# Patient Record
Sex: FEMALE | Race: WHITE | NOT HISPANIC OR LATINO | URBAN - METROPOLITAN AREA
[De-identification: names, ages, dates, MRNs, and addresses within clinical notes are randomized per-mention and may not be internally consistent; named-entity substitution may affect disease eponyms.]

---

## 2017-03-30 ENCOUNTER — OFFICE (AMBULATORY)
Dept: URBAN - METROPOLITAN AREA CLINIC 64 | Facility: CLINIC | Age: 67
End: 2017-03-30

## 2017-03-30 VITALS
WEIGHT: 172 LBS | HEART RATE: 85 BPM | HEIGHT: 67 IN | SYSTOLIC BLOOD PRESSURE: 132 MMHG | DIASTOLIC BLOOD PRESSURE: 90 MMHG

## 2017-03-30 DIAGNOSIS — R10.13 EPIGASTRIC PAIN: ICD-10-CM

## 2017-03-30 DIAGNOSIS — R11.0 NAUSEA: ICD-10-CM

## 2017-03-30 DIAGNOSIS — R12 HEARTBURN: ICD-10-CM

## 2017-03-30 DIAGNOSIS — Z86.010 PERSONAL HISTORY OF COLONIC POLYPS: ICD-10-CM

## 2017-03-30 DIAGNOSIS — K22.70 BARRETT'S ESOPHAGUS WITHOUT DYSPLASIA: ICD-10-CM

## 2017-03-30 PROCEDURE — 99214 OFFICE O/P EST MOD 30 MIN: CPT | Performed by: NURSE PRACTITIONER

## 2017-03-30 RX ORDER — DICYCLOMINE HYDROCHLORIDE 20 MG/1
40 TABLET ORAL
Qty: 60 | Refills: 11 | Status: COMPLETED
Start: 2017-03-30 | End: 2017-05-12

## 2017-04-19 ENCOUNTER — HOSPITAL ENCOUNTER (OUTPATIENT)
Dept: OTHER | Facility: HOSPITAL | Age: 67
Setting detail: SPECIMEN
Discharge: HOME | End: 2017-04-19
Attending: INTERNAL MEDICINE | Admitting: INTERNAL MEDICINE

## 2017-04-19 ENCOUNTER — ON CAMPUS - OUTPATIENT (AMBULATORY)
Dept: URBAN - METROPOLITAN AREA HOSPITAL 2 | Facility: HOSPITAL | Age: 67
End: 2017-04-19
Payer: MEDICARE

## 2017-04-19 ENCOUNTER — OFFICE (AMBULATORY)
Dept: URBAN - METROPOLITAN AREA CLINIC 64 | Facility: CLINIC | Age: 67
End: 2017-04-19
Payer: MEDICARE

## 2017-04-19 VITALS
SYSTOLIC BLOOD PRESSURE: 133 MMHG | SYSTOLIC BLOOD PRESSURE: 134 MMHG | HEART RATE: 76 BPM | RESPIRATION RATE: 18 BRPM | TEMPERATURE: 96.6 F | OXYGEN SATURATION: 98 % | DIASTOLIC BLOOD PRESSURE: 77 MMHG | OXYGEN SATURATION: 100 % | OXYGEN SATURATION: 95 % | DIASTOLIC BLOOD PRESSURE: 79 MMHG | HEART RATE: 70 BPM | SYSTOLIC BLOOD PRESSURE: 160 MMHG | DIASTOLIC BLOOD PRESSURE: 83 MMHG | DIASTOLIC BLOOD PRESSURE: 68 MMHG | SYSTOLIC BLOOD PRESSURE: 130 MMHG | OXYGEN SATURATION: 93 % | SYSTOLIC BLOOD PRESSURE: 103 MMHG | DIASTOLIC BLOOD PRESSURE: 94 MMHG | SYSTOLIC BLOOD PRESSURE: 116 MMHG | OXYGEN SATURATION: 97 % | HEART RATE: 77 BPM | OXYGEN SATURATION: 96 % | HEART RATE: 88 BPM | HEART RATE: 82 BPM | DIASTOLIC BLOOD PRESSURE: 104 MMHG | RESPIRATION RATE: 16 BRPM | DIASTOLIC BLOOD PRESSURE: 55 MMHG | DIASTOLIC BLOOD PRESSURE: 80 MMHG | HEART RATE: 87 BPM | SYSTOLIC BLOOD PRESSURE: 136 MMHG | SYSTOLIC BLOOD PRESSURE: 140 MMHG | WEIGHT: 167.4 LBS | RESPIRATION RATE: 19 BRPM | HEART RATE: 83 BPM | SYSTOLIC BLOOD PRESSURE: 178 MMHG | HEIGHT: 67 IN | HEART RATE: 86 BPM

## 2017-04-19 DIAGNOSIS — K62.89 OTHER SPECIFIED DISEASES OF ANUS AND RECTUM: ICD-10-CM

## 2017-04-19 DIAGNOSIS — K21.0 GASTRO-ESOPHAGEAL REFLUX DISEASE WITH ESOPHAGITIS: ICD-10-CM

## 2017-04-19 DIAGNOSIS — K62.1 RECTAL POLYP: ICD-10-CM

## 2017-04-19 DIAGNOSIS — D12.2 BENIGN NEOPLASM OF ASCENDING COLON: ICD-10-CM

## 2017-04-19 DIAGNOSIS — Z86.010 PERSONAL HISTORY OF COLONIC POLYPS: ICD-10-CM

## 2017-04-19 DIAGNOSIS — K22.70 BARRETT'S ESOPHAGUS WITHOUT DYSPLASIA: ICD-10-CM

## 2017-04-19 DIAGNOSIS — K44.9 DIAPHRAGMATIC HERNIA WITHOUT OBSTRUCTION OR GANGRENE: ICD-10-CM

## 2017-04-19 DIAGNOSIS — K64.8 OTHER HEMORRHOIDS: ICD-10-CM

## 2017-04-19 DIAGNOSIS — K57.30 DIVERTICULOSIS OF LARGE INTESTINE WITHOUT PERFORATION OR ABS: ICD-10-CM

## 2017-04-19 LAB
GI HISTOLOGY: A. UNSPECIFIED: (no result)
GI HISTOLOGY: B. UNSPECIFIED: (no result)
GI HISTOLOGY: C. UNSPECIFIED: (no result)
GI HISTOLOGY: PDF REPORT: (no result)

## 2017-04-19 PROCEDURE — 43239 EGD BIOPSY SINGLE/MULTIPLE: CPT | Performed by: INTERNAL MEDICINE

## 2017-04-19 PROCEDURE — 88305 TISSUE EXAM BY PATHOLOGIST: CPT | Mod: 26 | Performed by: INTERNAL MEDICINE

## 2017-04-19 PROCEDURE — 43450 DILATE ESOPHAGUS 1/MULT PASS: CPT | Performed by: INTERNAL MEDICINE

## 2017-04-19 PROCEDURE — 45380 COLONOSCOPY AND BIOPSY: CPT | Performed by: INTERNAL MEDICINE

## 2017-04-19 RX ORDER — OMEPRAZOLE 40 MG/1
CAPSULE, DELAYED RELEASE ORAL
Qty: 90 | Refills: 5 | Status: COMPLETED
End: 2019-04-22

## 2017-04-19 RX ORDER — RANITIDINE HYDROCHLORIDE 150 MG/1
CAPSULE ORAL
Qty: 90 | Refills: 5 | Status: COMPLETED
End: 2019-03-25

## 2017-04-19 RX ADMIN — PROPOFOL: 10 INJECTION, EMULSION INTRAVENOUS at 07:57

## 2017-05-12 ENCOUNTER — OFFICE (AMBULATORY)
Dept: URBAN - METROPOLITAN AREA CLINIC 64 | Facility: CLINIC | Age: 67
End: 2017-05-12

## 2017-05-12 VITALS
SYSTOLIC BLOOD PRESSURE: 99 MMHG | DIASTOLIC BLOOD PRESSURE: 67 MMHG | HEIGHT: 67 IN | HEART RATE: 89 BPM | WEIGHT: 173 LBS

## 2017-05-12 DIAGNOSIS — K44.9 DIAPHRAGMATIC HERNIA WITHOUT OBSTRUCTION OR GANGRENE: ICD-10-CM

## 2017-05-12 DIAGNOSIS — I95.9 HYPOTENSION, UNSPECIFIED: ICD-10-CM

## 2017-05-12 DIAGNOSIS — K22.70 BARRETT'S ESOPHAGUS WITHOUT DYSPLASIA: ICD-10-CM

## 2017-05-12 DIAGNOSIS — K31.84 GASTROPARESIS: ICD-10-CM

## 2017-05-12 DIAGNOSIS — R55 SYNCOPE AND COLLAPSE: ICD-10-CM

## 2017-05-12 DIAGNOSIS — K57.30 DIVERTICULOSIS OF LARGE INTESTINE WITHOUT PERFORATION OR ABS: ICD-10-CM

## 2017-05-12 DIAGNOSIS — Z86.010 PERSONAL HISTORY OF COLONIC POLYPS: ICD-10-CM

## 2017-05-12 PROCEDURE — 99213 OFFICE O/P EST LOW 20 MIN: CPT | Performed by: INTERNAL MEDICINE

## 2017-05-12 RX ORDER — LISINOPRIL 5 MG/1
TABLET ORAL
Qty: 90 | Refills: 5 | Status: COMPLETED
End: 2021-04-02

## 2017-05-15 ENCOUNTER — HOSPITAL ENCOUNTER (OUTPATIENT)
Dept: GENERAL RADIOLOGY | Facility: HOSPITAL | Age: 67
Discharge: HOME | End: 2017-05-15
Attending: INTERNAL MEDICINE | Admitting: INTERNAL MEDICINE

## 2017-06-14 ENCOUNTER — OFFICE (AMBULATORY)
Dept: URBAN - METROPOLITAN AREA CLINIC 64 | Facility: CLINIC | Age: 67
End: 2017-06-14

## 2017-06-14 VITALS
DIASTOLIC BLOOD PRESSURE: 64 MMHG | WEIGHT: 173 LBS | HEIGHT: 67 IN | SYSTOLIC BLOOD PRESSURE: 103 MMHG | HEART RATE: 86 BPM

## 2017-06-14 DIAGNOSIS — K31.84 GASTROPARESIS: ICD-10-CM

## 2017-06-14 DIAGNOSIS — K22.70 BARRETT'S ESOPHAGUS WITHOUT DYSPLASIA: ICD-10-CM

## 2017-06-14 DIAGNOSIS — K44.9 DIAPHRAGMATIC HERNIA WITHOUT OBSTRUCTION OR GANGRENE: ICD-10-CM

## 2017-06-14 DIAGNOSIS — R10.13 EPIGASTRIC PAIN: ICD-10-CM

## 2017-06-14 DIAGNOSIS — Z86.010 PERSONAL HISTORY OF COLONIC POLYPS: ICD-10-CM

## 2017-06-14 PROCEDURE — 99213 OFFICE O/P EST LOW 20 MIN: CPT | Performed by: INTERNAL MEDICINE

## 2017-06-14 RX ORDER — RANITIDINE HYDROCHLORIDE 150 MG/1
CAPSULE ORAL
Qty: 90 | Refills: 5 | Status: COMPLETED
End: 2019-03-25

## 2017-06-14 RX ORDER — OMEPRAZOLE 40 MG/1
CAPSULE, DELAYED RELEASE ORAL
Qty: 90 | Refills: 5 | Status: COMPLETED
End: 2019-04-22

## 2017-09-08 ENCOUNTER — HOSPITAL ENCOUNTER (OUTPATIENT)
Dept: MRI IMAGING | Facility: HOSPITAL | Age: 67
Discharge: HOME | End: 2017-09-08
Attending: ORTHOPAEDIC SURGERY | Admitting: ORTHOPAEDIC SURGERY

## 2018-08-28 ENCOUNTER — OFFICE (AMBULATORY)
Dept: URBAN - METROPOLITAN AREA CLINIC 64 | Facility: CLINIC | Age: 68
End: 2018-08-28

## 2018-08-28 VITALS
SYSTOLIC BLOOD PRESSURE: 138 MMHG | HEIGHT: 67 IN | HEART RATE: 94 BPM | DIASTOLIC BLOOD PRESSURE: 83 MMHG | WEIGHT: 153 LBS

## 2018-08-28 DIAGNOSIS — K22.70 BARRETT'S ESOPHAGUS WITHOUT DYSPLASIA: ICD-10-CM

## 2018-08-28 DIAGNOSIS — R13.19 OTHER DYSPHAGIA: ICD-10-CM

## 2018-08-28 DIAGNOSIS — I95.9 HYPOTENSION, UNSPECIFIED: ICD-10-CM

## 2018-08-28 DIAGNOSIS — Z86.010 PERSONAL HISTORY OF COLONIC POLYPS: ICD-10-CM

## 2018-08-28 DIAGNOSIS — K44.9 DIAPHRAGMATIC HERNIA WITHOUT OBSTRUCTION OR GANGRENE: ICD-10-CM

## 2018-08-28 PROCEDURE — 99213 OFFICE O/P EST LOW 20 MIN: CPT | Performed by: INTERNAL MEDICINE

## 2018-08-28 RX ORDER — DOXYCYCLINE 100 MG/1
200 TABLET, FILM COATED ORAL
Qty: 20 | Refills: 1 | Status: COMPLETED
Start: 2018-08-28 | End: 2019-03-25

## 2018-08-28 RX ORDER — OMEPRAZOLE 40 MG/1
CAPSULE, DELAYED RELEASE ORAL
Qty: 90 | Refills: 5 | Status: COMPLETED
End: 2019-04-22

## 2018-10-02 ENCOUNTER — ON CAMPUS - OUTPATIENT (AMBULATORY)
Dept: URBAN - METROPOLITAN AREA HOSPITAL 77 | Facility: HOSPITAL | Age: 68
End: 2018-10-02
Payer: MEDICARE

## 2018-10-02 DIAGNOSIS — R10.32 LEFT LOWER QUADRANT PAIN: ICD-10-CM

## 2018-10-02 DIAGNOSIS — K59.00 CONSTIPATION, UNSPECIFIED: ICD-10-CM

## 2018-10-02 DIAGNOSIS — K57.30 DIVERTICULOSIS OF LARGE INTESTINE WITHOUT PERFORATION OR ABS: ICD-10-CM

## 2018-10-02 DIAGNOSIS — R13.10 DYSPHAGIA, UNSPECIFIED: ICD-10-CM

## 2018-10-02 DIAGNOSIS — Z86.010 PERSONAL HISTORY OF COLONIC POLYPS: ICD-10-CM

## 2018-10-02 DIAGNOSIS — Z87.19 PERSONAL HISTORY OF OTHER DISEASES OF THE DIGESTIVE SYSTEM: ICD-10-CM

## 2018-10-02 DIAGNOSIS — K44.9 DIAPHRAGMATIC HERNIA WITHOUT OBSTRUCTION OR GANGRENE: ICD-10-CM

## 2018-10-02 PROCEDURE — 43235 EGD DIAGNOSTIC BRUSH WASH: CPT | Performed by: INTERNAL MEDICINE

## 2018-10-02 PROCEDURE — 43450 DILATE ESOPHAGUS 1/MULT PASS: CPT | Performed by: INTERNAL MEDICINE

## 2018-10-02 PROCEDURE — 45330 DIAGNOSTIC SIGMOIDOSCOPY: CPT | Performed by: INTERNAL MEDICINE

## 2019-03-25 ENCOUNTER — OFFICE (AMBULATORY)
Dept: URBAN - METROPOLITAN AREA CLINIC 64 | Facility: CLINIC | Age: 69
End: 2019-03-25

## 2019-03-25 VITALS
SYSTOLIC BLOOD PRESSURE: 98 MMHG | DIASTOLIC BLOOD PRESSURE: 69 MMHG | HEART RATE: 93 BPM | HEIGHT: 67 IN | WEIGHT: 173 LBS

## 2019-03-25 DIAGNOSIS — R07.9 CHEST PAIN, UNSPECIFIED: ICD-10-CM

## 2019-03-25 DIAGNOSIS — R10.13 EPIGASTRIC PAIN: ICD-10-CM

## 2019-03-25 PROCEDURE — 99214 OFFICE O/P EST MOD 30 MIN: CPT | Performed by: NURSE PRACTITIONER

## 2019-03-25 RX ORDER — OMEPRAZOLE 40 MG/1
CAPSULE, DELAYED RELEASE ORAL
Qty: 90 | Refills: 5 | Status: ACTIVE
Start: 2019-03-25

## 2019-03-25 RX ORDER — SUCRALFATE 1 G/1
4 TABLET ORAL
Qty: 120 | Refills: 1 | Status: COMPLETED
Start: 2019-03-25 | End: 2021-04-02

## 2019-04-23 ENCOUNTER — ON CAMPUS - OUTPATIENT (AMBULATORY)
Dept: URBAN - METROPOLITAN AREA HOSPITAL 2 | Facility: HOSPITAL | Age: 69
End: 2019-04-23
Payer: MEDICARE

## 2019-04-23 VITALS
OXYGEN SATURATION: 95 % | HEART RATE: 76 BPM | SYSTOLIC BLOOD PRESSURE: 145 MMHG | DIASTOLIC BLOOD PRESSURE: 84 MMHG | SYSTOLIC BLOOD PRESSURE: 125 MMHG | HEART RATE: 70 BPM | DIASTOLIC BLOOD PRESSURE: 90 MMHG | RESPIRATION RATE: 18 BRPM | HEART RATE: 67 BPM | DIASTOLIC BLOOD PRESSURE: 73 MMHG | HEIGHT: 67 IN | OXYGEN SATURATION: 97 % | WEIGHT: 171 LBS | RESPIRATION RATE: 20 BRPM | DIASTOLIC BLOOD PRESSURE: 78 MMHG | SYSTOLIC BLOOD PRESSURE: 122 MMHG | RESPIRATION RATE: 21 BRPM | OXYGEN SATURATION: 96 % | HEART RATE: 72 BPM | OXYGEN SATURATION: 100 % | TEMPERATURE: 97.1 F | HEART RATE: 73 BPM | DIASTOLIC BLOOD PRESSURE: 72 MMHG | SYSTOLIC BLOOD PRESSURE: 142 MMHG | OXYGEN SATURATION: 94 %

## 2019-04-23 DIAGNOSIS — K44.9 DIAPHRAGMATIC HERNIA WITHOUT OBSTRUCTION OR GANGRENE: ICD-10-CM

## 2019-04-23 DIAGNOSIS — R07.9 CHEST PAIN, UNSPECIFIED: ICD-10-CM

## 2019-04-23 DIAGNOSIS — K22.2 ESOPHAGEAL OBSTRUCTION: ICD-10-CM

## 2019-04-23 DIAGNOSIS — K22.70 BARRETT'S ESOPHAGUS WITHOUT DYSPLASIA: ICD-10-CM

## 2019-04-23 PROCEDURE — 43450 DILATE ESOPHAGUS 1/MULT PASS: CPT | Performed by: INTERNAL MEDICINE

## 2019-04-23 PROCEDURE — 43235 EGD DIAGNOSTIC BRUSH WASH: CPT | Performed by: INTERNAL MEDICINE

## 2020-03-05 PROBLEM — M48.061 SPINAL STENOSIS OF LUMBAR REGION: Status: ACTIVE | Noted: 2017-09-13

## 2020-03-05 PROBLEM — M48.07 STENOSIS OF LUMBOSACRAL SPINE: Status: ACTIVE | Noted: 2017-09-13

## 2020-03-05 PROBLEM — Z83.3 FAMILY HISTORY OF DIABETES MELLITUS: Status: ACTIVE | Noted: 2020-03-05

## 2020-03-05 RX ORDER — OMEPRAZOLE 40 MG/1
CAPSULE, DELAYED RELEASE ORAL
COMMUNITY
Start: 2019-12-16 | End: 2020-03-05

## 2020-03-05 RX ORDER — IBUPROFEN 800 MG/1
TABLET ORAL
COMMUNITY
Start: 2016-05-17

## 2020-03-05 RX ORDER — RANITIDINE HCL 75 MG
TABLET ORAL
COMMUNITY
Start: 2017-08-30 | End: 2022-05-10

## 2020-03-05 RX ORDER — LISINOPRIL 5 MG/1
TABLET ORAL
COMMUNITY
Start: 2019-12-16 | End: 2022-05-31

## 2020-03-05 RX ORDER — LANSOPRAZOLE 30 MG/1
CAPSULE, DELAYED RELEASE ORAL
COMMUNITY
Start: 2016-05-17 | End: 2020-03-05

## 2020-03-05 RX ORDER — HYDROCODONE BITARTRATE AND ACETAMINOPHEN 10; 325 MG/1; MG/1
TABLET ORAL
COMMUNITY
Start: 2017-08-30 | End: 2022-05-31

## 2020-03-05 RX ORDER — LISINOPRIL AND HYDROCHLOROTHIAZIDE 12.5; 1 MG/1; MG/1
TABLET ORAL
COMMUNITY
Start: 2016-05-17 | End: 2020-03-05

## 2020-03-05 NOTE — PROGRESS NOTES
Subjective: dizziness    Patient ID: Nola Tariq is a 70 y.o. female.    CHIEF COMPLAINT:dizziness    History of Present Illness      Patient notes she has had several episodes of dizziness and passing out since 2012.   Had a few episodes of feeling light headed.  First episode passed out when driving car to urgent care, was found to have pneumonia,   Had brief warning, put on brake and pulled car over, then had LOC brief LOC     Has not passed out completely since 2012 event driving    Has multiple episodes of warning , queezy feeling in stomach, light headed feeling, sit down or lay down then it passes with out LOC    Most usually standing , occ while sitting , not when laying down.    No noted associate with headache.     Once was too hot, not noted with being sleepy or hungry       She notes she has been evaluated by several doctors, cardiology, neurology and spinal surgeon. Saw dr anders, tried gabapentin, did not help.   she has all normal labs, cardiac work up, mri and EEG, had stress test , echo.     .She notes the only thing they can find is a Chiari malformation.      Patient had dexa scan on 8/12/19 showing osteoporosis.     Neuropathy in both feet, has a lot of back problems.    Low back pain since 2008.   Fell and fractured three vertebrae ,    She does get injections in her back for arthritis. Facet injections and epidurals and going to PT    Patient was dx with sleep apnea, she doesn't sleep with CPAP machine, she does have one.      Pt had migraine for years on right side of head , nausea vomiting if not treated right away, now Headaches not migraine , she has 15 - 20 headaches a month, she take OTC and they go away after 3 min.    Doesn't know what triggers them.  Family hx of headaches, father, sister and brother.      The following portions of the patient's history were reviewed and updated as appropriate: allergies, current medications, past family history, past medical history, past social  history, past surgical history and problem list.      Family History   Problem Relation Age of Onset   • Migraines Father    • Migraines Sister    • Migraines Brother        Past Medical History:   Diagnosis Date   • Arthritis    • Tijerina esophagus    • DJD (degenerative joint disease)    • GERD (gastroesophageal reflux disease)    • Hypertension    • Migraine        Social History     Socioeconomic History   • Marital status:      Spouse name: Not on file   • Number of children: Not on file   • Years of education: Not on file   • Highest education level: Not on file   Tobacco Use   • Smoking status: Current Every Day Smoker     Types: Cigarettes   Substance and Sexual Activity   • Alcohol use: Never     Frequency: Never   • Drug use: Never   • Sexual activity: Defer         Current Outpatient Medications:   •  Acetaminophen-Caffeine (EXCEDRIN TENSION HEADACHE) 500-65 MG tablet, EXCEDRIN TENSION HEADACHE 500-65 MG TABS, Disp: , Rfl:   •  Cetirizine HCl (ZYRTEC ALLERGY) 10 MG capsule, ZYRTEC ALLERGY 10 MG CAPS, Disp: , Rfl:   •  fluconazole (DIFLUCAN) 150 MG tablet, , Disp: , Rfl:   •  ibuprofen (ADVIL,MOTRIN) 800 MG tablet, IBUPROFEN 800 MG TABS, Disp: , Rfl:   •  lisinopril (PRINIVIL,ZESTRIL) 5 MG tablet, , Disp: , Rfl:   •  meloxicam (MOBIC) 15 MG tablet, , Disp: , Rfl:   •  Multiple Vitamins-Minerals (MULTIVITAMIN WITH MINERALS) tablet tablet, Take 1 tablet by mouth Daily., Disp: , Rfl:   •  raNITIdine (ZANTAC) 75 MG tablet, RANITIDINE HCL 75 MG TABS, Disp: , Rfl:   •  vitamin D (ERGOCALCIFEROL) 1.25 MG (48286 UT) capsule capsule, Take 50,000 Units by mouth 1 (One) Time Per Week., Disp: , Rfl:   •  HYDROcodone-acetaminophen (NORCO)  MG per tablet, NORCO  MG TABS, Disp: , Rfl:   •  nystatin (MYCOSTATIN) [id number] UNIT/ML suspension, , Disp: , Rfl:     Review of Systems   Constitutional: Negative for fatigue and fever.   HENT: Positive for hearing loss. Negative for ear discharge and ear pain.     Eyes: Negative for discharge and itching.   Cardiovascular: Negative for leg swelling.   Gastrointestinal: Negative for abdominal pain and nausea.   Endocrine: Negative for cold intolerance and heat intolerance.   Genitourinary: Negative for urgency.   Musculoskeletal: Positive for back pain. Negative for neck pain and neck stiffness.   Neurological: Positive for light-headedness and headaches. Negative for dizziness.   Psychiatric/Behavioral: Negative for confusion and sleep disturbance.        I have reviewed ROS completed by medical assistant.     Objective:    Neurologic Exam     Mental Status   Oriented to person, place, and time.   Level of consciousness: alert    Cranial Nerves   Cranial nerves II through XII intact.     Motor Exam   Muscle bulk: normal    Strength   Strength 5/5 throughout.     Sensory Exam   Light touch normal.   Proprioception normal.     Gait, Coordination, and Reflexes     Gait  Gait: normal    Coordination   Romberg: negative    Reflexes   Right biceps: 2+  Left biceps: 2+  Right patellar: 2+  Left patellar: 2+      Physical Exam   Constitutional: She is oriented to person, place, and time.   Neurological: She is oriented to person, place, and time. She has normal strength. She has a normal Romberg Test. Gait normal.   Reflex Scores:       Bicep reflexes are 2+ on the right side and 2+ on the left side.       Patellar reflexes are 2+ on the right side and 2+ on the left side.      Assessment/Plan:    Nola was seen today for dizziness.    Diagnoses and all orders for this visit:    Vasovagal near syncope    Migraine without aura and without status migrainosus, not intractable    Spinal stenosis of lumbar region with neurogenic claudication    pt has had frequent near syncopal spells but apparently is only had one episode with loss of consciousness.  She has been evaluated thoroughly and has seen multiple doctors including neurologist cardiologist and a spine surgeon.  Additional  testing does not appear warranted for the symptoms.  It is recommended that when she feels dizzy or lightheaded that she sit down or lie down to relieve the symptoms as she has been doing.  It is recommended that she continue the conservative management of her lumbar spine issues.  If her symptoms of claudication worsen reevaluation and consideration of spine surgery may be indicated in the future.      This document has been electronically signed by Joseph Seipel, MD on March 6, 2020 1:44 PM

## 2020-03-06 ENCOUNTER — OFFICE VISIT (OUTPATIENT)
Dept: NEUROLOGY | Facility: CLINIC | Age: 70
End: 2020-03-06

## 2020-03-06 VITALS — WEIGHT: 170 LBS | HEIGHT: 64 IN | BODY MASS INDEX: 29.02 KG/M2

## 2020-03-06 DIAGNOSIS — G43.009 MIGRAINE WITHOUT AURA AND WITHOUT STATUS MIGRAINOSUS, NOT INTRACTABLE: ICD-10-CM

## 2020-03-06 DIAGNOSIS — M48.062 SPINAL STENOSIS OF LUMBAR REGION WITH NEUROGENIC CLAUDICATION: ICD-10-CM

## 2020-03-06 DIAGNOSIS — R55 VASOVAGAL NEAR SYNCOPE: Primary | ICD-10-CM

## 2020-03-06 PROBLEM — G43.909 MIGRAINE: Status: ACTIVE | Noted: 2020-03-06

## 2020-03-06 PROCEDURE — 99203 OFFICE O/P NEW LOW 30 MIN: CPT | Performed by: PSYCHIATRY & NEUROLOGY

## 2020-03-06 RX ORDER — FLUCONAZOLE 150 MG/1
TABLET ORAL
COMMUNITY
Start: 2020-02-25 | End: 2022-05-31

## 2020-03-06 RX ORDER — MULTIPLE VITAMINS W/ MINERALS TAB 9MG-400MCG
1 TAB ORAL DAILY
COMMUNITY

## 2020-03-06 RX ORDER — MELOXICAM 15 MG/1
TABLET ORAL
COMMUNITY
Start: 2020-02-25 | End: 2022-05-31

## 2020-03-06 RX ORDER — ERGOCALCIFEROL 1.25 MG/1
50000 CAPSULE ORAL WEEKLY
COMMUNITY

## 2021-04-02 ENCOUNTER — OFFICE (AMBULATORY)
Dept: URBAN - METROPOLITAN AREA CLINIC 64 | Facility: CLINIC | Age: 71
End: 2021-04-02

## 2021-04-02 VITALS
SYSTOLIC BLOOD PRESSURE: 132 MMHG | DIASTOLIC BLOOD PRESSURE: 105 MMHG | HEART RATE: 103 BPM | WEIGHT: 173 LBS | HEIGHT: 67 IN

## 2021-04-02 DIAGNOSIS — K14.6 GLOSSODYNIA: ICD-10-CM

## 2021-04-02 DIAGNOSIS — Z86.010 PERSONAL HISTORY OF COLONIC POLYPS: ICD-10-CM

## 2021-04-02 DIAGNOSIS — K30 FUNCTIONAL DYSPEPSIA: ICD-10-CM

## 2021-04-02 PROCEDURE — 99214 OFFICE O/P EST MOD 30 MIN: CPT | Performed by: NURSE PRACTITIONER

## 2021-04-02 RX ORDER — SUCRALFATE 1 G/1
4 TABLET ORAL
Qty: 120 | Refills: 5 | Status: COMPLETED
Start: 2021-04-02 | End: 2021-10-25

## 2021-05-18 ENCOUNTER — OFFICE (AMBULATORY)
Dept: URBAN - METROPOLITAN AREA PATHOLOGY 4 | Facility: PATHOLOGY | Age: 71
End: 2021-05-18
Payer: MEDICARE

## 2021-05-18 ENCOUNTER — ON CAMPUS - OUTPATIENT (AMBULATORY)
Dept: URBAN - METROPOLITAN AREA HOSPITAL 2 | Facility: HOSPITAL | Age: 71
End: 2021-05-18
Payer: MEDICARE

## 2021-05-18 ENCOUNTER — OFFICE (AMBULATORY)
Dept: URBAN - METROPOLITAN AREA PATHOLOGY 4 | Facility: PATHOLOGY | Age: 71
End: 2021-05-18
Payer: COMMERCIAL

## 2021-05-18 VITALS
DIASTOLIC BLOOD PRESSURE: 79 MMHG | OXYGEN SATURATION: 97 % | OXYGEN SATURATION: 99 % | SYSTOLIC BLOOD PRESSURE: 131 MMHG | OXYGEN SATURATION: 94 % | HEART RATE: 98 BPM | OXYGEN SATURATION: 98 % | DIASTOLIC BLOOD PRESSURE: 71 MMHG | RESPIRATION RATE: 16 BRPM | HEIGHT: 67 IN | HEART RATE: 100 BPM | DIASTOLIC BLOOD PRESSURE: 74 MMHG | DIASTOLIC BLOOD PRESSURE: 97 MMHG | HEART RATE: 84 BPM | HEART RATE: 104 BPM | SYSTOLIC BLOOD PRESSURE: 129 MMHG | RESPIRATION RATE: 18 BRPM | DIASTOLIC BLOOD PRESSURE: 81 MMHG | SYSTOLIC BLOOD PRESSURE: 122 MMHG | DIASTOLIC BLOOD PRESSURE: 52 MMHG | SYSTOLIC BLOOD PRESSURE: 128 MMHG | OXYGEN SATURATION: 96 % | DIASTOLIC BLOOD PRESSURE: 106 MMHG | HEART RATE: 86 BPM | SYSTOLIC BLOOD PRESSURE: 125 MMHG | SYSTOLIC BLOOD PRESSURE: 136 MMHG | TEMPERATURE: 98 F | SYSTOLIC BLOOD PRESSURE: 161 MMHG | HEART RATE: 110 BPM | SYSTOLIC BLOOD PRESSURE: 111 MMHG | WEIGHT: 170 LBS

## 2021-05-18 DIAGNOSIS — K62.1 RECTAL POLYP: ICD-10-CM

## 2021-05-18 DIAGNOSIS — K64.8 OTHER HEMORRHOIDS: ICD-10-CM

## 2021-05-18 DIAGNOSIS — K44.9 DIAPHRAGMATIC HERNIA WITHOUT OBSTRUCTION OR GANGRENE: ICD-10-CM

## 2021-05-18 DIAGNOSIS — R13.19 OTHER DYSPHAGIA: ICD-10-CM

## 2021-05-18 DIAGNOSIS — K63.5 POLYP OF COLON: ICD-10-CM

## 2021-05-18 DIAGNOSIS — K22.2 ESOPHAGEAL OBSTRUCTION: ICD-10-CM

## 2021-05-18 DIAGNOSIS — K57.30 DIVERTICULOSIS OF LARGE INTESTINE WITHOUT PERFORATION OR ABS: ICD-10-CM

## 2021-05-18 DIAGNOSIS — Z86.010 PERSONAL HISTORY OF COLONIC POLYPS: ICD-10-CM

## 2021-05-18 LAB
GI HISTOLOGY: A. UNSPECIFIED: (no result)
GI HISTOLOGY: B. UNSPECIFIED: (no result)
GI HISTOLOGY: PDF REPORT: (no result)

## 2021-05-18 PROCEDURE — 88305 TISSUE EXAM BY PATHOLOGIST: CPT | Mod: 26 | Performed by: INTERNAL MEDICINE

## 2021-05-18 PROCEDURE — 45385 COLONOSCOPY W/LESION REMOVAL: CPT | Mod: PT | Performed by: INTERNAL MEDICINE

## 2021-05-18 PROCEDURE — 43235 EGD DIAGNOSTIC BRUSH WASH: CPT | Performed by: INTERNAL MEDICINE

## 2021-05-18 PROCEDURE — 43450 DILATE ESOPHAGUS 1/MULT PASS: CPT | Performed by: INTERNAL MEDICINE

## 2021-05-18 RX ORDER — MENTHOL AND BENZOCAINE 10; 15 MG/1; MG/1
LOZENGE ORAL
Qty: 20 | Refills: 5 | Status: COMPLETED
Start: 2021-05-18 | End: 2021-10-25

## 2021-10-25 ENCOUNTER — OFFICE (AMBULATORY)
Dept: URBAN - METROPOLITAN AREA CLINIC 64 | Facility: CLINIC | Age: 71
End: 2021-10-25

## 2021-10-25 VITALS
HEIGHT: 67 IN | DIASTOLIC BLOOD PRESSURE: 87 MMHG | HEART RATE: 86 BPM | WEIGHT: 167 LBS | SYSTOLIC BLOOD PRESSURE: 144 MMHG

## 2021-10-25 DIAGNOSIS — R12 HEARTBURN: ICD-10-CM

## 2021-10-25 DIAGNOSIS — K21.9 GASTRO-ESOPHAGEAL REFLUX DISEASE WITHOUT ESOPHAGITIS: ICD-10-CM

## 2021-10-25 PROBLEM — K22.2 ESOPHAGEAL OBSTRUCTION: Status: ACTIVE | Noted: 2019-04-23

## 2021-10-25 PROCEDURE — 99213 OFFICE O/P EST LOW 20 MIN: CPT | Performed by: INTERNAL MEDICINE

## 2021-10-25 RX ORDER — METOCLOPRAMIDE HYDROCHLORIDE 10 MG/1
TABLET ORAL
Qty: 90 | Refills: 10 | Status: COMPLETED
Start: 2021-10-25 | End: 2022-11-16

## 2021-10-25 RX ORDER — OMEPRAZOLE 40 MG/1
CAPSULE, DELAYED RELEASE ORAL
Qty: 90 | Refills: 5 | Status: ACTIVE
Start: 2019-03-25

## 2021-10-25 RX ORDER — METOCLOPRAMIDE HYDROCHLORIDE 10 MG/1
TABLET ORAL
Qty: 90 | Refills: 10 | Status: ACTIVE
Start: 2021-10-25

## 2021-10-25 RX ORDER — SUCRALFATE 1 G/1
4 TABLET ORAL
Qty: 120 | Refills: 11 | Status: COMPLETED
Start: 2021-10-25 | End: 2024-01-29

## 2021-11-10 ENCOUNTER — OFFICE (AMBULATORY)
Dept: URBAN - METROPOLITAN AREA PATHOLOGY 4 | Facility: PATHOLOGY | Age: 71
End: 2021-11-10
Payer: MEDICARE

## 2021-11-10 ENCOUNTER — ON CAMPUS - OUTPATIENT (AMBULATORY)
Dept: URBAN - METROPOLITAN AREA HOSPITAL 2 | Facility: HOSPITAL | Age: 71
End: 2021-11-10
Payer: MEDICARE

## 2021-11-10 ENCOUNTER — OFFICE (AMBULATORY)
Dept: URBAN - METROPOLITAN AREA PATHOLOGY 4 | Facility: PATHOLOGY | Age: 71
End: 2021-11-10

## 2021-11-10 VITALS
DIASTOLIC BLOOD PRESSURE: 90 MMHG | OXYGEN SATURATION: 96 % | RESPIRATION RATE: 16 BRPM | RESPIRATION RATE: 18 BRPM | HEART RATE: 90 BPM | HEIGHT: 67 IN | OXYGEN SATURATION: 95 % | RESPIRATION RATE: 19 BRPM | SYSTOLIC BLOOD PRESSURE: 139 MMHG | OXYGEN SATURATION: 99 % | SYSTOLIC BLOOD PRESSURE: 150 MMHG | HEART RATE: 76 BPM | TEMPERATURE: 96.7 F | SYSTOLIC BLOOD PRESSURE: 147 MMHG | OXYGEN SATURATION: 98 % | HEART RATE: 77 BPM | DIASTOLIC BLOOD PRESSURE: 109 MMHG | SYSTOLIC BLOOD PRESSURE: 154 MMHG | DIASTOLIC BLOOD PRESSURE: 93 MMHG | WEIGHT: 170 LBS | DIASTOLIC BLOOD PRESSURE: 106 MMHG | DIASTOLIC BLOOD PRESSURE: 99 MMHG

## 2021-11-10 DIAGNOSIS — K22.70 BARRETT'S ESOPHAGUS WITHOUT DYSPLASIA: ICD-10-CM

## 2021-11-10 DIAGNOSIS — K22.2 ESOPHAGEAL OBSTRUCTION: ICD-10-CM

## 2021-11-10 DIAGNOSIS — K44.9 DIAPHRAGMATIC HERNIA WITHOUT OBSTRUCTION OR GANGRENE: ICD-10-CM

## 2021-11-10 LAB
GI HISTOLOGY: A. UNSPECIFIED: (no result)
GI HISTOLOGY: PDF REPORT: (no result)

## 2021-11-10 PROCEDURE — 43450 DILATE ESOPHAGUS 1/MULT PASS: CPT | Performed by: INTERNAL MEDICINE

## 2021-11-10 PROCEDURE — 88305 TISSUE EXAM BY PATHOLOGIST: CPT | Mod: 26 | Performed by: INTERNAL MEDICINE

## 2021-11-10 PROCEDURE — 43239 EGD BIOPSY SINGLE/MULTIPLE: CPT | Performed by: INTERNAL MEDICINE

## 2021-11-10 RX ORDER — MENTHOL AND BENZOCAINE 10; 15 MG/1; MG/1
LOZENGE ORAL
Qty: 20 | Refills: 5 | Status: COMPLETED
Start: 2021-11-10 | End: 2022-11-16

## 2022-05-09 NOTE — PROGRESS NOTES
CHIEF COMPLAINT  Lower back pain.       Subjective   Nola Tariq is a 72 y.o. female who was referred by JEM Ashton to our pain management clinic for consultation, evaluation and treatment of lower back pain.  Pain started around 2008 without any significant injuries and has been getting worse since then.  Her main complaints today are mid back and lower back pain.  Patient had previously seen Dr. Voss and had epidurals, facet joint injections, ablations.  She states that nothing helped at the time. Last seen 1 year ago. Her lower back pain is worse than mid back pain. She is retired RN.      Lower back  pain is 4/10 on VAS, at maximum is 5/10. Pain is aching and dull in nature. Pain is not referred but she has some numbness and tingling in b/l foot. The pain is constant. The pain is improved by pain meds (tramadol). The pain is worse with walking, standing. She takes 2 tramadols at bed time which helps.     PHQ-9- 4  SOAPP-2     PMH:   Tijerina's esophagus, hypertension, migraine, thoracic vertebra fx s/p kypho T7, T10, migraines, smoker, Back surgery- decompression L4-5?-2009 Dr. Rdz, Carpal tunnel surgery 1983 bilateral wrist; R knee steroid injection with orthopedics (Dr. Barton at [facility]).       Current Medications:   Ibuprofen 800 mg  Tramadol 50 mg BID PRN (4/4/22- 30 days)    Past Medications:  Norco 5-325 mg - short prescription   Meloxicam 15 mg  Celebrex-leg swelling    Past Modalities:  TENS:       no          Physical Therapy Within The Last 6 Months     Yes (1.5 years ago with some help).   Psychotherapy     no  Massage Therapy      no    Patient Complains Of:  Uro-Fecal Incontinence no  Weight Gain/Loss  Yes (weight gain 25 lbs - 1 year after smoking).   Fever/Chills   no  Weakness   no      PEG Assessment   What number best describes your pain on average in the past week?4  What number best describes how, during the past week, pain has interfered with your enjoyment of life?4  What number  best describes how, during the past week, pain has interfered with your general activity?  4        Current Outpatient Medications:   •  Acetaminophen-Caffeine 500-65 MG tablet, EXCEDRIN TENSION HEADACHE 500-65 MG TABS, Disp: , Rfl:   •  Cetirizine HCl 10 MG capsule, ZYRTEC ALLERGY 10 MG CAPS, Disp: , Rfl:   •  fluconazole (DIFLUCAN) 150 MG tablet, , Disp: , Rfl:   •  HYDROcodone-acetaminophen (NORCO)  MG per tablet, NORCO  MG TABS, Disp: , Rfl:   •  ibuprofen (ADVIL,MOTRIN) 800 MG tablet, IBUPROFEN 800 MG TABS, Disp: , Rfl:   •  lisinopril (PRINIVIL,ZESTRIL) 5 MG tablet, , Disp: , Rfl:   •  meloxicam (MOBIC) 15 MG tablet, , Disp: , Rfl:   •  Multiple Vitamins-Minerals (MULTIVITAMIN WITH MINERALS) tablet tablet, Take 1 tablet by mouth Daily., Disp: , Rfl:   •  nystatin (MYCOSTATIN) [id number] UNIT/ML suspension, , Disp: , Rfl:   •  omeprazole (priLOSEC) 40 MG capsule, , Disp: , Rfl:   •  sucralfate (CARAFATE) 1 g tablet, , Disp: , Rfl:   •  vitamin D (ERGOCALCIFEROL) 1.25 MG (74854 UT) capsule capsule, Take 50,000 Units by mouth 1 (One) Time Per Week., Disp: , Rfl:   •  traMADol ER (ULTRAM-ER) 100 MG 24 hr tablet, Take 1 tablet by mouth Daily As Needed for Moderate Pain  for up to 60 days., Disp: 30 tablet, Rfl: 0    The following portions of the patient's history were reviewed and updated as appropriate: allergies, current medications, past family history, past medical history, past social history, past surgical history, and problem list.      REVIEW OF PERTINENT MEDICAL DATA    Past Medical History:   Diagnosis Date   • Arthritis    • Tijerina esophagus    • DJD (degenerative joint disease)    • GERD (gastroesophageal reflux disease)    • Hypertension    • Low back pain    • Migraine    • Plantar fasciitis      Past Surgical History:   Procedure Laterality Date   • CARPAL TUNNEL RELEASE      colin.   • EYE SURGERY      catarac   • KNEE ARTHROSCOPY     • TUBAL ABDOMINAL LIGATION     • WRIST SURGERY      tendon  "release     Family History   Problem Relation Age of Onset   • Migraines Father    • Migraines Sister    • Migraines Brother      Social History     Socioeconomic History   • Marital status:    Tobacco Use   • Smoking status: Former Smoker     Types: Cigarettes     Quit date: 11/3/2021     Years since quittin.5   • Smokeless tobacco: Never Used   Vaping Use   • Vaping Use: Never used   Substance and Sexual Activity   • Alcohol use: Never   • Drug use: Never   • Sexual activity: Defer         Review of Systems   Musculoskeletal: Positive for arthralgias and back pain.         Vitals:    05/10/22 1015   BP: 146/82   Pulse: 83   Resp: 16   SpO2: 95%   Weight: 77.1 kg (170 lb)   Height: 162.6 cm (64\")   PainSc:   4         Objective   Physical Exam  Musculoskeletal:         General: Tenderness present.        Back:         Legs:    Neurological:      Comments: Motor strength 5/5 b/l LE  Sensory intact b/l LE             Imaging Reviewed:  MRI lumbar spine-2017  L2-3-facet arthropathy.  Mild neuroforaminal narrowing on the right.  L3-4-right paracentral disc protrusion.  Moderate canal stenosis with canal measuring 8 mm in AP.  Significant effacement of right lateral recess.  Mild facet hypertrophy.  L4-5-facet hypertrophy with disc bulge.  Mild neuroforaminal narrowing bilateral  L5-S1-disc bulge with bilateral facet hypertrophy.  Mild to moderate neuroforaminal narrowing on the right with moderate to severe neuroforaminal narrowing on the left.    Assessment:    1. Lumbar spondylosis    2. Failed back surgical syndrome    3. Chronic bilateral thoracic back pain         Plan:   1. New patient visit, urine drug screen per office policy. UDS today to monitor for compliance with medication use. The UDS is medically necessary to monitor for compliance due to the potential side effects and complications of misuse of opioids. UDS done today will review on next visit.   Narcotic contract signed-5/10/2022.  2. We " discussed trying a course of formal physical therapy.  Physical therapy can help strengthen and stretch the muscles around the joints. Continue to be as active as possible. Start physical therapy as it will help generalized pain and follow up with HEP.  PT prescription sent to [facility] in [place] as requested by patient.  3.  Patient currently takes 2 tramadol at nighttime which helps with pain but does not last for long enough.  We will start her on tramadol 100 mg ER daily PRN (30 days prescription - 5/10/22). Side effects discussed including but not limited to nausea, vomiting, constipation, lightheadedness, dizziness, drowsiness, or headache. This medication may increase serotonin and rarely cause a very serious condition called serotonin syndrome/ toxicity.   4.  Patient's last MRI was in 2017.  Patient had multiple epidurals, facet injections, and RFA without significant pain relief.  She has a history of lumbar decompression.  We will obtain new lumbar MRI without contrast to evaluate her back further.  5.  History of multiple compression fractures after a fall in her thoracic spine.  She continues to have mid back pain.  Will obtain thoracic MRI without contrast to evaluate her mid back further.  6.  Patient has history of failed back syndrome and has failed multiple interventional procedures along with medications including NSAIDs.  We will consider spinal cord stimulator in future after reviewing her MRI.    RTC on 5/31/22.     Kirk Nascimento DO  Pain Management   [facility]         INSPECT REPORT    As part of the patient's treatment plan, I may be prescribing controlled substances. The patient has been made aware of appropriate use of such medications, including potential risk of somnolence, limited ability to drive and/or work safely, and the potential for dependence or overdose. It has also been made clear that these medications are for use by this patient only, without concomitant use of  alcohol or other substances unless prescribed.     Patient has completed prescribing agreement detailing terms of continued prescribing of controlled substances, including monitoring INSPECT reports, urine drug screening, and pill counts if necessary. The patient is aware that inappropriate use will results in cessation of prescribing such medications.    INSPECT report has been reviewed and scanned into the patient's chart.      EMR Dragon/Transcription Disclaimer:   Much of this encounter note is an electronic transcription/translation of spoken language to printed text. The electronic translation of spoken language may permit erroneous, or at times, nonsensical words or phrases to be inadvertently transcribed; Although I have reviewed the note for such errors, some may still exist.

## 2022-05-10 ENCOUNTER — OFFICE VISIT (OUTPATIENT)
Dept: PAIN MEDICINE | Facility: CLINIC | Age: 72
End: 2022-05-10

## 2022-05-10 VITALS
HEIGHT: 64 IN | RESPIRATION RATE: 16 BRPM | DIASTOLIC BLOOD PRESSURE: 82 MMHG | WEIGHT: 170 LBS | BODY MASS INDEX: 29.02 KG/M2 | SYSTOLIC BLOOD PRESSURE: 146 MMHG | OXYGEN SATURATION: 95 % | HEART RATE: 83 BPM

## 2022-05-10 DIAGNOSIS — G89.29 CHRONIC BILATERAL THORACIC BACK PAIN: ICD-10-CM

## 2022-05-10 DIAGNOSIS — Z79.899 HIGH RISK MEDICATION USE: Primary | ICD-10-CM

## 2022-05-10 DIAGNOSIS — M96.1 FAILED BACK SURGICAL SYNDROME: ICD-10-CM

## 2022-05-10 DIAGNOSIS — M47.816 LUMBAR SPONDYLOSIS: Primary | ICD-10-CM

## 2022-05-10 DIAGNOSIS — M54.6 CHRONIC BILATERAL THORACIC BACK PAIN: ICD-10-CM

## 2022-05-10 PROCEDURE — 99204 OFFICE O/P NEW MOD 45 MIN: CPT | Performed by: STUDENT IN AN ORGANIZED HEALTH CARE EDUCATION/TRAINING PROGRAM

## 2022-05-10 RX ORDER — SUCRALFATE 1 G/1
TABLET ORAL
COMMUNITY
Start: 2022-05-02

## 2022-05-10 RX ORDER — TRAMADOL HYDROCHLORIDE 50 MG/1
50 TABLET ORAL 2 TIMES DAILY PRN
COMMUNITY
Start: 2022-04-04 | End: 2022-05-10

## 2022-05-10 RX ORDER — TRAMADOL HYDROCHLORIDE 100 MG/1
100 TABLET, EXTENDED RELEASE ORAL DAILY PRN
Qty: 30 TABLET | Refills: 0 | Status: SHIPPED | OUTPATIENT
Start: 2022-05-10 | End: 2022-05-31

## 2022-05-10 RX ORDER — OMEPRAZOLE 40 MG/1
CAPSULE, DELAYED RELEASE ORAL
COMMUNITY
Start: 2022-05-02

## 2022-05-19 NOTE — PROGRESS NOTES
Subjective   Nola Tariq is a 72 y.o. female is here for follow up for lower back pain. Patient was last seen on 5/10/2022. This is televisit as she is currently has COVID. She is scheduled for MRI on 6/2/2021.     On last visit: Switch to long-acting tramadol- hasn't helped much yet.     Lower back pain is 4/10 on VAS, and maximum 5/10.  Pain is aching and dull in nature.  Not referred, but as she has some numbness and tingling in bilateral foot.  Pain is constant.  Improved by pain meds.  Worse with walking, standing.    Previous Injection:     Hx:  Referred by JEM Ashton for  lower back pain.  Pain started around 2008 without any significant injuries and has been getting worse since then.  Her main complaints today are mid back and lower back pain.  Patient had previously seen Dr. Voss and had epidurals, facet joint injections, ablations.  She states that nothing helped at the time. Last seen 1 year ago. Her lower back pain is worse than mid back pain. She is retired RN.          PHQ-9- 4  SOAPP-2      PMH:   Tijerina's esophagus, hypertension, migraine, thoracic vertebra fx s/p kypho T7, T10, migraines, smoker, Back surgery- decompression L4-5?-2009 Dr. Rdz, Carpal tunnel surgery 1983 bilateral wrist; R knee steroid injection with orthopedics (Dr. Barton at [place]).         Current Medications:   Ibuprofen 800 mg  Tramadol  mg daily      Past Medications:  Norco 5-325 mg - short prescription   Meloxicam 15 mg  Celebrex-leg swelling     Past Modalities:  TENS:                                                                          no                                                  Physical Therapy Within The Last 6 Months              Yes (1.5 years ago with some help).   Psychotherapy                                                            no  Massage Therapy                                                       no     Patient Complains Of:  Uro-Fecal Incontinence          no  Weight  Gain/Loss                   Yes (weight gain 25 lbs - 1 year after smoking).   Fever/Chills                             no  Weakness                               no            Current Outpatient Medications:   •  Acetaminophen-Caffeine 500-65 MG tablet, EXCEDRIN TENSION HEADACHE 500-65 MG TABS, Disp: , Rfl:   •  Cetirizine HCl 10 MG capsule, ZYRTEC ALLERGY 10 MG CAPS, Disp: , Rfl:   •  ibuprofen (ADVIL,MOTRIN) 800 MG tablet, IBUPROFEN 800 MG TABS, Disp: , Rfl:   •  Multiple Vitamins-Minerals (MULTIVITAMIN WITH MINERALS) tablet tablet, Take 1 tablet by mouth Daily., Disp: , Rfl:   •  omeprazole (priLOSEC) 40 MG capsule, , Disp: , Rfl:   •  sucralfate (CARAFATE) 1 g tablet, , Disp: , Rfl:   •  vitamin D (ERGOCALCIFEROL) 1.25 MG (57986 UT) capsule capsule, Take 50,000 Units by mouth 1 (One) Time Per Week., Disp: , Rfl:     The following portions of the patient's history were reviewed and updated as appropriate: allergies, current medications, past family history, past medical history, past social history, past surgical history, and problem list.      REVIEW OF PERTINENT MEDICAL DATA    Past Medical History:   Diagnosis Date   • Arthritis    • Tijerina esophagus    • COVID-19    • DJD (degenerative joint disease)    • GERD (gastroesophageal reflux disease)    • Hypertension    • Low back pain    • Migraine    • Plantar fasciitis      Past Surgical History:   Procedure Laterality Date   • CARPAL TUNNEL RELEASE      colin.   • EYE SURGERY      catarac   • KNEE ARTHROSCOPY     • TUBAL ABDOMINAL LIGATION     • WRIST SURGERY      tendon release     Family History   Problem Relation Age of Onset   • Migraines Father    • Migraines Sister    • Migraines Brother      Social History     Socioeconomic History   • Marital status:    Tobacco Use   • Smoking status: Former Smoker     Types: Cigarettes     Quit date: 11/3/2021     Years since quittin.5   • Smokeless tobacco: Never Used   Vaping Use   • Vaping Use: Never used  "  Substance and Sexual Activity   • Alcohol use: Never   • Drug use: Never   • Sexual activity: Defer         Review of Systems      Vitals:    05/31/22 1014   Weight: 77.1 kg (170 lb)   Height: 162.6 cm (64\")         Objective   Physical Exam      Imaging Reviewed:  MRI lumbar spine-9/8/2017  L2-3-facet arthropathy.  Mild neuroforaminal narrowing on the right.  L3-4-right paracentral disc protrusion.  Moderate canal stenosis with canal measuring 8 mm in AP.  Significant effacement of right lateral recess.  Mild facet hypertrophy.  L4-5-facet hypertrophy with disc bulge.  Mild neuroforaminal narrowing bilateral  L5-S1-disc bulge with bilateral facet hypertrophy.  Mild to moderate neuroforaminal narrowing on the right with moderate to severe neuroforaminal narrowing on the left.      Assessment:    1. Failed back surgical syndrome    2. High risk medication use    3. Lumbar spondylosis    4. Chronic bilateral thoracic back pain         Plan:   1.  UDS consistent with patient's interview on 5/10/2022.   Narcotic contract signed-5/10/2022.  2. We discussed trying a course of formal physical therapy.  Physical therapy can help strengthen and stretch the muscles around the joints. Continue to be as active as possible. Start physical therapy as it will help generalized pain and follow up with HEP.  PT prescription sent to [facility] in [place] as requested by patient.  3.  Increase tramadol to 200 mg ER daily PRN (30 days prescription - 5/31/22)- will bring back 100 mg tabs on next visit. Side effects discussed including but not limited to nausea, vomiting, constipation, lightheadedness, dizziness, drowsiness, or headache. This medication may increase serotonin and rarely cause a very serious condition called serotonin syndrome/ toxicity.   4.  Patient's last MRI was in 2017.  Patient had multiple epidurals, facet injections, and RFA without significant pain relief.  She has a history of lumbar decompression.  We will " obtain new lumbar MRI without contrast to evaluate her back further.  5.  History of multiple compression fractures after a fall in her thoracic spine.  She continues to have mid back pain.  Will obtain thoracic MRI without contrast to evaluate her mid back further.  6.  Patient has history of failed back syndrome and has failed multiple interventional procedures along with medications including NSAIDs.  We will consider spinal cord stimulator in future after reviewing her MRI.  7. Scheduled for root canal. Okay to for dentist to prescribe higher dose pain meds if needed. Advised not to take both pain medications at the same time.      RTC in 1 month.      Kirk Nascimento DO  Pain Management   [facility]     Time spent- 25 minutes.       INSPECT REPORT    As part of the patient's treatment plan, I may be prescribing controlled substances. The patient has been made aware of appropriate use of such medications, including potential risk of somnolence, limited ability to drive and/or work safely, and the potential for dependence or overdose. It has also been made clear that these medications are for use by this patient only, without concomitant use of alcohol or other substances unless prescribed.     Patient has completed prescribing agreement detailing terms of continued prescribing of controlled substances, including monitoring INSPECT reports, urine drug screening, and pill counts if necessary. The patient is aware that inappropriate use will results in cessation of prescribing such medications.    INSPECT report has been reviewed and scanned into the patient's chart.

## 2022-05-31 ENCOUNTER — OFFICE VISIT (OUTPATIENT)
Dept: PAIN MEDICINE | Facility: CLINIC | Age: 72
End: 2022-05-31

## 2022-05-31 VITALS — BODY MASS INDEX: 29.02 KG/M2 | HEIGHT: 64 IN | WEIGHT: 170 LBS

## 2022-05-31 DIAGNOSIS — M47.816 LUMBAR SPONDYLOSIS: ICD-10-CM

## 2022-05-31 DIAGNOSIS — M96.1 FAILED BACK SURGICAL SYNDROME: Primary | ICD-10-CM

## 2022-05-31 DIAGNOSIS — Z79.899 HIGH RISK MEDICATION USE: ICD-10-CM

## 2022-05-31 DIAGNOSIS — M54.6 CHRONIC BILATERAL THORACIC BACK PAIN: ICD-10-CM

## 2022-05-31 DIAGNOSIS — G89.29 CHRONIC BILATERAL THORACIC BACK PAIN: ICD-10-CM

## 2022-05-31 PROCEDURE — 99443 PR PHYS/QHP TELEPHONE EVALUATION 21-30 MIN: CPT | Performed by: STUDENT IN AN ORGANIZED HEALTH CARE EDUCATION/TRAINING PROGRAM

## 2022-05-31 RX ORDER — TRAMADOL HYDROCHLORIDE 200 MG/1
200 TABLET, EXTENDED RELEASE ORAL DAILY
Qty: 30 TABLET | Refills: 0 | Status: SHIPPED | OUTPATIENT
Start: 2022-05-31 | End: 2022-06-28

## 2022-06-27 NOTE — PROGRESS NOTES
Subjective   Nola Tariq is a 72 y.o. female  is here for follow-up for lower back pain.  Last seen on 5/31/2022.  MRI lumbar spine and thoracic spine obtained since last visit.    On last visit: Started tramadol ER- didn't help much.     Lower back pain is 4/10 on VAS, and maximum 5/10.  Pain is aching and dull in nature.  Not referred, but as she has some numbness and tingling in bilateral foot.  Pain is constant.  Improved by pain meds.  Worse with walking, standing.    Previous Injection:     Hx:  Referred by JEM Ashton for  lower back pain.  Pain started around 2008 without any significant injuries and has been getting worse since then.  Her main complaints today are mid back and lower back pain.  Patient had previously seen Dr. Voss and had epidurals, facet joint injections, ablations.  She states that nothing helped at the time. Last seen 1 year ago. Her lower back pain is worse than mid back pain. She is retired RN.          PHQ-9- 4  SOAPP-2      PMH:   Tijerina's esophagus, hypertension, migraine, thoracic vertebra fx s/p kypho T7, T10, migraines, smoker, Back surgery- decompression L4-5?-2009 Dr. Rdz, Carpal tunnel surgery 1983 bilateral wrist; R knee steroid injection with orthopedics (Dr. Barton at [place]).         Current Medications:   Norco 5-325 mg BID PRN   Lyrica 25 mg BID  Ibuprofen 800 mg       Past Medications:  Tramadol  mg daily   Meloxicam 15 mg  Celebrex-leg swelling  Gabapentin - didn't help      Past Modalities:  TENS:                                                                          no                                                  Physical Therapy Within The Last 6 Months              Yes (1.5 years ago with some help).   Psychotherapy                                                            no  Massage Therapy                                                       no     Patient Complains Of:  Uro-Fecal Incontinence          no  Weight Gain/Loss                    Yes (weight gain 25 lbs - 1 year after smoking).   Fever/Chills                             no  Weakness                               no                    Current Outpatient Medications:   •  Acetaminophen-Caffeine 500-65 MG tablet, EXCEDRIN TENSION HEADACHE 500-65 MG TABS, Disp: , Rfl:   •  Cetirizine HCl 10 MG capsule, ZYRTEC ALLERGY 10 MG CAPS, Disp: , Rfl:   •  ibuprofen (ADVIL,MOTRIN) 800 MG tablet, IBUPROFEN 800 MG TABS, Disp: , Rfl:   •  Multiple Vitamins-Minerals (MULTIVITAMIN WITH MINERALS) tablet tablet, Take 1 tablet by mouth Daily., Disp: , Rfl:   •  omeprazole (priLOSEC) 40 MG capsule, , Disp: , Rfl:   •  sucralfate (CARAFATE) 1 g tablet, , Disp: , Rfl:   •  vitamin D (ERGOCALCIFEROL) 1.25 MG (68749 UT) capsule capsule, Take 50,000 Units by mouth 1 (One) Time Per Week., Disp: , Rfl:   •  HYDROcodone-acetaminophen (NORCO) 5-325 MG per tablet, Take 1 tablet by mouth 2 (Two) Times a Day As Needed for Severe Pain  for up to 7 days., Disp: 14 tablet, Rfl: 0  •  [START ON 7/5/2022] HYDROcodone-acetaminophen (NORCO) 5-325 MG per tablet, Take 1 tablet by mouth 2 (Two) Times a Day As Needed for Severe Pain  for up to 30 days., Disp: 60 tablet, Rfl: 0  •  naloxone (NARCAN) 4 MG/0.1ML nasal spray, 1 spray into the nostril(s) as directed by provider As Needed (narcotic overdose, respiratory depression) for up to 30 days., Disp: 1 each, Rfl: 0  •  pregabalin (Lyrica) 25 MG capsule, Take 1 capsule by mouth 2 (Two) Times a Day for 30 days., Disp: 60 capsule, Rfl: 0    The following portions of the patient's history were reviewed and updated as appropriate: allergies, current medications, past family history, past medical history, past social history, past surgical history, and problem list.      REVIEW OF PERTINENT MEDICAL DATA    Past Medical History:   Diagnosis Date   • Arthritis    • Tijerina esophagus    • COVID-19    • DJD (degenerative joint disease)    • GERD (gastroesophageal reflux disease)    •  Hypertension    • Low back pain    • Migraine    • Plantar fasciitis      Past Surgical History:   Procedure Laterality Date   • CARPAL TUNNEL RELEASE      colin.   • EYE SURGERY      catarac   • KNEE ARTHROSCOPY     • TUBAL ABDOMINAL LIGATION     • WRIST SURGERY      tendon release     Family History   Problem Relation Age of Onset   • Migraines Father    • Migraines Sister    • Migraines Brother      Social History     Socioeconomic History   • Marital status:    Tobacco Use   • Smoking status: Former Smoker     Types: Cigarettes     Quit date: 11/3/2021     Years since quittin.6   • Smokeless tobacco: Never Used   Vaping Use   • Vaping Use: Never used   Substance and Sexual Activity   • Alcohol use: Never   • Drug use: Never   • Sexual activity: Defer         Review of Systems      Vitals:    22 1103   BP: 141/81   Pulse: 89   Resp: 16   SpO2: 96%   PainSc:   5         Objective   Physical Exam      Imaging Reviewed:  Imaging Reviewed:  Lumbar spine MRI without contrast-2022.  L2-3-moderate disc bulge.  Moderate posterior facet changes.  Mild to moderate central canal narrowing.  Moderate bilateral neural foraminal narrowing.  L3-4-- broad-based disc bulge.  Central posterior disc protrusion causing narrowing of central canal with approximate 5 mm in AP dimension.  Moderate facet changes.  Moderate to severe central canal narrowing.  Mild bilateral neural foraminal narrowing.  L4-5-large broad-based disc bulge.  Severe facet changes.  Severe central canal narrowing.  Moderate bilateral neural foraminal narrowing  L5-S1-large broad-based disc bulge.  Severe facet changes.  Moderate to severe central canal narrowing.  Severe bilateral neural foraminal narrowing.      MRI thoracic spine-2022  Kyphoplasty at T7 and T10.  - Residual mild to moderate compression fracture deformities  - Mild posterior disc bulges at multiple thoracic levels.  No large extrusions.      Assessment:    1. Lumbar  stenosis with neurogenic claudication    2. Lumbar spondylosis    3. Chronic bilateral thoracic back pain    4. Opioid use         Plan:   1.  UDS consistent with patient's interview on 5/10/2022.   Narcotic contract signed-5/10/2022.  Narcan sent-6/20/2022.  2. We discussed trying a course of formal physical therapy.  Physical therapy can help strengthen and stretch the muscles around the joints. Continue to be as active as possible. Start physical therapy as it will help generalized pain and follow up with HEP.  PT prescription sent to [facility] in [place] as requested by patient.  3.   Stop tramadol as it is not helping.  Due to severe pain, start Norco 5-325 mg BID PRN (7 days followed by 30 days). Discussed with the patient regarding long-term side effects of opioids including but not limited to opioid induced hormonal suppression, hyperalgesia, fatigue, weight gain, possible opioid induced altered immune system, addiction, tolerance, dependence, risk of hearing loss and elevated risk of myocardial infarction. Proper use and potential life threatening side effects of over use discussed with patient. Patient states understanding of their use and risks.  4.  Start Lyrica 25 mg increase it to BID. Side effects discussed with the patient including but not limited to dizziness, blurry vision, weight gain, sleepiness, trouble concentrating, swelling of your hands and feet, dry mouth, feeling high and suicidal thoughts. Patient understands and agrees with the plan.  5. Patient has pain in the lower back with referred pain in the leg, patient has failed conservative therapy including PT and pharmacological management for more than 6 weeks and pain interferes with activities of daily living. MRI shows severe central canal stenosis at L3-4, L4-5, L5-S1. Discussed lumbar CARLIE L5-S1. Discussed the possibility of infection, bleeding, nerve damage, post dural puncture headache, increased pain, paraplegia. Patient  understands and agrees.   6. Narcan ordered as patient is on chronic opioid therapy. Narcan Nasal Spray/ Naloxone is used to treat an opioid overdose in an emergency situation.  It blocks or reverses the effects of opioid medication, including extreme drowsiness, slowed breathing, or loss of consciousness.   Administer 1 spray intranasally into 1 nostril, if desired response is not achieved after 2 or 3 minutes, give a second dose intranasally into alternate nostril.  Because Narcan reverses opioid effects, this medicine may cause sudden withdrawal symptoms such as: nausea, vomiting, diarrhea, stomach pain,fever, sweating, body aches, weakness; tremors or shivering, fast heart rate, pounding heartbeats, increased blood pressure; feeling nervous, restless, or irritable; goosebumps, shivering; runny nose, yawning.      RTC for injections and then 2-week follow-up in [place].     Kirk Nascimento DO  Pain Management   [facility]         INSPECT REPORT    As part of the patient's treatment plan, I may be prescribing controlled substances. The patient has been made aware of appropriate use of such medications, including potential risk of somnolence, limited ability to drive and/or work safely, and the potential for dependence or overdose. It has also been made clear that these medications are for use by this patient only, without concomitant use of alcohol or other substances unless prescribed.     Patient has completed prescribing agreement detailing terms of continued prescribing of controlled substances, including monitoring INSPECT reports, urine drug screening, and pill counts if necessary. The patient is aware that inappropriate use will results in cessation of prescribing such medications.    INSPECT report has been reviewed and scanned into the patient's chart.

## 2022-06-28 ENCOUNTER — OFFICE VISIT (OUTPATIENT)
Dept: PAIN MEDICINE | Facility: CLINIC | Age: 72
End: 2022-06-28

## 2022-06-28 VITALS
OXYGEN SATURATION: 96 % | DIASTOLIC BLOOD PRESSURE: 81 MMHG | RESPIRATION RATE: 16 BRPM | HEART RATE: 89 BPM | SYSTOLIC BLOOD PRESSURE: 141 MMHG

## 2022-06-28 DIAGNOSIS — G89.29 CHRONIC BILATERAL THORACIC BACK PAIN: ICD-10-CM

## 2022-06-28 DIAGNOSIS — M48.062 LUMBAR STENOSIS WITH NEUROGENIC CLAUDICATION: Primary | ICD-10-CM

## 2022-06-28 DIAGNOSIS — F11.90 OPIOID USE: ICD-10-CM

## 2022-06-28 DIAGNOSIS — M54.6 CHRONIC BILATERAL THORACIC BACK PAIN: ICD-10-CM

## 2022-06-28 DIAGNOSIS — M47.816 LUMBAR SPONDYLOSIS: ICD-10-CM

## 2022-06-28 PROCEDURE — 99214 OFFICE O/P EST MOD 30 MIN: CPT | Performed by: STUDENT IN AN ORGANIZED HEALTH CARE EDUCATION/TRAINING PROGRAM

## 2022-06-28 RX ORDER — HYDROCODONE BITARTRATE AND ACETAMINOPHEN 5; 325 MG/1; MG/1
1 TABLET ORAL 2 TIMES DAILY PRN
Qty: 60 TABLET | Refills: 0 | Status: SHIPPED | OUTPATIENT
Start: 2022-07-05 | End: 2022-07-08

## 2022-06-28 RX ORDER — PREGABALIN 25 MG/1
25 CAPSULE ORAL 2 TIMES DAILY
Qty: 60 CAPSULE | Refills: 0 | Status: SHIPPED | OUTPATIENT
Start: 2022-06-28 | End: 2022-07-26

## 2022-06-28 RX ORDER — NALOXONE HYDROCHLORIDE 4 MG/.1ML
1 SPRAY NASAL AS NEEDED
Qty: 1 EACH | Refills: 0 | Status: SHIPPED | OUTPATIENT
Start: 2022-06-28 | End: 2022-07-28

## 2022-06-28 RX ORDER — HYDROCODONE BITARTRATE AND ACETAMINOPHEN 5; 325 MG/1; MG/1
1 TABLET ORAL 2 TIMES DAILY PRN
Qty: 14 TABLET | Refills: 0 | Status: SHIPPED | OUTPATIENT
Start: 2022-06-28 | End: 2022-07-05

## 2022-07-05 ENCOUNTER — TELEPHONE (OUTPATIENT)
Dept: PAIN MEDICINE | Facility: CLINIC | Age: 72
End: 2022-07-05

## 2022-07-05 DIAGNOSIS — M48.062 LUMBAR STENOSIS WITH NEUROGENIC CLAUDICATION: Primary | ICD-10-CM

## 2022-07-05 RX ORDER — TRAMADOL HYDROCHLORIDE 200 MG/1
200 TABLET, EXTENDED RELEASE ORAL DAILY PRN
Qty: 30 TABLET | Refills: 0 | Status: SHIPPED | OUTPATIENT
Start: 2022-07-05 | End: 2022-07-26

## 2022-07-05 NOTE — TELEPHONE ENCOUNTER
Caller: FRANCIE  Relationship to Patient: SELF    Phone Number: [phone]  Reason for Call: PT RETURNING PHONE CALL TO TI ABOUT MEDICATION. PLEASE ADVISE

## 2022-07-08 ENCOUNTER — HOSPITAL ENCOUNTER (OUTPATIENT)
Dept: PAIN MEDICINE | Facility: HOSPITAL | Age: 72
Discharge: HOME | End: 2022-07-08

## 2022-07-08 VITALS
DIASTOLIC BLOOD PRESSURE: 83 MMHG | WEIGHT: 178 LBS | HEIGHT: 64 IN | SYSTOLIC BLOOD PRESSURE: 120 MMHG | OXYGEN SATURATION: 96 % | TEMPERATURE: 97.3 F | HEART RATE: 79 BPM | BODY MASS INDEX: 30.39 KG/M2 | RESPIRATION RATE: 16 BRPM

## 2022-07-08 DIAGNOSIS — R52 PAIN: ICD-10-CM

## 2022-07-08 DIAGNOSIS — M48.07 STENOSIS OF LUMBOSACRAL SPINE: Primary | ICD-10-CM

## 2022-07-08 PROCEDURE — 25010000002 METHYLPREDNISOLONE PER 80 MG: Performed by: STUDENT IN AN ORGANIZED HEALTH CARE EDUCATION/TRAINING PROGRAM

## 2022-07-08 PROCEDURE — 62323 NJX INTERLAMINAR LMBR/SAC: CPT | Performed by: STUDENT IN AN ORGANIZED HEALTH CARE EDUCATION/TRAINING PROGRAM

## 2022-07-08 PROCEDURE — 77003 FLUOROGUIDE FOR SPINE INJECT: CPT

## 2022-07-08 PROCEDURE — 0 IOPAMIDOL 41 % SOLUTION: Performed by: STUDENT IN AN ORGANIZED HEALTH CARE EDUCATION/TRAINING PROGRAM

## 2022-07-08 RX ORDER — METHYLPREDNISOLONE ACETATE 80 MG/ML
80 INJECTION, SUSPENSION INTRA-ARTICULAR; INTRALESIONAL; INTRAMUSCULAR; SOFT TISSUE ONCE
Status: COMPLETED | OUTPATIENT
Start: 2022-07-08 | End: 2022-07-08

## 2022-07-08 RX ADMIN — IOPAMIDOL 1 ML: 408 INJECTION, SOLUTION INTRATHECAL at 15:41

## 2022-07-08 RX ADMIN — METHYLPREDNISOLONE ACETATE 80 MG: 80 INJECTION, SUSPENSION INTRA-ARTICULAR; INTRALESIONAL; INTRAMUSCULAR; SOFT TISSUE at 15:41

## 2022-07-08 NOTE — H&P
H and P reviewed from previous visit and no changes to patient's clinical presentation. Will proceed with procedure as planned.       Kirk Nascimento DO  Pain Management   [facility]

## 2022-07-08 NOTE — DISCHARGE INSTRUCTIONS

## 2022-07-08 NOTE — PROCEDURES
PREOPERATIVE DIAGNOSIS:    1. Lumbar DDD    POSTOPERATIVE DIAGNOSIS: Same    PROCEDURE:  Lumbar epidural steroid injection L5-S1    PROCEDURE NOTE:  After obtaining written informed consent patient was taken to the procedure room. Pre-procedure blood pressure and pulse were stable and recorded in patients clinic chart.     The patient was placed in the prone position. The lower back was prepped with antiseptic solution and draped in the usual sterile fashion.  The skin over the L5-S1 space was identified under fluoroscopic guidance and infiltrated with 1% lidocaine for local anesthesia via 25 gauge needle.  A 20-gauge tuohy needle was used to access the epidural space using loss of resistance to air technique. Following negative aspiration, 2 cc of the omnipaque dye was injected.  There was good spread of the dye from L3-L5 area. A mixture containing  3 ml of saline with 80 mg of depo-medrol was injected. There was no evidence of CSF, paresthesia or vascular spread. The needle was removed. Skin was cleaned and band aid was applied.    Following the procedure the patient's vital signs were stable. The patient was discharged home in good condition after being given discharge instructions.    COMPLICATIONS: None    Kirk Nascimento DO  Pain Management   [facility]

## 2022-07-25 NOTE — PROGRESS NOTES
Subjective   Nola Tariq is a 72 y.o. female  is here for follow-up for lower back pain.  Last seen on 7/8/2022 for LESI L5-S1 with no relief.     On last visit: Started tramadol 24-hour release-  Minimal help      ; started Lyrica - hasn't noticed much relief.     Lower back pain is 4/10 on VAS, and maximum 5/10.  Pain is aching and dull in nature.  Not referred.  She has some numbness and tingling in bilateral foot.  Pain is constant.  Improved by pain meds.  Worse with walking and standing.    Previous Injection:   7/8/22 - LESI L5-S1 - no relief.     Hx:  Referred by JEM Ashton for  lower back pain.  Pain started around 2008 without any significant injuries and has been getting worse since then.  Her main complaints today are mid back and lower back pain.  Patient had previously seen Dr. Voss and had epidurals, facet joint injections, ablations.  She states that nothing helped at the time. Last seen 1 year ago. Her lower back pain is worse than mid back pain. She is retired RN.          PHQ-9- 4  SOAPP-2      PMH:   Tijerina's esophagus, hypertension, migraine, thoracic vertebra fx s/p kypho T7, T10, migraines, smoker, Back surgery- decompression L4-5?-2009 Dr. Rdz, Carpal tunnel surgery 1983 bilateral wrist; R knee steroid injection with orthopedics (Dr. Barton at [place]).         Current Medications:   Tramadol 200 mg 24-hour release-  Lyrica 25 mg BID  Ibuprofen 800 mg       Past Medications:  Tramadol  mg daily   Meloxicam 15 mg  Celebrex-leg swelling  Gabapentin - didn't help   Norco 5-325 mg BID PRN      Past Modalities:  TENS:                                                                          no                                                  Physical Therapy Within The Last 6 Months              Yes (1.5 years ago with some help).   Psychotherapy                                                            no  Massage Therapy                                                        no     Patient Complains Of:  Uro-Fecal Incontinence          no  Weight Gain/Loss                   Yes (weight gain 25 lbs - 1 year after smoking).   Fever/Chills                             no  Weakness                               no            Current Outpatient Medications:   •  Acetaminophen-Caffeine 500-65 MG tablet, EXCEDRIN TENSION HEADACHE 500-65 MG TABS, Disp: , Rfl:   •  Cetirizine HCl 10 MG capsule, ZYRTEC ALLERGY 10 MG CAPS, Disp: , Rfl:   •  ibuprofen (ADVIL,MOTRIN) 800 MG tablet, IBUPROFEN 800 MG TABS, Disp: , Rfl:   •  Multiple Vitamins-Minerals (MULTIVITAMIN WITH MINERALS) tablet tablet, Take 1 tablet by mouth Daily., Disp: , Rfl:   •  naloxone (NARCAN) 4 MG/0.1ML nasal spray, 1 spray into the nostril(s) as directed by provider As Needed (narcotic overdose, respiratory depression) for up to 30 days., Disp: 1 each, Rfl: 0  •  omeprazole (priLOSEC) 40 MG capsule, , Disp: , Rfl:   •  sucralfate (CARAFATE) 1 g tablet, , Disp: , Rfl:   •  vitamin D (ERGOCALCIFEROL) 1.25 MG (49685 UT) capsule capsule, Take 50,000 Units by mouth 1 (One) Time Per Week., Disp: , Rfl:   •  pregabalin (LYRICA) 50 MG capsule, Take 1 capsule by mouth 2 (Two) Times a Day for 30 days., Disp: 60 capsule, Rfl: 0  •  traMADol (ULTRAM) 50 MG tablet, Take 1-2 tablets by mouth Every 6 (Six) Hours As Needed for Moderate Pain  for up to 28 days., Disp: 112 tablet, Rfl: 1    The following portions of the patient's history were reviewed and updated as appropriate: allergies, current medications, past family history, past medical history, past social history, past surgical history, and problem list.      REVIEW OF PERTINENT MEDICAL DATA    Past Medical History:   Diagnosis Date   • Arthritis    • Tijerina esophagus    • COVID-19    • DJD (degenerative joint disease)    • GERD (gastroesophageal reflux disease)    • Hypertension    • Low back pain    • Migraine    • Plantar fasciitis      Past Surgical History:   Procedure Laterality Date  "  • CARPAL TUNNEL RELEASE      colin.   • EYE SURGERY      catarac   • KNEE ARTHROSCOPY     • TUBAL ABDOMINAL LIGATION     • WRIST SURGERY      tendon release     Family History   Problem Relation Age of Onset   • Migraines Father    • Migraines Sister    • Migraines Brother      Social History     Socioeconomic History   • Marital status:    Tobacco Use   • Smoking status: Former Smoker     Types: Cigarettes     Quit date: 11/3/2021     Years since quittin.7   • Smokeless tobacco: Never Used   Vaping Use   • Vaping Use: Never used   Substance and Sexual Activity   • Alcohol use: Never   • Drug use: Never   • Sexual activity: Defer         Review of Systems   Musculoskeletal: Positive for arthralgias and back pain.         Vitals:    22 1140   BP: 139/81   Pulse: 100   Resp: 16   SpO2: 94%   Weight: 80.7 kg (178 lb)   Height: 162.6 cm (64\")   PainSc:   5         Objective   Physical Exam  Musculoskeletal:         General: Tenderness present.        Back:         Legs:    Neurological:      Comments: Motor strength 5/5 b/l LE  Sensory intact b/l LE             Imaging Reviewed:  Lumbar spine MRI without contrast-2022.  L2-3-moderate disc bulge.  Moderate posterior facet changes.  Mild to moderate central canal narrowing.  Moderate bilateral neural foraminal narrowing.  L3-4-- broad-based disc bulge.  Central posterior disc protrusion causing narrowing of central canal with approximate 5 mm in AP dimension.  Moderate facet changes.  Moderate to severe central canal narrowing.  Mild bilateral neural foraminal narrowing.  L4-5-large broad-based disc bulge.  Severe facet changes.  Severe central canal narrowing.  Moderate bilateral neural foraminal narrowing  L5-S1-large broad-based disc bulge.  Severe facet changes.  Moderate to severe central canal narrowing.  Severe bilateral neural foraminal narrowing.      MRI thoracic spine-2022  Kyphoplasty at T7 and T10.  - Residual mild to moderate " compression fracture deformities  - Mild posterior disc bulges at multiple thoracic levels.  No large extrusions.      Assessment:    1. Stenosis of lumbosacral spine    2. Lumbar stenosis with neurogenic claudication         Plan:   1.  UDS consistent with patient's interview on 5/10/2022.   Narcotic contract signed-5/10/2022.  Narcan sent-6/20/2022.  2. We discussed trying a course of formal physical therapy.  Physical therapy can help strengthen and stretch the muscles around the joints. Continue to be as active as possible. Start physical therapy as it will help generalized pain and follow up with HEP.  PT prescription sent to [facility] in [place] as requested by patient.  3.   Patient states that she had better relief when she was on 1 to 2 tablets of 50 mg tramadol every 6 hour.  Stop tramadol 200 mg ER and start tramadol  mg every 6 hours as needed- given 14-day supply with 1 refill. Side effects discussed including but not limited to nausea, vomiting, constipation, lightheadedness, dizziness, drowsiness, or headache. This medication may increase serotonin and rarely cause a very serious condition called serotonin syndrome/ toxicity.   4.  Start Lyrica 50 mg increase it to BID. Side effects discussed with the patient including but not limited to dizziness, blurry vision, weight gain, sleepiness, trouble concentrating, swelling of your hands and feet, dry mouth, feeling high and suicidal thoughts. Patient understands and agrees with the plan.  5.  Patient's axial back pain is the main complaint today.  Discussed possible MBB for her severe facet arthritis and lumbar spine.  Patient states that she had facet injections in the past without significant pain relief and she does not believe in this procedures as they have not been helping and she has already spent about thousand dollars.  Also discussed seeing spine surgeon for possible surgical evaluation.  Patient states that her friend recommended  [facility] spine [place] and she would like to go to [facility] spine.  Put in referral today for [facility].    RTC in 1 month.     Kirk Nascimento DO  Pain Management   [facility]           INSPECT REPORT    As part of the patient's treatment plan, I may be prescribing controlled substances. The patient has been made aware of appropriate use of such medications, including potential risk of somnolence, limited ability to drive and/or work safely, and the potential for dependence or overdose. It has also been made clear that these medications are for use by this patient only, without concomitant use of alcohol or other substances unless prescribed.     Patient has completed prescribing agreement detailing terms of continued prescribing of controlled substances, including monitoring INSPECT reports, urine drug screening, and pill counts if necessary. The patient is aware that inappropriate use will results in cessation of prescribing such medications.    INSPECT report has been reviewed and scanned into the patient's chart.

## 2022-07-26 ENCOUNTER — OFFICE VISIT (OUTPATIENT)
Dept: PAIN MEDICINE | Facility: CLINIC | Age: 72
End: 2022-07-26

## 2022-07-26 VITALS
HEART RATE: 100 BPM | WEIGHT: 178 LBS | OXYGEN SATURATION: 94 % | BODY MASS INDEX: 30.39 KG/M2 | SYSTOLIC BLOOD PRESSURE: 139 MMHG | RESPIRATION RATE: 16 BRPM | HEIGHT: 64 IN | DIASTOLIC BLOOD PRESSURE: 81 MMHG

## 2022-07-26 DIAGNOSIS — M48.07 STENOSIS OF LUMBOSACRAL SPINE: Primary | ICD-10-CM

## 2022-07-26 DIAGNOSIS — M48.062 LUMBAR STENOSIS WITH NEUROGENIC CLAUDICATION: ICD-10-CM

## 2022-07-26 PROCEDURE — 99214 OFFICE O/P EST MOD 30 MIN: CPT | Performed by: STUDENT IN AN ORGANIZED HEALTH CARE EDUCATION/TRAINING PROGRAM

## 2022-07-26 RX ORDER — TRAMADOL HYDROCHLORIDE 50 MG/1
50-100 TABLET ORAL EVERY 6 HOURS PRN
Qty: 112 TABLET | Refills: 1 | Status: SHIPPED | OUTPATIENT
Start: 2022-07-26 | End: 2022-08-23

## 2022-07-26 RX ORDER — PREGABALIN 50 MG/1
50 CAPSULE ORAL 2 TIMES DAILY
Qty: 60 CAPSULE | Refills: 0 | Status: SHIPPED | OUTPATIENT
Start: 2022-07-26 | End: 2022-08-25

## 2022-08-22 NOTE — PROGRESS NOTES
Subjective   Nola Tariq is a 72 y.o. female is here for follow-up for lower back pain.  Last seen on 7/26/2022.  She has since seen [facility] (seen by APRN) who recommended medication management.  Patient states that she is not interested in fusion surgery as there is 50% chance of lower back pain improvement.    On last visit: Restarted short acting tramadol- helping better         ; started Lyrica- stopped taking it as it made her drowsy    Lower back pain is 4/10 on VAS, and maximum 5/10.  Pain is aching and dull in nature.  Not referred.  She has some numbness and tingling in bilateral foot.  Pain is constant.  Improved by pain meds.  Worse with walking and standing.    Previous Injection:   7/8/22 - LESI L5-S1 - no relief.     Hx:  Referred by JEM Ashton for  lower back pain.  Pain started around 2008 without any significant injuries and has been getting worse since then.  Her main complaints today are mid back and lower back pain.  Patient had previously seen Dr. Voss and had epidurals, facet joint injections, ablations.  She states that nothing helped at the time. Last seen 1 year ago. Her lower back pain is worse than mid back pain. She is retired RN.          PHQ-9- 4  SOAPP-2      PMH:   Tijerina's esophagus, hypertension, migraine, thoracic vertebra fx s/p kypho T7, T10, migraines, smoker, Back surgery- decompression L4-5?-2009 Dr. Rdz, Carpal tunnel surgery 1983 bilateral wrist; R knee steroid injection with orthopedics (Dr. Barton at [facility]).         Current Medications:   Tramadol  mg q6H PRN  Lyrica 25 mg BID  Ibuprofen 800 mg       Past Medications:  Tramadol  mg daily   Meloxicam 15 mg  Celebrex-leg swelling  Gabapentin - didn't help   Norco 5-325 mg BID PRN      Past Modalities:  TENS:                                                                          no                                                  Physical Therapy Within The Last 6 Months               Yes (1.5 years ago with some help).   Psychotherapy                                                            no  Massage Therapy                                                       no     Patient Complains Of:  Uro-Fecal Incontinence          no  Weight Gain/Loss                   Yes (weight gain 25 lbs - 1 year after smoking).   Fever/Chills                             no  Weakness                               no            Current Outpatient Medications:   •  Acetaminophen-Caffeine 500-65 MG tablet, EXCEDRIN TENSION HEADACHE 500-65 MG TABS, Disp: , Rfl:   •  Cetirizine HCl 10 MG capsule, ZYRTEC ALLERGY 10 MG CAPS, Disp: , Rfl:   •  ibuprofen (ADVIL,MOTRIN) 800 MG tablet, IBUPROFEN 800 MG TABS, Disp: , Rfl:   •  Multiple Vitamins-Minerals (MULTIVITAMIN WITH MINERALS) tablet tablet, Take 1 tablet by mouth Daily., Disp: , Rfl:   •  omeprazole (priLOSEC) 40 MG capsule, , Disp: , Rfl:   •  pregabalin (LYRICA) 50 MG capsule, Take 1 capsule by mouth 2 (Two) Times a Day for 30 days., Disp: 60 capsule, Rfl: 0  •  sucralfate (CARAFATE) 1 g tablet, , Disp: , Rfl:   •  [START ON 8/30/2022] traMADol (ULTRAM) 50 MG tablet, Take 1-2 tablets by mouth Every 6 (Six) Hours As Needed for Moderate Pain  for up to 60 days., Disp: 120 tablet, Rfl: 1  •  vitamin D (ERGOCALCIFEROL) 1.25 MG (66051 UT) capsule capsule, Take 50,000 Units by mouth 1 (One) Time Per Week., Disp: , Rfl:     The following portions of the patient's history were reviewed and updated as appropriate: allergies, current medications, past family history, past medical history, past social history, past surgical history, and problem list.      REVIEW OF PERTINENT MEDICAL DATA    Past Medical History:   Diagnosis Date   • Arthritis    • Tijerina esophagus    • COVID-19    • DJD (degenerative joint disease)    • GERD (gastroesophageal reflux disease)    • Hypertension    • Low back pain    • Migraine    • Plantar fasciitis      Past Surgical History:   Procedure  "Laterality Date   • CARPAL TUNNEL RELEASE      colin.   • EYE SURGERY      catarac   • KNEE ARTHROSCOPY     • TUBAL ABDOMINAL LIGATION     • WRIST SURGERY      tendon release     Family History   Problem Relation Age of Onset   • Migraines Father    • Migraines Sister    • Migraines Brother      Social History     Socioeconomic History   • Marital status:    Tobacco Use   • Smoking status: Former Smoker     Types: Cigarettes     Quit date: 11/3/2021     Years since quittin.8   • Smokeless tobacco: Never Used   Vaping Use   • Vaping Use: Never used   Substance and Sexual Activity   • Alcohol use: Never   • Drug use: Never   • Sexual activity: Defer         Review of Systems   Musculoskeletal: Positive for arthralgias and back pain.         Vitals:    22 1140   BP: 143/82   Pulse: 88   Resp: 16   SpO2: 98%   Weight: 80.7 kg (178 lb)   Height: 162.6 cm (64\")   PainSc:   4         Objective   Physical Exam  Musculoskeletal:         General: Tenderness present.        Back:         Legs:    Neurological:      Comments: Motor strength 5/5 b/l LE  Sensory intact b/l LE             Imaging Reviewed:  Lumbar spine MRI without contrast-2022.  L2-3-moderate disc bulge.  Moderate posterior facet changes.  Mild to moderate central canal narrowing.  Moderate bilateral neural foraminal narrowing.  L3-4-- broad-based disc bulge.  Central posterior disc protrusion causing narrowing of central canal with approximate 5 mm in AP dimension.  Moderate facet changes.  Moderate to severe central canal narrowing.  Mild bilateral neural foraminal narrowing.  L4-5-large broad-based disc bulge.  Severe facet changes.  Severe central canal narrowing.  Moderate bilateral neural foraminal narrowing  L5-S1-large broad-based disc bulge.  Severe facet changes.  Moderate to severe central canal narrowing.  Severe bilateral neural foraminal narrowing.      MRI thoracic spine-2022  Kyphoplasty at T7 and T10.  - Residual mild to " moderate compression fracture deformities  - Mild posterior disc bulges at multiple thoracic levels.  No large extrusions.      Assessment:    1. Failed back surgical syndrome    2. Stenosis of lumbosacral spine    3. Lumbar stenosis with neurogenic claudication    4. Lumbar spondylosis    5. Chronic bilateral thoracic back pain         Plan:   1.  UDS consistent with patient's interview on 5/10/2022.   Narcotic contract signed-5/10/2022.  Narcan sent-6/20/2022.  2. We discussed trying a course of formal physical therapy.  Physical therapy can help strengthen and stretch the muscles around the joints. Continue to be as active as possible. Start physical therapy as it will help generalized pain and follow up with HEP.  PT prescription sent to [facility] in [place] as requested by patient.  3.   Patient states that she had better relief when she was on 1 to 2 tablets of 50 mg tramadol q6H PRN (good till 10/27/22) Side effects discussed including but not limited to nausea, vomiting, constipation, lightheadedness, dizziness, drowsiness, or headache. This medication may increase serotonin and rarely cause a very serious condition called serotonin syndrome/ toxicity.   4.  Patient has pain in the lower back with referred pain in the legs. He had multiple procedures and back surgery in the past with no improvement in pain. She is not a candidate for repeat surgery and has failed more than 6 months of conservative therapy and there are no contraindications for SCS trial. So he is good candidate for SCS trial.   Given information on 8/23/2022.    RTC in 2 months.     Kirk Nascimento DO  Pain Management   [facility]           INSPECT REPORT    As part of the patient's treatment plan, I may be prescribing controlled substances. The patient has been made aware of appropriate use of such medications, including potential risk of somnolence, limited ability to drive and/or work safely, and the potential for dependence or  overdose. It has also been made clear that these medications are for use by this patient only, without concomitant use of alcohol or other substances unless prescribed.     Patient has completed prescribing agreement detailing terms of continued prescribing of controlled substances, including monitoring INSPECT reports, urine drug screening, and pill counts if necessary. The patient is aware that inappropriate use will results in cessation of prescribing such medications.    INSPECT report has been reviewed and scanned into the patient's chart.

## 2022-08-23 ENCOUNTER — OFFICE VISIT (OUTPATIENT)
Dept: PAIN MEDICINE | Facility: CLINIC | Age: 72
End: 2022-08-23

## 2022-08-23 VITALS
RESPIRATION RATE: 16 BRPM | OXYGEN SATURATION: 98 % | SYSTOLIC BLOOD PRESSURE: 143 MMHG | BODY MASS INDEX: 30.39 KG/M2 | DIASTOLIC BLOOD PRESSURE: 82 MMHG | HEART RATE: 88 BPM | WEIGHT: 178 LBS | HEIGHT: 64 IN

## 2022-08-23 DIAGNOSIS — M48.062 LUMBAR STENOSIS WITH NEUROGENIC CLAUDICATION: ICD-10-CM

## 2022-08-23 DIAGNOSIS — M96.1 FAILED BACK SURGICAL SYNDROME: Primary | ICD-10-CM

## 2022-08-23 DIAGNOSIS — M48.07 STENOSIS OF LUMBOSACRAL SPINE: ICD-10-CM

## 2022-08-23 DIAGNOSIS — M47.816 LUMBAR SPONDYLOSIS: ICD-10-CM

## 2022-08-23 DIAGNOSIS — G89.29 CHRONIC BILATERAL THORACIC BACK PAIN: ICD-10-CM

## 2022-08-23 DIAGNOSIS — M54.6 CHRONIC BILATERAL THORACIC BACK PAIN: ICD-10-CM

## 2022-08-23 PROCEDURE — 99214 OFFICE O/P EST MOD 30 MIN: CPT | Performed by: STUDENT IN AN ORGANIZED HEALTH CARE EDUCATION/TRAINING PROGRAM

## 2022-08-23 RX ORDER — TRAMADOL HYDROCHLORIDE 50 MG/1
50-100 TABLET ORAL EVERY 6 HOURS PRN
Qty: 120 TABLET | Refills: 1 | Status: SHIPPED | OUTPATIENT
Start: 2022-08-30 | End: 2022-10-25 | Stop reason: SDUPTHER

## 2022-08-23 RX ORDER — TRAMADOL HYDROCHLORIDE 100 MG/1
TABLET, EXTENDED RELEASE ORAL
COMMUNITY
Start: 2022-05-11 | End: 2022-08-23

## 2022-10-24 NOTE — PROGRESS NOTES
Subjective   Nola Tariq is a 72 y.o. female is here for follow-up for lower back pain.  Last seen 8/23/2022.    On last visit:     Lower back pain is 4/10 on VAS, at maximum 5/10.  Pain is aching and dull in nature.  Not referred.  She has numbness and tingling in bilateral foot.  Pain is constant.  Improved by pain meds.  Worse with walking and standing.    R knee pain is 5/10 on VAS. Worse with weight bearing. She had R knee steroid injection with Rheumatology several months ago with excellent relief.     Previous Injection:   7/8/22 - LESI L5-S1 - no relief.   RFA at previous pain management - no relief.     Hx:  Referred by JEM Ashton for  lower back pain.  Pain started around 2008 without any significant injuries and has been getting worse since then.  Her main complaints today are mid back and lower back pain.  Patient had previously seen Dr. Voss and had epidurals, facet joint injections, ablations.  She states that nothing helped at the time. Last seen 1 year ago. Her lower back pain is worse than mid back pain. She is retired RN.   She has since seen [facility] spine (seen by APRN)- 2022 who recommended medication management.  Patient states that she is not interested in fusion surgery as there is 50% chance of lower back pain improvement.          PHQ-9- 4  SOAPP-2      PMH:   Tijerina's esophagus, hypertension, migraine, thoracic vertebra fx s/p kypho T7, T10, migraines, smoker, Back surgery- decompression L4-5?-2009 Dr. Rdz, Carpal tunnel surgery 1983 bilateral wrist; R knee steroid injection with orthopedics (Dr. Barton at [facility]).         Current Medications:   Tramadol  mg q6H PRN  Lyrica 25 mg BID  Ibuprofen 800 mg       Past Medications:  Tramadol  mg daily   Meloxicam 15 mg  Celebrex-leg swelling  Gabapentin - didn't help   Norco 5-325 mg BID PRN      Past Modalities:  TENS:                                                                          no                                                   Physical Therapy Within The Last 6 Months              Yes (1.5 years ago with some help).   Psychotherapy                                                            no  Massage Therapy                                                       no     Patient Complains Of:  Uro-Fecal Incontinence          no  Weight Gain/Loss                   Yes (weight gain 25 lbs - 1 year after smoking).   Fever/Chills                             no  Weakness                               no            Current Outpatient Medications:   •  Acetaminophen-Caffeine 500-65 MG tablet, EXCEDRIN TENSION HEADACHE 500-65 MG TABS, Disp: , Rfl:   •  Cetirizine HCl 10 MG capsule, ZYRTEC ALLERGY 10 MG CAPS, Disp: , Rfl:   •  ibuprofen (ADVIL,MOTRIN) 800 MG tablet, IBUPROFEN 800 MG TABS, Disp: , Rfl:   •  Multiple Vitamins-Minerals (MULTIVITAMIN WITH MINERALS) tablet tablet, Take 1 tablet by mouth Daily., Disp: , Rfl:   •  omeprazole (priLOSEC) 40 MG capsule, , Disp: , Rfl:   •  sucralfate (CARAFATE) 1 g tablet, , Disp: , Rfl:   •  traMADol (ULTRAM) 50 MG tablet, Take 1-2 tablets by mouth Every 6 (Six) Hours As Needed for Moderate Pain for up to 60 days., Disp: 120 tablet, Rfl: 1  •  vitamin D (ERGOCALCIFEROL) 1.25 MG (46543 UT) capsule capsule, Take 50,000 Units by mouth 1 (One) Time Per Week., Disp: , Rfl:   •  pregabalin (LYRICA) 50 MG capsule, Take 1 capsule by mouth 2 (Two) Times a Day for 30 days., Disp: 60 capsule, Rfl: 0    The following portions of the patient's history were reviewed and updated as appropriate: allergies, current medications, past family history, past medical history, past social history, past surgical history, and problem list.      REVIEW OF PERTINENT MEDICAL DATA    Past Medical History:   Diagnosis Date   • Arthritis    • Tijerina esophagus    • COVID-19    • DJD (degenerative joint disease)    • GERD (gastroesophageal reflux disease)    • Hypertension    • Low back pain    • Migraine    •  Plantar fasciitis      Past Surgical History:   Procedure Laterality Date   • CARPAL TUNNEL RELEASE      colin.   • EYE SURGERY      catarac   • KNEE ARTHROSCOPY     • TUBAL ABDOMINAL LIGATION     • WRIST SURGERY      tendon release     Family History   Problem Relation Age of Onset   • Migraines Father    • Migraines Sister    • Migraines Brother      Social History     Socioeconomic History   • Marital status:    Tobacco Use   • Smoking status: Former     Types: Cigarettes     Quit date: 11/3/2021     Years since quittin.9   • Smokeless tobacco: Never   Vaping Use   • Vaping Use: Never used   Substance and Sexual Activity   • Alcohol use: Never   • Drug use: Never   • Sexual activity: Defer         Review of Systems   Musculoskeletal: Positive for arthralgias and back pain.         Vitals:    10/25/22 1111   BP: 147/79   Pulse: 83   Resp: 16   SpO2: 92%   PainSc:   5         Objective   Physical Exam  Musculoskeletal:         General: Tenderness present.        Back:         Legs:    Neurological:      Comments: Motor strength 5/5 b/l LE  Sensory intact b/l LE             Imaging Reviewed:  Lumbar spine MRI without contrast-2022.  L2-3-moderate disc bulge.  Moderate posterior facet changes.  Mild to moderate central canal narrowing.  Moderate bilateral neural foraminal narrowing.  L3-4-- broad-based disc bulge.  Central posterior disc protrusion causing narrowing of central canal with approximate 5 mm in AP dimension.  Moderate facet changes.  Moderate to severe central canal narrowing.  Mild bilateral neural foraminal narrowing.  L4-5-large broad-based disc bulge.  Severe facet changes.  Severe central canal narrowing.  Moderate bilateral neural foraminal narrowing  L5-S1-large broad-based disc bulge.  Severe facet changes.  Moderate to severe central canal narrowing.  Severe bilateral neural foraminal narrowing.      MRI thoracic spine-2022  Kyphoplasty at T7 and T10.  - Residual mild to  moderate compression fracture deformities  - Mild posterior disc bulges at multiple thoracic levels.  No large extrusions.      Assessment:    1. Failed back surgical syndrome    2. Stenosis of lumbosacral spine    3. Lumbar stenosis with neurogenic claudication    4. Lumbar spondylosis         Plan:   1.  UDS consistent with patient's interview on 5/10/2022.   Narcotic contract signed-5/10/2022.  Narcan sent-6/20/2022.  2. We discussed trying a course of formal physical therapy.  Physical therapy can help strengthen and stretch the muscles around the joints. Continue to be as active as possible. Start physical therapy as it will help generalized pain and follow up with HEP.  PT prescription sent to [facility] in [place] as requested by patient.  3.   Patient states that she had better relief when she was on 1 to 2 tablets of 50 mg tramadol q6H PRN (good till 12/24/22) Side effects discussed including but not limited to nausea, vomiting, constipation, lightheadedness, dizziness, drowsiness, or headache. This medication may increase serotonin and rarely cause a very serious condition called serotonin syndrome/ toxicity.   4.  Patient has pain in the lower back with referred pain in the legs. He had multiple procedures and back surgery in the past with no improvement in pain. She is not a candidate for repeat surgery and has failed more than 6 months of conservative therapy and there are no contraindications for SCS trial. So he is good candidate for SCS trial.   Psych eval sent - 10/25/22.   5. Patient has pain in the right knee and the Xray shows degenerative changes. Discussed intra articular knee injection. Discussed the possibility of infection, bleeding, nerve damage, headache, increased pain, paraplegia. Patient understands and agrees.       RTC for R knee injection and then follow up in 5 weeks.     Kirk Nascimento DO  Pain Management   [facility]           INSPECT REPORT    As part of the patient's  treatment plan, I may be prescribing controlled substances. The patient has been made aware of appropriate use of such medications, including potential risk of somnolence, limited ability to drive and/or work safely, and the potential for dependence or overdose. It has also been made clear that these medications are for use by this patient only, without concomitant use of alcohol or other substances unless prescribed.     Patient has completed prescribing agreement detailing terms of continued prescribing of controlled substances, including monitoring INSPECT reports, urine drug screening, and pill counts if necessary. The patient is aware that inappropriate use will results in cessation of prescribing such medications.    INSPECT report has been reviewed and scanned into the patient's chart.

## 2022-10-25 ENCOUNTER — OFFICE VISIT (OUTPATIENT)
Dept: PAIN MEDICINE | Facility: CLINIC | Age: 72
End: 2022-10-25

## 2022-10-25 VITALS
OXYGEN SATURATION: 92 % | RESPIRATION RATE: 16 BRPM | DIASTOLIC BLOOD PRESSURE: 79 MMHG | HEART RATE: 83 BPM | SYSTOLIC BLOOD PRESSURE: 147 MMHG

## 2022-10-25 DIAGNOSIS — M48.07 STENOSIS OF LUMBOSACRAL SPINE: ICD-10-CM

## 2022-10-25 DIAGNOSIS — M47.816 LUMBAR SPONDYLOSIS: ICD-10-CM

## 2022-10-25 DIAGNOSIS — M17.11 OSTEOARTHRITIS OF RIGHT KNEE, UNSPECIFIED OSTEOARTHRITIS TYPE: ICD-10-CM

## 2022-10-25 DIAGNOSIS — M96.1 FAILED BACK SURGICAL SYNDROME: Primary | ICD-10-CM

## 2022-10-25 DIAGNOSIS — M48.062 LUMBAR STENOSIS WITH NEUROGENIC CLAUDICATION: ICD-10-CM

## 2022-10-25 PROCEDURE — 99214 OFFICE O/P EST MOD 30 MIN: CPT | Performed by: STUDENT IN AN ORGANIZED HEALTH CARE EDUCATION/TRAINING PROGRAM

## 2022-10-25 RX ORDER — TRAMADOL HYDROCHLORIDE 50 MG/1
50-100 TABLET ORAL EVERY 6 HOURS PRN
Qty: 120 TABLET | Refills: 1 | Status: SHIPPED | OUTPATIENT
Start: 2022-10-25 | End: 2022-12-24

## 2022-11-03 ENCOUNTER — TELEPHONE (OUTPATIENT)
Dept: PAIN MEDICINE | Facility: CLINIC | Age: 72
End: 2022-11-03

## 2022-11-03 NOTE — TELEPHONE ENCOUNTER
Caller: FRANCIE ALBA     Relationship: SELF     Best call back number: [phone]    What is the best time to reach you: ANY     Who are you requesting to speak with (clinical staff, provider,  specific staff member): CLINICAL     Do you know the name of the person who called: YES     What was the call regarding: PATIENT CALLED AND STATES THAT THE PSYCH EVAL IS NO COVERED UNDER [facility]

## 2022-11-08 ENCOUNTER — PROCEDURE VISIT (OUTPATIENT)
Dept: PAIN MEDICINE | Facility: CLINIC | Age: 72
End: 2022-11-08

## 2022-11-08 VITALS
RESPIRATION RATE: 16 BRPM | SYSTOLIC BLOOD PRESSURE: 164 MMHG | DIASTOLIC BLOOD PRESSURE: 96 MMHG | OXYGEN SATURATION: 96 % | HEART RATE: 77 BPM

## 2022-11-08 DIAGNOSIS — M17.11 OSTEOARTHRITIS OF RIGHT KNEE, UNSPECIFIED OSTEOARTHRITIS TYPE: Primary | ICD-10-CM

## 2022-11-08 PROCEDURE — 20610 DRAIN/INJ JOINT/BURSA W/O US: CPT | Performed by: STUDENT IN AN ORGANIZED HEALTH CARE EDUCATION/TRAINING PROGRAM

## 2022-11-08 RX ORDER — BUPIVACAINE HYDROCHLORIDE 2.5 MG/ML
5 INJECTION, SOLUTION INFILTRATION; PERINEURAL ONCE
Status: COMPLETED | OUTPATIENT
Start: 2022-11-08 | End: 2022-11-08

## 2022-11-08 RX ORDER — METHYLPREDNISOLONE ACETATE 40 MG/ML
40 INJECTION, SUSPENSION INTRA-ARTICULAR; INTRALESIONAL; INTRAMUSCULAR; SOFT TISSUE ONCE
Status: COMPLETED | OUTPATIENT
Start: 2022-11-08 | End: 2022-11-08

## 2022-11-08 RX ADMIN — BUPIVACAINE HYDROCHLORIDE 5 ML: 2.5 INJECTION, SOLUTION INFILTRATION; PERINEURAL at 10:05

## 2022-11-08 RX ADMIN — METHYLPREDNISOLONE ACETATE 40 MG: 40 INJECTION, SUSPENSION INTRA-ARTICULAR; INTRALESIONAL; INTRAMUSCULAR; SOFT TISSUE at 10:06

## 2022-11-08 NOTE — PROGRESS NOTES
H and P reviewed from previous visit and no changes to patient's clinical presentation. Will proceed with procedure as planned. Patient denies history of DM and being on blood thinners.    Procedure:   PREOPERATIVE DIAGNOSIS:    1. Right Knee pain     POSTOPERATIVE DIAGNOSIS:  Same    PROCEDURE Right Intra articular knee injection.    PROCEDURE NOTE:  After obtaining written informed consent patient was taken to the procedure room. Pre-procedure blood pressure and pulse were stable and recorded in patients clinic chart.     The patient was placed in a sitting position and the right knee was prepped with chloraprep and draped in the usual sterile fashion.  The skin over the right patella was identified. The skin was infilterated with 1% lidoacaine using 25 g needle. 22-gauge-1.5-inch needle was inserted into the knee joint from the medial side.  Following the negative aspiration, 5 ml of 0.25 % bupivacaine with 40 mg of depomedrol was injected without any complications.     After the procedure the needle was removed and sterile band-aid was placed.    Following the procedure the patient's vital signs were stable. The patient was discharged home in good condition after being given discharge instructions.      COMPLICATIONS: None    Kirk Nascimento DO  Pain Management   [facility]

## 2022-11-16 ENCOUNTER — OFFICE (AMBULATORY)
Dept: URBAN - METROPOLITAN AREA CLINIC 64 | Facility: CLINIC | Age: 72
End: 2022-11-16

## 2022-11-16 VITALS
HEIGHT: 67 IN | WEIGHT: 182 LBS | HEART RATE: 96 BPM | DIASTOLIC BLOOD PRESSURE: 105 MMHG | SYSTOLIC BLOOD PRESSURE: 152 MMHG

## 2022-11-16 DIAGNOSIS — K59.00 CONSTIPATION, UNSPECIFIED: ICD-10-CM

## 2022-11-16 DIAGNOSIS — K21.9 GASTRO-ESOPHAGEAL REFLUX DISEASE WITHOUT ESOPHAGITIS: ICD-10-CM

## 2022-11-16 DIAGNOSIS — K22.70 BARRETT'S ESOPHAGUS WITHOUT DYSPLASIA: ICD-10-CM

## 2022-11-16 PROCEDURE — 99214 OFFICE O/P EST MOD 30 MIN: CPT

## 2022-11-16 RX ORDER — OMEPRAZOLE 40 MG/1
CAPSULE, DELAYED RELEASE ORAL
Qty: 90 | Refills: 5 | Status: ACTIVE
Start: 2019-03-25

## 2022-11-16 RX ORDER — FAMOTIDINE 40 MG/1
80 TABLET, FILM COATED ORAL
Qty: 180 | Refills: 4 | Status: ACTIVE
Start: 2022-11-16

## 2023-01-16 ENCOUNTER — TELEPHONE (OUTPATIENT)
Dept: PAIN MEDICINE | Facility: CLINIC | Age: 73
End: 2023-01-16

## 2023-01-16 DIAGNOSIS — M48.07 STENOSIS OF LUMBOSACRAL SPINE: ICD-10-CM

## 2023-01-16 DIAGNOSIS — M17.11 OSTEOARTHRITIS OF RIGHT KNEE, UNSPECIFIED OSTEOARTHRITIS TYPE: Primary | ICD-10-CM

## 2023-01-16 RX ORDER — HYDROCODONE BITARTRATE AND ACETAMINOPHEN 7.5; 325 MG/1; MG/1
1 TABLET ORAL 3 TIMES DAILY PRN
Qty: 21 TABLET | Refills: 0 | Status: SHIPPED | OUTPATIENT
Start: 2023-01-16 | End: 2023-01-23

## 2023-01-16 RX ORDER — TRAMADOL HYDROCHLORIDE 50 MG/1
50 TABLET ORAL EVERY 6 HOURS PRN
Qty: 120 TABLET | Refills: 0 | Status: SHIPPED | OUTPATIENT
Start: 2023-01-16 | End: 2023-01-23 | Stop reason: SDUPTHER

## 2023-01-16 NOTE — TELEPHONE ENCOUNTER
Caller: FRANCIE ALBA    Relationship to patient: SELF    Best call back number: [phone]    Patient is needing: PATIENT STATES SHE WAS SEEN IN [facility] ED ON 01/15/2023- HAS A FRACTURE AND STATES SHE WAS PRESCRIBED A PAIN MEDICATION- REQ TO HAVE DR CORDOVA CALL IN PAIN MEDICATION- NORMALLY SEEN IN [facility]    PATIENT STATES NOT CERTAIN WHAT WAS PRESCRIBED FOR HER- STATES SHE WILL CALL THE PHARMACY AND CALL BACK WITH NAME OF MEDICATION THAT WAS PRESCRIBED

## 2023-01-16 NOTE — TELEPHONE ENCOUNTER
PATIENT CALLING BACK- CONCERNED AS TO WHY TRAMADOL WAS CALLED IN INSTEAD OF NORCO? PLEASE CALL PATIENT [phone]

## 2023-01-16 NOTE — TELEPHONE ENCOUNTER
PATIENT CALLED TO PROVIDE NAME OF MEDICATION. STATES SHE WAS TOLD    NORCO 7.5 EVERY 6 HOURS FOR 3 DAYS      PHONE: [phone]    REQUESTING A CALL BACK ONCE ORDER IS SENT. PLEASE ADVISE.

## 2023-01-19 NOTE — PROGRESS NOTES
Subjective   Nola Tariq is a 72 y.o. female is here for follow-up for lower back and right knee pain.  Patient was last seen 11/8/2023 for right knee injection with some relief  Patient called on 1/16/2023 requesting Norco and notified us that she had a right leg fracture.  Patient states that she had a mechanical fall due to falling on ice.  She is currently not cast and does not require surgical intervention.  She continues to have significant pain despite being on Norco.  She had also injured her left knee and had steroid injection by Ortho in left knee.  Scheduled to see Ortho in 5 weeks.    On last visit:     Right leg pain is 6/10 on VAS.    Lower back pain is 4/10 on VAS, at maximum 5/10.  Pain is aching and dull in nature.  Not referred.  She has numbness and tingling in bilateral foot.  Pain is constant.  Improved by pain meds.  Worse with walking and standing.    R knee pain is 5/10 on VAS. Worse with weight bearing. She had R knee steroid injection with Rheumatology several months ago with excellent relief.     Previous Injection:   11/8/2022-right knee injection- good relief.  7/8/22 - LESI L5-S1 - no relief.   RFA at previous pain management - no relief.     Hx:  Referred by JEM Ashton for  lower back pain.  Pain started around 2008 without any significant injuries and has been getting worse since then.  Her main complaints today are mid back and lower back pain.  Patient had previously seen Dr. Voss and had epidurals, facet joint injections, ablations.  She states that nothing helped at the time. Last seen 1 year ago. Her lower back pain is worse than mid back pain. She is retired RN.   She has since seen [facility] spine (seen by APRN)- 2022 who recommended medication management.  Patient states that she is not interested in fusion surgery as there is 50% chance of lower back pain improvement.          PHQ-9- 4  SOAPP-2      PMH:   Tijerina's esophagus, hypertension, migraine, thoracic  vertebra fx s/p kypho T7, T10, migraines, smoker, Back surgery- decompression L4-5?-2009 Dr. Rdz, Carpal tunnel surgery 1983 bilateral wrist; R knee steroid injection with orthopedics (Dr. Barton at [place]).         Current Medications:   Tramadol  mg q6H PRN  Lyrica 25 mg BID  Ibuprofen 800 mg       Past Medications:  Tramadol  mg daily   Meloxicam 15 mg  Celebrex-leg swelling  Gabapentin - didn't help   Norco 5-325 mg BID PRN      Past Modalities:  TENS:                                                                          no                                                  Physical Therapy Within The Last 6 Months              Yes (1.5 years ago with some help).   Psychotherapy                                                            no  Massage Therapy                                                       no     Patient Complains Of:  Uro-Fecal Incontinence          no  Weight Gain/Loss                   Yes (weight gain 25 lbs - 1 year after smoking).   Fever/Chills                             no  Weakness                               no            Current Outpatient Medications:   •  Acetaminophen-Caffeine 500-65 MG tablet, EXCEDRIN TENSION HEADACHE 500-65 MG TABS, Disp: , Rfl:   •  Cetirizine HCl 10 MG capsule, ZYRTEC ALLERGY 10 MG CAPS, Disp: , Rfl:   •  famotidine (PEPCID) 40 MG tablet, , Disp: , Rfl:   •  HYDROcodone-acetaminophen (NORCO) 7.5-325 MG per tablet, Take 1 tablet by mouth 3 (Three) Times a Day As Needed for Moderate Pain for up to 7 days., Disp: 21 tablet, Rfl: 0  •  ibuprofen (ADVIL,MOTRIN) 800 MG tablet, IBUPROFEN 800 MG TABS, Disp: , Rfl:   •  Multiple Vitamins-Minerals (MULTIVITAMIN WITH MINERALS) tablet tablet, Take 1 tablet by mouth Daily., Disp: , Rfl:   •  omeprazole (priLOSEC) 40 MG capsule, , Disp: , Rfl:   •  sucralfate (CARAFATE) 1 g tablet, , Disp: , Rfl:   •  traMADol (ULTRAM) 50 MG tablet, Take 2 tablets by mouth Every 6 (Six) Hours As Needed for Moderate Pain  for up to 7 days., Disp: 56 tablet, Rfl: 0  •  vitamin D (ERGOCALCIFEROL) 1.25 MG (50036 UT) capsule capsule, Take 50,000 Units by mouth 1 (One) Time Per Week., Disp: , Rfl:   •  pregabalin (LYRICA) 50 MG capsule, Take 1 capsule by mouth 2 (Two) Times a Day for 30 days., Disp: 60 capsule, Rfl: 0    The following portions of the patient's history were reviewed and updated as appropriate: allergies, current medications, past family history, past medical history, past social history, past surgical history, and problem list.      REVIEW OF PERTINENT MEDICAL DATA    Past Medical History:   Diagnosis Date   • Arthritis    • Tijerina esophagus    • COVID-19    • DJD (degenerative joint disease)    • Fall at home    • GERD (gastroesophageal reflux disease)    • Hypertension    • Low back pain    • Migraine    • Plantar fasciitis      Past Surgical History:   Procedure Laterality Date   • CARPAL TUNNEL RELEASE      colin.   • EYE SURGERY      catarac   • KNEE ARTHROSCOPY     • TUBAL ABDOMINAL LIGATION     • WRIST SURGERY      tendon release     Family History   Problem Relation Age of Onset   • Migraines Father    • Migraines Sister    • Migraines Brother      Social History     Socioeconomic History   • Marital status:    Tobacco Use   • Smoking status: Former     Types: Cigarettes     Quit date: 2021     Years since quittin.5   • Smokeless tobacco: Never   Vaping Use   • Vaping Use: Never used   Substance and Sexual Activity   • Alcohol use: Never   • Drug use: Never   • Sexual activity: Not Currently         Review of Systems   Musculoskeletal: Positive for arthralgias and back pain.         Vitals:    23 1441   BP: 168/100   Pulse: 89   Resp: 16   SpO2: 97%   PainSc:   6         Objective   Physical Exam  Musculoskeletal:         General: Tenderness present.        Back:         Legs:    Neurological:      Comments: Motor strength 5/5 b/l LE  Sensory intact b/l LE             Imaging Reviewed:  Lumbar  spine MRI without contrast-6/13/2022.  L2-3-moderate disc bulge.  Moderate posterior facet changes.  Mild to moderate central canal narrowing.  Moderate bilateral neural foraminal narrowing.  L3-4-- broad-based disc bulge.  Central posterior disc protrusion causing narrowing of central canal with approximate 5 mm in AP dimension.  Moderate facet changes.  Moderate to severe central canal narrowing.  Mild bilateral neural foraminal narrowing.  L4-5-large broad-based disc bulge.  Severe facet changes.  Severe central canal narrowing.  Moderate bilateral neural foraminal narrowing  L5-S1-large broad-based disc bulge.  Severe facet changes.  Moderate to severe central canal narrowing.  Severe bilateral neural foraminal narrowing.      MRI thoracic spine-6/13/2022  Kyphoplasty at T7 and T10.  - Residual mild to moderate compression fracture deformities  - Mild posterior disc bulges at multiple thoracic levels.  No large extrusions.      Assessment:    1. Right foot pain    2. Osteoarthritis of right knee, unspecified osteoarthritis type    3. Stenosis of lumbosacral spine         Plan:   1.  UDS consistent with patient's interview on 5/10/2022.   Narcotic contract signed-5/10/2022.  Narcan sent-6/20/2022.  2. We discussed trying a course of formal physical therapy.  Physical therapy can help strengthen and stretch the muscles around the joints. Continue to be as active as possible. Start physical therapy as it will help generalized pain and follow up with HEP.  PT prescription sent to [facility] in [place] as requested by patient.  3.  Patient continues to have pain despite being on Norco.  She had significant pain relief with tramadol previously.   Restart tramadol 1 to 2 tablets of 50 mg tramadol q6H PRN for 7 days.  Advised patient to call back in 3 days if she would like to continue tramadol or if she does not have significant pain relief we can consider increasing Norco to  mg.  Patient understands and  agrees with the plan and will give us a call.  Side effects discussed including but not limited to nausea, vomiting, constipation, lightheadedness, dizziness, drowsiness, or headache. This medication may increase serotonin and rarely cause a very serious condition called serotonin syndrome/ toxicity.   4.  Patient has pain in the lower back with referred pain in the legs. He had multiple procedures and back surgery in the past with no improvement in pain. She is not a candidate for repeat surgery and has failed more than 6 months of conservative therapy and there are no contraindications for SCS trial. So he is good candidate for SCS trial.   Psych eval sent - 10/25/22-awaiting scheduling.   5. Patient has pain in the right knee and the Xray shows degenerative changes. Discussed intra articular knee injection. Discussed the possibility of infection, bleeding, nerve damage, headache, increased pain, paraplegia. Patient understands and agrees.       RTC in 1 month.     Kirk Nascimento DO  Pain Management   [facility]           INSPECT REPORT    As part of the patient's treatment plan, I may be prescribing controlled substances. The patient has been made aware of appropriate use of such medications, including potential risk of somnolence, limited ability to drive and/or work safely, and the potential for dependence or overdose. It has also been made clear that these medications are for use by this patient only, without concomitant use of alcohol or other substances unless prescribed.     Patient has completed prescribing agreement detailing terms of continued prescribing of controlled substances, including monitoring INSPECT reports, urine drug screening, and pill counts if necessary. The patient is aware that inappropriate use will results in cessation of prescribing such medications.    INSPECT report has been reviewed and scanned into the patient's chart.

## 2023-01-23 ENCOUNTER — OFFICE VISIT (OUTPATIENT)
Dept: PAIN MEDICINE | Facility: CLINIC | Age: 73
End: 2023-01-23
Payer: MEDICARE

## 2023-01-23 VITALS
HEART RATE: 89 BPM | DIASTOLIC BLOOD PRESSURE: 100 MMHG | RESPIRATION RATE: 16 BRPM | SYSTOLIC BLOOD PRESSURE: 168 MMHG | OXYGEN SATURATION: 97 %

## 2023-01-23 DIAGNOSIS — M79.671 RIGHT FOOT PAIN: Primary | ICD-10-CM

## 2023-01-23 DIAGNOSIS — M17.11 OSTEOARTHRITIS OF RIGHT KNEE, UNSPECIFIED OSTEOARTHRITIS TYPE: ICD-10-CM

## 2023-01-23 DIAGNOSIS — M48.07 STENOSIS OF LUMBOSACRAL SPINE: ICD-10-CM

## 2023-01-23 PROCEDURE — 99214 OFFICE O/P EST MOD 30 MIN: CPT | Performed by: STUDENT IN AN ORGANIZED HEALTH CARE EDUCATION/TRAINING PROGRAM

## 2023-01-23 RX ORDER — FAMOTIDINE 40 MG/1
TABLET, FILM COATED ORAL
COMMUNITY
Start: 2022-11-17

## 2023-01-23 RX ORDER — TRAMADOL HYDROCHLORIDE 50 MG/1
100 TABLET ORAL EVERY 6 HOURS PRN
Qty: 56 TABLET | Refills: 0 | Status: SHIPPED | OUTPATIENT
Start: 2023-01-23 | End: 2023-01-30

## 2023-02-20 NOTE — PROGRESS NOTES
Subjective   Nola Tariq is a 72 y.o. female is here for follow-up for lower back and right knee pain.  Last seen on 1/23/2023. She takes Tramadol for pain and for severe pain takes Norco. She is also complaining of migraines. She was seen in ED previously and was given Fioricet with good relief. She used to take Excedrin for daily migraines but it doesn't work anymore. She is scheduled for another sleep study. She does wake up with migraines during night or early in morning. She would wake up with headache and goes away with Excedrin. She had 2-3 headaches over last month that didn't go away with Excedrin and lasted for rest of the day. She had seen someone for chiari malformation previous.  History of migraines since teenager and usually right-sided temporal area but states that new migraines are all over the head.    On last visit: Increased Norco- helps with severe pain.     Right leg pain is 6/10 on VAS.    Lower back pain is 4/10 on VAS, at maximum 5/10.  Pain is aching and dull in nature.  Not referred.  She has numbness and tingling in bilateral foot.  Pain is constant.  Improved by pain meds.  Worse with walking and standing.    R knee pain is 5/10 on VAS. Worse with weight bearing. She had R knee steroid injection with Rheumatology several months ago with excellent relief.     Previous Injection:   11/8/2022-right knee injection- good relief.  7/8/22 - LESI L5-S1 - no relief.   RFA at previous pain management - no relief.     Hx:  Referred by JEM Ashton for  lower back pain.  Pain started around 2008 without any significant injuries and has been getting worse since then.  Her main complaints today are mid back and lower back pain.  Patient had previously seen Dr. Voss and had epidurals, facet joint injections, ablations.  She states that nothing helped at the time. Last seen 1 year ago. Her lower back pain is worse than mid back pain. She is retired RN.   She has since seen [facility] spine  (seen by APRN)- 2022 who recommended medication management.  Patient states that she is not interested in fusion surgery as there is 50% chance of lower back pain improvement. She had also injured her left knee and had steroid injection by Ortho in left knee.  Scheduled to see Ortho in 5 weeks          PHQ-9- 4  SOAPP-2      PMH:   Tijerina's esophagus, hypertension, migraine, thoracic vertebra fx s/p kypho T7, T10, migraines, smoker, Back surgery- decompression L4-5?-2009 Dr. Rdz, Carpal tunnel surgery 1983 bilateral wrist; R knee steroid injection with orthopedics (Dr. Barton at [place]).         Current Medications:   Tramadol  mg q6H PRN  Lyrica 25 mg BID  Ibuprofen 800 mg       Past Medications:  Tramadol  mg daily   Meloxicam 15 mg  Celebrex-leg swelling  Gabapentin - didn't help   Norco 5-325 mg BID PRN      Past Modalities:  TENS:                                                                          no                                                  Physical Therapy Within The Last 6 Months              Yes (1.5 years ago with some help).   Psychotherapy                                                            no  Massage Therapy                                                       no     Patient Complains Of:  Uro-Fecal Incontinence          no  Weight Gain/Loss                   Yes (weight gain 25 lbs - 1 year after smoking).   Fever/Chills                             no  Weakness                               no            Current Outpatient Medications:   •  Acetaminophen-Caffeine 500-65 MG tablet, EXCEDRIN TENSION HEADACHE 500-65 MG TABS, Disp: , Rfl:   •  Cetirizine HCl 10 MG capsule, ZYRTEC ALLERGY 10 MG CAPS, Disp: , Rfl:   •  famotidine (PEPCID) 40 MG tablet, , Disp: , Rfl:   •  ibuprofen (ADVIL,MOTRIN) 800 MG tablet, IBUPROFEN 800 MG TABS, Disp: , Rfl:   •  Multiple Vitamins-Minerals (MULTIVITAMIN WITH MINERALS) tablet tablet, Take 1 tablet by mouth Daily., Disp: , Rfl:   •   omeprazole (priLOSEC) 40 MG capsule, , Disp: , Rfl:   •  sucralfate (CARAFATE) 1 g tablet, , Disp: , Rfl:   •  vitamin D (ERGOCALCIFEROL) 1.25 MG (58185 UT) capsule capsule, Take 50,000 Units by mouth 1 (One) Time Per Week., Disp: , Rfl:   •  HYDROcodone-acetaminophen (NORCO) 7.5-325 MG per tablet, Take 1 tablet by mouth Every 6 (Six) Hours As Needed for Severe Pain for up to 7 days. For breakthrough pain, Disp: 28 tablet, Rfl: 0  •  pregabalin (LYRICA) 50 MG capsule, Take 1 capsule by mouth 2 (Two) Times a Day for 30 days., Disp: 60 capsule, Rfl: 0  •  traMADol (ULTRAM) 50 MG tablet, Take 2 tablets by mouth 3 (Three) Times a Day As Needed for Moderate Pain for up to 60 days., Disp: 180 tablet, Rfl: 1    The following portions of the patient's history were reviewed and updated as appropriate: allergies, current medications, past family history, past medical history, past social history, past surgical history, and problem list.      REVIEW OF PERTINENT MEDICAL DATA    Past Medical History:   Diagnosis Date   • Arthritis    • Tijerina esophagus    • COVID-19    • DJD (degenerative joint disease)    • Fall at home    • Fall at home     fracture rt. leg   • GERD (gastroesophageal reflux disease)    • Hypertension    • Low back pain    • Migraine    • Plantar fasciitis      Past Surgical History:   Procedure Laterality Date   • CARPAL TUNNEL RELEASE      colin.   • EYE SURGERY      catarac   • KNEE ARTHROSCOPY     • TUBAL ABDOMINAL LIGATION     • WRIST SURGERY      tendon release     Family History   Problem Relation Age of Onset   • Migraines Father    • Migraines Sister    • Migraines Brother      Social History     Socioeconomic History   • Marital status:    Tobacco Use   • Smoking status: Former     Types: Cigarettes     Quit date: 2021     Years since quittin.6   • Smokeless tobacco: Never   Vaping Use   • Vaping Use: Never used   Substance and Sexual Activity   • Alcohol use: Never   • Drug use: Never    • Sexual activity: Not Currently         Review of Systems   Musculoskeletal: Positive for arthralgias and back pain.         Vitals:    02/21/23 1044   BP: 134/84   Pulse: 97   Resp: 16   SpO2: 95%   PainSc:   4         Objective   Physical Exam  Musculoskeletal:         General: Tenderness present.        Back:         Legs:    Neurological:      Comments: Motor strength 5/5 b/l LE  Sensory intact b/l LE             Imaging Reviewed:  Lumbar spine MRI without contrast-6/13/2022.  L2-3-moderate disc bulge.  Moderate posterior facet changes.  Mild to moderate central canal narrowing.  Moderate bilateral neural foraminal narrowing.  L3-4-- broad-based disc bulge.  Central posterior disc protrusion causing narrowing of central canal with approximate 5 mm in AP dimension.  Moderate facet changes.  Moderate to severe central canal narrowing.  Mild bilateral neural foraminal narrowing.  L4-5-large broad-based disc bulge.  Severe facet changes.  Severe central canal narrowing.  Moderate bilateral neural foraminal narrowing  L5-S1-large broad-based disc bulge.  Severe facet changes.  Moderate to severe central canal narrowing.  Severe bilateral neural foraminal narrowing.      MRI thoracic spine-6/13/2022  Kyphoplasty at T7 and T10.  - Residual mild to moderate compression fracture deformities  - Mild posterior disc bulges at multiple thoracic levels.  No large extrusions.    MRI brain wo contrast: 8/15/2018  Chiari Malformation type 1 with cerebellar tonsils projecting 7 mm below level of foramen magnum  Chronic b/l maxillary and ethmoid sinusitis     Assessment:    1. Postlaminectomy syndrome    2. Lumbar spondylosis    3. Right foot pain    4. Migraine without aura and without status migrainosus, not intractable    5. Lumbar radiculopathy         Plan:   1.  UDS consistent with patient's interview on 5/10/2022.   Narcotic contract signed-5/10/2022.  Narcan sent-6/20/2022.  2. We discussed trying a course of formal  physical therapy.  Physical therapy can help strengthen and stretch the muscles around the joints. Continue to be as active as possible. Start physical therapy as it will help generalized pain and follow up with HEP.  PT prescription sent to [facility] in [place] as requested by patient.  3.  Patient continues to have pain despite being on Norco.  She had significant pain relief with tramadol previously.   Restart tramadol 1 to 2 tablets of 50 mg tramadol TID PRN with 1 refill (2/21/23).     4. Norco 7.5-325 mg (#28 tabs) PRN for break through pain (2/21/23)  Side effects discussed including but not limited to nausea, vomiting, constipation, lightheadedness, dizziness, drowsiness, or headache. This medication may increase serotonin and rarely cause a very serious condition called serotonin syndrome/ toxicity.   4.  Patient has pain in the lower back with referred pain in the legs. He had multiple procedures and back surgery in the past with no improvement in pain. She is not a candidate for repeat surgery and has failed more than 6 months of conservative therapy and there are no contraindications for SCS trial. So he is good candidate for SCS trial.   Psych eval sent - 10/25/22- done.  Awaiting approval from insurance for the trial.  5.  Recommend following up with neurology for worsening of migraines.  History of Chiari malformation type I.      RTC in 2 months.     Kirk Nascimento DO  Pain Management   [facility]           INSPECT REPORT    As part of the patient's treatment plan, I may be prescribing controlled substances. The patient has been made aware of appropriate use of such medications, including potential risk of somnolence, limited ability to drive and/or work safely, and the potential for dependence or overdose. It has also been made clear that these medications are for use by this patient only, without concomitant use of alcohol or other substances unless prescribed.     Patient has completed  prescribing agreement detailing terms of continued prescribing of controlled substances, including monitoring INSPECT reports, urine drug screening, and pill counts if necessary. The patient is aware that inappropriate use will results in cessation of prescribing such medications.    INSPECT report has been reviewed and scanned into the patient's chart.

## 2023-02-21 ENCOUNTER — OFFICE VISIT (OUTPATIENT)
Dept: PAIN MEDICINE | Facility: CLINIC | Age: 73
End: 2023-02-21
Payer: MEDICARE

## 2023-02-21 VITALS
RESPIRATION RATE: 16 BRPM | HEART RATE: 97 BPM | SYSTOLIC BLOOD PRESSURE: 134 MMHG | OXYGEN SATURATION: 95 % | DIASTOLIC BLOOD PRESSURE: 84 MMHG

## 2023-02-21 DIAGNOSIS — M47.816 LUMBAR SPONDYLOSIS: ICD-10-CM

## 2023-02-21 DIAGNOSIS — M54.16 LUMBAR RADICULOPATHY: ICD-10-CM

## 2023-02-21 DIAGNOSIS — M96.1 POSTLAMINECTOMY SYNDROME: ICD-10-CM

## 2023-02-21 DIAGNOSIS — M79.671 RIGHT FOOT PAIN: ICD-10-CM

## 2023-02-21 DIAGNOSIS — M96.1 FAILED BACK SURGICAL SYNDROME: Primary | ICD-10-CM

## 2023-02-21 DIAGNOSIS — G43.009 MIGRAINE WITHOUT AURA AND WITHOUT STATUS MIGRAINOSUS, NOT INTRACTABLE: ICD-10-CM

## 2023-02-21 PROCEDURE — 99214 OFFICE O/P EST MOD 30 MIN: CPT | Performed by: STUDENT IN AN ORGANIZED HEALTH CARE EDUCATION/TRAINING PROGRAM

## 2023-02-21 RX ORDER — HYDROCODONE BITARTRATE AND ACETAMINOPHEN 7.5; 325 MG/1; MG/1
1 TABLET ORAL EVERY 6 HOURS PRN
Qty: 28 TABLET | Refills: 0 | Status: SHIPPED | OUTPATIENT
Start: 2023-02-21 | End: 2023-02-28

## 2023-02-21 RX ORDER — TRAMADOL HYDROCHLORIDE 50 MG/1
100 TABLET ORAL 3 TIMES DAILY PRN
Qty: 180 TABLET | Refills: 1 | Status: SHIPPED | OUTPATIENT
Start: 2023-02-21 | End: 2023-04-22

## 2023-02-22 ENCOUNTER — TELEPHONE (OUTPATIENT)
Dept: PAIN MEDICINE | Facility: CLINIC | Age: 73
End: 2023-02-22
Payer: MEDICARE

## 2023-02-22 NOTE — TELEPHONE ENCOUNTER
Caller: Nola Tariq    Relationship: Self    Best call back number:     What was the call regarding: THE PATIENT STATED SHE SPOKE WITH DR CORDOVA AT HER APPT YESTERDAY  ABOUT GETTING A SPINAL CORD STIMULATOR. SHE CALLED HER INSURANCE TO SEE HOW MUCH IT WOULD COST HER AND THEY TOLD HER NOTHING HAS BEEN SUBMITTED FOR AUTHORIZATION. SHE WOULD LIKE IT TO BE SUBMITTED TO HER INSURANCE FOR AUTHORIZATION.    SHE WOULD ALSO LIKE A CALL BACK WITH THE CPT CODE SO SHE CAN SEE HOW MUCH IT WILL BE FOR HER.      Do you require a callback: YES

## 2023-02-22 NOTE — TELEPHONE ENCOUNTER
Sw patient to le her know that K[employer] has submitted though [employer] for approval. The pt was given cpt codes

## 2023-04-18 ENCOUNTER — HOSPITAL ENCOUNTER (OUTPATIENT)
Dept: PAIN MEDICINE | Facility: HOSPITAL | Age: 73
Discharge: HOME | End: 2023-04-18
Payer: MEDICARE

## 2023-04-18 VITALS
OXYGEN SATURATION: 97 % | BODY MASS INDEX: 30.39 KG/M2 | WEIGHT: 178 LBS | RESPIRATION RATE: 16 BRPM | SYSTOLIC BLOOD PRESSURE: 148 MMHG | HEIGHT: 64 IN | TEMPERATURE: 97.7 F | DIASTOLIC BLOOD PRESSURE: 89 MMHG | HEART RATE: 77 BPM

## 2023-04-18 DIAGNOSIS — M96.1 FAILED BACK SURGICAL SYNDROME: ICD-10-CM

## 2023-04-18 DIAGNOSIS — R52 PAIN: ICD-10-CM

## 2023-04-18 DIAGNOSIS — M96.1 POSTLAMINECTOMY SYNDROME: Primary | ICD-10-CM

## 2023-04-18 PROCEDURE — 25010000002 KETOROLAC TROMETHAMINE PER 15 MG: Performed by: STUDENT IN AN ORGANIZED HEALTH CARE EDUCATION/TRAINING PROGRAM

## 2023-04-18 PROCEDURE — 77003 FLUOROGUIDE FOR SPINE INJECT: CPT

## 2023-04-18 PROCEDURE — 25010000002 CEFAZOLIN PER 500 MG

## 2023-04-18 PROCEDURE — 25010000002 FENTANYL CITRATE (PF) 50 MCG/ML SOLUTION: Performed by: STUDENT IN AN ORGANIZED HEALTH CARE EDUCATION/TRAINING PROGRAM

## 2023-04-18 PROCEDURE — 63650 IMPLANT NEUROELECTRODES: CPT | Performed by: STUDENT IN AN ORGANIZED HEALTH CARE EDUCATION/TRAINING PROGRAM

## 2023-04-18 PROCEDURE — 25010000002 MIDAZOLAM PER 1 MG: Performed by: STUDENT IN AN ORGANIZED HEALTH CARE EDUCATION/TRAINING PROGRAM

## 2023-04-18 PROCEDURE — 99152 MOD SED SAME PHYS/QHP 5/>YRS: CPT | Performed by: STUDENT IN AN ORGANIZED HEALTH CARE EDUCATION/TRAINING PROGRAM

## 2023-04-18 PROCEDURE — C1897 LEAD, NEUROSTIM TEST KIT: HCPCS | Performed by: STUDENT IN AN ORGANIZED HEALTH CARE EDUCATION/TRAINING PROGRAM

## 2023-04-18 RX ORDER — LIDOCAINE HYDROCHLORIDE AND EPINEPHRINE 10; 10 MG/ML; UG/ML
10 INJECTION, SOLUTION INFILTRATION; PERINEURAL ONCE
Status: COMPLETED | OUTPATIENT
Start: 2023-04-18 | End: 2023-04-18

## 2023-04-18 RX ORDER — CEFAZOLIN 2 G/1
INJECTION, POWDER, FOR SOLUTION INTRAMUSCULAR; INTRAVENOUS
Status: COMPLETED
Start: 2023-04-18 | End: 2023-04-18

## 2023-04-18 RX ORDER — CEFAZOLIN SODIUM 1 G/3ML
2 INJECTION, POWDER, FOR SOLUTION INTRAMUSCULAR; INTRAVENOUS ONCE
Status: COMPLETED | OUTPATIENT
Start: 2023-04-18 | End: 2023-04-18

## 2023-04-18 RX ORDER — SODIUM CHLORIDE 9 MG/ML
50 INJECTION, SOLUTION INTRAVENOUS CONTINUOUS
Status: DISCONTINUED | OUTPATIENT
Start: 2023-04-18 | End: 2023-04-19 | Stop reason: HOSPADM

## 2023-04-18 RX ORDER — TOPIRAMATE 25 MG/1
TABLET ORAL
COMMUNITY
Start: 2023-04-14

## 2023-04-18 RX ORDER — KETOROLAC TROMETHAMINE 15 MG/ML
15 INJECTION, SOLUTION INTRAMUSCULAR; INTRAVENOUS ONCE
Status: DISCONTINUED | OUTPATIENT
Start: 2023-04-18 | End: 2023-04-18

## 2023-04-18 RX ORDER — MIDAZOLAM HYDROCHLORIDE 1 MG/ML
2 INJECTION INTRAMUSCULAR; INTRAVENOUS ONCE
Status: COMPLETED | OUTPATIENT
Start: 2023-04-18 | End: 2023-04-18

## 2023-04-18 RX ORDER — CEFAZOLIN SODIUM 1 G/3ML
1 INJECTION, POWDER, FOR SOLUTION INTRAMUSCULAR; INTRAVENOUS ONCE
Status: DISCONTINUED | OUTPATIENT
Start: 2023-04-18 | End: 2023-04-18

## 2023-04-18 RX ORDER — FENTANYL CITRATE 50 UG/ML
100 INJECTION, SOLUTION INTRAMUSCULAR; INTRAVENOUS ONCE
Status: COMPLETED | OUTPATIENT
Start: 2023-04-18 | End: 2023-04-18

## 2023-04-18 RX ORDER — BUPIVACAINE HYDROCHLORIDE 2.5 MG/ML
10 INJECTION, SOLUTION EPIDURAL; INFILTRATION; INTRACAUDAL ONCE
Status: COMPLETED | OUTPATIENT
Start: 2023-04-18 | End: 2023-04-18

## 2023-04-18 RX ORDER — SODIUM CHLORIDE 9 MG/ML
INJECTION, SOLUTION INTRAVENOUS
Status: COMPLETED
Start: 2023-04-18 | End: 2023-04-18

## 2023-04-18 RX ORDER — KETOROLAC TROMETHAMINE 15 MG/ML
15 INJECTION, SOLUTION INTRAMUSCULAR; INTRAVENOUS ONCE
Status: COMPLETED | OUTPATIENT
Start: 2023-04-18 | End: 2023-04-18

## 2023-04-18 RX ADMIN — MIDAZOLAM 1 MG: 1 INJECTION INTRAMUSCULAR; INTRAVENOUS at 13:22

## 2023-04-18 RX ADMIN — BUPIVACAINE HYDROCHLORIDE 10 ML: 2.5 INJECTION, SOLUTION EPIDURAL; INFILTRATION; INTRACAUDAL; PERINEURAL at 13:11

## 2023-04-18 RX ADMIN — CEFAZOLIN SODIUM 2 G: 1 INJECTION, POWDER, FOR SOLUTION INTRAMUSCULAR; INTRAVENOUS at 13:11

## 2023-04-18 RX ADMIN — SODIUM CHLORIDE 100 ML: 9 INJECTION, SOLUTION INTRAVENOUS at 13:24

## 2023-04-18 RX ADMIN — KETOROLAC TROMETHAMINE 15 MG: 15 INJECTION, SOLUTION INTRAMUSCULAR; INTRAVENOUS at 14:29

## 2023-04-18 RX ADMIN — FENTANYL CITRATE 75 MCG: 50 INJECTION, SOLUTION INTRAMUSCULAR; INTRAVENOUS at 13:18

## 2023-04-18 RX ADMIN — CEFAZOLIN 2 G: 2 INJECTION, POWDER, FOR SOLUTION INTRAMUSCULAR; INTRAVENOUS at 13:11

## 2023-04-18 RX ADMIN — MIDAZOLAM 1 MG: 1 INJECTION INTRAMUSCULAR; INTRAVENOUS at 13:18

## 2023-04-18 RX ADMIN — SODIUM CHLORIDE 100 ML: 900 INJECTION INTRAVENOUS at 13:24

## 2023-04-18 RX ADMIN — FENTANYL CITRATE 25 MCG: 50 INJECTION, SOLUTION INTRAMUSCULAR; INTRAVENOUS at 13:43

## 2023-04-18 RX ADMIN — LIDOCAINE HYDROCHLORIDE,EPINEPHRINE BITARTRATE 10 ML: 10; .01 INJECTION, SOLUTION INFILTRATION; PERINEURAL at 13:11

## 2023-04-18 NOTE — ADDENDUM NOTE
Encounter addended by: Kirk Nascimento DO on: 4/18/2023 4:27 PM   Actions taken: Medication List reviewed, Problem List reviewed, Allergies reviewed, Clinical Note Signed, Charge Capture section accepted

## 2023-04-18 NOTE — DISCHARGE INSTRUCTIONS
PAIN MANAGEMENT         Spinal Cord Stimulator Trial         Home Instructions        Home Care after Spinal Cord Stimulator Trial  A spinal cord stimulation trial is a test to see whether a spinal cord stimulator reduces your pain. A spinal cord stimulator is a small device that is inserted (implanted) in your back. The stimulator has small wires (leads) that connect it to your spinal cord. The stimulator sends electrical pulses through the leads to the spinal cord. This can relieve pain. Settings for the stimulator can be adjusted with a remote device to find the best pain control.  Your health care provider may suggest a spinal cord stimulation trial if other treatments for long-lasting (chronic) pain have not worked for you. Spinal cord stimulation may be used to manage pain that is caused by:  Coronary artery disease or peripheral vascular disease.  Failed back surgery.  Phantom limb sensation.  Peripheral neuropathy.  Complex regional pain syndrome.  Other syndromes that involve chronic pain.  For the trial, the stimulator is attached to your back instead of inserted under the skin. A trial period is usually 3-5 days, but this can vary among health care providers. After your trial period, you and your health care provider will discuss whether a permanent spinal cord stimulator is an option for you. The permanent stimulator may be an option depending on:  Whether the stimulator reduces your pain during the trial.  Whether the stimulator fits into your lifestyle.  Whether the cost of the stimulator is covered by your insurance.    What are the risks?  Generally, a spinal cord stimulation trial is safe. However, problems can occur, including:  Bleeding or pain at the insertion site of the leads.  Infection at the insertion site or around the leads.  Allergic reactions to medicines, devices, or dyes.  Damage to the skin, nerves, back muscles, or spinal cord where the leads are placed.  Inability to move  the legs (paralysis).  Numbness in the legs.  Inability to control when you urinate or have a bowel movement (incontinence).  Spinal fluid leakage.    How is a spinal cord stimulator placed for a trial?    For a trial period, the stimulator is placed on your skin, not under it. Only the leads that connect the stimulator to the spinal cord are implanted under your skin. The exact location of the stimulator depends on where you have pain.  How should I care for myself during the trial period?  For the first day, you may experience discomfort and swelling at the incision site.   You can use ice for 20 minutes on and then off 20 minutes if needed.   It is okay to use Tylenol to ease this pain or your normal pain medication. Avoid aspirin which increases bleeding.     Restrictions   You will need to restrict your routines as directed below so that the lead wires in your back do not slip or fall out of place. If the leads move out of place, you can lose stimulation. It is possible that stimulation may not be restored even with reprogramming. Follow your doctors instructions regarding driving, returning to work and for how long you will need to follow these restrictions.   ? Limit riding in the car to those trips that are absolutely necessary.   ? If you find it necessary to drive a car, you must turn off the stimulator while driving  ? Do not raise your arms above your head.   ? Do not twist, bend or stretch your body at the waist. Use caution when changing positions, i.e. log roll in and out of bed, bend with your knees.        Do not make any sudden movements.   ? Do not lift items weighing more than 5 pounds.     Site Care/Bathing and Showering   You will have and incision or puncture site in your back where the spinal cord stimulator trial was placed. This will be covered by a large dressing.  You will need to take very good care of the site to avoid infection or bleeding. You will not be able to take a bath or shower  and will need to take a sponge bath until you return to the office for your follow up visit where the wires will be removed.   ? Keep the area covered with the bandage. If the bandage becomes soiled, dislodged, bloody or wet, you will need to call the representative working with you or the Pain Management         office.  ?Check the site daily. Do not remove the bandage. Reinforce your bandage only if needed. You have been given a roll of tape to add to your dressing if needs to be reinforced.     Call  the Pain Management office if you have:          Pus-like drainage.        Fever (oral temperature over 100.4 F or 38.0 C for two readings taken 4 hours apart).        Site is red or warm to touch.        Excessive swelling, bruising or bleeding.        Pain you cannot control.       Your dressing comes off       [facility] phone number is [phone]    Get help right away if:  Your pain gets worse.  The stimulator leads come out.  You develop numbness or weakness in your legs, or you have difficulty walking.  You have problems urinating or having a bowel movement.  You have a fever.  You have symptoms that last for more than 2-3 days.  Your symptoms suddenly get worse.  Summary  A spinal cord stimulator is a small device that sends electrical pulses to your spinal cord. This can relieve pain caused by many different health conditions.  Before a permanent stimulator is placed, you will have a trial using a temporary stimulator that is not implanted under your skin. This helps determine if a stimulator will reduce your pain.  For the trial, only the leads that connect the stimulator to the spinal cord are implanted under your skin.  During the trial period, make sure you write down information about your pain and your responses to the stimulator so that you can share this information with your health care provider.  Keep all follow-up visits as told by your health care provider. This is important.  Contact your health care provider if you have symptoms that indicate a problem.  This information is not intended to replace advice given to you by your health care provider. Make sure you discuss any questions you have with your health care provider.  Document Revised: 09/11/2020 Document Reviewed: 01/22/2020  [employer] Patient Education © 2021 [employer]     Moderate Conscious Sedation, Adult, Care After    This sheet gives you information about how to care for yourself after your procedure. Your health care provider may also give you more specific instructions. If you have problems or questions, contact your health care provider.    What can I expect after the procedure?  After the procedure, it is common to have:  Sleepiness for several hours.  Impaired judgment for several hours.  Difficulty with balance.  Vomiting if you eat too soon.    Follow these instructions at home:  For the next 24 hours:         Rest.  Do not participate in activities where you could fall or become injured.  Do not drive or use machinery.  Do not drink alcohol.  Do not take sleeping pills or medicines that cause drowsiness.  Do not make important decisions or sign legal documents.  Do not take care of children on your own.    Eating and drinking    Follow the diet recommended by your health care provider.  Drink enough fluid to keep your urine pale yellow.  If you vomit:  Drink water, juice, or soup when you can drink without vomiting.  Make sure you have little or no nausea before eating solid foods.     General instructions  Take over-the-counter and prescription medicines only as told by your health care provider.  Have a responsible adult stay with you for 24 hours. It is important to have someone help care for you until you are awake and alert.  Do not smoke.  Keep all follow-up visits as told by your health care provider. This is important.    Contact a health care provider if:  You are still sleepy or having trouble with  balance after 24 hours.  You feel light-headed.  You keep feeling nauseous or you keep vomiting.  You develop a rash.  You have a fever.  You have redness or swelling around the IV site.    Get help right away if:  You have trouble breathing.  You have new-onset confusion at home.    Summary  After the procedure, it is common to feel sleepy, have impaired judgment, or feel nauseous if you eat too soon.  Rest after you get home. Know the things you should not do after the procedure.  Follow the diet recommended by your health care provider and drink enough fluid to keep your urine pale yellow.  Get help right away if you have trouble breathing or new-onset confusion at home.    This information is not intended to replace advice given to you by your health care provider. Make sure you discuss any questions you have with your health care provider.    Document Revised: 04/16/2021 Document Reviewed: 11/12/2020  [employer] Patient Education © 2021 [employer]

## 2023-04-18 NOTE — PROCEDURES
Spinal Cord Stimulation - Phase 1 (SCS Trialing)  Two-Lead Technique      SURGEON: Kirk Nascimento DO    Preop Dx:   Postop Dx: Same as preop dx    SCS System: [employer]    Lead one...   Right lead entering into the T12-L1 interspace, advanced to a point with the distal tip at the T7-8 interspace vertebral level, lying just right of midline      Lead two...   Left lead entering into the L1-L2 nterspace, advanced to a point with the distal tip at the top of T8 vertebral level, lying just left of midline      Preprocedure Discussion with Patient:  Risks and complications were discussed with the patient prior to starting the procedure and informed consent was obtained.  We discussed various topics including but not limited to bleeding, infection, injury, allergic reactions, epidural hematoma, spinal cord and/or neural injury, implant site pain, post procedural site soreness, equipment malfunction including but not limited to lead fracture or lead migration or risk of electrical shock or risk of loss stimulation, risk of the lack of pain relief, and risk of worsening clinical picture.      Reason for procedure:  Post laminectomy syndrome.  This patient had a favorable psychological profile noted prior to trialing.       Moderate sedation: 100 mcg Fentanly; 2 mg Versed ; 15 minutes of moderate sedation was done and vitals were monitored closely during this periods.  ETCO2, RR, BP, HR, O2 were monitored closely throughout sedation period.     Antibiotics:   Cefazolin 2g IV immediately prior to incision    Description of Procedure:    After obtaining informed consent, IV access had been obtained by nursing staff in the preoperative area.  The patient was transported to the operative suite and was placed in a prone position.  Appropriate padding was placed under the patient's abdomen.  Anesthesia care and cardiopulmonary monitoring maintained by member of the anesthesiology team throughout the procedure.  Perioperative  antibodies were given prior to incision per routine as indicated in the anesthesia record and indicated above.  The patient's spine was cleansed appropriately with routine cleansing, and then prepped in a sterile routine fashion.  The area was then draped in sterile routine fashion.       The midline of the patient's spine was identified and marked.  The skin and subcutaneous tissue at the needle entry sites were anesthetized with appropriate amounts of local anesthetic solution.     Needle entry sites were about 1 1/2-2 vertebral levels below the intended interlaminar space for epidural access.  The entry points were medial to the pedicles, and inserted at acute angles of less than 45° and in to the interlaminar space noted above.  Loss-of-resistance technique was utilized to identify entry into the epidural space.  Aspiration was negative.  The leads were inserted in the epidural needles as noted above to the aforementioned target.  Lateral fluoroscopic view was utilized to confirm posterior placement in the epidural space of each lead.     Temporary wire extensions were attached to the leads and the connectors were passed out the sterile field to the spinal cord stimulation device representative.  Stimulator testing was performed with representative's assistance.  Impedances were acceptable.  The patient reported good quality of capture of stimulation covering the painful areas.    Epidural needle was removed slowly from the skin at each insertion with care not to advance to retract needle tip position.  AP fluoroscopic imaging was checked sequentially to confirm no gross changes in lead position had occurred.      Each lead was secured to the skin using a StayFix dressing.  Once more in AP fluoroscopic view was checked to confirm final lead placement after the bandage placement was completed.  The sites were dressed with a Tegaderm dressing in my routine fashion.        Estimated Blood  Loss: Minimal  Specimens:   None  Complications:  No complications were noted.    Tolerance and discharge condition:    The patient tolerated the procedure well and was awakened from any sedation without incident.  The patient was transported to the recovery area without difficulties.  Further device programming was performed by the spinal cord stimulation device representative in the recovery area.  The patient was again cautioned not to drive at this time and avoid strenuous activities and to avoid reaching overhead and to avoid bending and avoid lifting objects over 5 pounds.  The patient was discharged home under the care of family members in stable and satisfactory condition.      Plan of care:    - The patient was given our standard instruction sheet and will resume all medications as per the medication reconciliation sheet.      - The patient will return to my office at the appropriate time scheduled in about one business day with the stimulation device representative for further device programming and education about device function if necessary.     - The patient will again return to my office in about a week for the final visit of the trial in order to end the trial and decide upon whether or not to proceed to Phase 2.     The patient understands that there is any signs of complication, bleeding, infection, fever, chills, increasing back pain or spine pain, any neurologic deficit, or any other concerning finding, that the patient of a family member should call my office at [phone] at any time to access the answering service.      Kirk Nascimento DO  Pain Management   [facility]

## 2023-04-18 NOTE — H&P
H and P reviewed from previous visit and no changes to patient's clinical presentation. Will proceed with procedure as planned. Patient denies history of DM and being on blood thinners.    Discussed risks of procedure including but not limited to bleeding, infection, nerve damage, epidural hematoma, spinal headache, paralysis, lead migration.     She does have migraine currently in pre op area. Patient states that she has increased migraine 9/10 for last 1 month. She has been recently started on Topamax by PCP. She has taken Fioricet and Excedrin without much relief.     Given 15 mg Toradol in post op to help with migraine without much relief.     Advised to call PCP to see if she would be a good candidate for acute migraine medications such as Triptans. Patient will call her PCP.      Kirk Nascimento DO  Pain Management   [facility]

## 2023-04-19 NOTE — PROGRESS NOTES
Subjective   Nola Tariq is a 73 y.o. female is here for SCS lead removal.  She is s/p SCS trial with [employer] on 4/18/2023.  Patient states that she did not feel significant pain relief with that SCS.  Coverage was excellent with patient able to feel paresthesia in all of the painful areas but unfortunately no significant pain relief.  No functional improvement as well.      On last visit:     Right leg pain is 6/10 on VAS.    Lower back pain is 4/10 on VAS, at maximum 5/10.  Pain is aching and dull in nature.  Not referred.  She has numbness and tingling in bilateral foot.  Pain is constant.  Improved by pain meds.  Worse with walking and standing.    R knee pain is 5/10 on VAS. Worse with weight bearing. She had R knee steroid injection with Rheumatology several months ago with excellent relief.     Migraines - She used to take Excedrin and Fioricet for daily migraines but it doesn't work anymore. She is scheduled to see Neurology.  She does wake up with migraines during night or early in morning. She would wake up with headache and goes away with Excedrin. She had 2-3 headaches over last month that didn't go away with Excedrin and lasted for rest of the day. She had seen someone for chiari malformation previous.  History of migraines since teenager and usually right-sided temporal area but states that new migraines are all over the head.      Previous Injection:   4/18/2023- SCS trial [employer]- no significant pain relief.  11/8/2022-right knee injection- good relief.  7/8/22 - LESI L5-S1 - no relief.   RFA at previous pain management - no relief.     Hx:  Referred by JEM Ashton for  lower back pain.  Pain started around 2008 without any significant injuries and has been getting worse since then.  Her main complaints today are mid back and lower back pain.  Patient had previously seen Dr. Voss and had epidurals, facet joint injections, ablations.  She states that nothing helped at  the time. Last seen 1 year ago. Her lower back pain is worse than mid back pain. She is retired RN.   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  Patient states that she is not interested in fusion surgery as there is 50% chance of lower back pain improvement. She had also injured her left knee and had steroid injection by Ortho in left knee.  Scheduled to see Ortho in 5 weeks          PHQ-9- 4  SOAPP-2      PMH:   Tijerina's esophagus, hypertension, migraine, thoracic vertebra fx s/p kypho T7, T10, migraines, smoker, Back surgery- decompression L4-5?-2009 Dr. Rdz, Carpal tunnel surgery 1983 bilateral wrist; R knee steroid injection with orthopedics (Dr. Barton at [place]).         Current Medications:   Tramadol  mg q6H PRN  Lyrica 25 mg BID  Ibuprofen 800 mg       Past Medications:  Tramadol  mg daily   Meloxicam 15 mg  Celebrex-leg swelling  Gabapentin - didn't help   Norco 5-325 mg BID PRN      Past Modalities:  TENS:                                                                          no                                                  Physical Therapy Within The Last 6 Months              Yes (1.5 years ago with some help).   Psychotherapy                                                            no  Massage Therapy                                                       no     Patient Complains Of:  Uro-Fecal Incontinence          no  Weight Gain/Loss                   Yes (weight gain 25 lbs - 1 year after smoking).   Fever/Chills                             no  Weakness                               no            Current Outpatient Medications:   •  Acetaminophen-Caffeine 500-65 MG tablet, EXCEDRIN TENSION HEADACHE 500-65 MG TABS, Disp: , Rfl:   •  Cetirizine HCl 10 MG capsule, ZYRTEC ALLERGY 10 MG CAPS, Disp: , Rfl:   •  famotidine (PEPCID) 40 MG tablet, , Disp: , Rfl:   •  ibuprofen (ADVIL,MOTRIN) 800 MG tablet, IBUPROFEN 800 MG TABS, Disp:  , Rfl:   •  Multiple Vitamins-Minerals (MULTIVITAMIN WITH MINERALS) tablet tablet, Take 1 tablet by mouth Daily., Disp: , Rfl:   •  omeprazole (priLOSEC) 40 MG capsule, , Disp: , Rfl:   •  sucralfate (CARAFATE) 1 g tablet, , Disp: , Rfl:   •  topiramate (TOPAMAX) 25 MG tablet, , Disp: , Rfl:   •  vitamin D (ERGOCALCIFEROL) 1.25 MG (45172 UT) capsule capsule, Take 1 capsule by mouth 1 (One) Time Per Week., Disp: , Rfl:   •  pregabalin (LYRICA) 50 MG capsule, Take 1 capsule by mouth 2 (Two) Times a Day for 30 days., Disp: 60 capsule, Rfl: 0    The following portions of the patient's history were reviewed and updated as appropriate: allergies, current medications, past family history, past medical history, past social history, past surgical history, and problem list.      REVIEW OF PERTINENT MEDICAL DATA    Past Medical History:   Diagnosis Date   • Arthritis    • Tijerina esophagus    • COVID-19    • DJD (degenerative joint disease)    • Fall at home    • Fall at home     fracture rt. leg   • GERD (gastroesophageal reflux disease)    • Hypertension    • Low back pain    • Migraine    • Plantar fasciitis    • Sleep apnea      Past Surgical History:   Procedure Laterality Date   • CARPAL TUNNEL RELEASE      colin.   • EYE SURGERY      catarac   • KNEE ARTHROSCOPY     • SPINAL CORD STIMULATOR TRIAL W/ LAMINOTOMY     • TUBAL ABDOMINAL LIGATION     • WRIST SURGERY      tendon release     Family History   Problem Relation Age of Onset   • Migraines Father    • Migraines Sister    • Migraines Brother      Social History     Socioeconomic History   • Marital status:    Tobacco Use   • Smoking status: Former     Types: Cigarettes     Quit date: 2021     Years since quittin.7   • Smokeless tobacco: Never   Vaping Use   • Vaping Use: Never used   Substance and Sexual Activity   • Alcohol use: Never   • Drug use: Never   • Sexual activity: Not Currently         Review of Systems   Musculoskeletal: Positive for  arthralgias and back pain.         Vitals:    04/24/23 1307   BP: 151/99   Pulse: 92   Resp: 16   SpO2: 96%   PainSc:   5         Objective   Physical Exam  Musculoskeletal:         General: Tenderness present.        Arms:         Back:         Legs:    Neurological:      Comments: Motor strength 5/5 b/l LE  Sensory intact b/l LE             Imaging Reviewed:  Lumbar spine MRI without contrast-6/13/2022.  L2-3-moderate disc bulge.  Moderate posterior facet changes.  Mild to moderate central canal narrowing.  Moderate bilateral neural foraminal narrowing.  L3-4-- broad-based disc bulge.  Central posterior disc protrusion causing narrowing of central canal with approximate 5 mm in AP dimension.  Moderate facet changes.  Moderate to severe central canal narrowing.  Mild bilateral neural foraminal narrowing.  L4-5-large broad-based disc bulge.  Severe facet changes.  Severe central canal narrowing.  Moderate bilateral neural foraminal narrowing  L5-S1-large broad-based disc bulge.  Severe facet changes.  Moderate to severe central canal narrowing.  Severe bilateral neural foraminal narrowing.      MRI thoracic spine-6/13/2022  Kyphoplasty at T7 and T10.  - Residual mild to moderate compression fracture deformities  - Mild posterior disc bulges at multiple thoracic levels.  No large extrusions.    MRI brain wo contrast: 8/15/2018  Chiari Malformation type 1 with cerebellar tonsils projecting 7 mm below level of foramen magnum  Chronic b/l maxillary and ethmoid sinusitis     Assessment:    1. Postlaminectomy syndrome         Plan:   1.  UDS consistent with patient's interview on 5/10/2022.   Narcotic contract signed-5/10/2022.  Narcan sent-6/20/2022.  2. We discussed trying a course of formal physical therapy.  Physical therapy can help strengthen and stretch the muscles around the joints. Continue to be as active as possible. Start physical therapy as it will help generalized pain and follow up with HEP.  PT  prescription sent to [facility]  in [place] as requested by patient.  3.  Patient continues to have pain despite being on Norco.  She had significant pain relief with tramadol previously.   Restart tramadol 1 to 2 tablets of 50 mg tramadol TID PRN with 1 refill (2/21/23).     4. Norco 7.5-325 mg (#28 tabs) PRN for break through pain (2/21/23)  Side effects discussed including but not limited to nausea, vomiting, constipation, lightheadedness, dizziness, drowsiness, or headache. This medication may increase serotonin and rarely cause a very serious condition called serotonin syndrome/ toxicity.   4.  Unfortunately no significant relief with SCS trial.  Would not be a good candidate for implant.  We discussed returning to surgeon for possible surgery but patient is not too keen on fusion as she was told that it would most likely give her 50% pain relief.  We will continue pain medications for now.  5.  Recommend following up with neurology for worsening of migraines.  History of Chiari malformation type I.  6.  Leads were removed today without any complications.  Band-Aids applied.      RTC in 1 month at [facility].     Kirk Nascimento DO  Pain Management   [facility]           INSPECT REPORT    As part of the patient's treatment plan, I may be prescribing controlled substances. The patient has been made aware of appropriate use of such medications, including potential risk of somnolence, limited ability to drive and/or work safely, and the potential for dependence or overdose. It has also been made clear that these medications are for use by this patient only, without concomitant use of alcohol or other substances unless prescribed.     Patient has completed prescribing agreement detailing terms of continued prescribing of controlled substances, including monitoring INSPECT reports, urine drug screening, and pill counts if necessary. The patient is aware that inappropriate use will results in cessation of  prescribing such medications.    INSPECT report has been reviewed and scanned into the patient's chart.

## 2023-04-24 ENCOUNTER — OFFICE VISIT (OUTPATIENT)
Dept: PAIN MEDICINE | Facility: CLINIC | Age: 73
End: 2023-04-24
Payer: MEDICARE

## 2023-04-24 VITALS
HEART RATE: 92 BPM | DIASTOLIC BLOOD PRESSURE: 99 MMHG | OXYGEN SATURATION: 96 % | RESPIRATION RATE: 16 BRPM | SYSTOLIC BLOOD PRESSURE: 151 MMHG

## 2023-04-24 DIAGNOSIS — M96.1 POSTLAMINECTOMY SYNDROME: Primary | ICD-10-CM

## 2023-04-24 PROCEDURE — 1125F AMNT PAIN NOTED PAIN PRSNT: CPT | Performed by: STUDENT IN AN ORGANIZED HEALTH CARE EDUCATION/TRAINING PROGRAM

## 2023-04-24 PROCEDURE — 99024 POSTOP FOLLOW-UP VISIT: CPT | Performed by: STUDENT IN AN ORGANIZED HEALTH CARE EDUCATION/TRAINING PROGRAM

## 2023-04-24 PROCEDURE — 1159F MED LIST DOCD IN RCRD: CPT | Performed by: STUDENT IN AN ORGANIZED HEALTH CARE EDUCATION/TRAINING PROGRAM

## 2023-04-24 PROCEDURE — 1160F RVW MEDS BY RX/DR IN RCRD: CPT | Performed by: STUDENT IN AN ORGANIZED HEALTH CARE EDUCATION/TRAINING PROGRAM

## 2023-09-11 RX ORDER — TRAMADOL HYDROCHLORIDE 50 MG/1
50 TABLET ORAL EVERY 6 HOURS PRN
Status: CANCELLED | OUTPATIENT
Start: 2023-09-11

## 2023-09-11 NOTE — PROGRESS NOTES
Subjective   Nola Tariq is a 73 y.o. female is here for right leg, lower back and right knee pain.  Last seen on 7/11/2023. Increased R shoulder pain for which she will be seen by Dr. Mcgovern. She has seen podiatry as well who has evaluated her.     On last visit:     Right leg pain is 5/10 on VAS.    Lower back pain is 4/10 on VAS, at maximum 5/10.  Pain is aching and dull in nature.  Not referred.  She has numbness and tingling in bilateral foot.  Pain is constant.  Improved by pain meds.  Worse with walking and standing.    R knee pain is 5/10 on VAS. Worse with weight bearing. She had R knee steroid injection with Rheumatology several months ago with excellent relief.     R shoulder pain is 6/10 on VAS. Normal ROM. Had shoulder bursa issues several years ago which improved with US treatments.     Migraines - She used to take Excedrin and Fioricet for daily migraines but it doesn't work anymore. She is scheduled to see Neurology.  She does wake up with migraines during night or early in morning. She would wake up with headache and goes away with Excedrin. She had 2-3 headaches over last month that didn't go away with Excedrin and lasted for rest of the day. She had seen someone for chiari malformation previous.  History of migraines since teenager and usually right-sided temporal area but states that new migraines are all over the head. Seen by neurology who recommended continuing excedrin.      Previous Injection:   4/18/2023- SCS trial [employer]- no significant pain relief. Coverage was excellent with patient able to feel paresthesia in all of the painful areas but unfortunately no significant pain relief.  No functional improvement as well.  11/8/2022-right knee injection- good relief.  7/8/22 - LESI L5-S1 - no relief.   RFA at previous pain management - no relief.     Hx:  Referred by JEM Ashton for  lower back pain.  Pain started around 2008 without any significant injuries and has been  getting worse since then.  Her main complaints today are mid back and lower back pain.  Patient had previously seen Dr. Voss and had epidurals, facet joint injections, ablations.  She states that nothing helped at the time. Last seen 1 year ago. Her lower back pain is worse than mid back pain. She is retired RN.   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  Patient states that she is not interested in fusion surgery as there is 50% chance of lower back pain improvement. She had also injured her left knee and had steroid injection by Ortho in left knee.  Scheduled to see Ortho in 5 weeks. She has been seeing neurology for migraines and has been started on gabapentin.  Patient has taken gabapentin for about 1 month without significant improvement in her migraines.           PHQ-9- 4  SOAPP-2      PMH:   Tijerina's esophagus, hypertension, migraine, thoracic vertebra fx s/p kypho T7, T10, migraines, smoker, Back surgery- decompression L4-5?-2009 Dr. Rdz, Carpal tunnel surgery 1983 bilateral wrist; R knee steroid injection with orthopedics (Dr. Barton at [facility]).         Current Medications:   Tramadol  mg q6H PRN  Lyrica 25 mg BID  Ibuprofen 800 mg       Past Medications:  Tramadol  mg daily   Meloxicam 15 mg  Celebrex-leg swelling  Gabapentin - didn't help   Norco 5-325 mg BID PRN      Past Modalities:  TENS:                                                                          no                                                  Physical Therapy Within The Last 6 Months              Yes (1.5 years ago with some help).   Psychotherapy                                                            no  Massage Therapy                                                       no     Patient Complains Of:  Uro-Fecal Incontinence          no  Weight Gain/Loss                   Yes (weight gain 25 lbs - 1 year after smoking).   Fever/Chills                              no  Weakness                               no            Current Outpatient Medications:     Acetaminophen-Caffeine 500-65 MG tablet, EXCEDRIN TENSION HEADACHE 500-65 MG TABS, Disp: , Rfl:     Cetirizine HCl 10 MG capsule, ZYRTEC ALLERGY 10 MG CAPS, Disp: , Rfl:     gabapentin (NEURONTIN) 300 MG capsule, , Disp: , Rfl:     ibuprofen (ADVIL,MOTRIN) 800 MG tablet, IBUPROFEN 800 MG TABS, Disp: , Rfl:     Multiple Vitamins-Minerals (MULTIVITAMIN WITH MINERALS) tablet tablet, Take 1 tablet by mouth Daily., Disp: , Rfl:     omeprazole (priLOSEC) 40 MG capsule, , Disp: , Rfl:     vitamin D (ERGOCALCIFEROL) 1.25 MG (27432 UT) capsule capsule, Take 1 capsule by mouth 1 (One) Time Per Week., Disp: , Rfl:     famotidine (PEPCID) 40 MG tablet, , Disp: , Rfl:     HYDROcodone-acetaminophen (NORCO) 7.5-325 MG per tablet, Take 1 tablet by mouth Every 6 (Six) Hours As Needed for Moderate Pain for up to 7 days., Disp: 28 tablet, Rfl: 0    pregabalin (LYRICA) 50 MG capsule, Take 1 capsule by mouth 2 (Two) Times a Day for 30 days., Disp: 60 capsule, Rfl: 0    sucralfate (CARAFATE) 1 g tablet, , Disp: , Rfl:     topiramate (TOPAMAX) 25 MG tablet, , Disp: , Rfl:     The following portions of the patient's history were reviewed and updated as appropriate: allergies, current medications, past family history, past medical history, past social history, past surgical history, and problem list.      REVIEW OF PERTINENT MEDICAL DATA    Past Medical History:   Diagnosis Date    Arthritis     Tijerina esophagus     COVID-19     DJD (degenerative joint disease)     Fall at home     Fall at home     fracture rt. leg    Foot pain, right     GERD (gastroesophageal reflux disease)     Hypertension     Leg pain, right     Low back pain     Migraine     Plantar fasciitis     Shoulder pain, right     Sleep apnea      Past Surgical History:   Procedure Laterality Date    CARPAL TUNNEL RELEASE      colin.    EYE SURGERY      catarac    KNEE ARTHROSCOPY       SPINAL CORD STIMULATOR TRIAL W/ LAMINOTOMY      TUBAL ABDOMINAL LIGATION      WRIST SURGERY      tendon release     Family History   Problem Relation Age of Onset    Migraines Father     Migraines Sister     Migraines Brother      Social History     Socioeconomic History    Marital status:    Tobacco Use    Smoking status: Former     Types: Cigarettes     Quit date: 2021     Years since quittin.1    Smokeless tobacco: Never   Vaping Use    Vaping Use: Never used   Substance and Sexual Activity    Alcohol use: Never    Drug use: Never    Sexual activity: Not Currently         Review of Systems   Musculoskeletal:  Positive for arthralgias and back pain.       Vitals:    23 1134   BP: 130/75   Pulse: 88   Resp: 16   SpO2: 95%   PainSc:   5         Objective   Physical Exam  Musculoskeletal:         General: Tenderness present.        Back:         Legs:    Neurological:      Comments: Motor strength 5/5 b/l LE  Sensory intact b/l LE           Imaging Reviewed:  Lumbar spine MRI without contrast-2022.  L2-3-moderate disc bulge.  Moderate posterior facet changes.  Mild to moderate central canal narrowing.  Moderate bilateral neural foraminal narrowing.  L3-4-- broad-based disc bulge.  Central posterior disc protrusion causing narrowing of central canal with approximate 5 mm in AP dimension.  Moderate facet changes.  Moderate to severe central canal narrowing.  Mild bilateral neural foraminal narrowing.  L4-5-large broad-based disc bulge.  Severe facet changes.  Severe central canal narrowing.  Moderate bilateral neural foraminal narrowing  L5-S1-large broad-based disc bulge.  Severe facet changes.  Moderate to severe central canal narrowing.  Severe bilateral neural foraminal narrowing.      MRI thoracic spine-2022  Kyphoplasty at T7 and T10.  - Residual mild to moderate compression fracture deformities  - Mild posterior disc bulges at multiple thoracic levels.  No large extrusions.    MRI  brain wo contrast: 8/15/2018  Chiari Malformation type 1 with cerebellar tonsils projecting 7 mm below level of foramen magnum  Chronic b/l maxillary and ethmoid sinusitis     Assessment:    1. Postlaminectomy syndrome    2. Failed back surgical syndrome    3. Lumbar spondylosis    4. Right foot pain    5. High risk medication use         Plan:   1.  UDS consistent with patient's interview on 5/10/2022.   Narcotic contract signed-5/10/2022.  Narcan sent-6/20/2022.  2. We discussed trying a course of formal physical therapy.  Physical therapy can help strengthen and stretch the muscles around the joints. Continue to be as active as possible. Start physical therapy as it will help generalized pain and follow up with HEP.  PT prescription sent to [facility] in [place] as requested by patient.  3.  Patient continues to have pain despite being on Norco.  She had significant pain relief with tramadol previously.   Continue tramadol 1 to 2 tablets of 50 mg tramadol TID PRN with 1 refill (7/11/2023 with 1 refill).     4. Norco 7.5-325 mg (#28 tabs) PRN for break through pain (9/12/23).  Has only taken 2 tablets in the last 4 months.  Side effects discussed including but not limited to nausea, vomiting, constipation, lightheadedness, dizziness, drowsiness, or headache. This medication may increase serotonin and rarely cause a very serious condition called serotonin syndrome/ toxicity.   5.  Recommend following up with neurology for worsening of migraines.  History of Chiari malformation type I.  We did discuss possibility of Botox injection if she fails multiple medications  6. Discussed PT for R shoulder. She would like to see Ortho to discuss.     RTC in 2 months.     Kirk Nascimento DO  Pain Management   [facility]           INSPECT REPORT    As part of the patient's treatment plan, I may be prescribing controlled substances. The patient has been made aware of appropriate use of such medications, including potential  risk of somnolence, limited ability to drive and/or work safely, and the potential for dependence or overdose. It has also been made clear that these medications are for use by this patient only, without concomitant use of alcohol or other substances unless prescribed.     Patient has completed prescribing agreement detailing terms of continued prescribing of controlled substances, including monitoring INSPECT reports, urine drug screening, and pill counts if necessary. The patient is aware that inappropriate use will results in cessation of prescribing such medications.    INSPECT report has been reviewed and scanned into the patient's chart.

## 2023-09-12 ENCOUNTER — OFFICE VISIT (OUTPATIENT)
Dept: PAIN MEDICINE | Facility: CLINIC | Age: 73
End: 2023-09-12
Payer: MEDICARE

## 2023-09-12 VITALS
SYSTOLIC BLOOD PRESSURE: 130 MMHG | RESPIRATION RATE: 16 BRPM | DIASTOLIC BLOOD PRESSURE: 75 MMHG | OXYGEN SATURATION: 95 % | HEART RATE: 88 BPM

## 2023-09-12 DIAGNOSIS — M96.1 POSTLAMINECTOMY SYNDROME: Primary | ICD-10-CM

## 2023-09-12 DIAGNOSIS — M79.671 RIGHT FOOT PAIN: ICD-10-CM

## 2023-09-12 DIAGNOSIS — M96.1 FAILED BACK SURGICAL SYNDROME: ICD-10-CM

## 2023-09-12 DIAGNOSIS — M47.816 LUMBAR SPONDYLOSIS: ICD-10-CM

## 2023-09-12 DIAGNOSIS — Z79.899 HIGH RISK MEDICATION USE: ICD-10-CM

## 2023-09-12 RX ORDER — HYDROCODONE BITARTRATE AND ACETAMINOPHEN 7.5; 325 MG/1; MG/1
1 TABLET ORAL EVERY 6 HOURS PRN
Qty: 28 TABLET | Refills: 0 | Status: SHIPPED | OUTPATIENT
Start: 2023-09-12 | End: 2023-09-19

## 2023-10-30 DIAGNOSIS — M96.1 POSTLAMINECTOMY SYNDROME: ICD-10-CM

## 2023-10-30 DIAGNOSIS — G43.009 MIGRAINE WITHOUT AURA AND WITHOUT STATUS MIGRAINOSUS, NOT INTRACTABLE: ICD-10-CM

## 2023-10-30 DIAGNOSIS — M79.671 RIGHT FOOT PAIN: ICD-10-CM

## 2023-10-30 DIAGNOSIS — M47.816 LUMBAR SPONDYLOSIS: ICD-10-CM

## 2023-10-30 DIAGNOSIS — M96.1 FAILED BACK SURGICAL SYNDROME: ICD-10-CM

## 2023-10-31 RX ORDER — TRAMADOL HYDROCHLORIDE 50 MG/1
100 TABLET ORAL EVERY 6 HOURS PRN
Qty: 180 TABLET | Refills: 0 | Status: SHIPPED | OUTPATIENT
Start: 2023-10-31

## 2023-11-20 NOTE — PROGRESS NOTES
Subjective   Nola Tariq is a 73 y.o. female is here for right leg, lower back and right knee pain.  Last seen on 9/12/2023. Increased loewr back pain.   No significant changes with physical exam as well. Tramadol and Ibuprofen does help with pain.     On last visit:     Right leg pain is 5/10 on VAS.    Lower back pain is 4/10 on VAS, at maximum 5/10.  Pain is aching and dull in nature.  Not referred.  She has numbness and tingling in bilateral foot.  Pain is constant.  Improved by pain meds.  Worse with walking and standing.    R knee pain is 5/10 on VAS. Worse with weight bearing. She had R knee steroid injection with Rheumatology several months ago with excellent relief.     R shoulder pain is 6/10 on VAS. Normal ROM. Had shoulder bursa issues several years ago which improved with US treatments.     Migraines - She used to take Excedrin and Fioricet for daily migraines but it doesn't work anymore. She is scheduled to see Neurology.  She does wake up with migraines during night or early in morning. She would wake up with headache and goes away with Excedrin. She had 2-3 headaches over last month that didn't go away with Excedrin and lasted for rest of the day. She had seen someone for chiari malformation previous.  History of migraines since teenager and usually right-sided temporal area but states that new migraines are all over the head. Seen by neurology who recommended continuing excedrin.      Previous Injection:   4/18/2023- SCS trial Nova[employer]que- no significant pain relief. Coverage was excellent with patient able to feel paresthesia in all of the painful areas but unfortunately no significant pain relief.  No functional improvement as well.  11/8/2022-right knee injection- good relief.  7/8/22 - LESI L5-S1 - no relief.   RFA at previous pain management - no relief.     Hx:  Referred by JEM Ashton for  lower back pain.  Pain started around 2008 without any significant injuries and has  been getting worse since then.  Her main complaints today are mid back and lower back pain.  Patient had previously seen Dr. Voss and had epidurals, facet joint injections, ablations.  She states that nothing helped at the time. Last seen 1 year ago. Her lower back pain is worse than mid back pain. She is retired RN.   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  Patient states that she is not interested in fusion surgery as there is 50% chance of lower back pain improvement. She had also injured her left knee and had steroid injection by Ortho in left knee.  Scheduled to see Ortho in 5 weeks. She has been seeing neurology for migraines and has been started on gabapentin.  Patient has taken gabapentin for about 1 month without significant improvement in her migraines.           PHQ-9- 4  SOAPP-2      PMH:   Tijerina's esophagus, hypertension, migraine, thoracic vertebra fx s/p kypho T7, T10, migraines, smoker, Back surgery- decompression L4-5?-2009 Dr. Rdz, Carpal tunnel surgery 1983 bilateral wrist; R knee steroid injection with orthopedics (Dr. Barton at [facility] [place]).         Current Medications:   Tramadol  mg q6H PRN  Lyrica 25 mg BID  Ibuprofen 800 mg       Past Medications:  Tramadol  mg daily   Meloxicam 15 mg  Celebrex-leg swelling  Gabapentin - didn't help   Norco 5-325 mg BID PRN      Past Modalities:  TENS:                                                                          no                                                  Physical Therapy Within The Last 6 Months              Yes (1.5 years ago with some help).   Psychotherapy                                                            no  Massage Therapy                                                       no     Patient Complains Of:  Uro-Fecal Incontinence          no  Weight Gain/Loss                   Yes (weight gain 25 lbs - 1 year after smoking).   Fever/Chills                              no  Weakness                               no            Current Outpatient Medications:     Acetaminophen-Caffeine 500-65 MG tablet, EXCEDRIN TENSION HEADACHE 500-65 MG TABS, Disp: , Rfl:     Cetirizine HCl 10 MG capsule, ZYRTEC ALLERGY 10 MG CAPS, Disp: , Rfl:     gabapentin (NEURONTIN) 300 MG capsule, , Disp: , Rfl:     ibuprofen (ADVIL,MOTRIN) 800 MG tablet, IBUPROFEN 800 MG TABS, Disp: , Rfl:     Multiple Vitamins-Minerals (MULTIVITAMIN WITH MINERALS) tablet tablet, Take 1 tablet by mouth Daily., Disp: , Rfl:     omeprazole (priLOSEC) 40 MG capsule, , Disp: , Rfl:     [START ON 11/30/2023] traMADol (ULTRAM) 50 MG tablet, Take 2 tablets by mouth Every 6 (Six) Hours As Needed for Moderate Pain for up to 90 days., Disp: 180 tablet, Rfl: 2    vitamin D (ERGOCALCIFEROL) 1.25 MG (41252 UT) capsule capsule, Take 1 capsule by mouth 1 (One) Time Per Week., Disp: , Rfl:     famotidine (PEPCID) 40 MG tablet, , Disp: , Rfl:     pregabalin (LYRICA) 50 MG capsule, Take 1 capsule by mouth 2 (Two) Times a Day for 30 days., Disp: 60 capsule, Rfl: 0    sucralfate (CARAFATE) 1 g tablet, , Disp: , Rfl:     topiramate (TOPAMAX) 25 MG tablet, , Disp: , Rfl:     The following portions of the patient's history were reviewed and updated as appropriate: allergies, current medications, past family history, past medical history, past social history, past surgical history, and problem list.      REVIEW OF PERTINENT MEDICAL DATA    Past Medical History:   Diagnosis Date    Arthritis     Tijerina esophagus     COVID-19     DJD (degenerative joint disease)     Fall at home     Fall at home     fracture rt. leg    Foot pain, right     GERD (gastroesophageal reflux disease)     Hypertension     Leg pain, right     Low back pain     Migraine     Plantar fasciitis     Shoulder pain, right     Sleep apnea      Past Surgical History:   Procedure Laterality Date    CARPAL TUNNEL RELEASE      colin.    EYE SURGERY      catarac    KNEE ARTHROSCOPY       SPINAL CORD STIMULATOR TRIAL W/ LAMINOTOMY      TUBAL ABDOMINAL LIGATION      WRIST SURGERY      tendon release     Family History   Problem Relation Age of Onset    Migraines Father     Migraines Sister     Migraines Brother      Social History     Socioeconomic History    Marital status:    Tobacco Use    Smoking status: Former     Types: Cigarettes     Quit date: 2021     Years since quittin.3    Smokeless tobacco: Never   Vaping Use    Vaping Use: Never used   Substance and Sexual Activity    Alcohol use: Never    Drug use: Never    Sexual activity: Not Currently         Review of Systems   Musculoskeletal:  Positive for arthralgias and back pain.         Vitals:    23 1117   BP: 135/96   Pulse: 86   Resp: 16   SpO2: 97%   PainSc:   4           Objective   Physical Exam  Musculoskeletal:         General: Tenderness present.        Back:         Legs:    Neurological:      Comments: Motor strength 5/5 b/l LE  Sensory intact b/l LE             Imaging Reviewed:  Lumbar spine MRI without contrast-2022.  L2-3-moderate disc bulge.  Moderate posterior facet changes.  Mild to moderate central canal narrowing.  Moderate bilateral neural foraminal narrowing.  L3-4-- broad-based disc bulge.  Central posterior disc protrusion causing narrowing of central canal with approximate 5 mm in AP dimension.  Moderate facet changes.  Moderate to severe central canal narrowing.  Mild bilateral neural foraminal narrowing.  L4-5-large broad-based disc bulge.  Severe facet changes.  Severe central canal narrowing.  Moderate bilateral neural foraminal narrowing  L5-S1-large broad-based disc bulge.  Severe facet changes.  Moderate to severe central canal narrowing.  Severe bilateral neural foraminal narrowing.      MRI thoracic spine-2022  Kyphoplasty at T7 and T10.  - Residual mild to moderate compression fracture deformities  - Mild posterior disc bulges at multiple thoracic levels.  No large  extrusions.    MRI brain wo contrast: 8/15/2018  Chiari Malformation type 1 with cerebellar tonsils projecting 7 mm below level of foramen magnum  Chronic b/l maxillary and ethmoid sinusitis     Assessment:    1. High risk medication use    2. Postlaminectomy syndrome    3. Failed back surgical syndrome    4. Lumbar spondylosis    5. Right foot pain    6. Migraine without aura and without status migrainosus, not intractable           Plan:   1.  Repeat UDS-11/21/2023.  UDS consistent with patient's interview on 5/10/2022.   Narcotic contract signed-5/10/2022.  Narcan sent-6/20/2022.  2. We discussed trying a course of formal physical therapy.  Physical therapy can help strengthen and stretch the muscles around the joints. Continue to be as active as possible. Start physical therapy as it will help generalized pain and follow up with HEP.  PT prescription sent to [facility] in [place] as requested by patient.  3.  Patient continues to have pain despite being on Norco.  She had significant pain relief with tramadol previously.   Continue tramadol 1 to 2 tablets of 50 mg tramadol TID PRN with 1 refill (11/30/2023 with 1 refill).     4. Norco 7.5-325 mg (#28 tabs) PRN for break through pain (9/12/23).  Has only taken 2 tablets in the last 4 months.  Side effects discussed including but not limited to nausea, vomiting, constipation, lightheadedness, dizziness, drowsiness, or headache. This medication may increase serotonin and rarely cause a very serious condition called serotonin syndrome/ toxicity.   5.  Recommend following up with neurology for worsening of migraines.  History of Chiari malformation type I.  We did discuss possibility of Botox injection if she fails multiple medications  6. Discussed PT for R shoulder. She would like to see Ortho to discuss.     RTC before 2/28/24.      Kirk Nascimento DO  Pain Management   [facility]           INSPECT REPORT    As part of the patient's treatment plan, I may be  prescribing controlled substances. The patient has been made aware of appropriate use of such medications, including potential risk of somnolence, limited ability to drive and/or work safely, and the potential for dependence or overdose. It has also been made clear that these medications are for use by this patient only, without concomitant use of alcohol or other substances unless prescribed.     Patient has completed prescribing agreement detailing terms of continued prescribing of controlled substances, including monitoring INSPECT reports, urine drug screening, and pill counts if necessary. The patient is aware that inappropriate use will results in cessation of prescribing such medications.    INSPECT report has been reviewed and scanned into the patient's chart.

## 2023-11-21 ENCOUNTER — OFFICE VISIT (OUTPATIENT)
Dept: PAIN MEDICINE | Facility: CLINIC | Age: 73
End: 2023-11-21
Payer: MEDICARE

## 2023-11-21 VITALS
HEART RATE: 86 BPM | SYSTOLIC BLOOD PRESSURE: 135 MMHG | RESPIRATION RATE: 16 BRPM | OXYGEN SATURATION: 97 % | DIASTOLIC BLOOD PRESSURE: 96 MMHG

## 2023-11-21 DIAGNOSIS — M79.671 RIGHT FOOT PAIN: ICD-10-CM

## 2023-11-21 DIAGNOSIS — M96.1 POSTLAMINECTOMY SYNDROME: ICD-10-CM

## 2023-11-21 DIAGNOSIS — Z79.899 HIGH RISK MEDICATION USE: Primary | ICD-10-CM

## 2023-11-21 DIAGNOSIS — M96.1 FAILED BACK SURGICAL SYNDROME: ICD-10-CM

## 2023-11-21 DIAGNOSIS — M47.816 LUMBAR SPONDYLOSIS: ICD-10-CM

## 2023-11-21 DIAGNOSIS — G43.009 MIGRAINE WITHOUT AURA AND WITHOUT STATUS MIGRAINOSUS, NOT INTRACTABLE: ICD-10-CM

## 2023-11-21 RX ORDER — TRAMADOL HYDROCHLORIDE 50 MG/1
100 TABLET ORAL EVERY 6 HOURS PRN
Qty: 180 TABLET | Refills: 2 | Status: SHIPPED | OUTPATIENT
Start: 2023-11-30 | End: 2024-02-28

## 2024-01-29 ENCOUNTER — OFFICE (AMBULATORY)
Dept: URBAN - METROPOLITAN AREA CLINIC 64 | Facility: CLINIC | Age: 74
End: 2024-01-29

## 2024-01-29 VITALS
SYSTOLIC BLOOD PRESSURE: 161 MMHG | WEIGHT: 182 LBS | DIASTOLIC BLOOD PRESSURE: 108 MMHG | HEART RATE: 75 BPM | HEIGHT: 67 IN | SYSTOLIC BLOOD PRESSURE: 173 MMHG | DIASTOLIC BLOOD PRESSURE: 109 MMHG

## 2024-01-29 DIAGNOSIS — K20.90 ESOPHAGITIS, UNSPECIFIED WITHOUT BLEEDING: ICD-10-CM

## 2024-01-29 DIAGNOSIS — K59.00 CONSTIPATION, UNSPECIFIED: ICD-10-CM

## 2024-01-29 DIAGNOSIS — K22.70 BARRETT'S ESOPHAGUS WITHOUT DYSPLASIA: ICD-10-CM

## 2024-01-29 DIAGNOSIS — K21.9 GASTRO-ESOPHAGEAL REFLUX DISEASE WITHOUT ESOPHAGITIS: ICD-10-CM

## 2024-01-29 PROCEDURE — 99214 OFFICE O/P EST MOD 30 MIN: CPT | Performed by: INTERNAL MEDICINE

## 2024-01-29 RX ORDER — VONOPRAZAN FUMARATE 26.72 MG/1
20 TABLET ORAL
Qty: 90 | Refills: 3 | Status: COMPLETED
Start: 2024-01-29 | End: 2024-07-29

## 2024-01-29 RX ORDER — FAMOTIDINE 40 MG/1
80 TABLET, FILM COATED ORAL
Qty: 180 | Refills: 4 | Status: ACTIVE
Start: 2022-11-16

## 2024-02-26 NOTE — PROGRESS NOTES
Subjective   Nola Tarqi is a 73 y.o. female is here for right leg, lower back and right knee pain.  Last seen on 11/21/2023 and this is a 3 months follow-up visit.    On last visit:     Right leg pain is 5/10 on VAS.    Lower back pain is 4/10 on VAS, at maximum 5/10.  Pain is aching and dull in nature.  Not referred.  She has numbness and tingling in bilateral foot.  Pain is constant.  Improved by pain meds.  Worse with walking and standing.    R knee pain is 5/10 on VAS. Worse with weight bearing. She had R knee steroid injection with Rheumatology several months ago with excellent relief.     R shoulder pain is 6/10 on VAS. Normal ROM. Had shoulder bursa issues several years ago which improved with US treatments.     Migraines - She used to take Excedrin and Fioricet for daily migraines but it doesn't work anymore. She is scheduled to see Neurology.  She does wake up with migraines during night or early in morning. She would wake up with headache and goes away with Excedrin. She had 2-3 headaches over last month that didn't go away with Excedrin and lasted for rest of the day. She had seen someone for chiari malformation previous.  History of migraines since teenager and usually right-sided temporal area but states that new migraines are all over the head. Seen by neurology who recommended continuing excedrin.      Previous Injection:   4/18/2023- SCS trial HELIX [employer]OMEDIX- no significant pain relief. Coverage was excellent with patient able to feel paresthesia in all of the painful areas but unfortunately no significant pain relief.  No functional improvement as well.  11/8/2022-right knee injection- good relief.  7/8/22 - LESI L5-S1 - no relief.   RFA at previous pain management - no relief.     Hx:  Referred by JEM Ashton for  lower back pain.  Pain started around 2008 without any significant injuries and has been getting worse since then.  Her main complaints today are mid back and lower back  pain.  Patient had previously seen Dr. Voss and had epidurals, facet joint injections, ablations.  She states that nothing helped at the time. Last seen 1 year ago. Her lower back pain is worse than mid back pain. She is retired RN.   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  Patient states that she is not interested in fusion surgery as there is 50% chance of lower back pain improvement. She had also injured her left knee and had steroid injection by Ortho in left knee.  Scheduled to see Ortho in 5 weeks. She has been seeing neurology for migraines and has been started on gabapentin.  Patient has taken gabapentin for about 1 month without significant improvement in her migraines.           PHQ-9- 4  SOAPP-2      PMH:   Tijerina's esophagus, hypertension, migraine, thoracic vertebra fx s/p kypho T7, T10, migraines, smoker, Back surgery- decompression L4-5?-2009 Dr. Rdz, Carpal tunnel surgery 1983 bilateral wrist; R knee steroid injection with orthopedics (Dr. Barton at [place]).         Current Medications:   Tramadol  mg q6H PRN  Lyrica 25 mg BID  Ibuprofen 800 mg       Past Medications:  Tramadol  mg daily   Meloxicam 15 mg  Celebrex-leg swelling  Gabapentin - didn't help   Norco 5-325 mg BID PRN      Past Modalities:  TENS:                                                                          no                                                  Physical Therapy Within The Last 6 Months              Yes (1.5 years ago with some help).   Psychotherapy                                                            no  Massage Therapy                                                       no     Patient Complains Of:  Uro-Fecal Incontinence          no  Weight Gain/Loss                   Yes (weight gain 25 lbs - 1 year after smoking).   Fever/Chills                             no  Weakness                               no            Current Outpatient  Medications:     Acetaminophen-Caffeine 500-65 MG tablet, EXCEDRIN TENSION HEADACHE 500-65 MG TABS, Disp: , Rfl:     Cetirizine HCl 10 MG capsule, ZYRTEC ALLERGY 10 MG CAPS, Disp: , Rfl:     gabapentin (NEURONTIN) 300 MG capsule, , Disp: , Rfl:     Multiple Vitamins-Minerals (MULTIVITAMIN WITH MINERALS) tablet tablet, Take 1 tablet by mouth Daily., Disp: , Rfl:     omeprazole (priLOSEC) 40 MG capsule, , Disp: , Rfl:     traMADol (ULTRAM) 50 MG tablet, Take 2 tablets by mouth Every 6 (Six) Hours As Needed for Moderate Pain for up to 90 days., Disp: 180 tablet, Rfl: 2    vitamin D (ERGOCALCIFEROL) 1.25 MG (71069 UT) capsule capsule, Take 1 capsule by mouth 1 (One) Time Per Week., Disp: , Rfl:     Voquezna 20 MG tablet, , Disp: , Rfl:     famotidine (PEPCID) 40 MG tablet, , Disp: , Rfl:     pregabalin (LYRICA) 50 MG capsule, Take 1 capsule by mouth 2 (Two) Times a Day for 30 days., Disp: 60 capsule, Rfl: 0    sucralfate (CARAFATE) 1 g tablet, , Disp: , Rfl:     topiramate (TOPAMAX) 25 MG tablet, , Disp: , Rfl:     The following portions of the patient's history were reviewed and updated as appropriate: allergies, current medications, past family history, past medical history, past social history, past surgical history, and problem list.      REVIEW OF PERTINENT MEDICAL DATA    Past Medical History:   Diagnosis Date    Arthritis     Tijerina esophagus     COVID-19     DJD (degenerative joint disease)     Fall at home     Fall at home     fracture rt. leg    Foot pain, right     GERD (gastroesophageal reflux disease)     Hypertension     Leg pain, right     Low back pain     Migraine     Plantar fasciitis     Shoulder pain, right     Sleep apnea      Past Surgical History:   Procedure Laterality Date    CARPAL TUNNEL RELEASE      colin.    EYE SURGERY      catarac    KNEE ARTHROSCOPY      SPINAL CORD STIMULATOR TRIAL W/ LAMINOTOMY      TUBAL ABDOMINAL LIGATION      WRIST SURGERY      tendon release     Family History    Problem Relation Age of Onset    Migraines Father     Migraines Sister     Migraines Brother      Social History     Socioeconomic History    Marital status:    Tobacco Use    Smoking status: Former     Types: Cigarettes     Quit date: 2021     Years since quittin.6    Smokeless tobacco: Never   Vaping Use    Vaping Use: Never used   Substance and Sexual Activity    Alcohol use: Never    Drug use: Never    Sexual activity: Not Currently         Review of Systems   Musculoskeletal:  Positive for arthralgias and back pain.         Vitals:    24 1126   BP: 125/84   Pulse: 88   Resp: 16   SpO2: 98%   Weight: 82.1 kg (181 lb)   PainSc:   5             Objective   Physical Exam  Musculoskeletal:         General: Tenderness present.        Back:         Legs:    Neurological:      Comments: Motor strength 5/5 b/l LE  Sensory intact b/l LE             Imaging Reviewed:  Lumbar spine MRI without contrast-2022.  L2-3-moderate disc bulge.  Moderate posterior facet changes.  Mild to moderate central canal narrowing.  Moderate bilateral neural foraminal narrowing.  L3-4-- broad-based disc bulge.  Central posterior disc protrusion causing narrowing of central canal with approximate 5 mm in AP dimension.  Moderate facet changes.  Moderate to severe central canal narrowing.  Mild bilateral neural foraminal narrowing.  L4-5-large broad-based disc bulge.  Severe facet changes.  Severe central canal narrowing.  Moderate bilateral neural foraminal narrowing  L5-S1-large broad-based disc bulge.  Severe facet changes.  Moderate to severe central canal narrowing.  Severe bilateral neural foraminal narrowing.      MRI thoracic spine-2022  Kyphoplasty at T7 and T10.  - Residual mild to moderate compression fracture deformities  - Mild posterior disc bulges at multiple thoracic levels.  No large extrusions.    MRI brain wo contrast: 8/15/2018  Chiari Malformation type 1 with cerebellar tonsils projecting 7  mm below level of foramen magnum  Chronic b/l maxillary and ethmoid sinusitis     Assessment:    1. Postlaminectomy syndrome    2. Failed back surgical syndrome    3. High risk medication use    4. Lumbar spondylosis    5. Right foot pain    6. Migraine without aura and without status migrainosus, not intractable      Plan:   1.  Repeat UDS- 2/27/24.   UDS consistent with patient's interview on 5/10/2022.   Narcotic contract signed-5/10/2022.  Narcan sent-6/20/2022.  2. We discussed trying a course of formal physical therapy.  Physical therapy can help strengthen and stretch the muscles around the joints. Continue to be as active as possible. Start physical therapy as it will help generalized pain and follow up with HEP.  PT prescription sent to [facility] in [place] as requested by patient.  3.  Patient continues to have pain despite being on Norco.  She had significant pain relief with tramadol previously.   Continue tramadol 1 to 2 tablets of 50 mg tramadol TID PRN with 1 refill (5/27/24).     4. Norco 7.5-325 mg (#28 tabs) PRN for break through pain (9/12/23).  No need for refill.  Side effects discussed including but not limited to nausea, vomiting, constipation, lightheadedness, dizziness, drowsiness, or headache. This medication may increase serotonin and rarely cause a very serious condition called serotonin syndrome/ toxicity.   5. Continues Ibuprofen 800 mg TID PRN. Patient informed of increased risk of heart attacks, stroke and kidney problems in addition to gastric ulcers with use of non-steroidal anti-inflammatory medications.  6.  Recommend following up with neurology for worsening of migraines.  History of Chiari malformation type I.  We did discuss possibility of Botox injection if she fails multiple medications  7. Discussed PT for R shoulder. She would like to see Ortho to discuss.     RTC in 3 months      Kirk Nascimento DO  Pain Management   [facility]    As part of  the patient's treatment plan, I may be prescribing controlled substances. The patient has been made aware of appropriate use of such medications, including potential risk of somnolence, limited ability to drive and/or work safely, and the potential for dependence or overdose. It has also been made clear that these medications are for use by this patient only, without concomitant use of alcohol or other substances unless prescribed.     Patient has completed prescribing agreement detailing terms of continued prescribing of controlled substances, including monitoring INSPECT reports, urine drug screening, and pill counts if necessary. The patient is aware that inappropriate use will results in cessation of prescribing such medications.    INSPECT report has been reviewed and scanned into the patient's chart.

## 2024-02-27 ENCOUNTER — OFFICE VISIT (OUTPATIENT)
Dept: PAIN MEDICINE | Facility: CLINIC | Age: 74
End: 2024-02-27
Payer: MEDICARE

## 2024-02-27 VITALS
BODY MASS INDEX: 31.07 KG/M2 | HEART RATE: 88 BPM | OXYGEN SATURATION: 98 % | WEIGHT: 181 LBS | RESPIRATION RATE: 16 BRPM | DIASTOLIC BLOOD PRESSURE: 84 MMHG | SYSTOLIC BLOOD PRESSURE: 125 MMHG

## 2024-02-27 DIAGNOSIS — M79.671 RIGHT FOOT PAIN: ICD-10-CM

## 2024-02-27 DIAGNOSIS — G43.009 MIGRAINE WITHOUT AURA AND WITHOUT STATUS MIGRAINOSUS, NOT INTRACTABLE: ICD-10-CM

## 2024-02-27 DIAGNOSIS — Z79.899 HIGH RISK MEDICATION USE: Primary | ICD-10-CM

## 2024-02-27 DIAGNOSIS — M47.816 LUMBAR SPONDYLOSIS: ICD-10-CM

## 2024-02-27 DIAGNOSIS — Z79.899 HIGH RISK MEDICATION USE: ICD-10-CM

## 2024-02-27 DIAGNOSIS — M96.1 POSTLAMINECTOMY SYNDROME: Primary | ICD-10-CM

## 2024-02-27 DIAGNOSIS — M96.1 FAILED BACK SURGICAL SYNDROME: ICD-10-CM

## 2024-02-27 RX ORDER — TRAMADOL HYDROCHLORIDE 50 MG/1
100 TABLET ORAL EVERY 6 HOURS PRN
Qty: 180 TABLET | Refills: 2 | Status: SHIPPED | OUTPATIENT
Start: 2024-02-27 | End: 2024-05-27

## 2024-02-27 RX ORDER — IBUPROFEN 800 MG/1
800 TABLET ORAL 3 TIMES DAILY PRN
Qty: 270 TABLET | Refills: 0 | Status: SHIPPED | OUTPATIENT
Start: 2024-02-27 | End: 2024-05-27

## 2024-02-27 RX ORDER — VONOPRAZAN FUMARATE 26.72 MG/1
TABLET ORAL
COMMUNITY
Start: 2024-01-29

## 2024-04-01 ENCOUNTER — HOSPITAL ENCOUNTER (OUTPATIENT)
Facility: HOSPITAL | Age: 74
Discharge: HOME | End: 2024-04-01
Attending: EMERGENCY MEDICINE | Admitting: EMERGENCY MEDICINE
Payer: MEDICARE

## 2024-04-01 VITALS
SYSTOLIC BLOOD PRESSURE: 162 MMHG | OXYGEN SATURATION: 97 % | WEIGHT: 180.8 LBS | HEART RATE: 80 BPM | RESPIRATION RATE: 18 BRPM | DIASTOLIC BLOOD PRESSURE: 88 MMHG | BODY MASS INDEX: 30.87 KG/M2 | TEMPERATURE: 97.8 F | HEIGHT: 64 IN

## 2024-04-01 DIAGNOSIS — J03.90 TONSILLITIS: Primary | ICD-10-CM

## 2024-04-01 LAB
FLUAV SUBTYP SPEC NAA+PROBE: NOT DETECTED
FLUBV RNA ISLT QL NAA+PROBE: NOT DETECTED
SARS-COV-2 RNA RESP QL NAA+PROBE: NOT DETECTED
STREP A PCR: NOT DETECTED

## 2024-04-01 PROCEDURE — G0463 HOSPITAL OUTPT CLINIC VISIT: HCPCS

## 2024-04-01 PROCEDURE — 87651 STREP A DNA AMP PROBE: CPT | Performed by: EMERGENCY MEDICINE

## 2024-04-01 PROCEDURE — 25010000002 DEXAMETHASONE PER 1 MG

## 2024-04-01 PROCEDURE — 87636 SARSCOV2 & INF A&B AMP PRB: CPT | Performed by: EMERGENCY MEDICINE

## 2024-04-01 RX ORDER — AMOXICILLIN 500 MG/1
500 CAPSULE ORAL 2 TIMES DAILY
Qty: 20 CAPSULE | Refills: 0 | Status: SHIPPED | OUTPATIENT
Start: 2024-04-01 | End: 2024-04-01

## 2024-04-01 RX ORDER — AMOXICILLIN 250 MG/1
500 CAPSULE ORAL ONCE
Status: COMPLETED | OUTPATIENT
Start: 2024-04-01 | End: 2024-04-01

## 2024-04-01 RX ORDER — AMOXICILLIN 500 MG/1
500 CAPSULE ORAL 2 TIMES DAILY
Qty: 19 CAPSULE | Refills: 0 | Status: SHIPPED | OUTPATIENT
Start: 2024-04-01 | End: 2024-04-11

## 2024-04-01 RX ADMIN — DEXAMETHASONE SODIUM PHOSPHATE 10 MG: 10 INJECTION, SOLUTION INTRAMUSCULAR; INTRAVENOUS at 15:40

## 2024-04-01 RX ADMIN — AMOXICILLIN 500 MG: 250 CAPSULE ORAL at 15:40

## 2024-04-01 NOTE — FSED PROVIDER NOTE
[facility]    EMERGENCY DEPARTMENT ENCOUNTER    Room Number:  12/12  Date seen:  4/1/2024  Time seen: 15:03 EDT  PCP: Miranda Hylton APRN  Historian: patient    HPI:  Chief complaint:sore throat  Context:Nola Tariq is a 74 y.o. female who presents to the ED with c/o sore throat. The patient states that she started to have a sore throat that started on Thursday. She states that she also has been having right ear pain. The patient reports that she was running a low grade fever last night. She reports that she does feel a little better today but was concerned she may need antibiotics at this time. The patient denies any chest pain, shortness of breath, cough, congestion. The patient is nontoxic in appearance.     Timing:constant  Duration: since thursday  Location:throat and right ear  Quality:aching  Intensity/Severity:mild  Associated Symptoms:sore throat, right ear pain  Aggravating Factors:no known aggravating  Alleviating Factors:no known alleviating      MEDICAL RECORD REVIEW  Migraine, Tijerina's esophagus, hypertension, GERD, sleep apnea    ALLERGIES  Celebrex [celecoxib]    PAST MEDICAL HISTORY  Active Ambulatory Problems     Diagnosis Date Noted    Back pain 05/17/2016    Closed fracture of thoracic vertebra 05/25/2016    Degeneration of intervertebral disc of thoracic region 05/17/2016    Family history of diabetes mellitus 03/05/2020    Thoracic kyphosis 08/02/2016    Lumbosacral radiculopathy 05/17/2016    Spinal stenosis of lumbar region 09/13/2017    Stenosis of lumbosacral spine 09/13/2017    Spinal stenosis of thoracic region 05/17/2016    Thoracic radiculopathy 05/17/2016    Migraine 03/06/2020     Resolved Ambulatory Problems     Diagnosis Date Noted    No Resolved Ambulatory Problems     Past Medical History:   Diagnosis Date    Arthritis     Tijerina esophagus     COVID-19     DJD (degenerative joint disease)     Fall at home     Fall at home     Foot  pain, right     GERD (gastroesophageal reflux disease)     Hypertension     Leg pain, right     Low back pain     Plantar fasciitis     Shoulder pain, right     Sleep apnea        PAST SURGICAL HISTORY  Past Surgical History:   Procedure Laterality Date    CARPAL TUNNEL RELEASE      colin.    EYE SURGERY      catarac    KNEE ARTHROSCOPY      SPINAL CORD STIMULATOR TRIAL W/ LAMINOTOMY      TUBAL ABDOMINAL LIGATION      WRIST SURGERY      tendon release       FAMILY HISTORY  Family History   Problem Relation Age of Onset    Migraines Father     Migraines Sister     Migraines Brother        SOCIAL HISTORY  Social History     Socioeconomic History    Marital status:    Tobacco Use    Smoking status: Former     Current packs/day: 0.00     Types: Cigarettes     Quit date: 2021     Years since quittin.7    Smokeless tobacco: Never   Vaping Use    Vaping status: Never Used   Substance and Sexual Activity    Alcohol use: Never    Drug use: Never    Sexual activity: Not Currently       REVIEW OF SYSTEMS  Review of Systems    All systems reviewed and negative except for those discussed in HPI.     PHYSICAL EXAM    I have reviewed the triage vital signs and nursing notes.    ED Triage Vitals   Temp Heart Rate Resp BP SpO2   24 1448 24 1447 24 1447 24 1447 24 1447   97.5 °F (36.4 °C) 84 18 167/92 97 %      Temp src Heart Rate Source Patient Position BP Location FiO2 (%)   -- -- -- -- --              Physical Exam  Constitutional:       Appearance: She is well-developed. She is not toxic-appearing.   HENT:      Right Ear: Tympanic membrane and ear canal normal.      Left Ear: Tympanic membrane and ear canal normal.      Nose: No congestion or rhinorrhea.      Mouth/Throat:      Mouth: Mucous membranes are moist.      Pharynx: Uvula midline. Posterior oropharyngeal erythema present.      Tonsils: No tonsillar exudate or tonsillar abscesses.   Eyes:      Conjunctiva/sclera: Conjunctivae  normal.   Cardiovascular:      Rate and Rhythm: Normal rate and regular rhythm.      Heart sounds: Normal heart sounds.   Pulmonary:      Effort: Pulmonary effort is normal.      Breath sounds: Normal breath sounds.   Musculoskeletal:      Cervical back: Normal range of motion.   Skin:     General: Skin is warm.   Neurological:      General: No focal deficit present.      Mental Status: She is alert.   Psychiatric:         Mood and Affect: Mood normal.         Behavior: Behavior normal.         Vital signs and nursing notes reviewed.        LAB RESULTS  Recent Results (from the past 24 hour(s))   Rapid Strep A Screen - Swab, Throat    Collection Time: 04/01/24  2:48 PM    Specimen: Throat; Swab   Result Value Ref Range    STREP A PCR Not Detected Not Detected   COVID-19 and FLU A/B PCR, 1 HR TAT - Swab, Nasopharynx    Collection Time: 04/01/24  2:48 PM    Specimen: Nasopharynx; Swab   Result Value Ref Range    COVID19 Not Detected Not Detected - Ref. Range    Influenza A PCR Not Detected Not Detected    Influenza B PCR Not Detected Not Detected       Ordered the above labs and independently reviewed the results.      RADIOLOGY RESULTS  No Radiology Exams Resulted Within Past 24 Hours       I ordered the above noted radiological studies. Independently reviewed by me and discussed with radiologist.  See dictation above for official radiology interpretation.      Orders placed during this visit:  Orders Placed This Encounter   Procedures    Rapid Strep A Screen - Swab, Throat    COVID-19 and FLU A/B PCR, 1 HR TAT - Swab, Nasopharynx           PROCEDURES    Procedures        MEDICATIONS GIVEN IN ER    Medications   dexAMETHasone (DECADRON) 10 MG/ML oral solution 10 mg (has no administration in time range)   amoxicillin (AMOXIL) capsule 500 mg (has no administration in time range)         PROGRESS, DATA ANALYSIS, CONSULTS, AND MEDICAL DECISION MAKING    All labs have been independently reviewed by me.  All radiology  studies have been reviewed by me.   EKG's independently reviewed by me.  Discussion below represents my analysis of pertinent findings related to patient's condition, differential diagnosis, treatment plan and final disposition.    I rechecked the patient.  I discussed the patient's labs, radiology findings (including all incidental findings), diagnosis, and plan for discharge.  A repeat exam reveals no new worrisome changes from my initial exam findings.  The patient understands that the fact that they are being discharged does not denote that nothing is abnormal, it indicates that no clinical emergency is present and that they must follow-up as directed in order to properly maintain their health.  Follow-up instructions (specifically listed below) and return to ER precautions were given at this time.  I specifically instructed the patient to follow-up with their PCP.  The patient understands and agrees with the plan, and is ready for discharge.  All questions answered.         AS OF 15:32 EDT VITALS:    BP - 167/92  HR - 84  TEMP - 97.5 °F (36.4 °C)  02 SATS - 97%    Medical Decision Making  MEDICAL DECISION  Lab interpretation:  Labs viewed by me significant for, negative COVID influenza strep    Patient is a 74-year-old female who presents today with sore throat and right ear pain.  She reports that her symptoms started on Thursday.  Patient reports that she was running a low-grade fever last night.  No history of immunocompromise. Nontoxic appearance. Patient euvolemic with no trismus. No airway compromise. No change in voice, exudates, enlarged lymph nodes. Able to tolerate PO. Given history and exam I have low suspicion for this presentation being caused by PTA, RPA, Ludwigs angina, otitis media, epiglottitis.  Patient was given a dose of Decadron in the emergency room.  I will send her home with a prescription of amoxicillin.  Recommend that she follow-up with her primary care provider.  We discussed her  discharge instructions.  Patient was given return precautions with understanding.    Problems Addressed:  Tonsillitis: complicated acute illness or injury    Risk  Prescription drug management.          DIAGNOSIS  Final diagnoses:   Tonsillitis       New Medications Ordered This Visit   Medications    dexAMETHasone (DECADRON) 10 MG/ML oral solution 10 mg    amoxicillin (AMOXIL) capsule 500 mg    amoxicillin (AMOXIL) 500 MG capsule     Sig: Take 1 capsule by mouth 2 (Two) Times a Day for 10 days.     Dispense:  19 capsule     Refill:  0           I performed hand hygiene on entry into the pt room and upon exit.      Part of this note may be an electronic transcription/translation of spoken language to printed text using the Dragon Dictation System.     Appropriate PPE worn during exam.    Dictated utilizing Dragon dictation     Note Disclaimer: At [facility], we believe that sharing information builds trust and better relationships. You are receiving this note because you recently visited [facility]. It is possible you will see health information before a provider has talked with you about it. This kind of information can be easy to misunderstand. To help you fully understand what it means for your health, we urge you to discuss this note with your provider.

## 2024-05-21 ENCOUNTER — OFFICE VISIT (OUTPATIENT)
Dept: PAIN MEDICINE | Facility: CLINIC | Age: 74
End: 2024-05-21
Payer: MEDICARE

## 2024-05-21 ENCOUNTER — TELEPHONE (OUTPATIENT)
Dept: PAIN MEDICINE | Facility: CLINIC | Age: 74
End: 2024-05-21

## 2024-05-21 VITALS
WEIGHT: 180 LBS | OXYGEN SATURATION: 97 % | HEART RATE: 91 BPM | BODY MASS INDEX: 30.9 KG/M2 | SYSTOLIC BLOOD PRESSURE: 169 MMHG | DIASTOLIC BLOOD PRESSURE: 84 MMHG | RESPIRATION RATE: 16 BRPM

## 2024-05-21 DIAGNOSIS — M47.816 LUMBAR SPONDYLOSIS: ICD-10-CM

## 2024-05-21 DIAGNOSIS — M79.671 RIGHT FOOT PAIN: ICD-10-CM

## 2024-05-21 DIAGNOSIS — Z79.899 HIGH RISK MEDICATION USE: ICD-10-CM

## 2024-05-21 DIAGNOSIS — M96.1 FAILED BACK SURGICAL SYNDROME: ICD-10-CM

## 2024-05-21 DIAGNOSIS — M96.1 POSTLAMINECTOMY SYNDROME: Primary | ICD-10-CM

## 2024-05-21 DIAGNOSIS — G43.009 MIGRAINE WITHOUT AURA AND WITHOUT STATUS MIGRAINOSUS, NOT INTRACTABLE: ICD-10-CM

## 2024-05-21 RX ORDER — IBUPROFEN 800 MG/1
800 TABLET ORAL 3 TIMES DAILY PRN
Qty: 270 TABLET | Refills: 0 | Status: SHIPPED | OUTPATIENT
Start: 2024-05-21 | End: 2024-08-19

## 2024-05-21 RX ORDER — TRAMADOL HYDROCHLORIDE 50 MG/1
100 TABLET ORAL EVERY 6 HOURS PRN
Qty: 180 TABLET | Refills: 2 | Status: SHIPPED | OUTPATIENT
Start: 2024-05-21 | End: 2024-08-19

## 2024-05-21 RX ORDER — FLUTICASONE PROPIONATE 50 MCG
2 SPRAY, SUSPENSION (ML) NASAL 2 TIMES DAILY
COMMUNITY
Start: 2024-05-08

## 2024-05-21 NOTE — TELEPHONE ENCOUNTER
Caller: Nola Tariq    Relationship: Self    Best call back number: [phone]    Requested Prescriptions:   TRAMADOL    Pharmacy where request should be sent:    [facility] [address] [place] [address] (CONTACT ON FILE)  Last office visit with prescribing clinician: 5/21/2024   Last telemedicine visit with prescribing clinician: Visit date not found   Next office visit with prescribing clinician: 8/27/2024     Additional details provided by patient: PT SPOKE WITH PHARMACY AND STATES IT NEEDS TO BE WRITTEN  TABLETS (90 DAY SUPPLY). PHARMACY TOLD PT THAT PREVIOUS RX THAT WAS SENT CANNOT BE FILLED.    Does the patient have less than a 3 day supply:  [x] Yes  [] No    Would you like a call back once the refill request has been completed: [x] Yes [] No    If the office needs to give you a call back, can they leave a voicemail: [x] Yes [] No    Kenrick Toure   05/21/24 15:36 EDT

## 2024-05-21 NOTE — PROGRESS NOTES
Subjective   Nola Tariq is a 74 y.o. female is here for right leg, lower back and right knee pain.  Patient was last seen on 2/27/2024 and this is a 3 months follow-up. Increased lower back pain after lifting heavy cat.       On last visit:     Left leg pain is 5/10 on VAS.    Lower back pain is 6/10 on VAS, at maximum 7/10.  Pain is aching and dull in nature.  Not referred.  She has numbness and tingling in bilateral foot.  Pain is constant.  Improved by pain meds.  Worse with walking and standing.    R knee pain is 5/10 on VAS. Worse with weight bearing. She had R knee steroid injection with Rheumatology several months ago with excellent relief.     R shoulder pain is 6/10 on VAS. Normal ROM. Had shoulder bursa issues several years ago which improved with US treatments.     Migraines - She used to take Excedrin and Fioricet for daily migraines but it doesn't work anymore. She is scheduled to see Neurology.  She does wake up with migraines during night or early in morning. She would wake up with headache and goes away with Excedrin. She had 2-3 headaches over last month that didn't go away with Excedrin and lasted for rest of the day. She had seen someone for chiari malformation previous.  History of migraines since teenager and usually right-sided temporal area but states that new migraines are all over the head. Seen by neurology who recommended continuing excedrin.      Previous Injection:   4/18/2023- SCS trial Wisr- no significant pain relief. Coverage was excellent with patient able to feel paresthesia in all of the painful areas but unfortunately no significant pain relief.  No functional improvement as well.  11/8/2022-right knee injection- good relief.  7/8/22 - LESI L5-S1 - no relief.   RFA at previous pain management - no relief.     Hx:  Referred by JEM Ashton for  lower back pain.  Pain started around 2008 without any significant injuries and has been getting worse since  then.  Her main complaints today are mid back and lower back pain.  Patient had previously seen Dr. Voss and had epidurals, facet joint injections, ablations.  She states that nothing helped at the time. Last seen 1 year ago. Her lower back pain is worse than mid back pain. She is retired RN.   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  Patient states that she is not interested in fusion surgery as there is 50% chance of lower back pain improvement. She had also injured her left knee and had steroid injection by Ortho in left knee.  Scheduled to see Ortho in 5 weeks. She has been seeing neurology for migraines and has been started on gabapentin.  Patient has taken gabapentin for about 1 month without significant improvement in her migraines.           PHQ-9- 4  SOAPP-2      PMH:   Tijerina's esophagus, hypertension, migraine, thoracic vertebra fx s/p kypho T7, T10, migraines, smoker, Back surgery- decompression L4-5?-2009 Dr. Rdz, Carpal tunnel surgery 1983 bilateral wrist; R knee steroid injection with orthopedics (Dr. Barton at [place]).         Current Medications:   Tramadol  mg q6H PRN  Lyrica 25 mg BID  Ibuprofen 800 mg       Past Medications:  Tramadol  mg daily   Meloxicam 15 mg  Celebrex-leg swelling  Gabapentin - didn't help   Norco 5-325 mg BID PRN      Past Modalities:  TENS:                                                                          no                                                  Physical Therapy Within The Last 6 Months              Yes (1.5 years ago with some help).   Psychotherapy                                                            no  Massage Therapy                                                       no     Patient Complains Of:  Uro-Fecal Incontinence          no  Weight Gain/Loss                   Yes (weight gain 25 lbs - 1 year after smoking).   Fever/Chills                             no  Weakness                                no            Current Outpatient Medications:     Acetaminophen-Caffeine 500-65 MG tablet, EXCEDRIN TENSION HEADACHE 500-65 MG TABS, Disp: , Rfl:     Cetirizine HCl 10 MG capsule, ZYRTEC ALLERGY 10 MG CAPS, Disp: , Rfl:     fluticasone (FLONASE) 50 MCG/ACT nasal spray, 2 sprays by Each Nare route 2 (Two) Times a Day., Disp: , Rfl:     gabapentin (NEURONTIN) 300 MG capsule, , Disp: , Rfl:     ibuprofen (ADVIL,MOTRIN) 800 MG tablet, Take 1 tablet by mouth 3 (Three) Times a Day As Needed for Mild Pain for up to 90 days., Disp: 270 tablet, Rfl: 0    Multiple Vitamins-Minerals (MULTIVITAMIN WITH MINERALS) tablet tablet, Take 1 tablet by mouth Daily., Disp: , Rfl:     omeprazole (priLOSEC) 40 MG capsule, , Disp: , Rfl:     traMADol (ULTRAM) 50 MG tablet, Take 2 tablets by mouth Every 6 (Six) Hours As Needed for Moderate Pain for up to 90 days., Disp: 180 tablet, Rfl: 2    vitamin D (ERGOCALCIFEROL) 1.25 MG (48243 UT) capsule capsule, Take 1 capsule by mouth 1 (One) Time Per Week., Disp: , Rfl:     Voquezna 20 MG tablet, , Disp: , Rfl:     famotidine (PEPCID) 40 MG tablet, , Disp: , Rfl:     pregabalin (LYRICA) 50 MG capsule, Take 1 capsule by mouth 2 (Two) Times a Day for 30 days., Disp: 60 capsule, Rfl: 0    sucralfate (CARAFATE) 1 g tablet, , Disp: , Rfl:     topiramate (TOPAMAX) 25 MG tablet, , Disp: , Rfl:     The following portions of the patient's history were reviewed and updated as appropriate: allergies, current medications, past family history, past medical history, past social history, past surgical history, and problem list.      REVIEW OF PERTINENT MEDICAL DATA    Past Medical History:   Diagnosis Date    Arthritis     Tijerina esophagus     COVID-19     DJD (degenerative joint disease)     Fall at home     Fall at home     fracture rt. leg    Foot pain, right     GERD (gastroesophageal reflux disease)     Hypertension     Leg pain, right     Low back pain     Migraine     Plantar  fasciitis     Shoulder pain, right     Sleep apnea      Past Surgical History:   Procedure Laterality Date    CARPAL TUNNEL RELEASE      colin.    EYE SURGERY      catarac    KNEE ARTHROSCOPY      SPINAL CORD STIMULATOR TRIAL W/ LAMINOTOMY      TUBAL ABDOMINAL LIGATION      WRIST SURGERY      tendon release     Family History   Problem Relation Age of Onset    Migraines Father     Migraines Sister     Migraines Brother      Social History     Socioeconomic History    Marital status:    Tobacco Use    Smoking status: Former     Current packs/day: 0.00     Types: Cigarettes     Quit date: 2021     Years since quittin.8    Smokeless tobacco: Never   Vaping Use    Vaping status: Never Used   Substance and Sexual Activity    Alcohol use: Never    Drug use: Never    Sexual activity: Not Currently         Review of Systems   Musculoskeletal:  Positive for arthralgias and back pain.         Vitals:    24 1122   BP: 169/84   BP Location: Left arm   Patient Position: Sitting   Cuff Size: Large Adult   Pulse: 91   Resp: 16   SpO2: 97%   Weight: 81.6 kg (180 lb)   PainSc:   6               Objective   Physical Exam  Musculoskeletal:         General: Tenderness present.        Back:         Legs:    Neurological:      Comments: Motor strength 5/5 b/l LE  Sensory intact b/l LE             Imaging Reviewed:  Lumbar spine MRI without contrast-2022.  L2-3-moderate disc bulge.  Moderate posterior facet changes.  Mild to moderate central canal narrowing.  Moderate bilateral neural foraminal narrowing.  L3-4-- broad-based disc bulge.  Central posterior disc protrusion causing narrowing of central canal with approximate 5 mm in AP dimension.  Moderate facet changes.  Moderate to severe central canal narrowing.  Mild bilateral neural foraminal narrowing.  L4-5-large broad-based disc bulge.  Severe facet changes.  Severe central canal narrowing.  Moderate bilateral neural foraminal narrowing  L5-S1-large  broad-based disc bulge.  Severe facet changes.  Moderate to severe central canal narrowing.  Severe bilateral neural foraminal narrowing.      MRI thoracic spine-6/13/2022  Kyphoplasty at T7 and T10.  - Residual mild to moderate compression fracture deformities  - Mild posterior disc bulges at multiple thoracic levels.  No large extrusions.    MRI brain wo contrast: 8/15/2018  Chiari Malformation type 1 with cerebellar tonsils projecting 7 mm below level of foramen magnum  Chronic b/l maxillary and ethmoid sinusitis     Assessment:    1. Postlaminectomy syndrome    2. Failed back surgical syndrome    3. Lumbar spondylosis    4. High risk medication use    5. Right foot pain    6. Migraine without aura and without status migrainosus, not intractable        Plan:   1. UDS consistent with patient's interview on 2/27/2024.   Narcotic contract signed-5/10/2022.  Narcan sent-6/20/2022.  2. We discussed trying a course of formal physical therapy.  Physical therapy can help strengthen and stretch the muscles around the joints. Continue to be as active as possible. Start physical therapy as it will help generalized pain and follow up with HEP.  PT prescription sent to [facility] in [place] as requested by patient.  3.  Patient continues to have pain despite being on Norco.  She had significant pain relief with tramadol previously.   Continue tramadol 1 to 2 tablets of 50 mg tramadol TID PRN with 1 refill (8/19/24).     4. Norco 7.5-325 mg (#28 tabs) PRN for break through pain (9/12/23).  No need for refill.  Side effects discussed including but not limited to nausea, vomiting, constipation, lightheadedness, dizziness, drowsiness, or headache. This medication may increase serotonin and rarely cause a very serious condition called serotonin syndrome/ toxicity.   5. Continues Ibuprofen 800 mg TID PRN-8/29/24. Patient informed of increased risk of heart attacks, stroke and kidney problems in addition to gastric ulcers with  use of non-steroidal anti-inflammatory medications.  6.  Recommend following up with neurology for worsening of migraines.  History of Chiari malformation type I.  We did discuss possibility of Botox injection if she fails multiple medications      RTC in 3 months      Kirk Nascimento DO  Pain Management   [facility]           INSPECT REPORT    As part of the patient's treatment plan, I may be prescribing controlled substances. The patient has been made aware of appropriate use of such medications, including potential risk of somnolence, limited ability to drive and/or work safely, and the potential for dependence or overdose. It has also been made clear that these medications are for use by this patient only, without concomitant use of alcohol or other substances unless prescribed.     Patient has completed prescribing agreement detailing terms of continued prescribing of controlled substances, including monitoring INSPECT reports, urine drug screening, and pill counts if necessary. The patient is aware that inappropriate use will results in cessation of prescribing such medications.    INSPECT report has been reviewed and scanned into the patient's chart.

## 2024-07-11 ENCOUNTER — TELEPHONE (OUTPATIENT)
Dept: PAIN MEDICINE | Facility: CLINIC | Age: 74
End: 2024-07-11
Payer: MEDICARE

## 2024-07-11 RX ORDER — METHYLPREDNISOLONE 4 MG/1
TABLET ORAL
Qty: 21 TABLET | Refills: 0 | Status: SHIPPED | OUTPATIENT
Start: 2024-07-11

## 2024-07-11 NOTE — TELEPHONE ENCOUNTER
Hub staff attempted to follow warm transfer process and was unsuccessful     Caller: PATIENT    Relationship to patient: SELF     Best call back number: [phone]    Patient is needing: PATIENT WAS CALLING TO SPEAK WITH THE CLINICAL STAFF TO DISCUSS AN INCREASE IN PAIN. PATIENT STATED FOR A FEW WEEKS NOW SHE HAS HAD AN INCREASE IN PAIN IN HER LEFT HIP, LEFT LEG, AND LUMBAR SPINE. PATIENT IS WANTING TO SEE IF DR. CORDOVA COULD PRESCRIBE SOMETHING TO HELP WITH THE PAIN UNTIL HER APPT WITH HIM ON 07/31/24. PATIENT MOVED HER APPT UP FROM 08/27/24 TO 07/31/24. I OFFERED HER A SOONER APPT BUT PATIENT DECLINED DUE TO NEEDING AN AFTERNOON APPT. THANK YOU!

## 2024-07-12 ENCOUNTER — TELEPHONE (OUTPATIENT)
Dept: PAIN MEDICINE | Facility: CLINIC | Age: 74
End: 2024-07-12
Payer: MEDICARE

## 2024-07-12 DIAGNOSIS — M96.1 POSTLAMINECTOMY SYNDROME: Primary | ICD-10-CM

## 2024-07-12 DIAGNOSIS — M96.1 FAILED BACK SURGICAL SYNDROME: ICD-10-CM

## 2024-07-12 NOTE — TELEPHONE ENCOUNTER
Provider:      Caller: FRANCIE ALBA     Relationship to Patient: SELF     Phone Number: [phone]    Reason for Call: PATIENT WAS GIVEN A STEROID PACK FOR HER PAIN. SHE SAW AN EAR DR AND THEY ARE GOING TO PUT TUBES IN HER EARS. THE DR STATES SHE CAN'T DO THE STEROID PACK AND TUBES. PATIENT WOULD LIKE TO BE ADVISED ON THIS AND IF SHE CAN BE CALLED IN SOMETHING ELSE FOR PAIN.

## 2024-07-15 RX ORDER — HYDROCODONE BITARTRATE AND ACETAMINOPHEN 7.5; 325 MG/1; MG/1
1 TABLET ORAL EVERY 6 HOURS PRN
Qty: 28 TABLET | Refills: 0 | Status: SHIPPED | OUTPATIENT
Start: 2024-07-15 | End: 2024-07-16 | Stop reason: SDUPTHER

## 2024-07-15 NOTE — TELEPHONE ENCOUNTER
Sent Norco. Recommend not taking it with Tramadol. Avoid tramadol while taking Norco.     Kirk Nascimento DO  Pain Management   [facility]

## 2024-07-16 ENCOUNTER — TELEPHONE (OUTPATIENT)
Dept: PAIN MEDICINE | Facility: CLINIC | Age: 74
End: 2024-07-16
Payer: MEDICARE

## 2024-07-16 DIAGNOSIS — M96.1 POSTLAMINECTOMY SYNDROME: ICD-10-CM

## 2024-07-16 DIAGNOSIS — M96.1 FAILED BACK SURGICAL SYNDROME: ICD-10-CM

## 2024-07-16 RX ORDER — HYDROCODONE BITARTRATE AND ACETAMINOPHEN 7.5; 325 MG/1; MG/1
1 TABLET ORAL EVERY 6 HOURS PRN
Qty: 28 TABLET | Refills: 0 | Status: SHIPPED | OUTPATIENT
Start: 2024-07-16 | End: 2024-07-23

## 2024-07-16 NOTE — TELEPHONE ENCOUNTER
Spoke to [facility] pharmacist. She is not going to fill Hydrocodone due to patient just receive rx for tramadol. She states rx  diagnosis codes are for surgery. She was asking when is patient scheduled for surgery.  Patient would like her rx sent to [facility].

## 2024-07-16 NOTE — TELEPHONE ENCOUNTER
Caller: Nola Tariq    Relationship to patient: Self    Patient is needing: PATIENT CALLED STATING THAT THE PHARMACY CANNOT GIVE THE PATIENT HER SCRIPT UNTIL THE OFFICE REACHES OUT TO THEM TO CLARIFY SOMETHING ABOUT THE [place] SCRIPT THAT WAS SENT IN

## 2024-07-29 ENCOUNTER — OFFICE (AMBULATORY)
Dept: URBAN - METROPOLITAN AREA CLINIC 64 | Facility: CLINIC | Age: 74
End: 2024-07-29
Payer: COMMERCIAL

## 2024-07-29 VITALS
DIASTOLIC BLOOD PRESSURE: 87 MMHG | WEIGHT: 179 LBS | HEIGHT: 67 IN | HEART RATE: 72 BPM | SYSTOLIC BLOOD PRESSURE: 139 MMHG

## 2024-07-29 DIAGNOSIS — K22.70 BARRETT'S ESOPHAGUS WITHOUT DYSPLASIA: ICD-10-CM

## 2024-07-29 DIAGNOSIS — R09.89 OTHER SPECIFIED SYMPTOMS AND SIGNS INVOLVING THE CIRCULATORY: ICD-10-CM

## 2024-07-29 DIAGNOSIS — K30 FUNCTIONAL DYSPEPSIA: ICD-10-CM

## 2024-07-29 PROCEDURE — 99213 OFFICE O/P EST LOW 20 MIN: CPT | Performed by: NURSE PRACTITIONER

## 2024-07-29 RX ORDER — FAMOTIDINE 40 MG/1
80 TABLET, FILM COATED ORAL
Qty: 60 | Refills: 11 | Status: ACTIVE
Start: 2024-07-29

## 2024-07-30 NOTE — PROGRESS NOTES
Subjective   Nola Tariq is a 74 y.o. female is here for right leg, lower back and right knee pain.  Patient was last seen on 5/21/2024.  Norco was started for short-term due to severe pain since last visit. She had to stop PT due to pain.     On last visit:     Left leg pain is 5/10 on VAS.    Lower back pain is 7/10 on VAS, at maximum 8/10.  Pain is aching and dull in nature.  Not referred.  She has numbness and tingling in bilateral foot.  Pain is constant.  Improved by pain meds.  Worse with walking and standing.    R knee pain is 5/10 on VAS. Worse with weight bearing. She had R knee steroid injection with Rheumatology several months ago with excellent relief.     R shoulder pain is 6/10 on VAS. Normal ROM. Had shoulder bursa issues several years ago which improved with US treatments.     Migraines - She used to take Excedrin and Fioricet for daily migraines but it doesn't work anymore. She is scheduled to see Neurology.  She does wake up with migraines during night or early in morning. She would wake up with headache and goes away with Excedrin. She had 2-3 headaches over last month that didn't go away with Excedrin and lasted for rest of the day. She had seen someone for chiari malformation previous.  History of migraines since teenager and usually right-sided temporal area but states that new migraines are all over the head. Seen by neurology who recommended continuing excedrin.      Previous Injection:   4/18/2023- SCS trial [employer]- no significant pain relief. Coverage was excellent with patient able to feel paresthesia in all of the painful areas but unfortunately no significant pain relief.  No functional improvement as well.  11/8/2022-right knee injection- good relief.  7/8/22 - LESI L5-S1 - no relief.   RFA at previous pain management - no relief.     Hx:  Referred by JEM Ashton for  lower back pain.  Pain started around 2008 without any significant injuries and has been  getting worse since then.  Her main complaints today are mid back and lower back pain.  Patient had previously seen Dr. Voss and had epidurals, facet joint injections, ablations.  She states that nothing helped at the time. Last seen 1 year ago. Her lower back pain is worse than mid back pain. She is retired RN.   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  Patient states that she is not interested in fusion surgery as there is 50% chance of lower back pain improvement. She had also injured her left knee and had steroid injection by Ortho in left knee.  Scheduled to see Ortho in 5 weeks. She has been seeing neurology for migraines and has been started on gabapentin.  Patient has taken gabapentin for about 1 month without significant improvement in her migraines.           PHQ-9- 4  SOAPP-2      PMH:   Tijerina's esophagus, hypertension, migraine, thoracic vertebra fx s/p kypho T7, T10, migraines, smoker, Back surgery- decompression L4-5?-2009 Dr. Rdz, Carpal tunnel surgery 1983 bilateral wrist; R knee steroid injection with orthopedics (Dr. Barton at [place]).         Current Medications:   Tramadol  mg q6H PRN  Lyrica 25 mg BID  Ibuprofen 800 mg       Past Medications:  Tramadol  mg daily   Meloxicam 15 mg  Celebrex-leg swelling  Gabapentin - didn't help   Norco 5-325 mg BID PRN      Past Modalities:  TENS:                                                                          no                                                  Physical Therapy Within The Last 6 Months              Yes (1.5 years ago with some help).   Psychotherapy                                                            no  Massage Therapy                                                       no     Patient Complains Of:  Uro-Fecal Incontinence          no  Weight Gain/Loss                   Yes (weight gain 25 lbs - 1 year after smoking).   Fever/Chills                              no  Weakness                               no            Current Outpatient Medications:     Acetaminophen-Caffeine 500-65 MG tablet, EXCEDRIN TENSION HEADACHE 500-65 MG TABS, Disp: , Rfl:     Cetirizine HCl 10 MG capsule, ZYRTEC ALLERGY 10 MG CAPS, Disp: , Rfl:     famotidine (PEPCID) 40 MG tablet, , Disp: , Rfl:     gabapentin (NEURONTIN) 300 MG capsule, , Disp: , Rfl:     ibuprofen (ADVIL,MOTRIN) 800 MG tablet, Take 1 tablet by mouth 3 (Three) Times a Day As Needed for Mild Pain for up to 90 days., Disp: 270 tablet, Rfl: 0    methylPREDNISolone (MEDROL) 4 MG dose pack, Take as directed on package instructions., Disp: 21 tablet, Rfl: 0    Multiple Vitamins-Minerals (MULTIVITAMIN WITH MINERALS) tablet tablet, Take 1 tablet by mouth Daily., Disp: , Rfl:     omeprazole (priLOSEC) 40 MG capsule, , Disp: , Rfl:     traMADol (ULTRAM) 50 MG tablet, Take 2 tablets by mouth Every 6 (Six) Hours As Needed for Moderate Pain for up to 90 days., Disp: 180 tablet, Rfl: 2    vitamin D (ERGOCALCIFEROL) 1.25 MG (65138 UT) capsule capsule, Take 1 capsule by mouth 1 (One) Time Per Week., Disp: , Rfl:     Voquezna 20 MG tablet, , Disp: , Rfl:     fluticasone (FLONASE) 50 MCG/ACT nasal spray, 2 sprays by Each Nare route 2 (Two) Times a Day., Disp: , Rfl:     sucralfate (CARAFATE) 1 g tablet, , Disp: , Rfl:     topiramate (TOPAMAX) 25 MG tablet, , Disp: , Rfl:     The following portions of the patient's history were reviewed and updated as appropriate: allergies, current medications, past family history, past medical history, past social history, past surgical history, and problem list.      REVIEW OF PERTINENT MEDICAL DATA    Past Medical History:   Diagnosis Date    Arthritis     Tijerina esophagus     Chronic pain disorder     COVID-19     DJD (degenerative joint disease)     Extremity pain     Fall at home     Fall at home     fracture rt. leg    Foot pain, right     Fractures     GERD (gastroesophageal reflux disease)      Hypertension     Joint pain     Leg pain, right     Low back pain     Migraine     Plantar fasciitis     Shingles     Shoulder pain, right     Sleep apnea      Past Surgical History:   Procedure Laterality Date    CARPAL TUNNEL RELEASE      colin.    EPIDURAL BLOCK      EYE SURGERY      catarac    KNEE ARTHROSCOPY      SPINAL CORD STIMULATOR TRIAL W/ LAMINOTOMY      TUBAL ABDOMINAL LIGATION      WRIST SURGERY      tendon release     Family History   Problem Relation Age of Onset    Cancer Mother     Migraines Father     COPD Father     Migraines Sister     Migraines Brother      Social History     Socioeconomic History    Marital status:    Tobacco Use    Smoking status: Former     Current packs/day: 0.00     Types: Cigarettes     Quit date: 7/18/2021     Years since quitting: 3.0    Smokeless tobacco: Never   Vaping Use    Vaping status: Never Used   Substance and Sexual Activity    Alcohol use: Never    Drug use: Never    Sexual activity: Not Currently         Review of Systems   Musculoskeletal:  Positive for arthralgias and back pain.         Vitals:    07/31/24 1522   BP: 135/85   Pulse: 94   Resp: 16   SpO2: 97%   Weight: 80.7 kg (178 lb)   PainSc:   7                 Objective   Physical Exam  Musculoskeletal:         General: Tenderness present.        Back:         Legs:    Neurological:      Comments: Motor strength 5/5 b/l LE  Sensory intact b/l LE             Imaging Reviewed:  Lumbar spine MRI without contrast-6/13/2022.  L2-3-moderate disc bulge.  Moderate posterior facet changes.  Mild to moderate central canal narrowing.  Moderate bilateral neural foraminal narrowing.  L3-4-- broad-based disc bulge.  Central posterior disc protrusion causing narrowing of central canal with approximate 5 mm in AP dimension.  Moderate facet changes.  Moderate to severe central canal narrowing.  Mild bilateral neural foraminal narrowing.  L4-5-large broad-based disc bulge.  Severe facet changes.  Severe central  canal narrowing.  Moderate bilateral neural foraminal narrowing  L5-S1-large broad-based disc bulge.  Severe facet changes.  Moderate to severe central canal narrowing.  Severe bilateral neural foraminal narrowing.      MRI thoracic spine-6/13/2022  Kyphoplasty at T7 and T10.  - Residual mild to moderate compression fracture deformities  - Mild posterior disc bulges at multiple thoracic levels.  No large extrusions.    MRI brain wo contrast: 8/15/2018  Chiari Malformation type 1 with cerebellar tonsils projecting 7 mm below level of foramen magnum  Chronic b/l maxillary and ethmoid sinusitis     Assessment:    1. High risk medication use    2. Postlaminectomy syndrome    3. Failed back surgical syndrome    4. Lumbar spondylosis    5. Right foot pain    6. Migraine without aura and without status migrainosus, not intractable          Plan:   1. UDS consistent with patient's interview on 2/27/2024.   Narcotic contract signed-5/10/2022.  Narcan sent-6/20/2022.  2. We discussed trying a course of formal physical therapy.  Physical therapy can help strengthen and stretch the muscles around the joints. Continue to be as active as possible. Start physical therapy as it will help generalized pain and follow up with HEP.  PT prescription sent to [facility] in [place] as requested by patient.  3.  Patient continues to have pain despite being on Norco.  She had significant pain relief with tramadol previously.   Continue tramadol 1 to 2 tablets of 50 mg tramadol TID PRN with 1 refill (10/29/2024).     4. Norco 7.5-325 mg (#28 tabs) PRN for break through pain (9/12/23).  No need for refill.  Side effects discussed including but not limited to nausea, vomiting, constipation, lightheadedness, dizziness, drowsiness, or headache. This medication may increase serotonin and rarely cause a very serious condition called serotonin syndrome/ toxicity.   5. Continues Ibuprofen 800 mg TID PRN-8/29/24. Patient informed of increased risk  of heart attacks, stroke and kidney problems in addition to gastric ulcers with use of non-steroidal anti-inflammatory medications.  6.  Recommend following up with neurology for worsening of migraines.  History of Chiari malformation type I.  We did discuss possibility of Botox injection if she fails multiple medications  7. Significantly increased lower back pain with tenderness to palpation to L4, L5 vertebral level. With hx of previous compression fx, we will obtain new lumbar MRI to r/o any new fx.   8. Will do medrol dose pack for 5 days to help with severe pain.    RTC after MRI.     Kirk Nascimento DO  Pain Management   [facility]           INSPECT REPORT    As part of the patient's treatment plan, I may be prescribing controlled substances. The patient has been made aware of appropriate use of such medications, including potential risk of somnolence, limited ability to drive and/or work safely, and the potential for dependence or overdose. It has also been made clear that these medications are for use by this patient only, without concomitant use of alcohol or other substances unless prescribed.     Patient has completed prescribing agreement detailing terms of continued prescribing of controlled substances, including monitoring INSPECT reports, urine drug screening, and pill counts if necessary. The patient is aware that inappropriate use will results in cessation of prescribing such medications.    INSPECT report has been reviewed and scanned into the patient's chart.

## 2024-07-31 ENCOUNTER — OFFICE VISIT (OUTPATIENT)
Dept: PAIN MEDICINE | Facility: CLINIC | Age: 74
End: 2024-07-31
Payer: MEDICARE

## 2024-07-31 VITALS
HEART RATE: 94 BPM | BODY MASS INDEX: 30.55 KG/M2 | SYSTOLIC BLOOD PRESSURE: 135 MMHG | OXYGEN SATURATION: 97 % | RESPIRATION RATE: 16 BRPM | DIASTOLIC BLOOD PRESSURE: 85 MMHG | WEIGHT: 178 LBS

## 2024-07-31 DIAGNOSIS — G43.009 MIGRAINE WITHOUT AURA AND WITHOUT STATUS MIGRAINOSUS, NOT INTRACTABLE: ICD-10-CM

## 2024-07-31 DIAGNOSIS — M79.671 RIGHT FOOT PAIN: ICD-10-CM

## 2024-07-31 DIAGNOSIS — M96.1 POSTLAMINECTOMY SYNDROME: ICD-10-CM

## 2024-07-31 DIAGNOSIS — M96.1 FAILED BACK SURGICAL SYNDROME: ICD-10-CM

## 2024-07-31 DIAGNOSIS — M47.816 LUMBAR SPONDYLOSIS: ICD-10-CM

## 2024-07-31 DIAGNOSIS — Z79.899 HIGH RISK MEDICATION USE: Primary | ICD-10-CM

## 2024-07-31 RX ORDER — TRAMADOL HYDROCHLORIDE 50 MG/1
100 TABLET ORAL EVERY 6 HOURS PRN
Qty: 180 TABLET | Refills: 2 | Status: SHIPPED | OUTPATIENT
Start: 2024-07-31 | End: 2024-08-05 | Stop reason: SDUPTHER

## 2024-08-05 ENCOUNTER — TELEPHONE (OUTPATIENT)
Dept: PAIN MEDICINE | Facility: CLINIC | Age: 74
End: 2024-08-05
Payer: MEDICARE

## 2024-08-05 DIAGNOSIS — M96.1 FAILED BACK SURGICAL SYNDROME: ICD-10-CM

## 2024-08-05 DIAGNOSIS — M47.816 LUMBAR SPONDYLOSIS: ICD-10-CM

## 2024-08-05 DIAGNOSIS — G43.009 MIGRAINE WITHOUT AURA AND WITHOUT STATUS MIGRAINOSUS, NOT INTRACTABLE: ICD-10-CM

## 2024-08-05 DIAGNOSIS — M79.671 RIGHT FOOT PAIN: ICD-10-CM

## 2024-08-05 DIAGNOSIS — M96.1 POSTLAMINECTOMY SYNDROME: ICD-10-CM

## 2024-08-05 RX ORDER — TRAMADOL HYDROCHLORIDE 50 MG/1
100 TABLET ORAL EVERY 6 HOURS PRN
Qty: 180 TABLET | Refills: 2 | Status: SHIPPED | OUTPATIENT
Start: 2024-08-05 | End: 2024-08-08 | Stop reason: SDUPTHER

## 2024-08-05 NOTE — TELEPHONE ENCOUNTER
Caller: Nola Tariq    Relationship to patient: Self    Best call back number: [phone] (home)     Patient is needing: PATIENT CALLED WANTING TO GET HER TRAMADOL SCRIPT SENT TO [facility]STEAD OF [facility]. SHE NEEDS A CALL FROM OFFICE SO THAT SHE CAN CALL [facility] TO HAVE THEM RELEASE THE SCRIPT

## 2024-08-08 ENCOUNTER — TELEPHONE (OUTPATIENT)
Dept: PAIN MEDICINE | Facility: CLINIC | Age: 74
End: 2024-08-08
Payer: MEDICARE

## 2024-08-08 DIAGNOSIS — M96.1 FAILED BACK SURGICAL SYNDROME: ICD-10-CM

## 2024-08-08 DIAGNOSIS — M96.1 POSTLAMINECTOMY SYNDROME: ICD-10-CM

## 2024-08-08 DIAGNOSIS — G43.009 MIGRAINE WITHOUT AURA AND WITHOUT STATUS MIGRAINOSUS, NOT INTRACTABLE: ICD-10-CM

## 2024-08-08 DIAGNOSIS — M79.671 RIGHT FOOT PAIN: ICD-10-CM

## 2024-08-08 DIAGNOSIS — M47.816 LUMBAR SPONDYLOSIS: ICD-10-CM

## 2024-08-08 RX ORDER — TRAMADOL HYDROCHLORIDE 50 MG/1
100 TABLET ORAL EVERY 8 HOURS PRN
Qty: 180 TABLET | Refills: 2 | Status: SHIPPED | OUTPATIENT
Start: 2024-08-08 | End: 2024-11-06

## 2024-08-08 NOTE — TELEPHONE ENCOUNTER
Caller: Nola Tariq    Relationship: Self    Best call back number: [phone]    Requested Prescriptions:   TRAMADOL 50 MG    Pharmacy where request should be sent: [facility], [facility]. [address] [phone] [facility] [phone] FX     Last office visit with prescribing clinician: 7/31/2024   Last telemedicine visit with prescribing clinician: Visit date not found   Next office visit with prescribing clinician: Visit date not found     Additional details provided by patient: PT STATES THE RX THAT WAS SENT ON 08/05/24 WAS DENIED BECAUSE IT EXCEEDED 8 PILLS A DAY. PT STATES IT NEEDS TO BE SENT BACK TO [facility].    Does the patient have less than a 3 day supply:  [] Yes  [x] No    Would you like a call back once the refill request has been completed: [x] Yes [] No    If the office needs to give you a call back, can they leave a voicemail: [x] Yes [] No    Kenrick Toure   08/08/24 09:51 EDT

## 2024-08-08 NOTE — TELEPHONE ENCOUNTER
Caller: Nola Tariq    Relationship: Self    Best call back number: [phone]    What was the call regarding: PT STATES SHE HAS BEEN SCHEDULED FOR 08/20/24 FOR HER MRI. PT IS REQUESTING TO HAVE THIS MRI DONE SOONER, AND WOULD LIKE TO KNOW IF DR CORDOVA WOULD KNOW OF ANY OTHER FACILITIES THAT COULD GET HER IN SOONER. PT IS OK WITH GOING SOMEWHERE ELSE, SO LONG AS IT IS IN [place]. PLEASE CONTACT PT TO DISCUSS.

## 2024-08-18 ENCOUNTER — APPOINTMENT (OUTPATIENT)
Dept: GENERAL RADIOLOGY | Facility: HOSPITAL | Age: 74
End: 2024-08-18
Payer: MEDICARE

## 2024-08-18 ENCOUNTER — HOSPITAL ENCOUNTER (OUTPATIENT)
Facility: HOSPITAL | Age: 74
Setting detail: OBSERVATION
Discharge: HOME | End: 2024-08-20
Attending: EMERGENCY MEDICINE | Admitting: EMERGENCY MEDICINE
Payer: MEDICARE

## 2024-08-18 DIAGNOSIS — R09.02 HYPOXIC: ICD-10-CM

## 2024-08-18 DIAGNOSIS — U07.1 COVID-19: Primary | ICD-10-CM

## 2024-08-18 LAB
ALBUMIN SERPL-MCNC: 4.4 G/DL (ref 3.5–5.2)
ALBUMIN/GLOB SERPL: 1.4 G/DL
ALP SERPL-CCNC: 75 U/L (ref 39–117)
ALT SERPL W P-5'-P-CCNC: 12 U/L (ref 1–33)
ANION GAP SERPL CALCULATED.3IONS-SCNC: 13 MMOL/L (ref 5–15)
AST SERPL-CCNC: 23 U/L (ref 1–32)
B PARAPERT DNA SPEC QL NAA+PROBE: NOT DETECTED
B PERT DNA SPEC QL NAA+PROBE: NOT DETECTED
BASOPHILS # BLD AUTO: 0.05 10*3/MM3 (ref 0–0.2)
BASOPHILS NFR BLD AUTO: 0.6 % (ref 0–1.5)
BILIRUB SERPL-MCNC: 0.3 MG/DL (ref 0–1.2)
BUN SERPL-MCNC: 14 MG/DL (ref 8–23)
BUN/CREAT SERPL: 14 (ref 7–25)
C PNEUM DNA NPH QL NAA+NON-PROBE: NOT DETECTED
CALCIUM SPEC-SCNC: 8.7 MG/DL (ref 8.6–10.5)
CHLORIDE SERPL-SCNC: 100 MMOL/L (ref 98–107)
CO2 SERPL-SCNC: 24 MMOL/L (ref 22–29)
CREAT SERPL-MCNC: 1 MG/DL (ref 0.57–1)
DEPRECATED RDW RBC AUTO: 43.7 FL (ref 37–54)
EGFRCR SERPLBLD CKD-EPI 2021: 58.9 ML/MIN/1.73
EOSINOPHIL # BLD AUTO: 0.02 10*3/MM3 (ref 0–0.4)
EOSINOPHIL NFR BLD AUTO: 0.2 % (ref 0.3–6.2)
ERYTHROCYTE [DISTWIDTH] IN BLOOD BY AUTOMATED COUNT: 12.5 % (ref 12.3–15.4)
FLUAV SUBTYP SPEC NAA+PROBE: NOT DETECTED
FLUBV RNA ISLT QL NAA+PROBE: NOT DETECTED
GLOBULIN UR ELPH-MCNC: 3.1 GM/DL
GLUCOSE SERPL-MCNC: 122 MG/DL (ref 65–99)
HADV DNA SPEC NAA+PROBE: NOT DETECTED
HCOV 229E RNA SPEC QL NAA+PROBE: NOT DETECTED
HCOV HKU1 RNA SPEC QL NAA+PROBE: NOT DETECTED
HCOV NL63 RNA SPEC QL NAA+PROBE: NOT DETECTED
HCOV OC43 RNA SPEC QL NAA+PROBE: NOT DETECTED
HCT VFR BLD AUTO: 39 % (ref 34–46.6)
HGB BLD-MCNC: 13 G/DL (ref 12–15.9)
HMPV RNA NPH QL NAA+NON-PROBE: NOT DETECTED
HPIV1 RNA ISLT QL NAA+PROBE: NOT DETECTED
HPIV2 RNA SPEC QL NAA+PROBE: NOT DETECTED
HPIV3 RNA NPH QL NAA+PROBE: NOT DETECTED
HPIV4 P GENE NPH QL NAA+PROBE: NOT DETECTED
IMM GRANULOCYTES # BLD AUTO: 0.03 10*3/MM3 (ref 0–0.05)
IMM GRANULOCYTES NFR BLD AUTO: 0.4 % (ref 0–0.5)
LYMPHOCYTES # BLD AUTO: 0.6 10*3/MM3 (ref 0.7–3.1)
LYMPHOCYTES NFR BLD AUTO: 7.2 % (ref 19.6–45.3)
M PNEUMO IGG SER IA-ACNC: NOT DETECTED
MCH RBC QN AUTO: 31.7 PG (ref 26.6–33)
MCHC RBC AUTO-ENTMCNC: 33.3 G/DL (ref 31.5–35.7)
MCV RBC AUTO: 95.1 FL (ref 79–97)
MONOCYTES # BLD AUTO: 0.86 10*3/MM3 (ref 0.1–0.9)
MONOCYTES NFR BLD AUTO: 10.3 % (ref 5–12)
NEUTROPHILS NFR BLD AUTO: 6.76 10*3/MM3 (ref 1.7–7)
NEUTROPHILS NFR BLD AUTO: 81.3 % (ref 42.7–76)
NRBC BLD AUTO-RTO: 0 /100 WBC (ref 0–0.2)
PLATELET # BLD AUTO: 183 10*3/MM3 (ref 140–450)
PMV BLD AUTO: 8.6 FL (ref 6–12)
POTASSIUM SERPL-SCNC: 3.8 MMOL/L (ref 3.5–5.2)
PROT SERPL-MCNC: 7.5 G/DL (ref 6–8.5)
RBC # BLD AUTO: 4.1 10*6/MM3 (ref 3.77–5.28)
RHINOVIRUS RNA SPEC NAA+PROBE: NOT DETECTED
RSV RNA NPH QL NAA+NON-PROBE: NOT DETECTED
SARS-COV-2 RNA NPH QL NAA+NON-PROBE: DETECTED
SODIUM SERPL-SCNC: 137 MMOL/L (ref 136–145)
WBC NRBC COR # BLD AUTO: 8.32 10*3/MM3 (ref 3.4–10.8)

## 2024-08-18 PROCEDURE — 87040 BLOOD CULTURE FOR BACTERIA: CPT | Performed by: PHYSICIAN ASSISTANT

## 2024-08-18 PROCEDURE — 99285 EMERGENCY DEPT VISIT HI MDM: CPT

## 2024-08-18 PROCEDURE — 36415 COLL VENOUS BLD VENIPUNCTURE: CPT

## 2024-08-18 PROCEDURE — G0378 HOSPITAL OBSERVATION PER HR: HCPCS

## 2024-08-18 PROCEDURE — 0202U NFCT DS 22 TRGT SARS-COV-2: CPT | Performed by: PHYSICIAN ASSISTANT

## 2024-08-18 PROCEDURE — 71045 X-RAY EXAM CHEST 1 VIEW: CPT

## 2024-08-18 PROCEDURE — 94761 N-INVAS EAR/PLS OXIMETRY MLT: CPT

## 2024-08-18 PROCEDURE — 85025 COMPLETE CBC W/AUTO DIFF WBC: CPT | Performed by: PHYSICIAN ASSISTANT

## 2024-08-18 PROCEDURE — 80053 COMPREHEN METABOLIC PANEL: CPT | Performed by: PHYSICIAN ASSISTANT

## 2024-08-18 RX ORDER — SODIUM CHLORIDE 0.9 % (FLUSH) 0.9 %
10 SYRINGE (ML) INJECTION AS NEEDED
Status: DISCONTINUED | OUTPATIENT
Start: 2024-08-18 | End: 2024-08-20 | Stop reason: HOSPADM

## 2024-08-18 RX ORDER — ACETAMINOPHEN 500 MG
1000 TABLET ORAL ONCE
Status: COMPLETED | OUTPATIENT
Start: 2024-08-18 | End: 2024-08-18

## 2024-08-18 RX ADMIN — ACETAMINOPHEN 1000 MG: 500 TABLET, FILM COATED ORAL at 22:15

## 2024-08-19 ENCOUNTER — TELEPHONE (OUTPATIENT)
Dept: PAIN MEDICINE | Facility: CLINIC | Age: 74
End: 2024-08-19
Payer: MEDICARE

## 2024-08-19 PROBLEM — U07.1 COVID-19: Status: ACTIVE | Noted: 2024-08-19

## 2024-08-19 LAB
ANION GAP SERPL CALCULATED.3IONS-SCNC: 12.5 MMOL/L (ref 5–15)
BASOPHILS # BLD AUTO: 0.02 10*3/MM3 (ref 0–0.2)
BASOPHILS NFR BLD AUTO: 0.3 % (ref 0–1.5)
BUN SERPL-MCNC: 13 MG/DL (ref 8–23)
BUN/CREAT SERPL: 14.3 (ref 7–25)
CALCIUM SPEC-SCNC: 9.1 MG/DL (ref 8.6–10.5)
CHLORIDE SERPL-SCNC: 100 MMOL/L (ref 98–107)
CO2 SERPL-SCNC: 24.5 MMOL/L (ref 22–29)
CREAT SERPL-MCNC: 0.91 MG/DL (ref 0.57–1)
DEPRECATED RDW RBC AUTO: 43.4 FL (ref 37–54)
EGFRCR SERPLBLD CKD-EPI 2021: 65.9 ML/MIN/1.73
EOSINOPHIL # BLD AUTO: 0 10*3/MM3 (ref 0–0.4)
EOSINOPHIL NFR BLD AUTO: 0 % (ref 0.3–6.2)
ERYTHROCYTE [DISTWIDTH] IN BLOOD BY AUTOMATED COUNT: 12.5 % (ref 12.3–15.4)
GLUCOSE SERPL-MCNC: 140 MG/DL (ref 65–99)
HBA1C MFR BLD: 6.49 % (ref 4.8–5.6)
HCT VFR BLD AUTO: 39.6 % (ref 34–46.6)
HGB BLD-MCNC: 13.3 G/DL (ref 12–15.9)
IMM GRANULOCYTES # BLD AUTO: 0.03 10*3/MM3 (ref 0–0.05)
IMM GRANULOCYTES NFR BLD AUTO: 0.4 % (ref 0–0.5)
LYMPHOCYTES # BLD AUTO: 0.84 10*3/MM3 (ref 0.7–3.1)
LYMPHOCYTES NFR BLD AUTO: 12 % (ref 19.6–45.3)
MCH RBC QN AUTO: 31.7 PG (ref 26.6–33)
MCHC RBC AUTO-ENTMCNC: 33.6 G/DL (ref 31.5–35.7)
MCV RBC AUTO: 94.3 FL (ref 79–97)
MONOCYTES # BLD AUTO: 0.21 10*3/MM3 (ref 0.1–0.9)
MONOCYTES NFR BLD AUTO: 3 % (ref 5–12)
NEUTROPHILS NFR BLD AUTO: 5.91 10*3/MM3 (ref 1.7–7)
NEUTROPHILS NFR BLD AUTO: 84.3 % (ref 42.7–76)
NRBC BLD AUTO-RTO: 0 /100 WBC (ref 0–0.2)
PLATELET # BLD AUTO: 196 10*3/MM3 (ref 140–450)
PMV BLD AUTO: 8.8 FL (ref 6–12)
POTASSIUM SERPL-SCNC: 3.9 MMOL/L (ref 3.5–5.2)
PROCALCITONIN SERPL-MCNC: 0.12 NG/ML (ref 0–0.25)
RBC # BLD AUTO: 4.2 10*6/MM3 (ref 3.77–5.28)
SODIUM SERPL-SCNC: 137 MMOL/L (ref 136–145)
WBC NRBC COR # BLD AUTO: 7.01 10*3/MM3 (ref 3.4–10.8)

## 2024-08-19 PROCEDURE — 84145 PROCALCITONIN (PCT): CPT | Performed by: NURSE PRACTITIONER

## 2024-08-19 PROCEDURE — G0378 HOSPITAL OBSERVATION PER HR: HCPCS

## 2024-08-19 PROCEDURE — 25010000002 AZITHROMYCIN PER 500 MG: Performed by: NURSE PRACTITIONER

## 2024-08-19 PROCEDURE — 83036 HEMOGLOBIN GLYCOSYLATED A1C: CPT | Performed by: NURSE PRACTITIONER

## 2024-08-19 PROCEDURE — 96365 THER/PROPH/DIAG IV INF INIT: CPT

## 2024-08-19 PROCEDURE — 80048 BASIC METABOLIC PNL TOTAL CA: CPT | Performed by: EMERGENCY MEDICINE

## 2024-08-19 PROCEDURE — 25010000002 DEXAMETHASONE SODIUM PHOSPHATE 10 MG/ML SOLUTION: Performed by: PHYSICIAN ASSISTANT

## 2024-08-19 PROCEDURE — 25010000002 AMPICILLIN-SULBACTAM PER 1.5 G: Performed by: NURSE PRACTITIONER

## 2024-08-19 PROCEDURE — 85025 COMPLETE CBC W/AUTO DIFF WBC: CPT | Performed by: EMERGENCY MEDICINE

## 2024-08-19 PROCEDURE — 25810000003 SODIUM CHLORIDE 0.9 % SOLUTION 250 ML FLEX CONT: Performed by: NURSE PRACTITIONER

## 2024-08-19 PROCEDURE — 96375 TX/PRO/DX INJ NEW DRUG ADDON: CPT

## 2024-08-19 RX ORDER — ACETAMINOPHEN 325 MG/1
650 TABLET ORAL EVERY 4 HOURS PRN
Status: DISCONTINUED | OUTPATIENT
Start: 2024-08-19 | End: 2024-08-20 | Stop reason: HOSPADM

## 2024-08-19 RX ORDER — IPRATROPIUM BROMIDE AND ALBUTEROL SULFATE 2.5; .5 MG/3ML; MG/3ML
3 SOLUTION RESPIRATORY (INHALATION) EVERY 6 HOURS PRN
Status: DISCONTINUED | OUTPATIENT
Start: 2024-08-19 | End: 2024-08-20 | Stop reason: HOSPADM

## 2024-08-19 RX ORDER — SODIUM CHLORIDE 9 MG/ML
40 INJECTION, SOLUTION INTRAVENOUS AS NEEDED
Status: DISCONTINUED | OUTPATIENT
Start: 2024-08-19 | End: 2024-08-20 | Stop reason: HOSPADM

## 2024-08-19 RX ORDER — SODIUM CHLORIDE 0.9 % (FLUSH) 0.9 %
10 SYRINGE (ML) INJECTION AS NEEDED
Status: DISCONTINUED | OUTPATIENT
Start: 2024-08-19 | End: 2024-08-20 | Stop reason: HOSPADM

## 2024-08-19 RX ORDER — IPRATROPIUM BROMIDE AND ALBUTEROL SULFATE 2.5; .5 MG/3ML; MG/3ML
3 SOLUTION RESPIRATORY (INHALATION)
Status: DISCONTINUED | OUTPATIENT
Start: 2024-08-19 | End: 2024-08-19

## 2024-08-19 RX ORDER — HYDROCODONE BITARTRATE AND ACETAMINOPHEN 7.5; 325 MG/1; MG/1
1 TABLET ORAL ONCE
Status: COMPLETED | OUTPATIENT
Start: 2024-08-19 | End: 2024-08-19

## 2024-08-19 RX ORDER — HYDRALAZINE HYDROCHLORIDE 20 MG/ML
10 INJECTION INTRAMUSCULAR; INTRAVENOUS EVERY 6 HOURS PRN
Status: DISCONTINUED | OUTPATIENT
Start: 2024-08-19 | End: 2024-08-20 | Stop reason: HOSPADM

## 2024-08-19 RX ORDER — SODIUM CHLORIDE 0.9 % (FLUSH) 0.9 %
10 SYRINGE (ML) INJECTION AS NEEDED
Status: DISCONTINUED | OUTPATIENT
Start: 2024-08-19 | End: 2024-08-19

## 2024-08-19 RX ORDER — BISACODYL 10 MG
10 SUPPOSITORY, RECTAL RECTAL DAILY PRN
Status: DISCONTINUED | OUTPATIENT
Start: 2024-08-19 | End: 2024-08-20 | Stop reason: HOSPADM

## 2024-08-19 RX ORDER — ACETAMINOPHEN 160 MG/5ML
650 SOLUTION ORAL EVERY 4 HOURS PRN
Status: DISCONTINUED | OUTPATIENT
Start: 2024-08-19 | End: 2024-08-20 | Stop reason: HOSPADM

## 2024-08-19 RX ORDER — DEXAMETHASONE SODIUM PHOSPHATE 10 MG/ML
10 INJECTION, SOLUTION INTRAMUSCULAR; INTRAVENOUS ONCE
Status: COMPLETED | OUTPATIENT
Start: 2024-08-19 | End: 2024-08-19

## 2024-08-19 RX ORDER — SODIUM CHLORIDE 0.9 % (FLUSH) 0.9 %
10 SYRINGE (ML) INJECTION EVERY 12 HOURS SCHEDULED
Status: DISCONTINUED | OUTPATIENT
Start: 2024-08-19 | End: 2024-08-20 | Stop reason: HOSPADM

## 2024-08-19 RX ORDER — GUAIFENESIN 600 MG/1
600 TABLET, EXTENDED RELEASE ORAL EVERY 12 HOURS SCHEDULED
Status: DISCONTINUED | OUTPATIENT
Start: 2024-08-19 | End: 2024-08-20 | Stop reason: HOSPADM

## 2024-08-19 RX ORDER — BISACODYL 5 MG/1
5 TABLET, DELAYED RELEASE ORAL DAILY PRN
Status: DISCONTINUED | OUTPATIENT
Start: 2024-08-19 | End: 2024-08-20 | Stop reason: HOSPADM

## 2024-08-19 RX ORDER — GABAPENTIN 300 MG/1
300 CAPSULE ORAL EVERY 8 HOURS SCHEDULED
Status: DISCONTINUED | OUTPATIENT
Start: 2024-08-19 | End: 2024-08-20 | Stop reason: HOSPADM

## 2024-08-19 RX ORDER — AMOXICILLIN 250 MG
2 CAPSULE ORAL 2 TIMES DAILY PRN
Status: DISCONTINUED | OUTPATIENT
Start: 2024-08-19 | End: 2024-08-20 | Stop reason: HOSPADM

## 2024-08-19 RX ORDER — PANTOPRAZOLE SODIUM 40 MG/10ML
40 INJECTION, POWDER, LYOPHILIZED, FOR SOLUTION INTRAVENOUS
Status: DISCONTINUED | OUTPATIENT
Start: 2024-08-19 | End: 2024-08-20 | Stop reason: HOSPADM

## 2024-08-19 RX ORDER — FAMOTIDINE 20 MG/1
20 TABLET, FILM COATED ORAL DAILY
Status: DISCONTINUED | OUTPATIENT
Start: 2024-08-19 | End: 2024-08-20 | Stop reason: HOSPADM

## 2024-08-19 RX ORDER — POLYETHYLENE GLYCOL 3350 17 G/17G
17 POWDER, FOR SOLUTION ORAL DAILY PRN
Status: DISCONTINUED | OUTPATIENT
Start: 2024-08-19 | End: 2024-08-20 | Stop reason: HOSPADM

## 2024-08-19 RX ORDER — ACETAMINOPHEN 650 MG/1
650 SUPPOSITORY RECTAL EVERY 4 HOURS PRN
Status: DISCONTINUED | OUTPATIENT
Start: 2024-08-19 | End: 2024-08-20 | Stop reason: HOSPADM

## 2024-08-19 RX ORDER — ALBUTEROL SULFATE 90 UG/1
2 AEROSOL, METERED RESPIRATORY (INHALATION) ONCE
Status: DISCONTINUED | OUTPATIENT
Start: 2024-08-19 | End: 2024-08-20 | Stop reason: HOSPADM

## 2024-08-19 RX ORDER — ONDANSETRON 4 MG/1
4 TABLET, ORALLY DISINTEGRATING ORAL EVERY 6 HOURS PRN
Status: DISCONTINUED | OUTPATIENT
Start: 2024-08-19 | End: 2024-08-20 | Stop reason: HOSPADM

## 2024-08-19 RX ORDER — NITROGLYCERIN 0.4 MG/1
0.4 TABLET SUBLINGUAL
Status: DISCONTINUED | OUTPATIENT
Start: 2024-08-19 | End: 2024-08-20 | Stop reason: HOSPADM

## 2024-08-19 RX ORDER — TRAMADOL HYDROCHLORIDE 50 MG/1
100 TABLET ORAL EVERY 8 HOURS PRN
Status: DISCONTINUED | OUTPATIENT
Start: 2024-08-24 | End: 2024-08-20 | Stop reason: HOSPADM

## 2024-08-19 RX ORDER — ONDANSETRON 2 MG/ML
4 INJECTION INTRAMUSCULAR; INTRAVENOUS EVERY 6 HOURS PRN
Status: DISCONTINUED | OUTPATIENT
Start: 2024-08-19 | End: 2024-08-20 | Stop reason: HOSPADM

## 2024-08-19 RX ORDER — ENOXAPARIN SODIUM 100 MG/ML
40 INJECTION SUBCUTANEOUS EVERY 24 HOURS
Status: DISCONTINUED | OUTPATIENT
Start: 2024-08-19 | End: 2024-08-20 | Stop reason: HOSPADM

## 2024-08-19 RX ORDER — TRAMADOL HYDROCHLORIDE 50 MG/1
50 TABLET ORAL EVERY 8 HOURS PRN
Status: DISCONTINUED | OUTPATIENT
Start: 2024-08-19 | End: 2024-08-20 | Stop reason: HOSPADM

## 2024-08-19 RX ADMIN — ACETAMINOPHEN 650 MG: 325 TABLET, FILM COATED ORAL at 10:18

## 2024-08-19 RX ADMIN — FAMOTIDINE 20 MG: 20 TABLET, FILM COATED ORAL at 12:39

## 2024-08-19 RX ADMIN — NIRMATRELVIR AND RITONAVIR 2 TABLET: KIT at 21:29

## 2024-08-19 RX ADMIN — PANTOPRAZOLE SODIUM 40 MG: 40 INJECTION, POWDER, FOR SOLUTION INTRAVENOUS at 08:26

## 2024-08-19 RX ADMIN — GUAIFENESIN 600 MG: 600 TABLET, EXTENDED RELEASE ORAL at 21:29

## 2024-08-19 RX ADMIN — GUAIFENESIN 600 MG: 600 TABLET, EXTENDED RELEASE ORAL at 08:26

## 2024-08-19 RX ADMIN — Medication 10 ML: at 21:30

## 2024-08-19 RX ADMIN — NIRMATRELVIR AND RITONAVIR 2 TABLET: KIT at 01:09

## 2024-08-19 RX ADMIN — Medication 10 ML: at 08:28

## 2024-08-19 RX ADMIN — HYDROCODONE BITARTRATE AND ACETAMINOPHEN 1 TABLET: 7.5; 325 TABLET ORAL at 00:24

## 2024-08-19 RX ADMIN — GABAPENTIN 300 MG: 300 CAPSULE ORAL at 21:29

## 2024-08-19 RX ADMIN — TRAMADOL HYDROCHLORIDE 50 MG: 50 TABLET ORAL at 10:27

## 2024-08-19 RX ADMIN — AMPICILLIN SODIUM AND SULBACTAM SODIUM 3 G: 2; 1 INJECTION, POWDER, FOR SOLUTION INTRAMUSCULAR; INTRAVENOUS at 08:28

## 2024-08-19 RX ADMIN — DEXAMETHASONE SODIUM PHOSPHATE 10 MG: 10 INJECTION, SOLUTION INTRAMUSCULAR; INTRAVENOUS at 00:24

## 2024-08-19 RX ADMIN — GABAPENTIN 300 MG: 300 CAPSULE ORAL at 14:23

## 2024-08-19 RX ADMIN — NIRMATRELVIR AND RITONAVIR 2 TABLET: KIT at 10:18

## 2024-08-19 RX ADMIN — Medication 10 ML: at 04:43

## 2024-08-19 RX ADMIN — AMPICILLIN SODIUM AND SULBACTAM SODIUM 3 G: 2; 1 INJECTION, POWDER, FOR SOLUTION INTRAMUSCULAR; INTRAVENOUS at 14:23

## 2024-08-19 RX ADMIN — AZITHROMYCIN DIHYDRATE 500 MG: 500 INJECTION, POWDER, LYOPHILIZED, FOR SOLUTION INTRAVENOUS at 08:26

## 2024-08-19 NOTE — ED PROVIDER NOTES
Subjective   History of Present Illness  Patient is a 74-year-old female who presents with fatigue and headache that started today.  Her brother tested positive for COVID.  She had COVID twice already.  She reports she feels similar to the first time she had it.  He does not require oxygen.  Denies any significant shortness of breath.        Review of Systems   Constitutional:  Positive for fatigue and fever. Negative for chills and diaphoresis.   HENT:  Negative for congestion, ear pain, sinus pressure, sore throat, trouble swallowing and voice change.    Respiratory:  Positive for cough. Negative for chest tightness and shortness of breath.    Cardiovascular:  Negative for chest pain and palpitations.   Gastrointestinal:  Negative for abdominal distention, nausea and vomiting.   Genitourinary: Negative.    Musculoskeletal:  Positive for myalgias.   Skin: Negative.    Neurological:  Positive for headaches.   Psychiatric/Behavioral: Negative.         Past Medical History:   Diagnosis Date    Arthritis     Tijerina esophagus     Chronic pain disorder     COVID-19     DJD (degenerative joint disease)     Extremity pain     Fall at home     Fall at home     fracture rt. leg    Foot pain, right     Fractures     GERD (gastroesophageal reflux disease)     Hypertension     Joint pain     Leg pain, right     Low back pain     Migraine     Plantar fasciitis     Shingles     Shoulder pain, right     Sleep apnea        Allergies   Allergen Reactions    Celebrex [Celecoxib] Swelling       Past Surgical History:   Procedure Laterality Date    CARPAL TUNNEL RELEASE      colin.    EPIDURAL BLOCK      EYE SURGERY      catarac    KNEE ARTHROSCOPY      SPINAL CORD STIMULATOR TRIAL W/ LAMINOTOMY      TUBAL ABDOMINAL LIGATION      WRIST SURGERY      tendon release       Family History   Problem Relation Age of Onset    Cancer Mother     Migraines Father     COPD Father     Migraines Sister     Migraines Brother        Social History  "    Socioeconomic History    Marital status:    Tobacco Use    Smoking status: Former     Current packs/day: 0.00     Types: Cigarettes     Quit date: 7/18/2021     Years since quitting: 3.0    Smokeless tobacco: Never   Vaping Use    Vaping status: Never Used   Substance and Sexual Activity    Alcohol use: Never    Drug use: Never    Sexual activity: Not Currently           Objective   Physical Exam  Constitutional:       General: She is not in acute distress.     Appearance: Normal appearance. She is well-developed. She is ill-appearing. She is not toxic-appearing or diaphoretic.   HENT:      Head: Normocephalic and atraumatic.      Nose: Nose normal.   Eyes:      Conjunctiva/sclera: Conjunctivae normal.   Cardiovascular:      Rate and Rhythm: Normal rate and regular rhythm.   Pulmonary:      Effort: Pulmonary effort is normal. No respiratory distress.      Breath sounds: Normal breath sounds. No stridor. No wheezing, rhonchi or rales.   Musculoskeletal:         General: Normal range of motion.      Cervical back: Normal range of motion.   Skin:     General: Skin is warm and dry.   Neurological:      General: No focal deficit present.      Mental Status: She is alert and oriented to person, place, and time. Mental status is at baseline.   Psychiatric:         Mood and Affect: Mood normal.         Behavior: Behavior normal.         Thought Content: Thought content normal.         Judgment: Judgment normal.         Procedures           ED Course                                             Medical Decision Making  Appropriate PPE worn during exam.    /95   Pulse 106   Temp 98.9 °F (37.2 °C)   Resp 20   Ht 162.6 cm (64\")   Wt 81 kg (178 lb 9.2 oz)   SpO2 91%   BMI 30.65 kg/m²      Co-morbidities --  has a past medical history of Arthritis, Tijerina esophagus, Chronic pain disorder, COVID-19, DJD (degenerative joint disease), Extremity pain, Fall at home, Fall at home, Foot pain, right, Fractures, " GERD (gastroesophageal reflux disease), Hypertension, Joint pain, Leg pain, right, Low back pain, Migraine, Plantar fasciitis, Shingles, Shoulder pain, right, and Sleep apnea.  Radiology interpretation --  X-rays reviewed by me and independently interpreted by radiologist:  XR Chest AP    Result Date: 8/18/2024  Impression: Increased opacities in the lung bases, left greater than right, which could reflect pneumonia. Electronically Signed: Frederic Mccullough MD  8/18/2024 10:07 PM EDT  Workstation ID: [id number]      Patient presents with COVID.  Was febrile initially was given Tylenol.  Patient's oxygen saturation was in the low 90s on room air at rest it dropped down to 88 to 89%.  She was placed on 2 L.  I recommended that she stay at least overnight.  She was stable and in agreement with plan.  I discussed the findings and recommendations with the patient who voices understanding. Stable while in the ER.     Note Disclaimer: At [facility], we believe that sharing information builds trust and better relationships. You are receiving this note because you are receiving care at [facility] or recently visited. It is possible you will see health information before a provider has talked with you about it. This kind of information can be easy to misunderstand. To help you fully understand what it means for your health, we urge you to discuss this note with your provider.        Problems Addressed:  COVID-19: complicated acute illness or injury  Hypoxic: complicated acute illness or injury    Amount and/or Complexity of Data Reviewed  Labs: ordered.  Radiology: ordered.    Risk  OTC drugs.  Prescription drug management.        Final diagnoses:   COVID-19   Hypoxic       ED Disposition  ED Disposition       ED Disposition   Intended Admit    Condition   --    Comment   --               No follow-up provider specified.       Medication List      No changes were made to your prescriptions during this visit.             Karol Painter PA-C  08/19/24 0015

## 2024-08-19 NOTE — H&P
[facility] H&P    Patient Name: Nola Tariq  : 1950  MRN: [id number]  Primary Care Physician: Miranda Hylton APRN  Date of admission: 2024     Patient Care Team:  Miranda Hylton APRN as PCP - General (Nurse Practitioner)          Subjective   History Present Illness     Chief Complaint:   Chief Complaint   Patient presents with    Headache     Headache    Headache    ED 2024  Patient is a 74-year-old female who presents with fatigue and headache that started today. Her brother tested positive for COVID. She had COVID twice already. She reports she feels similar to the first time she had it. He does not require oxygen. Denies any significant shortness of breath.     Observation 2024  Patient agrees with HPI noted above.  She states that her symptoms started either on Friday or Saturday.  She complains of headache, body aches and fatigue.  Current O2 sats 88% room air.  She was placed on O2 2 L nasal cannula and is now 94%.  She states that her blood pressure is typically within normal range when she checks at home.      Review of Systems   Neurological:  Positive for headaches.     Review of Systems   Constitutional:  Positive for fatigue and fever. Negative for chills and diaphoresis.   HENT:  Negative for congestion, ear pain, sinus pressure, sore throat, trouble swallowing and voice change.    Respiratory:  Positive for cough. Negative for chest tightness and shortness of breath.    Cardiovascular:  Negative for chest pain and palpitations.   Gastrointestinal:  Negative for abdominal distention, nausea and vomiting.   Genitourinary: Negative.    Musculoskeletal:  Positive for myalgias.   Skin: Negative.    Neurological:  Positive for headaches.   Psychiatric/Behavioral: Negative.         Personal History     Past Medical History:   Past Medical History:   Diagnosis Date    Arthritis     [place] esophagus     Chronic pain disorder     COVID-19     DJD (degenerative joint  disease)     Extremity pain     Fall at home     Fall at home     fracture rt. leg    Foot pain, right     Fractures     GERD (gastroesophageal reflux disease)     Hypertension     Joint pain     Leg pain, right     Low back pain     Migraine     Plantar fasciitis     Shingles     Shoulder pain, right     Sleep apnea        Surgical History:      Past Surgical History:   Procedure Laterality Date    CARPAL TUNNEL RELEASE      colin.    EPIDURAL BLOCK      EYE SURGERY      catarac    KNEE ARTHROSCOPY      SPINAL CORD STIMULATOR TRIAL W/ LAMINOTOMY      TUBAL ABDOMINAL LIGATION      WRIST SURGERY      tendon release           Family History: family history includes COPD in her father; Cancer in her mother; Migraines in her brother, father, and sister. Otherwise pertinent FHx was reviewed and unremarkable.     Social History:  reports that she quit smoking about 3 years ago. Her smoking use included cigarettes. She has never used smokeless tobacco. She reports that she does not drink alcohol and does not use drugs.      Medications:  Prior to Admission medications    Medication Sig Start Date End Date Taking? Authorizing Provider   Acetaminophen-Caffeine 500-65 MG tablet EXCEDRIN TENSION HEADACHE 500-65 MG TABS 5/17/16   Carol Tolbert MD   Cetirizine HCl 10 MG capsule ZYRTEC ALLERGY 10 MG CAPS 8/30/17   Carol Tolbert MD   famotidine (PEPCID) 40 MG tablet  11/17/22   Carol Tolbert MD   fluticasone (FLONASE) 50 MCG/ACT nasal spray 2 sprays by Each Nare route 2 (Two) Times a Day. 5/8/24   Carol Tolbert MD   gabapentin (NEURONTIN) 300 MG capsule  7/7/23   Carol Tolbert MD   ibuprofen (ADVIL,MOTRIN) 800 MG tablet Take 1 tablet by mouth 3 (Three) Times a Day As Needed for Mild Pain for up to 90 days. 5/21/24 8/19/24  Kirk Nascimento, DO   methylPREDNISolone (MEDROL) 4 MG dose pack Take as directed on package instructions. 7/11/24   Kikr Nascimento, DO   Multiple Vitamins-Minerals (MULTIVITAMIN  WITH MINERALS) tablet tablet Take 1 tablet by mouth Daily.    Carol Tolbert MD   omeprazole (priLOSEC) 40 MG capsule  5/2/22   Carol Tolbert MD   sucralfate (CARAFATE) 1 g tablet  5/2/22   Carol Tolbert MD   topiramate (TOPAMAX) 25 MG tablet  4/14/23   Carol Tolbert MD   traMADol (ULTRAM) 50 MG tablet Take 2 tablets by mouth Every 8 (Eight) Hours As Needed for Moderate Pain for up to 90 days. 8/8/24 11/6/24  Kirk Nascimento,    vitamin D (ERGOCALCIFEROL) 1.25 MG (83577 UT) capsule capsule Take 1 capsule by mouth 1 (One) Time Per Week.    Carol Tolbert MD   Voquezna 20 MG tablet  1/29/24   Carol Tolbert MD       Allergies:    Allergies   Allergen Reactions    Celebrex [Celecoxib] Swelling       Objective   Objective     Vital Signs  Temp:  [97 °F (36.1 °C)-101.6 °F (38.7 °C)] 98.4 °F (36.9 °C)  Heart Rate:  [] 82  Resp:  [12-23] 16  BP: (149-185)/(83-97) 166/91  SpO2:  [89 %-96 %] 90 %  on  Flow (L/min):  [0-2] 0;   Device (Oxygen Therapy): room air  Body mass index is 30.5 kg/m².    Physical Exam  Vitals and nursing note reviewed.   Constitutional:       Appearance: She is ill-appearing.   HENT:      Head: Normocephalic and atraumatic.      Right Ear: External ear normal.      Left Ear: External ear normal.      Nose: Nose normal. Congestion present.      Mouth/Throat:      Mouth: Mucous membranes are moist.      Pharynx: Oropharynx is clear.   Eyes:      Extraocular Movements: Extraocular movements intact.   Cardiovascular:      Rate and Rhythm: Normal rate and regular rhythm.      Pulses: Normal pulses.      Heart sounds: Normal heart sounds.   Pulmonary:      Effort: Pulmonary effort is normal.      Breath sounds: Normal breath sounds. Decreased air movement present.      Comments: O2 2LNC  Abdominal:      General: Abdomen is flat. Bowel sounds are normal.      Palpations: Abdomen is soft.   Musculoskeletal:         General: Normal range of motion.       Cervical back: Normal range of motion.   Skin:     General: Skin is warm.   Neurological:      General: No focal deficit present.      Mental Status: She is alert and oriented to person, place, and time.   Psychiatric:         Mood and Affect: Mood normal.         Behavior: Behavior normal.           Results Review:  I have personally reviewed most recent lab results and radiology images and interpretations and agree with findings, most notably: CMP, A1c, procalcitonin, CBC, respiratory panel, chest x-ray and EKG.    Results from last 7 days   Lab Units 08/19/24  0442   WBC 10*3/mm3 7.01   HEMOGLOBIN g/dL 13.3   HEMATOCRIT % 39.6   PLATELETS 10*3/mm3 196     Results from last 7 days   Lab Units 08/19/24  0442 08/18/24  2205   SODIUM mmol/L 137 137   POTASSIUM mmol/L 3.9 3.8   CHLORIDE mmol/L 100 100   CO2 mmol/L 24.5 24.0   BUN mg/dL 13 14   CREATININE mg/dL 0.91 1.00   GLUCOSE mg/dL 140* 122*   CALCIUM mg/dL 9.1 8.7   ALK PHOS U/L  --  75   ALT (SGPT) U/L  --  12   AST (SGOT) U/L  --  23   PROCALCITONIN ng/mL 0.12  --      Estimated Creatinine Clearance: 54.9 mL/min (by C-G formula based on SCr of 0.91 mg/dL).  Brief Urine Lab Results       None            Microbiology Results (last 10 days)       Procedure Component Value - Date/Time    Respiratory Panel PCR w/COVID-19(SARS-CoV-2) PADMINI/LUIS FELIPE/SHANNON/[facility]/COR/SILVINO In-House, NP Swab in UTM/VTM, 2 HR TAT - Swab, Nasopharynx [[id number]]  (Abnormal) Collected: 08/18/24 2207    Lab Status: Final result Specimen: Swab from Nasopharynx Updated: 08/18/24 2328     ADENOVIRUS, PCR Not Detected     Coronavirus 229E Not Detected     Coronavirus HKU1 Not Detected     Coronavirus NL63 Not Detected     Coronavirus OC43 Not Detected     COVID19 Detected     Human Metapneumovirus Not Detected     Human Rhinovirus/Enterovirus Not Detected     Influenza A PCR Not Detected     Influenza B PCR Not Detected     Parainfluenza Virus 1 Not Detected     Parainfluenza Virus 2 Not Detected      Parainfluenza Virus 3 Not Detected     Parainfluenza Virus 4 Not Detected     RSV, PCR Not Detected     Bordetella pertussis pcr Not Detected     Bordetella parapertussis PCR Not Detected     Chlamydophila pneumoniae PCR Not Detected     Mycoplasma pneumo by PCR Not Detected    Narrative:      In the setting of a positive respiratory panel with a viral infection PLUS a negative procalcitonin without other underlying concern for bacterial infection, consider observing off antibiotics or discontinuation of antibiotics and continue supportive care. If the respiratory panel is positive for atypical bacterial infection (Bordetella pertussis, Chlamydophila pneumoniae, or Mycoplasma pneumoniae), consider antibiotic de-escalation to target atypical bacterial infection.            ECG/EMG Results (most recent)       Procedure Component Value Units Date/Time    Telemetry Scan [[id number]] Resulted: 08/18/24     Updated: 08/19/24 0953                    XR Chest AP    Result Date: 8/18/2024  Impression: Increased opacities in the lung bases, left greater than right, which could reflect pneumonia. Electronically Signed: Frederic Mccullough MD  8/18/2024 10:07 PM EDT  Workstation ID: [id number]       Estimated Creatinine Clearance: 54.9 mL/min (by C-G formula based on SCr of 0.91 mg/dL).    Assessment & Plan   Assessment/Plan       Active Hospital Problems    Diagnosis  POA    **COVID-19 [U07.1]  Yes      Resolved Hospital Problems   No resolved problems to display.       COVID-19/pneumonia  -CMP showed mild hyperglycemia but otherwise unremarkable  -CBC showed increased neutrophils but WBC within normal range  -Procalcitonin 0.12  -Chest x-ray showed increased opacities in the lung bases left greater than right suggesting pneumonia  -EKG showed sinus rhythm with heart rate 71  -In the ED patient given Tylenol, dexamethasone, Norco, Paxlovid  -Azithromycin and Unasyn  -DuoNebs and Mucinex  -O2 2 L nasal cannula, titrate to keep O2  greater than 90%  -Tylenol as needed for fever or mild pain    Hypertension  -Newly diagnosed  BP Readings from Last 1 Encounters:   08/19/24 166/91   -Denies history of hypertension and reports BP within normal range at home  -185/97 upon arrival and averaging systolic BP 160s  -Hydralazine 10 mg IV every 6 hours as needed  -Consider starting patient on antihypertensive medication  - Monitor while admitted     GERD  -Continue Pepcid and Protonix    Chronic pain  -Continue Ultram and gabapentin    Obesity  -BMI 30.50  -Lifestyle modification    VTE Prophylaxis - Active VTE Prophylaxis  Mechanical:        Start        08/19/24 0031  Maintain Venous Foot Pump  Continuous                          Select Pharmacologic VTE Prophylaxis if Desired & Appropriate      CODE STATUS:    Code Status and Medical Interventions: CPR (Attempt to Resuscitate); Full Support   Ordered at: 08/19/24 0748     Code Status (Patient has no pulse and is not breathing):    CPR (Attempt to Resuscitate)     Medical Interventions (Patient has pulse or is breathing):    Full Support       This patient has been examined wearing personal protective equipment.     I discussed the patient's findings and my recommendations with patient and nursing staff.      Signature:Electronically signed by JEM Aparicio, 08/19/24, 11:13 AM EDT.

## 2024-08-19 NOTE — TELEPHONE ENCOUNTER
Caller: FRANCIE ALBA      Phone Number: [phone] (home)     Reason for Call:   PATIENT HAD TO CANCEL APPOINTMENT ON 8-20-24 DUE TO BEING ADMITTED INTO HOSPITAL WITH COVID.

## 2024-08-19 NOTE — PLAN OF CARE
Problem: Adult Inpatient Plan of Care  Goal: Plan of Care Review  Outcome: Ongoing, Progressing  Flowsheets (Taken 8/19/2024 0605)  Progress: no change  Plan of Care Reviewed With: patient  Goal: Patient-Specific Goal (Individualized)  Outcome: Ongoing, Progressing  Goal: Absence of Hospital-Acquired Illness or Injury  Outcome: Ongoing, Progressing  Intervention: Identify and Manage Fall Risk  Recent Flowsheet Documentation  Taken 8/19/2024 0435 by Deepika Del Cid RN  Safety Promotion/Fall Prevention: safety round/check completed  Taken 8/19/2024 0242 by Deepika Del Cid RN  Safety Promotion/Fall Prevention: safety round/check completed  Taken 8/19/2024 0139 by Deepika Del Cid RN  Safety Promotion/Fall Prevention: safety round/check completed  Intervention: Prevent and Manage VTE (Venous Thromboembolism) Risk  Recent Flowsheet Documentation  Taken 8/19/2024 0139 by Deepika Del Cid RN  Range of Motion: active ROM (range of motion) encouraged  Goal: Optimal Comfort and Wellbeing  Outcome: Ongoing, Progressing  Intervention: Provide Person-Centered Care  Recent Flowsheet Documentation  Taken 8/19/2024 0139 by Deepika Del Cid RN  Trust Relationship/Rapport:   care explained   questions answered  Goal: Readiness for Transition of Care  Outcome: Ongoing, Progressing  Intervention: Mutually Develop Transition Plan  Recent Flowsheet Documentation  Taken 8/19/2024 0119 by Deepika Del Cid RN  Transportation Anticipated: family or friend will provide  Patient/Family Anticipated Services at Transition: none  Patient/Family Anticipates Transition to: home with family  Taken 8/19/2024 0117 by Deepika Del Cid RN  Equipment Currently Used at Home: walker, standard     Problem: Hypertension Comorbidity  Goal: Blood Pressure in Desired Range  Outcome: Ongoing, Progressing     Problem: Pain Acute  Goal: Acceptable Pain Control and Functional Ability  Outcome: Ongoing, Progressing   Goal Outcome Evaluation:  Plan  of Care Reviewed With: patient        Progress: no change

## 2024-08-20 VITALS
OXYGEN SATURATION: 90 % | RESPIRATION RATE: 20 BRPM | BODY MASS INDEX: 30.34 KG/M2 | HEART RATE: 87 BPM | HEIGHT: 64 IN | WEIGHT: 177.69 LBS | SYSTOLIC BLOOD PRESSURE: 143 MMHG | DIASTOLIC BLOOD PRESSURE: 98 MMHG | TEMPERATURE: 98.4 F

## 2024-08-20 LAB
ALBUMIN SERPL-MCNC: 3.9 G/DL (ref 3.5–5.2)
ALBUMIN/GLOB SERPL: 1.1 G/DL
ALP SERPL-CCNC: 65 U/L (ref 39–117)
ALT SERPL W P-5'-P-CCNC: 9 U/L (ref 1–33)
ANION GAP SERPL CALCULATED.3IONS-SCNC: 12 MMOL/L (ref 5–15)
AST SERPL-CCNC: 24 U/L (ref 1–32)
BASOPHILS # BLD AUTO: 0.01 10*3/MM3 (ref 0–0.2)
BASOPHILS NFR BLD AUTO: 0.1 % (ref 0–1.5)
BILIRUB SERPL-MCNC: 0.2 MG/DL (ref 0–1.2)
BUN SERPL-MCNC: 19 MG/DL (ref 8–23)
BUN/CREAT SERPL: 21.3 (ref 7–25)
CALCIUM SPEC-SCNC: 9.3 MG/DL (ref 8.6–10.5)
CHLORIDE SERPL-SCNC: 107 MMOL/L (ref 98–107)
CO2 SERPL-SCNC: 24 MMOL/L (ref 22–29)
CREAT SERPL-MCNC: 0.89 MG/DL (ref 0.57–1)
DEPRECATED RDW RBC AUTO: 43.6 FL (ref 37–54)
EGFRCR SERPLBLD CKD-EPI 2021: 67.7 ML/MIN/1.73
EOSINOPHIL # BLD AUTO: 0 10*3/MM3 (ref 0–0.4)
EOSINOPHIL NFR BLD AUTO: 0 % (ref 0.3–6.2)
ERYTHROCYTE [DISTWIDTH] IN BLOOD BY AUTOMATED COUNT: 12.5 % (ref 12.3–15.4)
GLOBULIN UR ELPH-MCNC: 3.4 GM/DL
GLUCOSE SERPL-MCNC: 149 MG/DL (ref 65–99)
HCT VFR BLD AUTO: 40.5 % (ref 34–46.6)
HGB BLD-MCNC: 13.4 G/DL (ref 12–15.9)
IMM GRANULOCYTES # BLD AUTO: 0.05 10*3/MM3 (ref 0–0.05)
IMM GRANULOCYTES NFR BLD AUTO: 0.6 % (ref 0–0.5)
LYMPHOCYTES # BLD AUTO: 1.28 10*3/MM3 (ref 0.7–3.1)
LYMPHOCYTES NFR BLD AUTO: 14.2 % (ref 19.6–45.3)
MCH RBC QN AUTO: 31.1 PG (ref 26.6–33)
MCHC RBC AUTO-ENTMCNC: 33.1 G/DL (ref 31.5–35.7)
MCV RBC AUTO: 94 FL (ref 79–97)
MONOCYTES # BLD AUTO: 0.49 10*3/MM3 (ref 0.1–0.9)
MONOCYTES NFR BLD AUTO: 5.4 % (ref 5–12)
NEUTROPHILS NFR BLD AUTO: 7.19 10*3/MM3 (ref 1.7–7)
NEUTROPHILS NFR BLD AUTO: 79.7 % (ref 42.7–76)
NRBC BLD AUTO-RTO: 0 /100 WBC (ref 0–0.2)
PLATELET # BLD AUTO: 219 10*3/MM3 (ref 140–450)
PMV BLD AUTO: 8.8 FL (ref 6–12)
POTASSIUM SERPL-SCNC: 3.8 MMOL/L (ref 3.5–5.2)
PROT SERPL-MCNC: 7.3 G/DL (ref 6–8.5)
RBC # BLD AUTO: 4.31 10*6/MM3 (ref 3.77–5.28)
SODIUM SERPL-SCNC: 143 MMOL/L (ref 136–145)
WBC NRBC COR # BLD AUTO: 9.02 10*3/MM3 (ref 3.4–10.8)

## 2024-08-20 PROCEDURE — 80053 COMPREHEN METABOLIC PANEL: CPT | Performed by: PHYSICIAN ASSISTANT

## 2024-08-20 PROCEDURE — 96376 TX/PRO/DX INJ SAME DRUG ADON: CPT

## 2024-08-20 PROCEDURE — G0378 HOSPITAL OBSERVATION PER HR: HCPCS

## 2024-08-20 PROCEDURE — 85025 COMPLETE CBC W/AUTO DIFF WBC: CPT | Performed by: NURSE PRACTITIONER

## 2024-08-20 PROCEDURE — 94618 PULMONARY STRESS TESTING: CPT

## 2024-08-20 RX ORDER — GUAIFENESIN 600 MG/1
600 TABLET, EXTENDED RELEASE ORAL EVERY 12 HOURS SCHEDULED
Start: 2024-08-20

## 2024-08-20 RX ORDER — ALBUTEROL SULFATE 90 UG/1
2 AEROSOL, METERED RESPIRATORY (INHALATION) EVERY 4 HOURS PRN
Qty: 6.7 G | Refills: 0 | Status: SHIPPED | OUTPATIENT
Start: 2024-08-20

## 2024-08-20 RX ADMIN — GUAIFENESIN 600 MG: 600 TABLET, EXTENDED RELEASE ORAL at 09:20

## 2024-08-20 RX ADMIN — Medication 10 ML: at 09:20

## 2024-08-20 RX ADMIN — GABAPENTIN 300 MG: 300 CAPSULE ORAL at 05:22

## 2024-08-20 RX ADMIN — FAMOTIDINE 20 MG: 20 TABLET, FILM COATED ORAL at 09:20

## 2024-08-20 RX ADMIN — PANTOPRAZOLE SODIUM 40 MG: 40 INJECTION, POWDER, FOR SOLUTION INTRAVENOUS at 05:22

## 2024-08-20 RX ADMIN — NIRMATRELVIR AND RITONAVIR 2 TABLET: KIT at 09:20

## 2024-08-20 NOTE — DISCHARGE SUMMARY
[facility]    Miranda Hylton APRN    CHIEF COMPLAINT:     Headache    HISTORY OF PRESENT ILLNESS:    Headache      ED 08/19/2024  Patient is a 74-year-old female who presents with fatigue and headache that started today. Her brother tested positive for COVID. She had COVID twice already. She reports she feels similar to the first time she had it. He does not require oxygen. Denies any significant shortness of breath.      Observation 08/19/2024  Patient agrees with HPI noted above.  She states that her symptoms started either on Friday or Saturday.  She complains of headache, body aches and fatigue.  Current O2 sats 88% room air.  She was placed on O2 2 L nasal cannula and is now 94%.  She states that her blood pressure is typically within normal range when she checks at home.    Observation 08/20/2024  Patient is feeling much better today and eager for discharge.  She is currently room air and a 6-minute walk oximetry completed and she did not qualify for oxygen.    Past Medical History:   Diagnosis Date    Arthritis     [place] esophagus     Chronic pain disorder     COVID-19     DJD (degenerative joint disease)     Extremity pain     Fall at home     Fall at home     fracture rt. leg    Foot pain, right     Fractures     GERD (gastroesophageal reflux disease)     Hypertension     Joint pain     Leg pain, right     Low back pain     Migraine     Plantar fasciitis     Shingles     Shoulder pain, right     Sleep apnea      Past Surgical History:   Procedure Laterality Date    CARPAL TUNNEL RELEASE      colin.    EPIDURAL BLOCK      EYE SURGERY      catarac    KNEE ARTHROSCOPY      SPINAL CORD STIMULATOR TRIAL W/ LAMINOTOMY      TUBAL ABDOMINAL LIGATION      WRIST SURGERY      tendon release     Family History   Problem Relation Age of Onset    Cancer Mother     Migraines Father     COPD Father     Migraines Sister     Migraines Brother      Social History     Tobacco Use    Smoking status:  Former     Current packs/day: 0.00     Types: Cigarettes     Quit date: 2021     Years since quitting: 3.0    Smokeless tobacco: Never   Vaping Use    Vaping status: Never Used   Substance Use Topics    Alcohol use: Never    Drug use: Never     Medications Prior to Admission   Medication Sig Dispense Refill Last Dose    Acetaminophen-Caffeine 500-65 MG tablet EXCEDRIN TENSION HEADACHE 500-65 MG TABS       Cetirizine HCl 10 MG capsule ZYRTEC ALLERGY 10 MG CAPS       famotidine (PEPCID) 40 MG tablet        fluticasone (FLONASE) 50 MCG/ACT nasal spray 2 sprays by Each Nare route 2 (Two) Times a Day.       gabapentin (NEURONTIN) 300 MG capsule        [] ibuprofen (ADVIL,MOTRIN) 800 MG tablet Take 1 tablet by mouth 3 (Three) Times a Day As Needed for Mild Pain for up to 90 days. 270 tablet 0     methylPREDNISolone (MEDROL) 4 MG dose pack Take as directed on package instructions. 21 tablet 0     Multiple Vitamins-Minerals (MULTIVITAMIN WITH MINERALS) tablet tablet Take 1 tablet by mouth Daily.       omeprazole (priLOSEC) 40 MG capsule        sucralfate (CARAFATE) 1 g tablet        topiramate (TOPAMAX) 25 MG tablet        traMADol (ULTRAM) 50 MG tablet Take 2 tablets by mouth Every 8 (Eight) Hours As Needed for Moderate Pain for up to 90 days. 180 tablet 2     vitamin D (ERGOCALCIFEROL) 1.25 MG (96875 UT) capsule capsule Take 1 capsule by mouth 1 (One) Time Per Week.       Voquezna 20 MG tablet         Allergies:  Celebrex [celecoxib]      There is no immunization history on file for this patient.        REVIEW OF SYSTEMS:    Review of Systems   Neurological:  Positive for headaches.       Review of Systems   Constitutional:  Positive for fatigue and fever. Negative for chills and diaphoresis.   HENT:  Negative for congestion, ear pain, sinus pressure, sore throat, trouble swallowing and voice change.    Respiratory:  Positive for cough. Negative for chest tightness and shortness of breath.    Cardiovascular:   Negative for chest pain and palpitations.   Gastrointestinal:  Negative for abdominal distention, nausea and vomiting.   Genitourinary: Negative.    Musculoskeletal:  Positive for myalgias.   Skin: Negative.    Neurological:  Positive for headaches.   Psychiatric/Behavioral: Negative.          Vital Signs  Temp:  [96.8 °F (36 °C)-98 °F (36.7 °C)] 96.8 °F (36 °C)  Heart Rate:  [60-94] 82  Resp:  [12-16] 12  BP: (150-160)/(76-91) 159/83          Physical Exam:  Physical Exam  Vitals and nursing note reviewed.   Constitutional:       Appearance: Normal appearance.      Comments: Patient appears to be better today, RA, no acute distress   HENT:      Head: Normocephalic and atraumatic.      Right Ear: External ear normal.      Left Ear: External ear normal.      Nose: Nose normal.      Mouth/Throat:      Mouth: Mucous membranes are moist.      Pharynx: Oropharynx is clear.   Eyes:      Extraocular Movements: Extraocular movements intact.   Cardiovascular:      Rate and Rhythm: Normal rate and regular rhythm.      Pulses: Normal pulses.      Heart sounds: Normal heart sounds.   Pulmonary:      Effort: Pulmonary effort is normal.   Abdominal:      General: Abdomen is flat. Bowel sounds are normal.      Palpations: Abdomen is soft.   Musculoskeletal:         General: Normal range of motion.      Cervical back: Normal range of motion.   Skin:     General: Skin is warm.   Neurological:      General: No focal deficit present.      Mental Status: She is alert and oriented to person, place, and time.   Psychiatric:         Mood and Affect: Mood normal.         Behavior: Behavior normal.           Emotional Behavior:    Normal    Debilities:   None  Results Review:    I reviewed the patient's new clinical results.  Lab Results (most recent)       Procedure Component Value Units Date/Time    Comprehensive Metabolic Panel [[id number]]  (Abnormal) Collected: 08/20/24 0516    Specimen: Blood from Arm, Right Updated: 08/20/24 0683      Glucose 149 mg/dL      BUN 19 mg/dL      Creatinine 0.89 mg/dL      Sodium 143 mmol/L      Potassium 3.8 mmol/L      Chloride 107 mmol/L      CO2 24.0 mmol/L      Calcium 9.3 mg/dL      Total Protein 7.3 g/dL      Albumin 3.9 g/dL      ALT (SGPT) 9 U/L      AST (SGOT) 24 U/L      Alkaline Phosphatase 65 U/L      Total Bilirubin 0.2 mg/dL      Globulin 3.4 gm/dL      A/G Ratio 1.1 g/dL      BUN/Creatinine Ratio 21.3     Anion Gap 12.0 mmol/L      eGFR 67.7 mL/min/1.73     Narrative:      GFR Normal >60  Chronic Kidney Disease <60  Kidney Failure <15    The GFR formula is only valid for adults with stable renal function between ages 18 and 70.    CBC & Differential [[id number]]  (Abnormal) Collected: 08/20/24 0516    Specimen: Blood from Arm, Right Updated: 08/20/24 0545    Narrative:      The following orders were created for panel order CBC & Differential.  Procedure                               Abnormality         Status                     ---------                               -----------         ------                     CBC Auto Differential[[id number]]        Abnormal            Final result                 Please view results for these tests on the individual orders.    CBC Auto Differential [[id number]]  (Abnormal) Collected: 08/20/24 0516    Specimen: Blood from Arm, Right Updated: 08/20/24 0545     WBC 9.02 10*3/mm3      RBC 4.31 10*6/mm3      Hemoglobin 13.4 g/dL      Hematocrit 40.5 %      MCV 94.0 fL      MCH 31.1 pg      MCHC 33.1 g/dL      RDW 12.5 %      RDW-SD 43.6 fl      MPV 8.8 fL      Platelets 219 10*3/mm3      Neutrophil % 79.7 %      Lymphocyte % 14.2 %      Monocyte % 5.4 %      Eosinophil % 0.0 %      Basophil % 0.1 %      Immature Grans % 0.6 %      Neutrophils, Absolute 7.19 10*3/mm3      Lymphocytes, Absolute 1.28 10*3/mm3      Monocytes, Absolute 0.49 10*3/mm3      Eosinophils, Absolute 0.00 10*3/mm3      Basophils, Absolute 0.01 10*3/mm3      Immature Grans, Absolute 0.05 10*3/mm3       "nRBC 0.0 /100 WBC     Blood Culture - Blood, Arm, Left [[id number]]  (Normal) Collected: 08/18/24 2316    Specimen: Blood from Arm, Left Updated: 08/19/24 2330     Blood Culture No growth at 24 hours    Blood Culture - Blood, Arm, Left [[id number]]  (Normal) Collected: 08/18/24 2205    Specimen: Blood from Arm, Left Updated: 08/19/24 2215     Blood Culture No growth at 24 hours    Hemoglobin A1c [[id number]]  (Abnormal) Collected: 08/19/24 0442    Specimen: Blood Updated: 08/19/24 1036     Hemoglobin A1C 6.49 %     Procalcitonin [[id number]]  (Normal) Collected: 08/19/24 0442    Specimen: Blood Updated: 08/19/24 0831     Procalcitonin 0.12 ng/mL     Narrative:      As a Marker for Sepsis (Non-Neonates):    1. <0.5 ng/mL represents a low risk of severe sepsis and/or septic shock.  2. >2 ng/mL represents a high risk of severe sepsis and/or septic shock.    As a Marker for Lower Respiratory Tract Infections that require antibiotic therapy:    PCT on Admission    Antibiotic Therapy       6-12 Hrs later    >0.5                Strongly Recommended  >0.25 - <0.5        Recommended   0.1 - 0.25          Discouraged              Remeasure/reassess PCT  <0.1                Strongly Discouraged     Remeasure/reassess PCT    As 28 day mortality risk marker: \"Change in Procalcitonin Result\" (>80% or <=80%) if Day 0 (or Day 1) and Day 4 values are available. Refer to [email or web address]    Change in PCT <=80%  A decrease of PCT levels below or equal to 80% defines a positive change in PCT test result representing a higher risk for 28-day all-cause mortality of patients diagnosed with severe sepsis for septic shock.    Change in PCT >80%  A decrease of PCT levels of more than 80% defines a negative change in PCT result representing a lower risk for 28-day all-cause mortality of patients diagnosed with severe sepsis or septic shock.       Basic Metabolic Panel [[id number]]  (Abnormal) Collected: 08/19/24 0442    " Specimen: Blood Updated: 08/19/24 0552     Glucose 140 mg/dL      BUN 13 mg/dL      Creatinine 0.91 mg/dL      Sodium 137 mmol/L      Potassium 3.9 mmol/L      Chloride 100 mmol/L      CO2 24.5 mmol/L      Calcium 9.1 mg/dL      BUN/Creatinine Ratio 14.3     Anion Gap 12.5 mmol/L      eGFR 65.9 mL/min/1.73     Narrative:      GFR Normal >60  Chronic Kidney Disease <60  Kidney Failure <15    The GFR formula is only valid for adults with stable renal function between ages 18 and 70.    CBC & Differential [[id number]]  (Abnormal) Collected: 08/19/24 0442    Specimen: Blood Updated: 08/19/24 0529    Narrative:      The following orders were created for panel order CBC & Differential.  Procedure                               Abnormality         Status                     ---------                               -----------         ------                     CBC Auto Differential[[id number]]        Abnormal            Final result                 Please view results for these tests on the individual orders.    CBC Auto Differential [[id number]]  (Abnormal) Collected: 08/19/24 0442    Specimen: Blood Updated: 08/19/24 0529     WBC 7.01 10*3/mm3      RBC 4.20 10*6/mm3      Hemoglobin 13.3 g/dL      Hematocrit 39.6 %      MCV 94.3 fL      MCH 31.7 pg      MCHC 33.6 g/dL      RDW 12.5 %      RDW-SD 43.4 fl      MPV 8.8 fL      Platelets 196 10*3/mm3      Neutrophil % 84.3 %      Lymphocyte % 12.0 %      Monocyte % 3.0 %      Eosinophil % 0.0 %      Basophil % 0.3 %      Immature Grans % 0.4 %      Neutrophils, Absolute 5.91 10*3/mm3      Lymphocytes, Absolute 0.84 10*3/mm3      Monocytes, Absolute 0.21 10*3/mm3      Eosinophils, Absolute 0.00 10*3/mm3      Basophils, Absolute 0.02 10*3/mm3      Immature Grans, Absolute 0.03 10*3/mm3      nRBC 0.0 /100 WBC     Respiratory Panel PCR w/COVID-19(SARS-CoV-2) PADMINI/LUIS FELIPE/SHANNON/PAD/COR/SILVINO In-House, NP Swab in UTM/VTM, 2 HR TAT - Swab, Nasopharynx [[id number]]  (Abnormal) Collected: 08/18/24  2207    Specimen: Swab from Nasopharynx Updated: 08/18/24 2328     ADENOVIRUS, PCR Not Detected     Coronavirus 229E Not Detected     Coronavirus HKU1 Not Detected     Coronavirus NL63 Not Detected     Coronavirus OC43 Not Detected     COVID19 Detected     Human Metapneumovirus Not Detected     Human Rhinovirus/Enterovirus Not Detected     Influenza A PCR Not Detected     Influenza B PCR Not Detected     Parainfluenza Virus 1 Not Detected     Parainfluenza Virus 2 Not Detected     Parainfluenza Virus 3 Not Detected     Parainfluenza Virus 4 Not Detected     RSV, PCR Not Detected     Bordetella pertussis pcr Not Detected     Bordetella parapertussis PCR Not Detected     Chlamydophila pneumoniae PCR Not Detected     Mycoplasma pneumo by PCR Not Detected    Narrative:      In the setting of a positive respiratory panel with a viral infection PLUS a negative procalcitonin without other underlying concern for bacterial infection, consider observing off antibiotics or discontinuation of antibiotics and continue supportive care. If the respiratory panel is positive for atypical bacterial infection (Bordetella pertussis, Chlamydophila pneumoniae, or Mycoplasma pneumoniae), consider antibiotic de-escalation to target atypical bacterial infection.    Comprehensive Metabolic Panel [[id number]]  (Abnormal) Collected: 08/18/24 2205    Specimen: Blood Updated: 08/18/24 2233     Glucose 122 mg/dL      BUN 14 mg/dL      Creatinine 1.00 mg/dL      Sodium 137 mmol/L      Potassium 3.8 mmol/L      Chloride 100 mmol/L      CO2 24.0 mmol/L      Calcium 8.7 mg/dL      Total Protein 7.5 g/dL      Albumin 4.4 g/dL      ALT (SGPT) 12 U/L      AST (SGOT) 23 U/L      Alkaline Phosphatase 75 U/L      Total Bilirubin 0.3 mg/dL      Globulin 3.1 gm/dL      A/G Ratio 1.4 g/dL      BUN/Creatinine Ratio 14.0     Anion Gap 13.0 mmol/L      eGFR 58.9 mL/min/1.73     Narrative:      GFR Normal >60  Chronic Kidney Disease <60  Kidney Failure <15    The  GFR formula is only valid for adults with stable renal function between ages 18 and 70.            Imaging Results (Most Recent)       Procedure Component Value Units Date/Time    XR Chest AP [[id number]] Collected: 08/18/24 2206     Updated: 08/18/24 2210    Narrative:      XR CHEST AP    Date of Exam: 8/18/2024 9:52 PM EDT    Indication: COVID Evaluation, Cough, Fever    Comparison: 7/25/2021 and lung screening CT 10/24/2023.    Findings:  There is persistent diffuse interstitial prominence in the lower lungs. There appear to be increased opacities in the lung bases, left greater than right that could reflect pneumonia. Pulmonary vasculature is partially indistinct. Heart size is normal.   No pneumothorax or pleural effusion. No acute osseous abnormality.      Impression:      Impression:  Increased opacities in the lung bases, left greater than right, which could reflect pneumonia.        Electronically Signed: Frederic Mccullough MD    8/18/2024 10:07 PM EDT    Workstation ID: [id number]          reviewed    ECG/EMG Results (most recent)       Procedure Component Value Units Date/Time    Telemetry Scan [[id number]] Resulted: 08/18/24     Updated: 08/19/24 0953    Telemetry Scan [[id number]] Resulted: 08/18/24     Updated: 08/19/24 1131    Telemetry Scan [[id number]] Resulted: 08/18/24     Updated: 08/19/24 1809    Telemetry Scan [[id number]] Resulted: 08/18/24     Updated: 08/19/24 1926    Telemetry Scan [[id number]] Resulted: 08/18/24     Updated: 08/19/24 2354    Telemetry Scan [[id number]] Resulted: 08/18/24     Updated: 08/20/24 0451    Telemetry Scan [[id number]] Resulted: 08/18/24     Updated: 08/20/24 0750          reviewed            Microbiology Results (last 10 days)       Procedure Component Value - Date/Time    Blood Culture - Blood, Arm, Left [[id number]]  (Normal) Collected: 08/18/24 2316    Lab Status: Preliminary result Specimen: Blood from Arm, Left Updated: 08/19/24 2330     Blood Culture No growth at 24 hours     Respiratory Panel PCR w/COVID-19(SARS-CoV-2) PADMINI/LUIS FELIPE/SHANNON/PAD/COR/SILVINO In-House, NP Swab in UTM/VTM, 2 HR TAT - Swab, Nasopharynx [[id number]]  (Abnormal) Collected: 08/18/24 2207    Lab Status: Final result Specimen: Swab from Nasopharynx Updated: 08/18/24 2328     ADENOVIRUS, PCR Not Detected     Coronavirus 229E Not Detected     Coronavirus HKU1 Not Detected     Coronavirus NL63 Not Detected     Coronavirus OC43 Not Detected     COVID19 Detected     Human Metapneumovirus Not Detected     Human Rhinovirus/Enterovirus Not Detected     Influenza A PCR Not Detected     Influenza B PCR Not Detected     Parainfluenza Virus 1 Not Detected     Parainfluenza Virus 2 Not Detected     Parainfluenza Virus 3 Not Detected     Parainfluenza Virus 4 Not Detected     RSV, PCR Not Detected     Bordetella pertussis pcr Not Detected     Bordetella parapertussis PCR Not Detected     Chlamydophila pneumoniae PCR Not Detected     Mycoplasma pneumo by PCR Not Detected    Narrative:      In the setting of a positive respiratory panel with a viral infection PLUS a negative procalcitonin without other underlying concern for bacterial infection, consider observing off antibiotics or discontinuation of antibiotics and continue supportive care. If the respiratory panel is positive for atypical bacterial infection (Bordetella pertussis, Chlamydophila pneumoniae, or Mycoplasma pneumoniae), consider antibiotic de-escalation to target atypical bacterial infection.    Blood Culture - Blood, Arm, Left [[id number]]  (Normal) Collected: 08/18/24 2205    Lab Status: Preliminary result Specimen: Blood from Arm, Left Updated: 08/19/24 2215     Blood Culture No growth at 24 hours            Assessment & Plan     COVID-19          COVID-19/viral pneumonia  -CMP showed mild hyperglycemia but otherwise unremarkable  -CBC showed increased neutrophils but WBC within normal range  -Procalcitonin 0.12  -Chest x-ray showed increased opacities in the lung bases  left greater than right suggesting pneumonia  -EKG showed sinus rhythm with heart rate 71  -In the ED patient given Tylenol, dexamethasone, Norco, Paxlovid  -Azithromycin and Unasyn given in the ED, discontinued as procalcitonin was 0.12 and resp panel negative  -DuoNebs and Mucinex  -O2 2 L nasal cannula, titrate to keep O2 greater than 90%  -Tylenol as needed for fever or mild pain  -Walk oximetry completed, did not qualify for O2  -Paxlovid, albuterol and mucinex sent at discharge  -F/u with PCP in 5-7 days  -Isolation for 5 days from onset of symptoms and 5 days wearing a mask.      Hypertension  -Newly diagnosed  BP Readings from Last 1 Encounters:   08/20/24 159/83   -Denies history of hypertension and reports BP within normal range at home  -185/97 upon arrival and averaging systolic BP 160s  -Hydralazine 10 mg IV every 6 hours as needed  -Consider starting patient on antihypertensive medication. Will defer to PCP since patient reports that her BP runs within normal range when she is not ill.        GERD  -Continue Pepcid and Protonix     Chronic pain  -Continue Ultram and gabapentin     Obesity  -BMI 30.50  -Lifestyle modification       I discussed the patients findings and my recommendations with patient and nursing staff.     Discharge Diagnosis:      COVID-19      Hospital Course  Patient is a 74 y.o. female presented with headache as noted in HPI above.  CMP and CBC generally unremarkable.  Procalcitonin 0.12.  Chest x-ray showed signs of pneumonia, left greater than right.  Respiratory panel positive for COVID-19 but otherwise unremarkable.  She was given azithromycin, Unasyn, Tylenol, dexamethasone and Paxlovid in the ED and she was kept in the observation unit as she was mildly hypoxia upon arrival with O2 sats 88% room air.  The day following admission a walk oximetry was completed and she did not require oxygen.  Her BP was noted to be elevated with systolic blood pressure ranging 150s.  She was not  a started on anything as she reports that her BP stays within normal range when she is not ill.  At this time, patient felt to be in good condition for discharge with close follow up with PCP.  She was prescribed Paxlovid, and albuterol and Mucinex at discharge.  Instructed to take all medications as prescribed and to return to ED if any concerning signs/symptoms.  Discussed with PCP regarding BP and possible need to be started on antihypertensive medication.  All test/lab results were discussed with patient. All questions were answered and patient verbalizes understanding.       Past Medical History:     Past Medical History:   Diagnosis Date    Arthritis     [place] esophagus     Chronic pain disorder     COVID-19     DJD (degenerative joint disease)     Extremity pain     Fall at home     Fall at home     fracture rt. leg    Foot pain, right     Fractures     GERD (gastroesophageal reflux disease)     Hypertension     Joint pain     Leg pain, right     Low back pain     Migraine     Plantar fasciitis     Shingles     Shoulder pain, right     Sleep apnea        Past Surgical History:     Past Surgical History:   Procedure Laterality Date    CARPAL TUNNEL RELEASE      colin.    EPIDURAL BLOCK      EYE SURGERY      catarac    KNEE ARTHROSCOPY      SPINAL CORD STIMULATOR TRIAL W/ LAMINOTOMY      TUBAL ABDOMINAL LIGATION      WRIST SURGERY      tendon release       Social History:   Social History     Socioeconomic History    Marital status:    Tobacco Use    Smoking status: Former     Current packs/day: 0.00     Types: Cigarettes     Quit date: 7/18/2021     Years since quitting: 3.0    Smokeless tobacco: Never   Vaping Use    Vaping status: Never Used   Substance and Sexual Activity    Alcohol use: Never    Drug use: Never    Sexual activity: Not Currently       Procedures Performed    08/20 0941 Walking Oximetry    Consults:   Consults       No orders found from 7/20/2024 to 8/19/2024.            Condition on  Discharge:     Stable    Discharge Disposition  Home or Self Care    Discharge Medications     Discharge Medications        New Medications        Instructions Start Date   albuterol sulfate  (90 Base) MCG/ACT inhaler  Commonly known as: PROVENTIL HFA;VENTOLIN HFA;PROAIR HFA   2 puffs, Inhalation, Every 4 Hours PRN      guaiFENesin 600 MG 12 hr tablet  Commonly known as: MUCINEX   600 mg, Oral, Every 12 Hours Scheduled      Nirmatrelvir&Ritonavir 150/100 10 x 150 MG & 10 x 100MG tablet therapy pack tablet (for renal adjustment)  Commonly known as: PAXLOVID   2 tablets, Oral, 2 Times Daily             Continue These Medications        Instructions Start Date   Acetaminophen-Caffeine 500-65 MG tablet   EXCEDRIN TENSION HEADACHE 500-65 MG TABS      Cetirizine HCl 10 MG capsule   ZYRTEC ALLERGY 10 MG CAPS      famotidine 40 MG tablet  Commonly known as: PEPCID   No dose, route, or frequency recorded.      fluticasone 50 MCG/ACT nasal spray  Commonly known as: FLONASE   2 sprays, Each Nare, 2 Times Daily      gabapentin 300 MG capsule  Commonly known as: NEURONTIN       methylPREDNISolone 4 MG dose pack  Commonly known as: MEDROL   Take as directed on package instructions.      multivitamin with minerals tablet tablet   1 tablet, Oral, Daily      omeprazole 40 MG capsule  Commonly known as: priLOSEC   No dose, route, or frequency recorded.      sucralfate 1 g tablet  Commonly known as: CARAFATE   No dose, route, or frequency recorded.      topiramate 25 MG tablet  Commonly known as: TOPAMAX   No dose, route, or frequency recorded.      traMADol 50 MG tablet  Commonly known as: ULTRAM   100 mg, Oral, Every 8 Hours PRN      vitamin D 1.25 MG (55659 UT) capsule capsule  Commonly known as: ERGOCALCIFEROL   50,000 Units, Oral, Weekly      Voquezna 20 MG tablet  Generic drug: Vonoprazan Fumarate              ASK your doctor about these medications        Instructions Start Date   ibuprofen 800 MG tablet  Commonly known  as: MALLY CARCAMO  Ask about: Should I take this medication?   800 mg, Oral, 3 Times Daily PRN               Discharge Diet:     Activity at Discharge:     Follow-up Appointments  No future appointments.      Test Results Pending at Discharge  Pending Labs       Order Current Status    Blood Culture - Blood, Arm, Left Preliminary result    Blood Culture - Blood, Arm, Left Preliminary result             Risk for Readmission (LACE) Score: 2 (8/20/2024  6:00 AM)      Greater than 30 minutes spent in discharge activities for this patient    Signature:Electronically signed by JEM Aparicio, 08/20/24, 10:15 AM EDT.

## 2024-08-20 NOTE — CASE MANAGEMENT/SOCIAL WORK
Discharge Planning Assessment   Ivan     Patient Name: Nola Tariq  MRN: [id number]  Today's Date: 8/20/2024    Admit Date: 8/18/2024    Plan: Return home with brother. Did not qualify for home oxgyen   Discharge Needs Assessment       Row Name 08/20/24 0946       Living Environment    People in Home sibling(s)    Name(s) of People in Home Steven sams    Current Living Arrangements home    Potentially Unsafe Housing Conditions none    In the past 12 months has the electric, gas, oil, or water company threatened to shut off services in your home? No    Primary Care Provided by self    Provides Primary Care For no one, unable/limited ability to care for self    Family Caregiver if Needed sibling(s)    Family Caregiver Names Steven sams    Quality of Family Relationships supportive;helpful    Able to Return to Prior Arrangements yes       Resource/Environmental Concerns    Resource/Environmental Concerns none    Transportation Concerns none       Transportation Needs    In the past 12 months, has lack of transportation kept you from medical appointments or from getting medications? no    In the past 12 months, has lack of transportation kept you from meetings, work, or from getting things needed for daily living? No       Food Insecurity    Within the past 12 months, you worried that your food would run out before you got the money to buy more. Never true    Within the past 12 months, the food you bought just didn't last and you didn't have money to get more. Never true       Transition Planning    Patient/Family Anticipates Transition to home with family    Patient/Family Anticipated Services at Transition none    Transportation Anticipated family or friend will provide       Discharge Needs Assessment    Readmission Within the Last 30 Days no previous admission in last 30 days    Equipment Currently Used at Home walker, rolling;wheelchair, motorized    Concerns to be Addressed no discharge needs  identified    Anticipated Changes Related to Illness none    Equipment Needed After Discharge none                   Discharge Plan       Row Name 08/20/24 0943       Plan    Plan Return home with brother. Did not qualify for home oxgyen    Patient/Family in Agreement with Plan yes    Plan Comments Barriers: + COVID and CM called her for interview. She is a/o, lives with her younger brother, drives and is independent. She has a rolling walker she uses and a scooter sometimes.  PCP and Pharmacy verified. She denied any financial concerns. Her brother will  at discharge and help her as needed.                     Demographic Summary       Row Name 08/20/24 0923       General Information    Admission Type observation    Arrived From emergency department    Required Notices Provided Observation Status Notice    Referral Source admission list    Reason for Consult discharge planning    Preferred Language English       Contact Information    Permission Granted to Share Info With                    Functional Status       Row Name 08/20/24 0920       Functional Status    Usual Activity Tolerance good    Current Activity Tolerance moderate       Functional Status, IADL    Medications independent    Meal Preparation independent    Housekeeping independent    Laundry independent    Shopping independent    IADL Comments independent       Mental Status    General Appearance WDL WDL       Mental Status Summary    Recent Changes in Mental Status/Cognitive Functioning no changes       Employment/    Employment Status retired                  Karol REED,RN Case Manager  [facility]  Phone: Desk- [phone] cell- [phone]

## 2024-08-20 NOTE — PLAN OF CARE
Problem: Adult Inpatient Plan of Care  Goal: Plan of Care Review  Outcome: Ongoing, Progressing  Flowsheets (Taken 8/20/2024 0441)  Progress: improving  Plan of Care Reviewed With: patient  Goal: Patient-Specific Goal (Individualized)  Outcome: Ongoing, Progressing  Goal: Absence of Hospital-Acquired Illness or Injury  Outcome: Ongoing, Progressing  Intervention: Identify and Manage Fall Risk  Recent Flowsheet Documentation  Taken 8/20/2024 0200 by Camille Ram RN  Safety Promotion/Fall Prevention:   activity supervised   clutter free environment maintained   fall prevention program maintained   lighting adjusted   nonskid shoes/slippers when out of bed   room organization consistent   safety round/check completed  Taken 8/20/2024 0000 by Camille Ram RN  Safety Promotion/Fall Prevention:   activity supervised   clutter free environment maintained   fall prevention program maintained   lighting adjusted   nonskid shoes/slippers when out of bed   room organization consistent   safety round/check completed  Taken 8/19/2024 2200 by Camille Ram RN  Safety Promotion/Fall Prevention:   activity supervised   clutter free environment maintained   fall prevention program maintained   lighting adjusted   nonskid shoes/slippers when out of bed   room organization consistent   safety round/check completed  Taken 8/19/2024 2000 by Camille Ram RN  Safety Promotion/Fall Prevention:   activity supervised   clutter free environment maintained   fall prevention program maintained   lighting adjusted   muscle strengthening facilitated   nonskid shoes/slippers when out of bed   room organization consistent   safety round/check completed  Intervention: Prevent Skin Injury  Recent Flowsheet Documentation  Taken 8/20/2024 0200 by Camille Ram RN  Body Position: position changed independently  Taken 8/20/2024 0000 by Camille Ram RN  Body Position: position changed independently  Taken 8/19/2024 2200 by  Camille Ram RN  Body Position: position changed independently  Taken 8/19/2024 2000 by Camille Ram RN  Body Position: position changed independently  Intervention: Prevent and Manage VTE (Venous Thromboembolism) Risk  Recent Flowsheet Documentation  Taken 8/20/2024 0200 by Camille Ram RN  Activity Management: activity encouraged  Taken 8/20/2024 0000 by Camille Ram RN  Activity Management: activity encouraged  Taken 8/19/2024 2200 by Camille Ram RN  Activity Management: activity encouraged  Taken 8/19/2024 2000 by Camille Ram RN  Activity Management: activity encouraged  Range of Motion: active ROM (range of motion) encouraged  Intervention: Prevent Infection  Recent Flowsheet Documentation  Taken 8/20/2024 0200 by Camille Ram RN  Infection Prevention:   rest/sleep promoted   single patient room provided  Taken 8/20/2024 0000 by Camille Ram RN  Infection Prevention:   rest/sleep promoted   single patient room provided  Taken 8/19/2024 2200 by Camille Ram RN  Infection Prevention:   rest/sleep promoted   single patient room provided  Taken 8/19/2024 2000 by Camille Ram RN  Infection Prevention:   rest/sleep promoted   single patient room provided  Goal: Optimal Comfort and Wellbeing  Outcome: Ongoing, Progressing  Intervention: Monitor Pain and Promote Comfort  Recent Flowsheet Documentation  Taken 8/20/2024 0000 by Camille Ram RN  Pain Management Interventions: see MAR  Intervention: Provide Person-Centered Care  Recent Flowsheet Documentation  Taken 8/19/2024 2000 by Camille Ram RN  Trust Relationship/Rapport:   care explained   questions answered   questions encouraged  Goal: Readiness for Transition of Care  Outcome: Ongoing, Progressing     Problem: Hypertension Comorbidity  Goal: Blood Pressure in Desired Range  Outcome: Ongoing, Progressing  Intervention: Maintain Blood Pressure Management  Recent Flowsheet Documentation  Taken 8/20/2024  0200 by Camille Ram RN  Medication Review/Management: medications reviewed  Taken 8/20/2024 0000 by Camille Ram RN  Medication Review/Management: medications reviewed  Taken 8/19/2024 2200 by Camille Ram RN  Medication Review/Management: medications reviewed  Taken 8/19/2024 2000 by Camille Ram RN  Medication Review/Management: medications reviewed     Problem: Pain Acute  Goal: Acceptable Pain Control and Functional Ability  Outcome: Ongoing, Progressing  Intervention: Prevent or Manage Pain  Recent Flowsheet Documentation  Taken 8/20/2024 0200 by Camille Ram RN  Medication Review/Management: medications reviewed  Taken 8/20/2024 0000 by Camille Ram RN  Medication Review/Management: medications reviewed  Taken 8/19/2024 2200 by Camille Ram RN  Medication Review/Management: medications reviewed  Taken 8/19/2024 2000 by Camille Ram RN  Medication Review/Management: medications reviewed  Intervention: Develop Pain Management Plan  Recent Flowsheet Documentation  Taken 8/20/2024 0000 by Camille Ram RN  Pain Management Interventions: see MAR   Goal Outcome Evaluation:  Plan of Care Reviewed With: patient        Progress: improving

## 2024-08-20 NOTE — PROCEDURES
Exercise Oximetry    Patient Name:Nola Tariq   MRN: [id number]   Date: 08/20/24             ROOM AIR BASELINE   SpO2% 90   Heart Rate 89   Blood Pressure      EXERCISE ON ROOM AIR SpO2% EXERCISE ON O2 @  LPM SpO2%   1 MINUTE 92 1 MINUTE    2 MINUTES 95 2 MINUTES    3 MINUTES 95 3 MINUTES    4 MINUTES 97 4 MINUTES    5 MINUTES 96 5 MINUTES    6 MINUTES 97 6 MINUTES               Distance Walked   Distance Walked   Dyspnea (Delisa Scale)   Dyspnea (Delisa Scale)   Fatigue (Delisa Scale)   Fatigue (Delisa Scale)   SpO2% Post Exercise   SpO2% Post Exercise   HR Post Exercise   HR Post Exercise   Time to Recovery   Time to Recovery     Comments: Patient does not require O2 at this time.   Ruchi Galloway, CRT

## 2024-08-20 NOTE — PLAN OF CARE
Goal Outcome Evaluation:              Outcome Evaluation: Pt denies need for PT at this time. Reports she is walking around her room independently and denies any weakness. RN confirms that pt is independent. Will complete order and sign off.

## 2024-08-20 NOTE — PLAN OF CARE
Goal Outcome Evaluation:    PT evaluation pending. Walking oximetry ordered.

## 2024-08-23 LAB
BACTERIA SPEC AEROBE CULT: NORMAL
BACTERIA SPEC AEROBE CULT: NORMAL

## 2024-08-29 ENCOUNTER — OFFICE VISIT (OUTPATIENT)
Dept: PAIN MEDICINE | Facility: CLINIC | Age: 74
End: 2024-08-29
Payer: MEDICARE

## 2024-08-29 VITALS
WEIGHT: 177 LBS | SYSTOLIC BLOOD PRESSURE: 155 MMHG | HEART RATE: 96 BPM | RESPIRATION RATE: 16 BRPM | BODY MASS INDEX: 30.38 KG/M2 | DIASTOLIC BLOOD PRESSURE: 97 MMHG | OXYGEN SATURATION: 95 %

## 2024-08-29 DIAGNOSIS — M48.062 SPINAL STENOSIS OF LUMBAR REGION WITH NEUROGENIC CLAUDICATION: ICD-10-CM

## 2024-08-29 DIAGNOSIS — S32.030A COMPRESSION FRACTURE OF L3 LUMBAR VERTEBRA, CLOSED, INITIAL ENCOUNTER: Primary | ICD-10-CM

## 2024-08-29 DIAGNOSIS — Z79.899 HIGH RISK MEDICATION USE: ICD-10-CM

## 2024-08-29 DIAGNOSIS — M96.1 FAILED BACK SURGICAL SYNDROME: ICD-10-CM

## 2024-08-29 DIAGNOSIS — M96.1 POSTLAMINECTOMY SYNDROME: ICD-10-CM

## 2024-08-29 DIAGNOSIS — M47.816 LUMBAR SPONDYLOSIS: ICD-10-CM

## 2024-08-29 PROCEDURE — 1125F AMNT PAIN NOTED PAIN PRSNT: CPT | Performed by: STUDENT IN AN ORGANIZED HEALTH CARE EDUCATION/TRAINING PROGRAM

## 2024-08-29 PROCEDURE — 1160F RVW MEDS BY RX/DR IN RCRD: CPT | Performed by: STUDENT IN AN ORGANIZED HEALTH CARE EDUCATION/TRAINING PROGRAM

## 2024-08-29 PROCEDURE — 1159F MED LIST DOCD IN RCRD: CPT | Performed by: STUDENT IN AN ORGANIZED HEALTH CARE EDUCATION/TRAINING PROGRAM

## 2024-08-29 PROCEDURE — 99214 OFFICE O/P EST MOD 30 MIN: CPT | Performed by: STUDENT IN AN ORGANIZED HEALTH CARE EDUCATION/TRAINING PROGRAM

## 2024-08-29 NOTE — PROGRESS NOTES
Subjective   Nloa Tariq is a 74 y.o. female is here for right leg, lower back and right knee pain.  Patient was last seen on 7/31/2024. Continues to have significant lower back pain with radiculopathy to legs.  Lumbar MRI has been done since last visit and has been reviewed with patient.    On last visit:     Left leg pain is 5/10 on VAS.    Lower back pain is 4/10 on VAS, at maximum 8/10.  Pain is aching and dull in nature.  Referred to b/l lateral leg..  She has numbness and tingling in bilateral foot.  Pain is constant.  Improved by pain meds.  Worse with walking and standing.    R knee pain is 5/10 on VAS. Worse with weight bearing. She had R knee steroid injection with Rheumatology several months ago with excellent relief.     R shoulder pain is 6/10 on VAS. Normal ROM. Had shoulder bursa issues several years ago which improved with US treatments.     Migraines - She used to take Excedrin and Fioricet for daily migraines but it doesn't work anymore. She is scheduled to see Neurology.  She does wake up with migraines during night or early in morning. She would wake up with headache and goes away with Excedrin. She had 2-3 headaches over last month that didn't go away with Excedrin and lasted for rest of the day. She had seen someone for chiari malformation previous.  History of migraines since teenager and usually right-sided temporal area but states that new migraines are all over the head. Seen by neurology who recommended continuing excedrin.      Previous Injection:   4/18/2023- SCS trial [employer]- no significant pain relief. Coverage was excellent with patient able to feel paresthesia in all of the painful areas but unfortunately no significant pain relief.  No functional improvement as well.  11/8/2022-right knee injection- good relief.  7/8/22 - LESI L5-S1 - no relief.   RFA at previous pain management - no relief.     Hx:  Referred by JEM Ashton for  lower back pain.  Pain started  around 2008 without any significant injuries and has been getting worse since then.  Her main complaints today are mid back and lower back pain.  Patient had previously seen Dr. Voss and had epidurals, facet joint injections, ablations.  She states that nothing helped at the time. Last seen 1 year ago. Her lower back pain is worse than mid back pain. She is retired RN.   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  Patient states that she is not interested in fusion surgery as there is 50% chance of lower back pain improvement. She had also injured her left knee and had steroid injection by Ortho in left knee.  Scheduled to see Ortho in 5 weeks. She has been seeing neurology for migraines and has been started on gabapentin.  Patient has taken gabapentin for about 1 month without significant improvement in her migraines.        PHQ-9- 4  SOAPP-2      PMH:   Tijerina's esophagus, hypertension, migraine, thoracic vertebra fx s/p kypho T7, T10, migraines, smoker, Back surgery- decompression L4-5?-2009 Dr. Rdz, Carpal tunnel surgery 1983 bilateral wrist; R knee steroid injection with orthopedics (Dr. Barton at [facility]).         Current Medications:   Tramadol  mg q6H PRN  Lyrica 25 mg BID  Ibuprofen 800 mg       Past Medications:  Tramadol  mg daily   Meloxicam 15 mg  Celebrex-leg swelling  Gabapentin - didn't help   Norco 5-325 mg BID PRN      Past Modalities:  TENS:                                                                          no                                                  Physical Therapy Within The Last 6 Months              Yes (1.5 years ago with some help).   Psychotherapy                                                            no  Massage Therapy                                                       no     Patient Complains Of:  Uro-Fecal Incontinence          no  Weight Gain/Loss                   Yes (weight gain 25 lbs - 1 year  after smoking).   Fever/Chills                             no  Weakness                               no            Current Outpatient Medications:     Acetaminophen-Caffeine 500-65 MG tablet, EXCEDRIN TENSION HEADACHE 500-65 MG TABS, Disp: , Rfl:     albuterol sulfate  (90 Base) MCG/ACT inhaler, Inhale 2 puffs Every 4 (Four) Hours As Needed for Wheezing., Disp: 6.7 g, Rfl: 0    Cetirizine HCl 10 MG capsule, ZYRTEC ALLERGY 10 MG CAPS, Disp: , Rfl:     denosumab (PROLIA) 60 MG/ML solution prefilled syringe syringe, , Disp: , Rfl:     famotidine (PEPCID) 40 MG tablet, , Disp: , Rfl:     gabapentin (NEURONTIN) 300 MG capsule, , Disp: , Rfl:     guaiFENesin (MUCINEX) 600 MG 12 hr tablet, Take 1 tablet by mouth Every 12 (Twelve) Hours., Disp: , Rfl:     Multiple Vitamins-Minerals (MULTIVITAMIN WITH MINERALS) tablet tablet, Take 1 tablet by mouth Daily., Disp: , Rfl:     omeprazole (priLOSEC) 40 MG capsule, , Disp: , Rfl:     traMADol (ULTRAM) 50 MG tablet, Take 2 tablets by mouth Every 8 (Eight) Hours As Needed for Moderate Pain for up to 90 days., Disp: 180 tablet, Rfl: 2    vitamin D (ERGOCALCIFEROL) 1.25 MG (94085 UT) capsule capsule, Take 1 capsule by mouth 1 (One) Time Per Week., Disp: , Rfl:     fluticasone (FLONASE) 50 MCG/ACT nasal spray, 2 sprays by Each Nare route 2 (Two) Times a Day., Disp: , Rfl:     methylPREDNISolone (MEDROL) 4 MG dose pack, Take as directed on package instructions., Disp: 21 tablet, Rfl: 0    sucralfate (CARAFATE) 1 g tablet, , Disp: , Rfl:     topiramate (TOPAMAX) 25 MG tablet, , Disp: , Rfl:     Voquezna 20 MG tablet, , Disp: , Rfl:     The following portions of the patient's history were reviewed and updated as appropriate: allergies, current medications, past family history, past medical history, past social history, past surgical history, and problem list.      REVIEW OF PERTINENT MEDICAL DATA    Past Medical History:   Diagnosis Date    Arthritis     Tijerina esophagus      Chronic pain disorder     COVID-19     DJD (degenerative joint disease)     Extremity pain     Fall at home     Fall at home     fracture rt. leg    Foot pain, right     Fractures     GERD (gastroesophageal reflux disease)     Headache, tension-type     Hypertension     Joint pain     Leg pain, right     Low back pain     Migraine     Plantar fasciitis     Shingles     Shoulder pain, right     Sleep apnea      Past Surgical History:   Procedure Laterality Date    CARPAL TUNNEL RELEASE      [place].    EPIDURAL BLOCK      EYE SURGERY      catarac    KNEE ARTHROSCOPY      SPINAL CORD STIMULATOR TRIAL W/ LAMINOTOMY      TUBAL ABDOMINAL LIGATION      WRIST SURGERY      tendon release     Family History   Problem Relation Age of Onset    Cancer Mother     Migraines Father     COPD Father     Migraines Sister     Migraines Brother      Social History     Socioeconomic History    Marital status:    Tobacco Use    Smoking status: Former     Current packs/day: 0.00     Types: Cigarettes     Quit date: 7/18/2021     Years since quitting: 3.1    Smokeless tobacco: Never   Vaping Use    Vaping status: Never Used   Substance and Sexual Activity    Alcohol use: Never    Drug use: Never    Sexual activity: Not Currently         Review of Systems   Musculoskeletal:  Positive for arthralgias and back pain.         Vitals:    08/29/24 1336   BP: 155/97   Pulse: 96   Resp: 16   SpO2: 95%   Weight: 80.3 kg (177 lb)   PainSc:   4                   Objective   Physical Exam  Musculoskeletal:         General: Tenderness present.        Back:         Legs:    Neurological:      Comments: Motor strength 5/5 b/l LE  Sensory intact b/l LE             Imaging Reviewed:  MRI lumbar spine without contrast-8/14/2024  - L2-3-disc bulge with left paracentral extrusion extending inferiorly.  Facet arthropathy.  Mild to moderate central canal stenosis and moderate bilateral foraminal stenosis  L3-4-disc bulge with small protrusion, bilateral facet  arthropathy with ligamentum flavum hypertrophy causing severe central canal stenosis and severe right and moderate left foraminal stenosis  S5-7-zhxacpbit facet hypertrophy resulting in 5 mm of L4 anterolisthesis.  Severe central canal stenosis, moderate bilateral foraminal stenosis and bilateral L5 lateral recess stenosis most severe on the right  L5-S1-left greater than right facet arthropathy.  Moderate central canal stenosis.  Possible compression of left S1 nerve root prior to entering the lateral recess.  Moderate right and severe left foraminal stenosis.  - Redundancy of the cauda equina nerve roots due to spinal stenosis.  - L3 inferior endplate subacute compression fracture with mild bone marrow edema which is new prior to prior- subacute fracture.      Lumbar spine MRI without contrast-6/13/2022.  L2-3-moderate disc bulge.  Moderate posterior facet changes.  Mild to moderate central canal narrowing.  Moderate bilateral neural foraminal narrowing.  L3-4-- broad-based disc bulge.  Central posterior disc protrusion causing narrowing of central canal with approximate 5 mm in AP dimension.  Moderate facet changes.  Moderate to severe central canal narrowing.  Mild bilateral neural foraminal narrowing.  L4-5-large broad-based disc bulge.  Severe facet changes.  Severe central canal narrowing.  Moderate bilateral neural foraminal narrowing  L5-S1-large broad-based disc bulge.  Severe facet changes.  Moderate to severe central canal narrowing.  Severe bilateral neural foraminal narrowing.      MRI thoracic spine-6/13/2022  Kyphoplasty at T7 and T10.  - Residual mild to moderate compression fracture deformities  - Mild posterior disc bulges at multiple thoracic levels.  No large extrusions.    MRI brain wo contrast: 8/15/2018  Chiari Malformation type 1 with cerebellar tonsils projecting 7 mm below level of foramen magnum  Chronic b/l maxillary and ethmoid sinusitis     Assessment:    1. Compression fracture of L3  lumbar vertebra, closed, initial encounter    2. Spinal stenosis of lumbar region with neurogenic claudication    3. Postlaminectomy syndrome    4. Failed back surgical syndrome    5. Lumbar spondylosis    6. High risk medication use            Plan:   1. UDS consistent with patient's interview on 2/27/2024.   Narcotic contract signed-5/10/2022.  Narcan sent-6/20/2022.  2. We discussed trying a course of formal physical therapy.  Physical therapy can help strengthen and stretch the muscles around the joints. Continue to be as active as possible. Start physical therapy as it will help generalized pain and follow up with HEP.  PT prescription sent to [facility] in [place] as requested by patient.  3.  Patient continues to have pain despite being on Norco.  She had significant pain relief with tramadol previously.   Continue tramadol 1 to 2 tablets of 50 mg tramadol TID PRN with 1 refill (10/29/2024).     4. Norco 7.5-325 mg (#28 tabs) PRN for break through pain (9/12/23).  No need for refill.  Side effects discussed including but not limited to nausea, vomiting, constipation, lightheadedness, dizziness, drowsiness, or headache. This medication may increase serotonin and rarely cause a very serious condition called serotonin syndrome/ toxicity.   5. Continues Ibuprofen 800 mg TID PRN-8/29/24. Patient informed of increased risk of heart attacks, stroke and kidney problems in addition to gastric ulcers with use of non-steroidal anti-inflammatory medications.  6.  Recommend following up with neurology for worsening of migraines.  History of Chiari malformation type I.  We did discuss possibility of Botox injection if she fails multiple medications  7. Patient has pain in the lower back with referred pain in the leg, patient has failed conservative therapy including PT and pharmacological management for more than 6 weeks and pain interferes with activities of daily living. MRI shows severe stenosis at L3-4 and L4-5.  Discussed lumbar CARLIE L3-4.  History of lumbar decompression L4-5.. Discussed the possibility of infection, bleeding, nerve damage, post dural puncture headache, increased pain, paraplegia. Patient understands and agrees.  8.  Lumbar MRI was independently reviewed and was also reviewed with patient using images and spinal model.  Subacute L3 compression fracture with edema present.  Discussed with patient that she may benefit from L3 kyphoplasty, but patient states that she had kyphoplasty done in thoracic spine without significant improvement in her pain so she is reluctant to do so.  Will discuss this further in future if needed.    RTC for DAYNA Nascimento DO  Pain Management   [facility]           INSPECT REPORT    As part of the patient's treatment plan, I may be prescribing controlled substances. The patient has been made aware of appropriate use of such medications, including potential risk of somnolence, limited ability to drive and/or work safely, and the potential for dependence or overdose. It has also been made clear that these medications are for use by this patient only, without concomitant use of alcohol or other substances unless prescribed.     Patient has completed prescribing agreement detailing terms of continued prescribing of controlled substances, including monitoring INSPECT reports, urine drug screening, and pill counts if necessary. The patient is aware that inappropriate use will results in cessation of prescribing such medications.    INSPECT report has been reviewed and scanned into the patient's chart.

## 2024-09-03 ENCOUNTER — HOSPITAL ENCOUNTER (OUTPATIENT)
Dept: PAIN MEDICINE | Facility: HOSPITAL | Age: 74
Discharge: HOME | End: 2024-09-03
Payer: MEDICARE

## 2024-09-03 VITALS
WEIGHT: 177 LBS | HEIGHT: 64 IN | HEART RATE: 83 BPM | TEMPERATURE: 97.1 F | SYSTOLIC BLOOD PRESSURE: 144 MMHG | DIASTOLIC BLOOD PRESSURE: 86 MMHG | OXYGEN SATURATION: 96 % | BODY MASS INDEX: 30.22 KG/M2 | RESPIRATION RATE: 16 BRPM

## 2024-09-03 DIAGNOSIS — R52 PAIN: ICD-10-CM

## 2024-09-03 DIAGNOSIS — M48.062 SPINAL STENOSIS OF LUMBAR REGION WITH NEUROGENIC CLAUDICATION: Primary | ICD-10-CM

## 2024-09-03 PROCEDURE — 25010000002 METHYLPREDNISOLONE PER 40 MG: Performed by: STUDENT IN AN ORGANIZED HEALTH CARE EDUCATION/TRAINING PROGRAM

## 2024-09-03 PROCEDURE — 77003 FLUOROGUIDE FOR SPINE INJECT: CPT

## 2024-09-03 PROCEDURE — 62323 NJX INTERLAMINAR LMBR/SAC: CPT | Performed by: STUDENT IN AN ORGANIZED HEALTH CARE EDUCATION/TRAINING PROGRAM

## 2024-09-03 PROCEDURE — 25510000001 IOPAMIDOL 41 % SOLUTION: Performed by: STUDENT IN AN ORGANIZED HEALTH CARE EDUCATION/TRAINING PROGRAM

## 2024-09-03 RX ORDER — IOPAMIDOL 408 MG/ML
3 INJECTION, SOLUTION INTRATHECAL
Status: COMPLETED | OUTPATIENT
Start: 2024-09-03 | End: 2024-09-03

## 2024-09-03 RX ORDER — METHYLPREDNISOLONE ACETATE 40 MG/ML
40 INJECTION, SUSPENSION INTRA-ARTICULAR; INTRALESIONAL; INTRAMUSCULAR; SOFT TISSUE ONCE
Status: COMPLETED | OUTPATIENT
Start: 2024-09-03 | End: 2024-09-03

## 2024-09-03 RX ADMIN — IOPAMIDOL 3 ML: 408 INJECTION, SOLUTION INTRATHECAL at 13:09

## 2024-09-03 RX ADMIN — METHYLPREDNISOLONE ACETATE 40 MG: 40 INJECTION, SUSPENSION INTRA-ARTICULAR; INTRALESIONAL; INTRAMUSCULAR; INTRASYNOVIAL; SOFT TISSUE at 13:10

## 2024-09-03 NOTE — H&P
H and P reviewed from previous visit and no changes to patient's clinical presentation. Will proceed with procedure as planned. Patient denies history of DM and being on blood thinners.    Kirk Nascimento DO  Pain Management   [facility]

## 2024-09-03 NOTE — DISCHARGE INSTRUCTIONS

## 2024-09-03 NOTE — PROCEDURES
PREOPERATIVE DIAGNOSIS:    1. Lumbar DDD    POSTOPERATIVE DIAGNOSIS: Same    PROCEDURE:  Lumbar epidural steroid injection L 3-4    PROCEDURE NOTE:  After obtaining written informed consent patient was taken to the procedure room. Pre-procedure blood pressure and pulse were stable and recorded in patients clinic chart.     The patient was placed in the prone position. The lower back was prepped with antiseptic solution and draped in the usual sterile fashion.  The skin over the L 3-4 space was identified under fluoroscopic guidance and infiltrated with 1% lidocaine for local anesthesia via 25 gauge needle.  A 20-gauge tuohy needle was used to access the epidural space using loss of resistance to air technique. Following negative aspiration, 2 cc of the omnipaque dye was injected.  There was good spread of the dye from L3-L4 area. A mixture containing  3 ml of saline with 40 mg of depo-medrol was injected. There was no evidence of CSF, paresthesia or vascular spread. The needle was removed. Skin was cleaned and band aid was applied.    Following the procedure the patient's vital signs were stable. The patient was discharged home in good condition after being given discharge instructions.    COMPLICATIONS: None    Kirk Nascimento DO  Pain Management   [facility]

## 2024-09-04 ENCOUNTER — TELEPHONE (OUTPATIENT)
Dept: PAIN MEDICINE | Facility: HOSPITAL | Age: 74
End: 2024-09-04
Payer: MEDICARE

## 2024-09-05 ENCOUNTER — HOSPITAL ENCOUNTER (OUTPATIENT)
Dept: GENERAL RADIOLOGY | Facility: HOSPITAL | Age: 74
Discharge: HOME | End: 2024-09-05
Payer: MEDICARE

## 2024-09-05 ENCOUNTER — OFFICE VISIT (OUTPATIENT)
Dept: PAIN MEDICINE | Facility: CLINIC | Age: 74
End: 2024-09-05
Payer: MEDICARE

## 2024-09-05 VITALS
SYSTOLIC BLOOD PRESSURE: 160 MMHG | BODY MASS INDEX: 30.38 KG/M2 | HEART RATE: 85 BPM | DIASTOLIC BLOOD PRESSURE: 96 MMHG | RESPIRATION RATE: 16 BRPM | WEIGHT: 177 LBS | OXYGEN SATURATION: 95 %

## 2024-09-05 DIAGNOSIS — M48.062 SPINAL STENOSIS OF LUMBAR REGION WITH NEUROGENIC CLAUDICATION: ICD-10-CM

## 2024-09-05 DIAGNOSIS — M47.816 LUMBAR SPONDYLOSIS: ICD-10-CM

## 2024-09-05 DIAGNOSIS — Z79.899 HIGH RISK MEDICATION USE: ICD-10-CM

## 2024-09-05 DIAGNOSIS — S32.030A COMPRESSION FRACTURE OF L3 LUMBAR VERTEBRA, CLOSED, INITIAL ENCOUNTER: Primary | ICD-10-CM

## 2024-09-05 DIAGNOSIS — M96.1 FAILED BACK SURGICAL SYNDROME: ICD-10-CM

## 2024-09-05 DIAGNOSIS — M96.1 POSTLAMINECTOMY SYNDROME: ICD-10-CM

## 2024-09-05 DIAGNOSIS — S32.030A COMPRESSION FRACTURE OF L3 LUMBAR VERTEBRA, CLOSED, INITIAL ENCOUNTER: ICD-10-CM

## 2024-09-05 PROCEDURE — 1159F MED LIST DOCD IN RCRD: CPT | Performed by: STUDENT IN AN ORGANIZED HEALTH CARE EDUCATION/TRAINING PROGRAM

## 2024-09-05 PROCEDURE — 1160F RVW MEDS BY RX/DR IN RCRD: CPT | Performed by: STUDENT IN AN ORGANIZED HEALTH CARE EDUCATION/TRAINING PROGRAM

## 2024-09-05 PROCEDURE — 73502 X-RAY EXAM HIP UNI 2-3 VIEWS: CPT

## 2024-09-05 PROCEDURE — 99214 OFFICE O/P EST MOD 30 MIN: CPT | Performed by: STUDENT IN AN ORGANIZED HEALTH CARE EDUCATION/TRAINING PROGRAM

## 2024-09-05 PROCEDURE — 1125F AMNT PAIN NOTED PAIN PRSNT: CPT | Performed by: STUDENT IN AN ORGANIZED HEALTH CARE EDUCATION/TRAINING PROGRAM

## 2024-09-05 RX ORDER — METHYLPREDNISOLONE 4 MG
TABLET, DOSE PACK ORAL
Qty: 21 TABLET | Refills: 0 | Status: SHIPPED | OUTPATIENT
Start: 2024-09-05

## 2024-09-05 RX ORDER — HYDROCODONE BITARTRATE AND ACETAMINOPHEN 10; 325 MG/1; MG/1
1 TABLET ORAL EVERY 6 HOURS PRN
Qty: 28 TABLET | Refills: 0 | Status: SHIPPED | OUTPATIENT
Start: 2024-09-05 | End: 2024-09-12

## 2024-09-05 NOTE — PROGRESS NOTES
Subjective   Nola Tariq is a 74 y.o. female is here for right leg, lower back and right knee pain.  Patient was last seen for LESI L3-4. Patient is returning due to severe pain in right leg for last 2 days. No bowel or bladder incontinence.  Patient is laying on the table as she is unable to sit for prolonged period of time.    On last visit:     Left leg pain is 5/10 on VAS.    Lower back pain is 8/10 on VAS, at maximum 9/10.  Pain is aching and dull in nature.  Referred to right groin, right leg  She has numbness and tingling in bilateral foot.  Pain is constant.  Improved by pain meds.  Worse with walking and standing.    R knee pain is 5/10 on VAS. Worse with weight bearing. She had R knee steroid injection with Rheumatology several months ago with excellent relief.     R shoulder pain is 6/10 on VAS. Normal ROM. Had shoulder bursa issues several years ago which improved with US treatments.     Migraines - She used to take Excedrin and Fioricet for daily migraines but it doesn't work anymore. She is scheduled to see Neurology.  She does wake up with migraines during night or early in morning. She would wake up with headache and goes away with Excedrin. She had 2-3 headaches over last month that didn't go away with Excedrin and lasted for rest of the day. She had seen someone for chiari malformation previous.  History of migraines since teenager and usually right-sided temporal area but states that new migraines are all over the head. Seen by neurology who recommended continuing excedrin.      Previous Injection:   9/3/2024-LESI L3-4-  4/18/2023- SCS trial [employer]- no significant pain relief. Coverage was excellent with patient able to feel paresthesia in all of the painful areas but unfortunately no significant pain relief.  No functional improvement as well.  11/8/2022-right knee injection- good relief.  7/8/22 - LESI L5-S1 - no relief.   RFA at previous pain management - no relief.     Hx:   Referred by JEM Ashton for  lower back pain.  Pain started around 2008 without any significant injuries and has been getting worse since then.  Her main complaints today are mid back and lower back pain.  Patient had previously seen Dr. Voss and had epidurals, facet joint injections, ablations.  She states that nothing helped at the time. Last seen 1 year ago. Her lower back pain is worse than mid back pain. She is retired RN.   She has since seen [facility] (seen by JEM)- 2022 who recommended medication management.  Patient states that she is not interested in fusion surgery as there is 50% chance of lower back pain improvement. She had also injured her left knee and had steroid injection by Ortho in left knee.  Scheduled to see Ortho in 5 weeks. She has been seeing neurology for migraines and has been started on gabapentin.  Patient has taken gabapentin for about 1 month without significant improvement in her migraines.        PHQ-9- 4  SOAPP-2      PMH:   Tijerina's esophagus, hypertension, migraine, thoracic vertebra fx s/p kypho T7, T10, migraines, smoker, Back surgery- decompression L4-5?-2009 Dr. Rdz, Carpal tunnel surgery 1983 bilateral wrist; R knee steroid injection with orthopedics (Dr. Barton at [place]).         Current Medications:   Tramadol  mg q6H PRN  Lyrica 25 mg BID  Ibuprofen 800 mg       Past Medications:  Tramadol  mg daily   Meloxicam 15 mg  Celebrex-leg swelling  Gabapentin - didn't help   Norco 5-325 mg BID PRN      Past Modalities:  TENS:                                                                          no                                                  Physical Therapy Within The Last 6 Months              Yes (1.5 years ago with some help).   Psychotherapy                                                            no  Massage Therapy                                                       no     Patient Complains Of:  Uro-Fecal Incontinence           no  Weight Gain/Loss                   Yes (weight gain 25 lbs - 1 year after smoking).   Fever/Chills                             no  Weakness                               no            Current Outpatient Medications:     Acetaminophen-Caffeine 500-65 MG tablet, EXCEDRIN TENSION HEADACHE 500-65 MG TABS, Disp: , Rfl:     albuterol sulfate  (90 Base) MCG/ACT inhaler, Inhale 2 puffs Every 4 (Four) Hours As Needed for Wheezing., Disp: 6.7 g, Rfl: 0    Cetirizine HCl 10 MG capsule, ZYRTEC ALLERGY 10 MG CAPS, Disp: , Rfl:     denosumab (PROLIA) 60 MG/ML solution prefilled syringe syringe, , Disp: , Rfl:     famotidine (PEPCID) 40 MG tablet, , Disp: , Rfl:     gabapentin (NEURONTIN) 300 MG capsule, , Disp: , Rfl:     methylPREDNISolone (MEDROL) 4 MG dose pack, Take as directed on package instructions., Disp: 21 tablet, Rfl: 0    Multiple Vitamins-Minerals (MULTIVITAMIN WITH MINERALS) tablet tablet, Take 1 tablet by mouth Daily., Disp: , Rfl:     omeprazole (priLOSEC) 40 MG capsule, , Disp: , Rfl:     traMADol (ULTRAM) 50 MG tablet, Take 2 tablets by mouth Every 8 (Eight) Hours As Needed for Moderate Pain for up to 90 days., Disp: 180 tablet, Rfl: 2    vitamin D (ERGOCALCIFEROL) 1.25 MG (42710 UT) capsule capsule, Take 1 capsule by mouth 1 (One) Time Per Week., Disp: , Rfl:     fluticasone (FLONASE) 50 MCG/ACT nasal spray, 2 sprays by Each Nare route 2 (Two) Times a Day., Disp: , Rfl:     guaiFENesin (MUCINEX) 600 MG 12 hr tablet, Take 1 tablet by mouth Every 12 (Twelve) Hours., Disp: , Rfl:     HYDROcodone-acetaminophen (NORCO)  MG per tablet, Take 1 tablet by mouth Every 6 (Six) Hours As Needed for Moderate Pain for up to 7 days., Disp: 28 tablet, Rfl: 0    sucralfate (CARAFATE) 1 g tablet, , Disp: , Rfl:     topiramate (TOPAMAX) 25 MG tablet, , Disp: , Rfl:     Voquezna 20 MG tablet, , Disp: , Rfl:     The following portions of the patient's history were reviewed and updated as appropriate: allergies,  current medications, past family history, past medical history, past social history, past surgical history, and problem list.      REVIEW OF PERTINENT MEDICAL DATA    Past Medical History:   Diagnosis Date    Arthritis     Tijerina esophagus     Chronic pain disorder     COVID-19     DJD (degenerative joint disease)     Extremity pain     Fall at home     Fall at home     fracture rt. leg    Foot pain, right     Fractures     GERD (gastroesophageal reflux disease)     Headache, tension-type     Hypertension     Joint pain     Leg pain, right     Low back pain     Migraine     Plantar fasciitis     Shingles     Shoulder pain, right     Sleep apnea      Past Surgical History:   Procedure Laterality Date    CARPAL TUNNEL RELEASE      [place].    EPIDURAL BLOCK      EYE SURGERY      [place]    KNEE ARTHROSCOPY      SPINAL CORD STIMULATOR TRIAL W/ LAMINOTOMY      TUBAL ABDOMINAL LIGATION      WRIST SURGERY      tendon release     Family History   Problem Relation Age of Onset    Cancer Mother     Migraines Father     COPD Father     Migraines Sister     Migraines Brother      Social History     Socioeconomic History    Marital status:    Tobacco Use    Smoking status: Former     Current packs/day: 0.00     Types: Cigarettes     Quit date: 7/18/2021     Years since quitting: 3.1    Smokeless tobacco: Never   Vaping Use    Vaping status: Never Used   Substance and Sexual Activity    Alcohol use: Never    Drug use: Never    Sexual activity: Not Currently         Review of Systems   Musculoskeletal:  Positive for arthralgias and back pain.         Vitals:    09/05/24 1335   BP: 160/96   Pulse: 85   Resp: 16   SpO2: 95%   Weight: 80.3 kg (177 lb)   PainSc:   8                     Objective   Physical Exam  Musculoskeletal:         General: Tenderness present.        Back:         Legs:    Neurological:      Comments: Motor strength 5/5 b/l LE  Sensory intact b/l LE             Imaging Reviewed:  MRI lumbar spine without  contrast-8/14/2024  - L2-3-disc bulge with left paracentral extrusion extending inferiorly.  Facet arthropathy.  Mild to moderate central canal stenosis and moderate bilateral foraminal stenosis  L3-4-disc bulge with small protrusion, bilateral facet arthropathy with ligamentum flavum hypertrophy causing severe central canal stenosis and severe right and moderate left foraminal stenosis  Y8-5-lrrwhzeph facet hypertrophy resulting in 5 mm of L4 anterolisthesis.  Severe central canal stenosis, moderate bilateral foraminal stenosis and bilateral L5 lateral recess stenosis most severe on the right  L5-S1-left greater than right facet arthropathy.  Moderate central canal stenosis.  Possible compression of left S1 nerve root prior to entering the lateral recess.  Moderate right and severe left foraminal stenosis.  - Redundancy of the cauda equina nerve roots due to spinal stenosis.  - L3 inferior endplate subacute compression fracture with mild bone marrow edema which is new prior to prior- subacute fracture.      Lumbar spine MRI without contrast-6/13/2022.  L2-3-moderate disc bulge.  Moderate posterior facet changes.  Mild to moderate central canal narrowing.  Moderate bilateral neural foraminal narrowing.  L3-4-- broad-based disc bulge.  Central posterior disc protrusion causing narrowing of central canal with approximate 5 mm in AP dimension.  Moderate facet changes.  Moderate to severe central canal narrowing.  Mild bilateral neural foraminal narrowing.  L4-5-large broad-based disc bulge.  Severe facet changes.  Severe central canal narrowing.  Moderate bilateral neural foraminal narrowing  L5-S1-large broad-based disc bulge.  Severe facet changes.  Moderate to severe central canal narrowing.  Severe bilateral neural foraminal narrowing.      MRI thoracic spine-6/13/2022  Kyphoplasty at T7 and T10.  - Residual mild to moderate compression fracture deformities  - Mild posterior disc bulges at multiple thoracic levels.   No large extrusions.    MRI brain wo contrast: 8/15/2018  Chiari Malformation type 1 with cerebellar tonsils projecting 7 mm below level of foramen magnum  Chronic b/l maxillary and ethmoid sinusitis     Assessment:    1. Compression fracture of L3 lumbar vertebra, closed, initial encounter    2. Spinal stenosis of lumbar region with neurogenic claudication    3. Postlaminectomy syndrome    4. Failed back surgical syndrome    5. Lumbar spondylosis    6. High risk medication use            Plan:   1. UDS consistent with patient's interview on 2/27/2024.   Narcotic contract signed-5/10/2022.  Narcan sent-6/20/2022.  2. We discussed trying a course of formal physical therapy.  Physical therapy can help strengthen and stretch the muscles around the joints. Continue to be as active as possible. Start physical therapy as it will help generalized pain and follow up with HEP.  PT prescription sent to [facility] in [place] as requested by patient.  3.  Patient continues to have pain despite being on Norco.  She had significant pain relief with tramadol previously.   Continue tramadol 1 to 2 tablets of 50 mg tramadol TID PRN with 1 refill (10/29/2024).     4.  Due to severe pain, increase Norco  mg for 28 tablets sent on 9/5/2024.  Advised to stop taking tramadol while taking hydrocodone.  No need for refill.  Side effects discussed including but not limited to nausea, vomiting, constipation, lightheadedness, dizziness, drowsiness, or headache. This medication may increase serotonin and rarely cause a very serious condition called serotonin syndrome/ toxicity.   5. Continues Ibuprofen 800 mg TID PRN-8/29/24. Patient informed of increased risk of heart attacks, stroke and kidney problems in addition to gastric ulcers with use of non-steroidal anti-inflammatory medications.  6.  Recommend following up with neurology for worsening of migraines.  History of Chiari malformation type I.  We did discuss possibility of  Botox injection if she fails multiple medications  7.  LESI L3-4 was done 2 days ago.  Discussed with patient that steroids may take several days to be effective and recommend giving time.  8.  Lumbar MRI was independently reviewed and was also reviewed with patient using images and spinal model.  Subacute L3 compression fracture with edema present.  Discussed with patient that she may benefit from L3 kyphoplasty, but patient states that she had kyphoplasty done in thoracic spine without significant improvement in her pain so she is reluctant to do so.  Will discuss this further in future if needed.  9.  Due to severe pain, start Medrol Dosepak for 5 days.  10.  Due to severe right groin pain and hip pain, will obtain right hip x-ray to rule out any hip pathology.    RTC in 1 week.  Discussed with patient that if pain becomes severe enough for is not tolerable, recommend going to the ER.  Another concern is having another compression fracture as patient has history of compression fractures in the past.     Kirk Nascimento DO  Pain Management   [facility]           INSPECT REPORT    As part of the patient's treatment plan, I may be prescribing controlled substances. The patient has been made aware of appropriate use of such medications, including potential risk of somnolence, limited ability to drive and/or work safely, and the potential for dependence or overdose. It has also been made clear that these medications are for use by this patient only, without concomitant use of alcohol or other substances unless prescribed.     Patient has completed prescribing agreement detailing terms of continued prescribing of controlled substances, including monitoring INSPECT reports, urine drug screening, and pill counts if necessary. The patient is aware that inappropriate use will results in cessation of prescribing such medications.    INSPECT report has been reviewed and scanned into the patient's chart.

## 2024-09-09 NOTE — PROGRESS NOTES
Subjective   Nola Tariq is a 74 y.o. female is here for right leg, lower back and right knee pain.  ED visit since last visit were she was given dilaudid with mild improvement in pain. No significant improvement with steroids. Increased numbness in right medial thigh.  Continues to struggle with severe pain.  Denies any bowel or bladder incontinence.  Does have urinary urgency.  Denies any saddle anesthesia.    On last visit:     Left leg pain is 5/10 on VAS.    Lower back pain is 8/10 on VAS, at maximum 9/10.  Pain is aching and dull in nature.  Referred to right groin, right leg  She has numbness and tingling in bilateral foot.  Pain is constant.  Improved by pain meds.  Worse with walking and standing.    R knee pain is 5/10 on VAS. Worse with weight bearing. She had R knee steroid injection with Rheumatology several months ago with excellent relief.     R shoulder pain is 6/10 on VAS. Normal ROM. Had shoulder bursa issues several years ago which improved with US treatments.     Migraines - She used to take Excedrin and Fioricet for daily migraines but it doesn't work anymore. She is scheduled to see Neurology.  She does wake up with migraines during night or early in morning. She would wake up with headache and goes away with Excedrin. She had 2-3 headaches over last month that didn't go away with Excedrin and lasted for rest of the day. She had seen someone for chiari malformation previous.  History of migraines since teenager and usually right-sided temporal area but states that new migraines are all over the head. Seen by neurology who recommended continuing excedrin.      Previous Injection:   9/3/2024-LESI L3-4-no significant improvement.  4/18/2023- [facility]CS trial Tivix- no significant pain relief. Coverage was excellent with patient able to feel paresthesia in all of the painful areas but unfortunately no significant pain relief.  No functional improvement as well.  11/8/2022-right knee  injection- good relief.  7/8/22 - LESI L5-S1 - no relief.   RFA at previous pain management - no relief.     Hx:  Referred by JEM Ashton for  lower back pain.  Pain started around 2008 without any significant injuries and has been getting worse since then.  Her main complaints today are mid back and lower back pain.  Patient had previously seen Dr. Voss and had epidurals, facet joint injections, ablations.  She states that nothing helped at the time. Last seen 1 year ago. Her lower back pain is worse than mid back pain. She is retired RN.   She has since seen [facility] (seen by [facility])- 2022 who recommended medication management.  Patient states that she is not interested in fusion surgery as there is 50% chance of lower back pain improvement. She had also injured her left knee and had steroid injection by Ortho in left knee.  Scheduled to see Ortho in 5 weeks. She has been seeing neurology for migraines and has been started on gabapentin.  Patient has taken gabapentin for about 1 month without significant improvement in her migraines.        PHQ-9- 4  SOAPP-2      PMH:   Tijerina's esophagus, hypertension, migraine, thoracic vertebra fx s/p kypho T7, T10, migraines, smoker, Back surgery- decompression L4-5?-2009 Dr. Rdz, Carpal tunnel surgery 1983 bilateral wrist; R knee steroid injection with orthopedics (Dr. Barton at [facility]).         Current Medications:   Tramadol  mg q6H PRN  Lyrica 25 mg BID  Ibuprofen 800 mg       Past Medications:  Tramadol  mg daily   Meloxicam 15 mg  Celebrex-leg swelling  Gabapentin - didn't help   Norco 5-325 mg BID PRN      Past Modalities:  TENS:                                                                          no                                                  Physical Therapy Within The Last 6 Months              Yes (1.5 years ago with some help).   Psychotherapy                                                            no  Massage Therapy                                                        no     Patient Complains Of:  Uro-Fecal Incontinence          no  Weight Gain/Loss                   Yes (weight gain 25 lbs - 1 year after smoking).   Fever/Chills                             no  Weakness                               no            Current Outpatient Medications:     Acetaminophen-Caffeine 500-65 MG tablet, EXCEDRIN TENSION HEADACHE 500-65 MG TABS, Disp: , Rfl:     albuterol sulfate  (90 Base) MCG/ACT inhaler, Inhale 2 puffs Every 4 (Four) Hours As Needed for Wheezing., Disp: 6.7 g, Rfl: 0    Cetirizine HCl 10 MG capsule, ZYRTEC ALLERGY 10 MG CAPS, Disp: , Rfl:     denosumab (PROLIA) 60 MG/ML solution prefilled syringe syringe, , Disp: , Rfl:     famotidine (PEPCID) 40 MG tablet, , Disp: , Rfl:     gabapentin (NEURONTIN) 300 MG capsule, , Disp: , Rfl:     HYDROcodone-acetaminophen (NORCO)  MG per tablet, Take 1 tablet by mouth Every 6 (Six) Hours As Needed for Moderate Pain for up to 7 days., Disp: 28 tablet, Rfl: 0    methylPREDNISolone (MEDROL) 4 MG dose pack, Take as directed on package instructions., Disp: 21 tablet, Rfl: 0    Multiple Vitamins-Minerals (MULTIVITAMIN WITH MINERALS) tablet tablet, Take 1 tablet by mouth Daily., Disp: , Rfl:     omeprazole (priLOSEC) 40 MG capsule, , Disp: , Rfl:     traMADol (ULTRAM) 50 MG tablet, Take 2 tablets by mouth Every 8 (Eight) Hours As Needed for Moderate Pain for up to 90 days., Disp: 180 tablet, Rfl: 2    vitamin D (ERGOCALCIFEROL) 1.25 MG (74234 UT) capsule capsule, Take 1 capsule by mouth 1 (One) Time Per Week., Disp: , Rfl:     fluticasone (FLONASE) 50 MCG/ACT nasal spray, 2 sprays by Each Nare route 2 (Two) Times a Day., Disp: , Rfl:     guaiFENesin (MUCINEX) 600 MG 12 hr tablet, Take 1 tablet by mouth Every 12 (Twelve) Hours., Disp: , Rfl:     sucralfate (CARAFATE) 1 g tablet, , Disp: , Rfl:     topiramate (TOPAMAX) 25 MG tablet, , Disp: , Rfl:     Voquezna 20 MG tablet, , Disp: , Rfl:      The following portions of the patient's history were reviewed and updated as appropriate: allergies, current medications, past family history, past medical history, past social history, past surgical history, and problem list.      REVIEW OF PERTINENT MEDICAL DATA    Past Medical History:   Diagnosis Date    Arthritis     Tijerina esophagus     Chronic pain disorder     COVID-19     DJD (degenerative joint disease)     Extremity pain     Fall at home     Fall at home     fracture rt. leg    Foot pain, right     Fractures     GERD (gastroesophageal reflux disease)     Headache, tension-type     Hypertension     Joint pain     Leg pain, right     Low back pain     Migraine     Plantar fasciitis     Shingles     Shoulder pain, right     Sleep apnea     Spinal stenosis 2024    Epidural on      Past Surgical History:   Procedure Laterality Date    BACK SURGERY  2009    Decompression    CARPAL TUNNEL RELEASE      colin.    EPIDURAL BLOCK      EYE SURGERY      catarac    KNEE ARTHROSCOPY      SPINAL CORD STIMULATOR TRIAL W/ LAMINOTOMY      SPINE SURGERY      Kyphoplasty    TUBAL ABDOMINAL LIGATION      WRIST SURGERY      tendon release     Family History   Problem Relation Age of Onset    Cancer Mother     Migraines Father     COPD Father     Migraines Sister     Migraines Brother      Social History     Socioeconomic History    Marital status:    Tobacco Use    Smoking status: Former     Current packs/day: 0.00     Types: Cigarettes     Quit date: 1966     Years since quittin.7    Smokeless tobacco: Never   Vaping Use    Vaping status: Never Used   Substance and Sexual Activity    Alcohol use: Never    Drug use: Never    Sexual activity: Not Currently         Review of Systems   Musculoskeletal:  Positive for arthralgias and back pain.         Vitals:    09/10/24 1048   BP: (!) 162/103   Pulse: 87   Resp: 16   SpO2: 95%   Weight: 80.3 kg (177 lb)   PainSc:   6                       Objective    Physical Exam  Musculoskeletal:         General: Tenderness present.        Back:         Legs:    Neurological:      Comments: Motor strength 5/5 b/l LE  Sensory intact b/l LE             Imaging Reviewed:  MRI lumbar spine without contrast-8/14/2024  - L2-3-disc bulge with left paracentral extrusion extending inferiorly.  Facet arthropathy.  Mild to moderate central canal stenosis and moderate bilateral foraminal stenosis  L3-4-disc bulge with small protrusion, bilateral facet arthropathy with ligamentum flavum hypertrophy causing severe central canal stenosis and severe right and moderate left foraminal stenosis  U4-5-aigvptzoc facet hypertrophy resulting in 5 mm of L4 anterolisthesis.  Severe central canal stenosis, moderate bilateral foraminal stenosis and bilateral L5 lateral recess stenosis most severe on the right  L5-S1-left greater than right facet arthropathy.  Moderate central canal stenosis.  Possible compression of left S1 nerve root prior to entering the lateral recess.  Moderate right and severe left foraminal stenosis.  - Redundancy of the cauda equina nerve roots due to spinal stenosis.  - L3 inferior endplate subacute compression fracture with mild bone marrow edema which is new prior to previous imaging- subacute fracture.      Lumbar spine MRI without contrast-6/13/2022.  L2-3-moderate disc bulge.  Moderate posterior facet changes.  Mild to moderate central canal narrowing.  Moderate bilateral neural foraminal narrowing.  L3-4-- broad-based disc bulge.  Central posterior disc protrusion causing narrowing of central canal with approximate 5 mm in AP dimension.  Moderate facet changes.  Moderate to severe central canal narrowing.  Mild bilateral neural foraminal narrowing.  L4-5-large broad-based disc bulge.  Severe facet changes.  Severe central canal narrowing.  Moderate bilateral neural foraminal narrowing  L5-S1-large broad-based disc bulge.  Severe facet changes.  Moderate to severe central  canal narrowing.  Severe bilateral neural foraminal narrowing.      MRI thoracic spine-6/13/2022  Kyphoplasty at T7 and T10.  - Residual mild to moderate compression fracture deformities  - Mild posterior disc bulges at multiple thoracic levels.  No large extrusions.    MRI brain wo contrast: 8/15/2018  Chiari Malformation type 1 with cerebellar tonsils projecting 7 mm below level of foramen magnum  Chronic b/l maxillary and ethmoid sinusitis     Assessment:    1. Spinal stenosis of lumbar region with neurogenic claudication    2. Postlaminectomy syndrome    3. Compression fracture of L3 lumbar vertebra, closed, initial encounter        Plan:   1. UDS consistent with patient's interview on 2/27/2024.   Narcotic contract signed-5/10/2022.  Narcan sent-6/20/2022.  2. We discussed trying a course of formal physical therapy.  Physical therapy can help strengthen and stretch the muscles around the joints. Continue to be as active as possible. Start physical therapy as it will help generalized pain and follow up with HEP.  PT prescription sent to [facility] in [place] as requested by patient.  3.  Patient continues to have pain despite being on Norco.  She had significant pain relief with tramadol previously.   Due to severe pain stop tramadol and start taking Norco.  4.  Due to severe pain, increase Norco  mg for 4 times daily as needed-9/10/2024.  Advised to stop taking tramadol while taking hydrocodone.   Side effects discussed including but not limited to nausea, vomiting, constipation, lightheadedness, dizziness, drowsiness, or headache. This medication may increase serotonin and rarely cause a very serious condition called serotonin syndrome/ toxicity.   5. Continues Ibuprofen 800 mg TID PRN-8/29/24. Patient informed of increased risk of heart attacks, stroke and kidney problems in addition to gastric ulcers with use of non-steroidal anti-inflammatory medications.  6.  Recommend following up with neurology  for worsening of migraines.  History of Chiari malformation type I.  We did discuss possibility of Botox injection if she fails multiple medications  7.  LESI L3-4 was done 2 days ago.  Discussed with patient that steroids may take several days to be effective and recommend giving time.  8.  Lumbar MRI was independently reviewed and was also reviewed with patient using images and spinal model.  Subacute L3 compression fracture with edema present.  Discussed with patient that she may benefit from L3 kyphoplasty, but patient states that she had kyphoplasty done in thoracic spine without significant improvement in her pain so she is reluctant to do so.  She also has severe lumbar stenosis for which she had seen [facility] spine previously discussed possibility of lumbar fusion.  Due to pain not improving with significant pain medications, injections or steroids, would recommend seeing [facility] for further surgical evaluation.  Phone number given to patient to give them a call.      RTC in 1 month.     Kirk Nascimento DO  Pain Management   [facility]           INSPECT REPORT    As part of the patient's treatment plan, I may be prescribing controlled substances. The patient has been made aware of appropriate use of such medications, including potential risk of somnolence, limited ability to drive and/or work safely, and the potential for dependence or overdose. It has also been made clear that these medications are for use by this patient only, without concomitant use of alcohol or other substances unless prescribed.     Patient has completed prescribing agreement detailing terms of continued prescribing of controlled substances, including monitoring INSPECT reports, urine drug screening, and pill counts if necessary. The patient is aware that inappropriate use will results in cessation of prescribing such medications.    INSPECT report has been reviewed and scanned into the patient's chart.

## 2024-09-10 ENCOUNTER — OFFICE VISIT (OUTPATIENT)
Dept: PAIN MEDICINE | Facility: CLINIC | Age: 74
End: 2024-09-10
Payer: MEDICARE

## 2024-09-10 VITALS
OXYGEN SATURATION: 95 % | BODY MASS INDEX: 30.38 KG/M2 | WEIGHT: 177 LBS | SYSTOLIC BLOOD PRESSURE: 162 MMHG | DIASTOLIC BLOOD PRESSURE: 103 MMHG | HEART RATE: 87 BPM | RESPIRATION RATE: 16 BRPM

## 2024-09-10 DIAGNOSIS — M96.1 POSTLAMINECTOMY SYNDROME: ICD-10-CM

## 2024-09-10 DIAGNOSIS — S32.030A COMPRESSION FRACTURE OF L3 LUMBAR VERTEBRA, CLOSED, INITIAL ENCOUNTER: ICD-10-CM

## 2024-09-10 DIAGNOSIS — M48.062 SPINAL STENOSIS OF LUMBAR REGION WITH NEUROGENIC CLAUDICATION: Primary | ICD-10-CM

## 2024-09-10 PROCEDURE — 1160F RVW MEDS BY RX/DR IN RCRD: CPT | Performed by: STUDENT IN AN ORGANIZED HEALTH CARE EDUCATION/TRAINING PROGRAM

## 2024-09-10 PROCEDURE — 99214 OFFICE O/P EST MOD 30 MIN: CPT | Performed by: STUDENT IN AN ORGANIZED HEALTH CARE EDUCATION/TRAINING PROGRAM

## 2024-09-10 PROCEDURE — 1125F AMNT PAIN NOTED PAIN PRSNT: CPT | Performed by: STUDENT IN AN ORGANIZED HEALTH CARE EDUCATION/TRAINING PROGRAM

## 2024-09-10 PROCEDURE — 1159F MED LIST DOCD IN RCRD: CPT | Performed by: STUDENT IN AN ORGANIZED HEALTH CARE EDUCATION/TRAINING PROGRAM

## 2024-09-10 RX ORDER — HYDROCODONE BITARTRATE AND ACETAMINOPHEN 10; 325 MG/1; MG/1
1 TABLET ORAL EVERY 6 HOURS PRN
Qty: 120 TABLET | Refills: 0 | Status: SHIPPED | OUTPATIENT
Start: 2024-09-11 | End: 2024-10-11

## 2024-10-07 NOTE — PROGRESS NOTES
Subjective   Nola Tariq is a 74 y.o. female is here for right leg, lower back and right knee pain.  Patient was last seen on 9/10/2024.  Seen by neurosurgery since last visit at [facility] who recommended conservative management.  Patient has been having severe lower back pain for more than 8 weeks despite trying conservative management and wearing TLSO brace.  ED visit as well due to pain.    On last visit:     Left leg pain is 5/10 on VAS.    Lower back pain is 8/10 on VAS, at maximum 9/10.  Pain is aching and dull in nature.  Referred to right groin, right leg  She has numbness and tingling in bilateral foot.  Pain is constant.  Improved by pain meds.  Worse with walking and standing.    R knee pain is 5/10 on VAS. Worse with weight bearing. She had R knee steroid injection with Rheumatology several months ago with excellent relief.     R shoulder pain is 6/10 on VAS. Normal ROM. Had shoulder bursa issues several years ago which improved with US treatments.     Migraines - She used to take Excedrin and Fioricet for daily migraines but it doesn't work anymore. She is scheduled to see Neurology.  She does wake up with migraines during night or early in morning. She would wake up with headache and goes away with Excedrin. She had 2-3 headaches over last month that didn't go away with Excedrin and lasted for rest of the day. She had seen someone for chiari malformation previous.  History of migraines since teenager and usually right-sided temporal area but states that new migraines are all over the head. Seen by neurology who recommended continuing excedrin.      Previous Injection:   9/3/2024-LESI L3-4-no significant improvement.  4/18/2023- SCS trial [employer]- no significant pain relief. Coverage was excellent with patient able to feel paresthesia in all of the painful areas but unfortunately no significant pain relief.  No functional improvement as well.  11/8/2022-right knee injection- good  relief.  7/8/22 - LESI L5-S1 - no relief.   RFA at previous pain management - no relief.     Hx:  Referred by JEM Ashton for  lower back pain.  Pain started around 2008 without any significant injuries and has been getting worse since then.  Her main complaints today are mid back and lower back pain.  Patient had previously seen Dr. Voss and had epidurals, facet joint injections, ablations.  She states that nothing helped at the time. Last seen 1 year ago. Her lower back pain is worse than mid back pain. She is retired RN.   She has since seen [facility] (seen by JEM)- 2022 who recommended medication management.  Patient states that she is not interested in fusion surgery as there is 50% chance of lower back pain improvement. She had also injured her left knee and had steroid injection by Ortho in left knee.  Scheduled to see Ortho in 5 weeks. She has been seeing neurology for migraines and has been started on gabapentin.  Patient has taken gabapentin for about 1 month without significant improvement in her migraines.        PHQ-9- 4  SOAPP-2      PMH:   Tijerina's esophagus, hypertension, migraine, thoracic vertebra fx s/p kypho T7, T10, migraines, smoker, Back surgery- decompression L4-5?-2009 Dr. Rdz, Carpal tunnel surgery 1983 bilateral wrist; R knee steroid injection with orthopedics (Dr. Barton at [facility]).         Current Medications:   Tramadol  mg q6H PRN  Lyrica 25 mg BID  Ibuprofen 800 mg       Past Medications:  Tramadol  mg daily   Meloxicam 15 mg  Celebrex-leg swelling  Gabapentin - didn't help   Norco 5-325 mg BID PRN      Past Modalities:  TENS:                                                                          no                                                  Physical Therapy Within The Last 6 Months              Yes (1.5 years ago with some help).   Psychotherapy                                                            no  Massage Therapy                                                        no     Patient Complains Of:  Uro-Fecal Incontinence          no  Weight Gain/Loss                   Yes (weight gain 25 lbs - 1 year after smoking).   Fever/Chills                             no  Weakness                               no            Current Outpatient Medications:     Acetaminophen-Caffeine 500-65 MG tablet, EXCEDRIN TENSION HEADACHE 500-65 MG TABS, Disp: , Rfl:     albuterol sulfate  (90 Base) MCG/ACT inhaler, Inhale 2 puffs Every 4 (Four) Hours As Needed for Wheezing., Disp: 6.7 g, Rfl: 0    Cetirizine HCl 10 MG capsule, ZYRTEC ALLERGY 10 MG CAPS, Disp: , Rfl:     denosumab (PROLIA) 60 MG/ML solution prefilled syringe syringe, , Disp: , Rfl:     famotidine (PEPCID) 40 MG tablet, , Disp: , Rfl:     gabapentin (NEURONTIN) 300 MG capsule, , Disp: , Rfl:     [START ON 10/12/2024] HYDROcodone-acetaminophen (NORCO)  MG per tablet, Take 1 tablet by mouth Every 6 (Six) Hours As Needed for Moderate Pain for up to 30 days., Disp: 120 tablet, Rfl: 0    [START ON 11/11/2024] HYDROcodone-acetaminophen (NORCO)  MG per tablet, Take 1 tablet by mouth Every 6 (Six) Hours As Needed for Moderate Pain for up to 30 days., Disp: 120 tablet, Rfl: 0    Multiple Vitamins-Minerals (MULTIVITAMIN WITH MINERALS) tablet tablet, Take 1 tablet by mouth Daily., Disp: , Rfl:     omeprazole (priLOSEC) 40 MG capsule, , Disp: , Rfl:     vitamin D (ERGOCALCIFEROL) 1.25 MG (19510 UT) capsule capsule, Take 1 capsule by mouth 1 (One) Time Per Week., Disp: , Rfl:     fluticasone (FLONASE) 50 MCG/ACT nasal spray, 2 sprays by Each Nare route 2 (Two) Times a Day., Disp: , Rfl:     guaiFENesin (MUCINEX) 600 MG 12 hr tablet, Take 1 tablet by mouth Every 12 (Twelve) Hours., Disp: , Rfl:     ibuprofen (ADVIL,MOTRIN) 800 MG tablet, Take 1 tablet by mouth 3 (Three) Times a Day As Needed for Mild Pain., Disp: 270 tablet, Rfl: 1    sucralfate (CARAFATE) 1 g tablet, , Disp: , Rfl:     topiramate  (TOPAMAX) 25 MG tablet, , Disp: , Rfl:     traMADol (ULTRAM) 50 MG tablet, Take 2 tablets by mouth Every 8 (Eight) Hours As Needed for Moderate Pain for up to 90 days. (Patient not taking: Reported on 10/8/2024), Disp: 180 tablet, Rfl: 2    Voquezna 20 MG tablet, , Disp: , Rfl:     The following portions of the patient's history were reviewed and updated as appropriate: allergies, current medications, past family history, past medical history, past social history, past surgical history, and problem list.      REVIEW OF PERTINENT MEDICAL DATA    Past Medical History:   Diagnosis Date    Arthritis     Tijerina esophagus     Chronic pain disorder     COVID-19     DJD (degenerative joint disease)     Extremity pain     Fall at home     Fall at home     fracture rt. leg    Foot pain, right     Fractures     GERD (gastroesophageal reflux disease)     Headache, tension-type     Hypertension     Joint pain     Leg pain, right     Low back pain     Migraine     Plantar fasciitis     Shingles     Shoulder pain, right     Sleep apnea     Spinal stenosis 2024    Epidural on      Past Surgical History:   Procedure Laterality Date    BACK SURGERY  2009    Decompression    CARPAL TUNNEL RELEASE      [place].    EPIDURAL BLOCK      EYE SURGERY      catarac    KNEE ARTHROSCOPY      SPINAL CORD STIMULATOR TRIAL W/ LAMINOTOMY      SPINE SURGERY      Kyphoplasty    TUBAL ABDOMINAL LIGATION      WRIST SURGERY      tendon release     Family History   Problem Relation Age of Onset    Cancer Mother     Migraines Father     COPD Father     Migraines Sister     Migraines Brother      Social History     Socioeconomic History    Marital status:    Tobacco Use    Smoking status: Former     Current packs/day: 0.00     Types: Cigarettes     Quit date: 1966     Years since quittin.7    Smokeless tobacco: Never   Vaping Use    Vaping status: Never Used   Substance and Sexual Activity    Alcohol use: Never    Drug use: Never     Sexual activity: Not Currently         Review of Systems   Musculoskeletal:  Positive for arthralgias and back pain.         Vitals:    10/08/24 1132 10/08/24 1135   BP: (!) 156/110 154/90   Pulse: 88    Resp: 16    SpO2: 94%    Weight: 80.3 kg (177 lb)  Comment: pt reported    PainSc:   8                          Objective   Physical Exam  Musculoskeletal:         General: Tenderness present.        Back:         Legs:    Neurological:      Comments: Motor strength 5/5 b/l LE  Sensory intact b/l LE             Imaging Reviewed:  Right hip x-ray-9/5/2024  - Mild degenerative changes of each hip.    MRI lumbar spine without contrast-8/14/2024  - L2-3-disc bulge with left paracentral extrusion extending inferiorly.  Facet arthropathy.  Mild to moderate central canal stenosis and moderate bilateral foraminal stenosis  L3-4-disc bulge with small protrusion, bilateral facet arthropathy with ligamentum flavum hypertrophy causing severe central canal stenosis and severe right and moderate left foraminal stenosis  W0-3-frhyohwrm facet hypertrophy resulting in 5 mm of L4 anterolisthesis.  Severe central canal stenosis, moderate bilateral foraminal stenosis and bilateral L5 lateral recess stenosis most severe on the right  L5-S1-left greater than right facet arthropathy.  Moderate central canal stenosis.  Possible compression of left S1 nerve root prior to entering the lateral recess.  Moderate right and severe left foraminal stenosis.  - Redundancy of the cauda equina nerve roots due to spinal stenosis.  - L3 inferior endplate subacute compression fracture with mild bone marrow edema which is new prior to previous imaging- subacute fracture.      Lumbar spine MRI without contrast-6/13/2022.  L2-3-moderate disc bulge.  Moderate posterior facet changes.  Mild to moderate central canal narrowing.  Moderate bilateral neural foraminal narrowing.  L3-4-- broad-based disc bulge.  Central posterior disc protrusion causing narrowing  of central canal with approximate 5 mm in AP dimension.  Moderate facet changes.  Moderate to severe central canal narrowing.  Mild bilateral neural foraminal narrowing.  L4-5-large broad-based disc bulge.  Severe facet changes.  Severe central canal narrowing.  Moderate bilateral neural foraminal narrowing  L5-S1-large broad-based disc bulge.  Severe facet changes.  Moderate to severe central canal narrowing.  Severe bilateral neural foraminal narrowing.      MRI thoracic spine-6/13/2022  Kyphoplasty at T7 and T10.  - Residual mild to moderate compression fracture deformities  - Mild posterior disc bulges at multiple thoracic levels.  No large extrusions.    MRI brain wo contrast: 8/15/2018  Chiari Malformation type 1 with cerebellar tonsils projecting 7 mm below level of foramen magnum  Chronic b/l maxillary and ethmoid sinusitis     Assessment:    1. Spinal stenosis of lumbar region with neurogenic claudication    2. Postlaminectomy syndrome    3. Compression fracture of L3 lumbar vertebra, closed, initial encounter          Plan:   1.  Repeat UDS-10/8/2024.  UDS consistent with patient's interview on 2/27/2024.   Narcotic contract signed-5/10/2022.  Narcan sent-6/20/2022.  2. We discussed trying a course of formal physical therapy.  Physical therapy can help strengthen and stretch the muscles around the joints. Continue to be as active as possible. Start physical therapy as it will help generalized pain and follow up with HEP.  PT prescription sent to [facility] in [place] as requested by patient.  3.  Patient continues to have pain despite being on Norco.  She had significant pain relief with tramadol previously.   Due to severe pain stop tramadol and start taking Norco.  4.  Due to severe pain, increase Norco  mg for 4 times daily as needed- 10/12; 11/11.  Advised to stop taking tramadol while taking hydrocodone.   Side effects discussed including but not limited to nausea, vomiting, constipation,  lightheadedness, dizziness, drowsiness, or headache. This medication may increase serotonin and rarely cause a very serious condition called serotonin syndrome/ toxicity.   5. Continues Ibuprofen 800 mg TID PRN-sent for 6 months on 10/8/2024 patient informed of increased risk of heart attacks, stroke and kidney problems in addition to gastric ulcers with use of non-steroidal anti-inflammatory medications.  6.  Recommend following up with neurology for worsening of migraines.  History of Chiari malformation type I.  We did discuss possibility of Botox injection if she fails multiple medications  7.    Lumbar MRI was independently reviewed and was also reviewed with patient using images and spinal model.  Subacute L3 compression fracture with edema present.  Discussed with patient that she may benefit from L3 kyphoplasty. Patient has axial lower back pain and severe tenderness to palpation over the fractured vertebrae.  Imaging shows acute to subacute compression fracture.  Patient has failed 6 to 8 weeks of conservative management.  Discussed with patient that she is good candidate for L3  kyphoplasty.  Benefits and risks were discussed with patient including but not limited to bleeding, infection, nerve damage, paralysis, pulmonary embolism, cement leak, death.  Patient understands and agrees with the plan and agrees to proceed with the procedure. STOP ibuprofen 7 days before the procedure.         RTC for L3 kyphoplasty.     Kirk Nascimento DO  Pain Management   [facility]           INSPECT REPORT    As part of the patient's treatment plan, I may be prescribing controlled substances. The patient has been made aware of appropriate use of such medications, including potential risk of somnolence, limited ability to drive and/or work safely, and the potential for dependence or overdose. It has also been made clear that these medications are for use by this patient only, without concomitant use of alcohol or other  substances unless prescribed.     Patient has completed prescribing agreement detailing terms of continued prescribing of controlled substances, including monitoring INSPECT reports, urine drug screening, and pill counts if necessary. The patient is aware that inappropriate use will results in cessation of prescribing such medications.    INSPECT report has been reviewed and scanned into the patient's chart.

## 2024-10-08 ENCOUNTER — OFFICE VISIT (OUTPATIENT)
Dept: PAIN MEDICINE | Facility: CLINIC | Age: 74
End: 2024-10-08
Payer: MEDICARE

## 2024-10-08 VITALS
DIASTOLIC BLOOD PRESSURE: 90 MMHG | WEIGHT: 177 LBS | HEART RATE: 88 BPM | OXYGEN SATURATION: 94 % | RESPIRATION RATE: 16 BRPM | SYSTOLIC BLOOD PRESSURE: 154 MMHG | BODY MASS INDEX: 30.38 KG/M2

## 2024-10-08 DIAGNOSIS — Z79.899 HIGH RISK MEDICATION USE: Primary | ICD-10-CM

## 2024-10-08 DIAGNOSIS — S32.030A COMPRESSION FRACTURE OF L3 LUMBAR VERTEBRA, CLOSED, INITIAL ENCOUNTER: ICD-10-CM

## 2024-10-08 DIAGNOSIS — M96.1 POSTLAMINECTOMY SYNDROME: ICD-10-CM

## 2024-10-08 DIAGNOSIS — M48.062 SPINAL STENOSIS OF LUMBAR REGION WITH NEUROGENIC CLAUDICATION: ICD-10-CM

## 2024-10-08 PROCEDURE — 1160F RVW MEDS BY RX/DR IN RCRD: CPT | Performed by: STUDENT IN AN ORGANIZED HEALTH CARE EDUCATION/TRAINING PROGRAM

## 2024-10-08 PROCEDURE — 1159F MED LIST DOCD IN RCRD: CPT | Performed by: STUDENT IN AN ORGANIZED HEALTH CARE EDUCATION/TRAINING PROGRAM

## 2024-10-08 PROCEDURE — 99214 OFFICE O/P EST MOD 30 MIN: CPT | Performed by: STUDENT IN AN ORGANIZED HEALTH CARE EDUCATION/TRAINING PROGRAM

## 2024-10-08 PROCEDURE — 1125F AMNT PAIN NOTED PAIN PRSNT: CPT | Performed by: STUDENT IN AN ORGANIZED HEALTH CARE EDUCATION/TRAINING PROGRAM

## 2024-10-08 RX ORDER — HYDROCODONE BITARTRATE AND ACETAMINOPHEN 10; 325 MG/1; MG/1
1 TABLET ORAL EVERY 6 HOURS PRN
Qty: 120 TABLET | Refills: 0 | Status: SHIPPED | OUTPATIENT
Start: 2024-10-12 | End: 2024-11-11

## 2024-10-08 RX ORDER — HYDROCODONE BITARTRATE AND ACETAMINOPHEN 10; 325 MG/1; MG/1
1 TABLET ORAL EVERY 6 HOURS PRN
Qty: 120 TABLET | Refills: 0 | Status: SHIPPED | OUTPATIENT
Start: 2024-11-11 | End: 2024-12-11

## 2024-10-08 RX ORDER — IBUPROFEN 800 MG/1
800 TABLET, FILM COATED ORAL 3 TIMES DAILY PRN
Qty: 270 TABLET | Refills: 1 | Status: SHIPPED | OUTPATIENT
Start: 2024-10-08

## 2024-10-09 RX ORDER — DOCUSATE SODIUM 100 MG/1
100 CAPSULE, LIQUID FILLED ORAL 2 TIMES DAILY
COMMUNITY

## 2024-10-10 ENCOUNTER — ANESTHESIA EVENT (OUTPATIENT)
Dept: PERIOP | Facility: HOSPITAL | Age: 74
End: 2024-10-10
Payer: MEDICARE

## 2024-10-10 NOTE — H&P
Subjective   Nola Tariq is a 74 y.o. female is here for L3 kyphoplasty.  No significant changes in H&P since last visit.        On last visit:     Left leg pain is 5/10 on VAS.    Lower back pain is 8/10 on VAS, at maximum 9/10.  Pain is aching and dull in nature.  Referred to right groin, right leg  She has numbness and tingling in bilateral foot.  Pain is constant.  Improved by pain meds.  Worse with walking and standing.    R knee pain is 5/10 on VAS. Worse with weight bearing. She had R knee steroid injection with Rheumatology several months ago with excellent relief.     R shoulder pain is 6/10 on VAS. Normal ROM. Had shoulder bursa issues several years ago which improved with US treatments.     Migraines - She used to take Excedrin and Fioricet for daily migraines but it doesn't work anymore. She is scheduled to see Neurology.  She does wake up with migraines during night or early in morning. She would wake up with headache and goes away with Excedrin. She had 2-3 headaches over last month that didn't go away with Excedrin and lasted for rest of the day. She had seen someone for chiari malformation previous.  History of migraines since teenager and usually right-sided temporal area but states that new migraines are all over the head. Seen by neurology who recommended continuing excedrin.      Previous Injection:   9/3/2024-LESI L3-4-no significant improvement.  4/18/2023- SCS trial [employer]- no significant pain relief. Coverage was excellent with patient able to feel paresthesia in all of the painful areas but unfortunately no significant pain relief.  No functional improvement as well.  11/8/2022-right knee injection- good relief.  7/8/22 - LESI L5-S1 - no relief.   RFA at previous pain management - no relief.     Hx:  Referred by JEM Ashton for  lower back pain.  Pain started around 2008 without any significant injuries and has been getting worse since then.  Her main complaints today  are mid back and lower back pain.  Patient had previously seen Dr. Voss and had epidurals, facet joint injections, ablations.  She states that nothing helped at the time. Last seen 1 year ago. Her lower back pain is worse than mid back pain. She is retired RN.   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  Patient states that she is not interested in fusion surgery as there is 50% chance of lower back pain improvement. She had also injured her left knee and had steroid injection by Ortho in left knee.  Scheduled to see Ortho in 5 weeks. She has been seeing neurology for migraines and has been started on gabapentin.  Patient has taken gabapentin for about 1 month without significant improvement in her migraines.        PHQ-9- 4  SOAPP-2      PMH:   Tijerina's esophagus, hypertension, migraine, thoracic vertebra fx s/p kypho T7, T10, migraines, smoker, Back surgery- decompression L4-5?-2009 Dr. Rdz, Carpal tunnel surgery 1983 bilateral wrist; R knee steroid injection with orthopedics (Dr. Barton at [facility]).         Current Medications:   Tramadol  mg q6H PRN  Lyrica 25 mg BID  Ibuprofen 800 mg       Past Medications:  Tramadol  mg daily   Meloxicam 15 mg  Celebrex-leg swelling  Gabapentin - didn't help   Norco 5-325 mg BID PRN      Past Modalities:  TENS:                                                                          no                                                  Physical Therapy Within The Last 6 Months              Yes (1.5 years ago with some help).   Psychotherapy                                                            no  Massage Therapy                                                       no     Patient Complains Of:  Uro-Fecal Incontinence          no  Weight Gain/Loss                   Yes (weight gain 25 lbs - 1 year after smoking).   Fever/Chills                             no  Weakness                               no             Current Facility-Administered Medications:     ceFAZolin 2000 mg IVPB in 100 mL NS (MBP), 2,000 mg, Intravenous, Once, Nascimento, Kirk, DO    lactated ringers infusion 500 mL, 500 mL, Intravenous, Continuous, Nascimento, Kirk, DO, Last Rate: 25 mL/hr at 10/11/24 0801, 500 mL at 10/11/24 0801    lidocaine PF 1% (XYLOCAINE) injection 0.5 mL, 0.5 mL, Intradermal, Once PRN, Nascimento, Kirk, DO    sodium chloride 0.9 % flush 10 mL, 10 mL, Intravenous, PRN, Nascimento, Kirk, DO    The following portions of the patient's history were reviewed and updated as appropriate: allergies, current medications, past family history, past medical history, past social history, past surgical history, and problem list.      REVIEW OF PERTINENT MEDICAL DATA    Past Medical History:   Diagnosis Date    Arthritis     Tijerina esophagus     Chronic pain disorder     COVID-19     DJD (degenerative joint disease)     Extremity pain     Fall at home     Fall at home     fracture rt. leg    Foot pain, right     Fractures     GERD (gastroesophageal reflux disease)     Headache, tension-type     Hypertension     Joint pain     Leg pain, right     Low back pain     Migraine     Plantar fasciitis     Shingles     Shoulder pain, right     Sleep apnea     Spinal stenosis 9/4/2024    Epidural on 9/4     Past Surgical History:   Procedure Laterality Date    BACK SURGERY  2/2009    Decompression    CARPAL TUNNEL RELEASE      [place].    COLONOSCOPY      EPIDURAL BLOCK      EYE SURGERY      [place]    KNEE ARTHROSCOPY      SPINAL CORD STIMULATOR TRIAL W/ LAMINOTOMY      SPINE SURGERY      Kyphoplasty    TUBAL ABDOMINAL LIGATION      WRIST SURGERY      tendon release     Family History   Problem Relation Age of Onset    Cancer Mother     Migraines Father     COPD Father     Migraines Sister     Migraines Brother      Social History     Socioeconomic History    Marital status:    Tobacco Use    Smoking status: Former     Current packs/day: 0.00     Types:  "Cigarettes     Quit date: 1966     Years since quittin.7    Smokeless tobacco: Never   Vaping Use    Vaping status: Never Used   Substance and Sexual Activity    Alcohol use: Never    Drug use: Never    Sexual activity: Not Currently         Review of Systems   Musculoskeletal:  Positive for arthralgias and back pain.         Vitals:    10/09/24 0927   Weight: 80.3 kg (177 lb)   Height: 162.6 cm (64\")                         Objective   Physical Exam  Musculoskeletal:         General: Tenderness present.        Back:         Legs:    Neurological:      Comments: Motor strength 5/5 b/l LE  Sensory intact b/l LE             Imaging Reviewed:  Right hip x-ray-2024  - Mild degenerative changes of each hip.    MRI lumbar spine without contrast-2024  - L2-3-disc bulge with left paracentral extrusion extending inferiorly.  Facet arthropathy.  Mild to moderate central canal stenosis and moderate bilateral foraminal stenosis  L3-4-disc bulge with small protrusion, bilateral facet arthropathy with ligamentum flavum hypertrophy causing severe central canal stenosis and severe right and moderate left foraminal stenosis  G2-8-fopqqhuuu facet hypertrophy resulting in 5 mm of L4 anterolisthesis.  Severe central canal stenosis, moderate bilateral foraminal stenosis and bilateral L5 lateral recess stenosis most severe on the right  L5-S1-left greater than right facet arthropathy.  Moderate central canal stenosis.  Possible compression of left S1 nerve root prior to entering the lateral recess.  Moderate right and severe left foraminal stenosis.  - Redundancy of the cauda equina nerve roots due to spinal stenosis.  - L3 inferior endplate subacute compression fracture with mild bone marrow edema which is new prior to previous imaging- subacute fracture.      Lumbar spine MRI without contrast-2022.  L2-3-moderate disc bulge.  Moderate posterior facet changes.  Mild to moderate central canal narrowing.  Moderate " bilateral neural foraminal narrowing.  L3-4-- broad-based disc bulge.  Central posterior disc protrusion causing narrowing of central canal with approximate 5 mm in AP dimension.  Moderate facet changes.  Moderate to severe central canal narrowing.  Mild bilateral neural foraminal narrowing.  L4-5-large broad-based disc bulge.  Severe facet changes.  Severe central canal narrowing.  Moderate bilateral neural foraminal narrowing  L5-S1-large broad-based disc bulge.  Severe facet changes.  Moderate to severe central canal narrowing.  Severe bilateral neural foraminal narrowing.      MRI thoracic spine-6/13/2022  Kyphoplasty at T7 and T10.  - Residual mild to moderate compression fracture deformities  - Mild posterior disc bulges at multiple thoracic levels.  No large extrusions.    MRI brain wo contrast: 8/15/2018  Chiari Malformation type 1 with cerebellar tonsils projecting 7 mm below level of foramen magnum  Chronic b/l maxillary and ethmoid sinusitis     Assessment:    No diagnosis found.        Plan:   1.    Lumbar MRI was independently reviewed and was also reviewed with patient using images and spinal model.  Subacute L3 compression fracture with edema present.  Discussed with patient that she may benefit from L3 kyphoplasty. Patient has axial lower back pain and severe tenderness to palpation over the fractured vertebrae.  Imaging shows acute to subacute compression fracture.  Patient has failed 6 to 8 weeks of conservative management.  Discussed with patient that she is good candidate for L3  kyphoplasty.  Benefits and risks were discussed with patient including but not limited to bleeding, infection, nerve damage, paralysis, pulmonary embolism, cement leak, death.  Patient understands and agrees with the plan and agrees to proceed with the procedure.     Proceed L3 kyphoplasty as scheduled     Kirk Nascimento DO  Pain Management   [facility]           INSPECT REPORT    As part of the patient's treatment  plan, I may be prescribing controlled substances. The patient has been made aware of appropriate use of such medications, including potential risk of somnolence, limited ability to drive and/or work safely, and the potential for dependence or overdose. It has also been made clear that these medications are for use by this patient only, without concomitant use of alcohol or other substances unless prescribed.     Patient has completed prescribing agreement detailing terms of continued prescribing of controlled substances, including monitoring INSPECT reports, urine drug screening, and pill counts if necessary. The patient is aware that inappropriate use will results in cessation of prescribing such medications.    INSPECT report has been reviewed and scanned into the patient's chart.

## 2024-10-11 ENCOUNTER — APPOINTMENT (OUTPATIENT)
Dept: GENERAL RADIOLOGY | Facility: HOSPITAL | Age: 74
End: 2024-10-11
Payer: MEDICARE

## 2024-10-11 ENCOUNTER — HOSPITAL ENCOUNTER (OUTPATIENT)
Facility: HOSPITAL | Age: 74
Setting detail: HOSPITAL OUTPATIENT SURGERY
Discharge: HOME | End: 2024-10-11
Attending: STUDENT IN AN ORGANIZED HEALTH CARE EDUCATION/TRAINING PROGRAM | Admitting: STUDENT IN AN ORGANIZED HEALTH CARE EDUCATION/TRAINING PROGRAM
Payer: MEDICARE

## 2024-10-11 ENCOUNTER — ANESTHESIA (OUTPATIENT)
Dept: PERIOP | Facility: HOSPITAL | Age: 74
End: 2024-10-11
Payer: MEDICARE

## 2024-10-11 VITALS
OXYGEN SATURATION: 91 % | WEIGHT: 177 LBS | DIASTOLIC BLOOD PRESSURE: 89 MMHG | HEART RATE: 97 BPM | RESPIRATION RATE: 17 BRPM | HEIGHT: 64 IN | TEMPERATURE: 98.3 F | SYSTOLIC BLOOD PRESSURE: 164 MMHG | BODY MASS INDEX: 30.22 KG/M2

## 2024-10-11 DIAGNOSIS — S32.030A COMPRESSION FRACTURE OF L3 LUMBAR VERTEBRA, CLOSED, INITIAL ENCOUNTER: ICD-10-CM

## 2024-10-11 PROCEDURE — 25010000002 DEXAMETHASONE PER 1 MG: Performed by: NURSE ANESTHETIST, CERTIFIED REGISTERED

## 2024-10-11 PROCEDURE — 25010000002 LIDOCAINE 1% - EPINEPHRINE 1:100000 1 %-1:100000 SOLUTION: Performed by: STUDENT IN AN ORGANIZED HEALTH CARE EDUCATION/TRAINING PROGRAM

## 2024-10-11 PROCEDURE — 25010000002 ONDANSETRON PER 1 MG: Performed by: NURSE ANESTHETIST, CERTIFIED REGISTERED

## 2024-10-11 PROCEDURE — 25010000002 SUCCINYLCHOLINE PER 20 MG: Performed by: NURSE ANESTHETIST, CERTIFIED REGISTERED

## 2024-10-11 PROCEDURE — 25010000002 PROPOFOL 200 MG/20ML EMULSION: Performed by: NURSE ANESTHETIST, CERTIFIED REGISTERED

## 2024-10-11 PROCEDURE — 25810000003 LACTATED RINGERS PER 1000 ML: Performed by: STUDENT IN AN ORGANIZED HEALTH CARE EDUCATION/TRAINING PROGRAM

## 2024-10-11 PROCEDURE — 25010000002 BUPIVACAINE 0.25 % SOLUTION: Performed by: STUDENT IN AN ORGANIZED HEALTH CARE EDUCATION/TRAINING PROGRAM

## 2024-10-11 PROCEDURE — 76000 FLUOROSCOPY <1 HR PHYS/QHP: CPT

## 2024-10-11 PROCEDURE — 25010000002 CEFAZOLIN PER 500 MG: Performed by: STUDENT IN AN ORGANIZED HEALTH CARE EDUCATION/TRAINING PROGRAM

## 2024-10-11 PROCEDURE — 71045 X-RAY EXAM CHEST 1 VIEW: CPT

## 2024-10-11 PROCEDURE — 25010000002 SUGAMMADEX 200 MG/2ML SOLUTION: Performed by: NURSE ANESTHETIST, CERTIFIED REGISTERED

## 2024-10-11 PROCEDURE — 25010000002 LIDOCAINE PF 1% 1 % SOLUTION: Performed by: NURSE ANESTHETIST, CERTIFIED REGISTERED

## 2024-10-11 PROCEDURE — C1713 ANCHOR/SCREW BN/BN,TIS/BN: HCPCS | Performed by: STUDENT IN AN ORGANIZED HEALTH CARE EDUCATION/TRAINING PROGRAM

## 2024-10-11 PROCEDURE — 25010000002 FENTANYL CITRATE (PF) 50 MCG/ML SOLUTION: Performed by: NURSE ANESTHETIST, CERTIFIED REGISTERED

## 2024-10-11 PROCEDURE — 25010000002 PHENYLEPHRINE 10 MG/ML SOLUTION: Performed by: NURSE ANESTHETIST, CERTIFIED REGISTERED

## 2024-10-11 PROCEDURE — 72100 X-RAY EXAM L-S SPINE 2/3 VWS: CPT

## 2024-10-11 PROCEDURE — 22514 PERQ VERTEBRAL AUGMENTATION: CPT | Performed by: STUDENT IN AN ORGANIZED HEALTH CARE EDUCATION/TRAINING PROGRAM

## 2024-10-11 PROCEDURE — 25010000002 HYDROMORPHONE 1 MG/ML SOLUTION: Performed by: ANESTHESIOLOGY

## 2024-10-11 PROCEDURE — 25510000001 IOPAMIDOL 41 % SOLUTION: Performed by: STUDENT IN AN ORGANIZED HEALTH CARE EDUCATION/TRAINING PROGRAM

## 2024-10-11 PROCEDURE — 25010000002 FENTANYL CITRATE (PF) 100 MCG/2ML SOLUTION: Performed by: NURSE ANESTHETIST, CERTIFIED REGISTERED

## 2024-10-11 DEVICE — BONE CEMENT C01B HV-R WITH MIXER  US
Type: IMPLANTABLE DEVICE | Site: SPINE LUMBAR | Status: FUNCTIONAL
Brand: KYPHON® HV-R® BONE CEMENT AND KYPHON® MIXER PACK

## 2024-10-11 RX ORDER — BUPIVACAINE HYDROCHLORIDE 2.5 MG/ML
INJECTION, SOLUTION INFILTRATION; PERINEURAL AS NEEDED
Status: DISCONTINUED | OUTPATIENT
Start: 2024-10-11 | End: 2024-10-11 | Stop reason: HOSPADM

## 2024-10-11 RX ORDER — DEXAMETHASONE SODIUM PHOSPHATE 4 MG/ML
INJECTION, SOLUTION INTRA-ARTICULAR; INTRALESIONAL; INTRAMUSCULAR; INTRAVENOUS; SOFT TISSUE AS NEEDED
Status: DISCONTINUED | OUTPATIENT
Start: 2024-10-11 | End: 2024-10-11 | Stop reason: SURG

## 2024-10-11 RX ORDER — ONDANSETRON 2 MG/ML
INJECTION INTRAMUSCULAR; INTRAVENOUS AS NEEDED
Status: DISCONTINUED | OUTPATIENT
Start: 2024-10-11 | End: 2024-10-11 | Stop reason: SURG

## 2024-10-11 RX ORDER — IPRATROPIUM BROMIDE AND ALBUTEROL SULFATE 2.5; .5 MG/3ML; MG/3ML
3 SOLUTION RESPIRATORY (INHALATION) ONCE AS NEEDED
Status: COMPLETED | OUTPATIENT
Start: 2024-10-11 | End: 2024-10-11

## 2024-10-11 RX ORDER — SODIUM CHLORIDE, SODIUM LACTATE, POTASSIUM CHLORIDE, CALCIUM CHLORIDE 600; 310; 30; 20 MG/100ML; MG/100ML; MG/100ML; MG/100ML
500 INJECTION, SOLUTION INTRAVENOUS CONTINUOUS
Status: DISPENSED | OUTPATIENT
Start: 2024-10-11 | End: 2024-10-11

## 2024-10-11 RX ORDER — FENTANYL CITRATE 50 UG/ML
50 INJECTION, SOLUTION INTRAMUSCULAR; INTRAVENOUS
Status: DISCONTINUED | OUTPATIENT
Start: 2024-10-11 | End: 2024-10-11 | Stop reason: HOSPADM

## 2024-10-11 RX ORDER — FENTANYL CITRATE 50 UG/ML
INJECTION, SOLUTION INTRAMUSCULAR; INTRAVENOUS AS NEEDED
Status: DISCONTINUED | OUTPATIENT
Start: 2024-10-11 | End: 2024-10-11 | Stop reason: SURG

## 2024-10-11 RX ORDER — SUCCINYLCHOLINE CHLORIDE 20 MG/ML
INJECTION INTRAMUSCULAR; INTRAVENOUS AS NEEDED
Status: DISCONTINUED | OUTPATIENT
Start: 2024-10-11 | End: 2024-10-11 | Stop reason: SURG

## 2024-10-11 RX ORDER — FLUMAZENIL 0.1 MG/ML
0.2 INJECTION INTRAVENOUS AS NEEDED
Status: DISCONTINUED | OUTPATIENT
Start: 2024-10-11 | End: 2024-10-11 | Stop reason: HOSPADM

## 2024-10-11 RX ORDER — LIDOCAINE HYDROCHLORIDE AND EPINEPHRINE 10; 10 MG/ML; UG/ML
INJECTION, SOLUTION INFILTRATION; PERINEURAL AS NEEDED
Status: DISCONTINUED | OUTPATIENT
Start: 2024-10-11 | End: 2024-10-11 | Stop reason: HOSPADM

## 2024-10-11 RX ORDER — OXYCODONE HYDROCHLORIDE 5 MG/1
5 TABLET ORAL ONCE AS NEEDED
Status: DISCONTINUED | OUTPATIENT
Start: 2024-10-11 | End: 2024-10-11 | Stop reason: HOSPADM

## 2024-10-11 RX ORDER — LIDOCAINE HYDROCHLORIDE 40 MG/ML
SOLUTION TOPICAL AS NEEDED
Status: DISCONTINUED | OUTPATIENT
Start: 2024-10-11 | End: 2024-10-11 | Stop reason: SURG

## 2024-10-11 RX ORDER — SODIUM CHLORIDE 0.9 % (FLUSH) 0.9 %
10 SYRINGE (ML) INJECTION AS NEEDED
Status: DISCONTINUED | OUTPATIENT
Start: 2024-10-11 | End: 2024-10-11 | Stop reason: HOSPADM

## 2024-10-11 RX ORDER — HYDRALAZINE HYDROCHLORIDE 20 MG/ML
5 INJECTION INTRAMUSCULAR; INTRAVENOUS
Status: DISCONTINUED | OUTPATIENT
Start: 2024-10-11 | End: 2024-10-11 | Stop reason: HOSPADM

## 2024-10-11 RX ORDER — DIPHENHYDRAMINE HYDROCHLORIDE 50 MG/ML
12.5 INJECTION INTRAMUSCULAR; INTRAVENOUS ONCE AS NEEDED
Status: DISCONTINUED | OUTPATIENT
Start: 2024-10-11 | End: 2024-10-11 | Stop reason: HOSPADM

## 2024-10-11 RX ORDER — PHENYLEPHRINE HYDROCHLORIDE 10 MG/ML
INJECTION INTRAVENOUS AS NEEDED
Status: DISCONTINUED | OUTPATIENT
Start: 2024-10-11 | End: 2024-10-11 | Stop reason: SURG

## 2024-10-11 RX ORDER — FAMOTIDINE 10 MG/ML
20 INJECTION, SOLUTION INTRAVENOUS 2 TIMES DAILY
Status: DISCONTINUED | OUTPATIENT
Start: 2024-10-11 | End: 2024-10-11 | Stop reason: HOSPADM

## 2024-10-11 RX ORDER — NALOXONE HCL 0.4 MG/ML
0.4 VIAL (ML) INJECTION AS NEEDED
Status: DISCONTINUED | OUTPATIENT
Start: 2024-10-11 | End: 2024-10-11 | Stop reason: HOSPADM

## 2024-10-11 RX ORDER — OXYCODONE HYDROCHLORIDE 5 MG/1
10 TABLET ORAL EVERY 4 HOURS PRN
Status: COMPLETED | OUTPATIENT
Start: 2024-10-11 | End: 2024-10-11

## 2024-10-11 RX ORDER — ROCURONIUM BROMIDE 10 MG/ML
INJECTION, SOLUTION INTRAVENOUS AS NEEDED
Status: DISCONTINUED | OUTPATIENT
Start: 2024-10-11 | End: 2024-10-11 | Stop reason: SURG

## 2024-10-11 RX ORDER — EPHEDRINE SULFATE 5 MG/ML
INJECTION INTRAVENOUS AS NEEDED
Status: DISCONTINUED | OUTPATIENT
Start: 2024-10-11 | End: 2024-10-11 | Stop reason: SURG

## 2024-10-11 RX ORDER — LIDOCAINE HYDROCHLORIDE 10 MG/ML
0.5 INJECTION, SOLUTION EPIDURAL; INFILTRATION; INTRACAUDAL; PERINEURAL ONCE AS NEEDED
Status: DISCONTINUED | OUTPATIENT
Start: 2024-10-11 | End: 2024-10-11 | Stop reason: HOSPADM

## 2024-10-11 RX ORDER — LABETALOL HYDROCHLORIDE 5 MG/ML
5 INJECTION, SOLUTION INTRAVENOUS
Status: DISCONTINUED | OUTPATIENT
Start: 2024-10-11 | End: 2024-10-11 | Stop reason: HOSPADM

## 2024-10-11 RX ORDER — DIPHENHYDRAMINE HYDROCHLORIDE 50 MG/ML
12.5 INJECTION INTRAMUSCULAR; INTRAVENOUS
Status: DISCONTINUED | OUTPATIENT
Start: 2024-10-11 | End: 2024-10-11 | Stop reason: HOSPADM

## 2024-10-11 RX ORDER — LIDOCAINE HYDROCHLORIDE 10 MG/ML
INJECTION, SOLUTION EPIDURAL; INFILTRATION; INTRACAUDAL; PERINEURAL AS NEEDED
Status: DISCONTINUED | OUTPATIENT
Start: 2024-10-11 | End: 2024-10-11 | Stop reason: SURG

## 2024-10-11 RX ORDER — IOPAMIDOL 408 MG/ML
INJECTION, SOLUTION INTRATHECAL AS NEEDED
Status: DISCONTINUED | OUTPATIENT
Start: 2024-10-11 | End: 2024-10-11 | Stop reason: HOSPADM

## 2024-10-11 RX ORDER — PROPOFOL 10 MG/ML
INJECTION, EMULSION INTRAVENOUS AS NEEDED
Status: DISCONTINUED | OUTPATIENT
Start: 2024-10-11 | End: 2024-10-11 | Stop reason: SURG

## 2024-10-11 RX ORDER — EPHEDRINE SULFATE 5 MG/ML
5 INJECTION INTRAVENOUS ONCE AS NEEDED
Status: DISCONTINUED | OUTPATIENT
Start: 2024-10-11 | End: 2024-10-11 | Stop reason: HOSPADM

## 2024-10-11 RX ORDER — ONDANSETRON 2 MG/ML
4 INJECTION INTRAMUSCULAR; INTRAVENOUS ONCE AS NEEDED
Status: DISCONTINUED | OUTPATIENT
Start: 2024-10-11 | End: 2024-10-11 | Stop reason: HOSPADM

## 2024-10-11 RX ADMIN — SUCCINYLCHOLINE CHLORIDE 160 MG: 20 INJECTION, SOLUTION INTRAMUSCULAR; INTRAVENOUS at 09:35

## 2024-10-11 RX ADMIN — PROPOFOL 180 MG: 10 INJECTION, EMULSION INTRAVENOUS at 09:35

## 2024-10-11 RX ADMIN — IPRATROPIUM BROMIDE AND ALBUTEROL SULFATE 3 ML: .5; 3 SOLUTION RESPIRATORY (INHALATION) at 12:55

## 2024-10-11 RX ADMIN — ROCURONIUM BROMIDE 45 MG: 10 INJECTION, SOLUTION INTRAVENOUS at 09:40

## 2024-10-11 RX ADMIN — PHENYLEPHRINE HYDROCHLORIDE 100 MCG: 10 INJECTION INTRAVENOUS at 09:54

## 2024-10-11 RX ADMIN — LIDOCAINE HYDROCHLORIDE 30 MG: 10 INJECTION, SOLUTION EPIDURAL; INFILTRATION; INTRACAUDAL; PERINEURAL at 09:35

## 2024-10-11 RX ADMIN — SODIUM CHLORIDE 2000 MG: 900 INJECTION INTRAVENOUS at 09:27

## 2024-10-11 RX ADMIN — OXYCODONE 10 MG: 5 TABLET ORAL at 11:21

## 2024-10-11 RX ADMIN — SODIUM CHLORIDE, POTASSIUM CHLORIDE, SODIUM LACTATE AND CALCIUM CHLORIDE 500 ML: 600; 310; 30; 20 INJECTION, SOLUTION INTRAVENOUS at 08:01

## 2024-10-11 RX ADMIN — HYDROMORPHONE HYDROCHLORIDE 0.5 MG: 1 INJECTION, SOLUTION INTRAMUSCULAR; INTRAVENOUS; SUBCUTANEOUS at 08:00

## 2024-10-11 RX ADMIN — FENTANYL CITRATE 50 MCG: 50 INJECTION, SOLUTION INTRAMUSCULAR; INTRAVENOUS at 10:48

## 2024-10-11 RX ADMIN — FENTANYL CITRATE 50 MCG: 50 INJECTION, SOLUTION INTRAMUSCULAR; INTRAVENOUS at 11:03

## 2024-10-11 RX ADMIN — FAMOTIDINE 20 MG: 10 INJECTION INTRAVENOUS at 13:22

## 2024-10-11 RX ADMIN — ROCURONIUM BROMIDE 5 MG: 10 INJECTION, SOLUTION INTRAVENOUS at 09:35

## 2024-10-11 RX ADMIN — DEXAMETHASONE SODIUM PHOSPHATE 4 MG: 4 INJECTION, SOLUTION INTRAMUSCULAR; INTRAVENOUS at 10:31

## 2024-10-11 RX ADMIN — EPHEDRINE SULFATE 5 MG: 5 INJECTION INTRAVENOUS at 10:07

## 2024-10-11 RX ADMIN — LIDOCAINE HYDROCHLORIDE 1 EACH: 40 SOLUTION TOPICAL at 09:35

## 2024-10-11 RX ADMIN — ONDANSETRON 4 MG: 2 INJECTION, SOLUTION INTRAMUSCULAR; INTRAVENOUS at 10:31

## 2024-10-11 RX ADMIN — SUGAMMADEX 200 MG: 100 INJECTION, SOLUTION INTRAVENOUS at 10:37

## 2024-10-11 NOTE — ANESTHESIA PREPROCEDURE EVALUATION
Anesthesia Evaluation     Patient summary reviewed and Nursing notes reviewed   NPO Solid Status: > 8 hours  NPO Liquid Status: > 8 hours           Airway   Mallampati: II  TM distance: >3 FB  Neck ROM: full  No difficulty expected  Dental - normal exam     Pulmonary    (+) ,sleep apnea  Cardiovascular     (+) hypertension      Neuro/Psych  (+) headaches, numbness  GI/Hepatic/Renal/Endo    (+) GERD poorly controlled    Musculoskeletal     (+) back pain  Abdominal    Substance History      OB/GYN          Other   arthritis,                 Anesthesia Plan    ASA 3     general   Rapid sequence  intravenous induction     Anesthetic plan, risks, benefits, and alternatives have been provided, discussed and informed consent has been obtained with: patient.    Plan discussed with CRNA.    CODE STATUS:

## 2024-10-11 NOTE — BRIEF OP NOTE
VERTEBROPLASTY  Progress Note    Nola Tariq  10/11/2024    Pre-op Diagnosis:   Compression fracture of L3 lumbar vertebra, closed, initial encounter [S32.030A]       Post-Op Diagnosis Codes:     * Compression fracture of L3 lumbar vertebra, closed, initial encounter [S32.030A]    Procedure/CPT® Codes:  TX PERQ VERT AGMNTJ CAVITY CRTJ UNI/BI CANNULJ LMBR [21153]      Procedure(s):  VERTEBROPLASTY              Surgeon(s):  Kirk Nascimento DO    Anesthesia: General    Staff:   Circulator: Zahraa Paz RN  Radiology Technologist: April Mcdonough Ashley  Scrub Person: Mirza Yusuf  Vendor Representative: Joyce Isabel; Alfreda Mullen         Estimated Blood Loss: minimal    Urine Voided: * No values recorded between 10/11/2024  9:29 AM and 10/11/2024 10:45 AM *    Specimens:                None          Drains: * No LDAs found *    Findings:         Complications: None          Kirk Nascimento DO     Date: 10/11/2024  Time: 11:00 EDT

## 2024-10-11 NOTE — OP NOTE
PROCEDURE: KYPHOPLASTY    SURGEON: Kirk Nascimento DO    OPERATION:    1. KYPHON® Balloon Kyphoplasty at L3 levels    2. Insertion of KYPHON® HV-R™ bone cement under low pressure at 150 levels      ANESTHESIA: General    PREOPERATIVE DIAGNOSIS: 733.13 pathologic fracture of vertebrae, 733.00 osteoporosis with pathologic fracture, vertebral compression fracture.    POSTOPERATIVE DIAGNOSIS: same    PREOPERATIVE ANTIBIOTICS: 2 g IV given 30 minutes prior to incision.    OPERATIVE PROCEDURE: The patient was brought to the operating room suite and general anesthesia with endotracheal intubation was performed. The patient was    positioned prone on the Marcello table. The back was prepped and draped. The image    intensifier (C-arm) was brought into position and the L3 pedicles were identified and marked with a skin marker. In view of the collapse of L3, a transpedicular approach to the vertebral body was appropriate.    A 22g livia needle was advanced to the pedicle and Lidocaine 1% was infiltrated, total of 5cc.    AP and lateral images were taken to verify position and trajectory. Alongside of the guide needle a 0.25 cm paramedian incision was made. The osteointroducer was advanced through the pedicle on the rightL3. Once I was at the junction of the pedicle and the vertebral body, a lateral image was taken to insure that the cannula was positioned approximately 1cm past the vertebral body wall. Through the cannula, a drill was advanced into the vertebral body under fluoroscopic guidance toward the anterior cortex, creating a channel. The anterior cortex was probed with the guide pin to ensure no perforations in the anterior cortex. After completing the entry into the vertebral body, a PMC 15 mm inflatable bone tamp was inserted through the cannula and advanced under fluoroscopic guidance into the vertebral body near the anterior cortex. The radiopaque marker bands on the bone tamp were identified using AP and lateral  images.    The above sequence of instrument placement was then repeated on the left L3 side of the  vertebral body.    Once both bone tamps were in position, they were inflated to 2 cc and 150 psi. Expansion of the bone tamps was done sequentially in increments of .25 to 0.5 cc of contrast, with careful attention being paid to the inflation pressures and balloon position. The inflation was monitored with AP and lateral imaging. The final balloon volume was 2 cc on the right side and 2.4 cc on the left. There was no breach of the lateral wall or anterior cortex of the vertebral body. Direct reduction of the fracture was achieved, end plate movement was noted and approximately 5 mm of height restoration was achieved.    Under fluoroscopic imaging, and the use of the bone void fillers, internal fixation was achieved through a low-pressure injection of KYPHON® HV-R™ bone cement. The cavity was filled with a total volume of 2 cc on the right side and 2.4 cc on the left side. Once the bone cement had hardened, the cannulas were then removed.    Post-procedure, all incisions were closed with dermabond. The patient was kept in the prone    position for approximately 10 minutes post cement injection or until hardening of the cement was confirmed. Patient was then turned supine. They were monitored briefly and returned to the floor. Nola Tariq was found to have no change in their baseline neurological exam at this time.    COMPLICATIONS: There were no immediate postprocedure complications.    ESTIMATED BLOOD LOSS: The estimated blood loss was nil.    Specimens as above.    Kirk Nascimento DO    October 11, 2024

## 2024-10-11 NOTE — DISCHARGE INSTRUCTIONS
DISCHARGE INSTRUCTIONS FOR Kyphoplasty     1. Maintain Dressing- Clean Dry and intact.   2. Follow up in one week.   3. Okay to take shower next day.   4. Call the office if you notice fever, chills, drainage from the incision site.  5. If there is a new onset weakness in the legs, loss of bowel or bladder control or severe pain in the back, go to the nearest ER and call the Pain Physician on call.   6. Call [phone] with any emergency.    Follow - up in pain clinic in 1 week.

## 2024-10-11 NOTE — ANESTHESIA PROCEDURE NOTES
Airway  Urgency: elective    Date/Time: 10/11/2024 9:35 AM  Airway not difficult    General Information and Staff    Patient location during procedure: OR  CRNA/CAA: Sasha Johnson, CRNA    Indications and Patient Condition  Indications for airway management: airway protection    Preoxygenated: yes  Mask difficulty assessment: 2 - vent by mask + OA or adjuvant +/- NMBA    Final Airway Details  Final airway type: endotracheal airway      Successful airway: ETT  Cuffed: yes   Successful intubation technique: video laryngoscopy  Facilitating devices/methods: intubating stylet  Endotracheal tube insertion site: oral  Blade: Naranjo  Blade size: 3  ETT size (mm): 7.0  Cormack-Lehane Classification: grade I - full view of glottis  Placement verified by: capnometry   Measured from: lips  ETT/EBT  to lips (cm): 22  Number of attempts at approach: 1  Assessment: lips, teeth, and gum same as pre-op and atraumatic intubation

## 2024-10-11 NOTE — ANESTHESIA POSTPROCEDURE EVALUATION
Patient: Nola Tariq    Procedure Summary       Date: 10/11/24 Room / Location: [facility] OR 08 / [facility] MAIN OR    Anesthesia Start: 0929 Anesthesia Stop: 1051    Procedure: VERTEBROPLASTY (Bilateral: Spine Lumbar) Diagnosis:       Compression fracture of L3 lumbar vertebra, closed, initial encounter      (Compression fracture of L3 lumbar vertebra, closed, initial encounter [S32.030A])    Surgeons: Kirk Nascimento DO Provider: Alfa Jalloh MD    Anesthesia Type: general ASA Status: 3            Anesthesia Type: general    Vitals  Vitals Value Taken Time   /89 10/11/24 1410   Temp 98 °F (36.7 °C) 10/11/24 1047   Pulse 84 10/11/24 1411   Resp 10 10/11/24 1347   SpO2 91 % 10/11/24 1411   Vitals shown include unfiled device data.        Post Anesthesia Care and Evaluation    Patient location during evaluation: PACU  Patient participation: complete - patient participated  Level of consciousness: awake  Pain scale: See nurse's notes for pain score.  Pain management: adequate    Airway patency: patent  Anesthetic complications: No anesthetic complications  PONV Status: none  Cardiovascular status: acceptable  Respiratory status: acceptable and spontaneous ventilation  Hydration status: acceptable    Comments: Patient seen and examined postoperatively; vital signs stable; SpO2 greater than or equal to 90%; cardiopulmonary status stable; nausea/vomiting adequately controlled; pain adequately controlled; no apparent anesthesia complications; patient discharged from anesthesia care when discharge criteria were met    SpO2 85-95% in recovery x 2 hours.  Received breathing tx and incentive spirometry.  Patient wide awake, normal respiratory effort, in no distress.  Pain controlled.    CXR obtained which showed bibasilar atelectasis, no ptx, no pleural effusion.    Ok to d/c home.  Encouraged continual IS use.

## 2024-10-15 ENCOUNTER — OFFICE VISIT (OUTPATIENT)
Dept: PAIN MEDICINE | Facility: CLINIC | Age: 74
End: 2024-10-15
Payer: MEDICARE

## 2024-10-15 VITALS
HEART RATE: 96 BPM | OXYGEN SATURATION: 96 % | DIASTOLIC BLOOD PRESSURE: 97 MMHG | RESPIRATION RATE: 16 BRPM | SYSTOLIC BLOOD PRESSURE: 158 MMHG

## 2024-10-15 DIAGNOSIS — S32.030A COMPRESSION FRACTURE OF L3 LUMBAR VERTEBRA, CLOSED, INITIAL ENCOUNTER: Primary | ICD-10-CM

## 2024-10-15 DIAGNOSIS — M96.1 POSTLAMINECTOMY SYNDROME: ICD-10-CM

## 2024-10-15 DIAGNOSIS — M48.062 SPINAL STENOSIS OF LUMBAR REGION WITH NEUROGENIC CLAUDICATION: ICD-10-CM

## 2024-10-15 DIAGNOSIS — Z98.890 S/P KYPHOPLASTY: ICD-10-CM

## 2024-10-15 RX ORDER — OXYCODONE AND ACETAMINOPHEN 10; 325 MG/1; MG/1
1 TABLET ORAL EVERY 6 HOURS PRN
Qty: 28 TABLET | Refills: 0 | Status: SHIPPED | OUTPATIENT
Start: 2024-10-15 | End: 2024-10-22

## 2024-10-15 RX ORDER — OXYCODONE AND ACETAMINOPHEN 10; 325 MG/1; MG/1
1 TABLET ORAL EVERY 6 HOURS PRN
Qty: 28 TABLET | Refills: 0 | Status: SHIPPED | OUTPATIENT
Start: 2024-10-22 | End: 2024-10-29

## 2024-10-15 NOTE — PROGRESS NOTES
Subjective   Nola Tariq is a 74 y.o. female is here s/p kyphoplasty at L3. She states that she continues to have lower back pain and radicular pain. Not sure due to other pain, if kypho has helped so far.  She is scheduled to see [facility].  She was told previously that her last option would be major fusion which may or may not help her overall pain.  On last visit:     Left leg pain is 5/10 on VAS.    Lower back pain is 8/10 on VAS, at maximum 9/10.  Pain is aching and dull in nature.  Referred to right groin, right leg  She has numbness and tingling in bilateral foot.  Pain is constant.  Improved by pain meds.  Worse with walking and standing.    R knee pain is 5/10 on VAS. Worse with weight bearing. She had R knee steroid injection with Rheumatology several months ago with excellent relief.     R shoulder pain is 6/10 on VAS. Normal ROM. Had shoulder bursa issues several years ago which improved with US treatments.     Migraines - She used to take Excedrin and Fioricet for daily migraines but it doesn't work anymore. She is scheduled to see Neurology.  She does wake up with migraines during night or early in morning. She would wake up with headache and goes away with Excedrin. She had 2-3 headaches over last month that didn't go away with Excedrin and lasted for rest of the day. She had seen someone for chiari malformation previous.  History of migraines since teenager and usually right-sided temporal area but states that new migraines are all over the head. Seen by neurology who recommended continuing excedrin.      Previous Injection:   10/11/24 - L3 kyphoplasty - not sure if helped.   9/3/2024-LESI L3-4-no significant improvement.  4/18/2023- SCS trial Educanon- no significant pain relief. Coverage was excellent with patient able to feel paresthesia in all of the painful areas but unfortunately no significant pain relief.  No functional improvement as well.  11/8/2022-right knee  injection- good relief.  7/8/22 - LESI L5-S1 - no relief.   RFA at previous pain management - no relief.     Hx:  Referred by JEM Ashton for  lower back pain.  Pain started around 2008 without any significant injuries and has been getting worse since then.  Her main complaints today are mid back and lower back pain.  Patient had previously seen Dr. Voss and had epidurals, facet joint injections, ablations.  She states that nothing helped at the time. Last seen 1 year ago. Her lower back pain is worse than mid back pain. She is retired RN.   She has since seen [facility] (seen by JEM)- 2022 who recommended medication management.  Patient states that she is not interested in fusion surgery as there is 50% chance of lower back pain improvement. She had also injured her left knee and had steroid injection by Ortho in left knee.  Scheduled to see Ortho in 5 weeks. She has been seeing neurology for migraines and has been started on gabapentin.  Patient has taken gabapentin for about 1 month without significant improvement in her migraines.        PHQ-9- 4  SOAPP-2      PMH:   Tijerina's esophagus, hypertension, migraine, thoracic vertebra fx s/p kypho T7, T10, migraines, smoker, Back surgery- decompression L4-5?-2009 Dr. Rdz, Carpal tunnel surgery 1983 bilateral wrist; R knee steroid injection with orthopedics (Dr. Barton at [facility]).         Current Medications:   Tramadol  mg q6H PRN  Lyrica 25 mg BID  Ibuprofen 800 mg       Past Medications:  Tramadol  mg daily   Meloxicam 15 mg  Celebrex-leg swelling  Gabapentin - didn't help   Norco 5-325 mg BID PRN      Past Modalities:  TENS:                                                                          no                                                  Physical Therapy Within The Last 6 Months              Yes (1.5 years ago with some help).   Psychotherapy                                                            no  Massage Therapy                                                        no     Patient Complains Of:  Uro-Fecal Incontinence          no  Weight Gain/Loss                   Yes (weight gain 25 lbs - 1 year after smoking).   Fever/Chills                             no  Weakness                               no            Current Outpatient Medications:     Acetaminophen-Caffeine 500-65 MG tablet, EXCEDRIN TENSION HEADACHE 500-65 MG TABS, Disp: , Rfl:     albuterol sulfate  (90 Base) MCG/ACT inhaler, Inhale 2 puffs Every 4 (Four) Hours As Needed for Wheezing., Disp: 6.7 g, Rfl: 0    Cetirizine HCl 10 MG capsule, ZYRTEC ALLERGY 10 MG CAPS, Disp: , Rfl:     denosumab (PROLIA) 60 MG/ML solution prefilled syringe syringe, , Disp: , Rfl:     docusate sodium (COLACE) 100 MG capsule, Take 1 capsule by mouth 2 (Two) Times a Day., Disp: , Rfl:     famotidine (PEPCID) 40 MG tablet, , Disp: , Rfl:     gabapentin (NEURONTIN) 300 MG capsule, , Disp: , Rfl:     HYDROcodone-acetaminophen (NORCO)  MG per tablet, Take 1 tablet by mouth Every 6 (Six) Hours As Needed for Moderate Pain for up to 30 days., Disp: 120 tablet, Rfl: 0    [START ON 11/11/2024] HYDROcodone-acetaminophen (NORCO)  MG per tablet, Take 1 tablet by mouth Every 6 (Six) Hours As Needed for Moderate Pain for up to 30 days., Disp: 120 tablet, Rfl: 0    ibuprofen (ADVIL,MOTRIN) 800 MG tablet, Take 1 tablet by mouth 3 (Three) Times a Day As Needed for Mild Pain., Disp: 270 tablet, Rfl: 1    Multiple Vitamins-Minerals (MULTIVITAMIN WITH MINERALS) tablet tablet, Take 1 tablet by mouth Daily., Disp: , Rfl:     omeprazole (priLOSEC) 40 MG capsule, , Disp: , Rfl:     vitamin D (ERGOCALCIFEROL) 1.25 MG (40938 UT) capsule capsule, Take 1 capsule by mouth 1 (One) Time Per Week., Disp: , Rfl:     fluticasone (FLONASE) 50 MCG/ACT nasal spray, 2 sprays by Each Nare route 2 (Two) Times a Day., Disp: , Rfl:     guaiFENesin (MUCINEX) 600 MG 12 hr tablet, Take 1 tablet by mouth Every 12  (Twelve) Hours., Disp: , Rfl:     oxyCODONE-acetaminophen (PERCOCET)  MG per tablet, Take 1 tablet by mouth Every 6 (Six) Hours As Needed for Moderate Pain for up to 7 days., Disp: 28 tablet, Rfl: 0    [START ON 10/22/2024] oxyCODONE-acetaminophen (PERCOCET)  MG per tablet, Take 1 tablet by mouth Every 6 (Six) Hours As Needed for Moderate Pain for up to 7 days., Disp: 28 tablet, Rfl: 0    sucralfate (CARAFATE) 1 g tablet, , Disp: , Rfl:     topiramate (TOPAMAX) 25 MG tablet, , Disp: , Rfl:     Voquezna 20 MG tablet, , Disp: , Rfl:     The following portions of the patient's history were reviewed and updated as appropriate: allergies, current medications, past family history, past medical history, past social history, past surgical history, and problem list.      REVIEW OF PERTINENT MEDICAL DATA    Past Medical History:   Diagnosis Date    Arthritis     Tijerina esophagus     Chronic pain disorder     COVID-19     DJD (degenerative joint disease)     Extremity pain     Fall at home     Fall at home     fracture rt. leg    Foot pain, right     Fractures     GERD (gastroesophageal reflux disease)     Headache, tension-type     Hypertension     Joint pain     Leg pain, right     Low back pain     Migraine     Plantar fasciitis     Shingles     Shoulder pain, right     Sleep apnea     Spinal stenosis 9/4/2024    Epidural on 9/4     Past Surgical History:   Procedure Laterality Date    BACK SURGERY  2/2009    Decompression    CARPAL TUNNEL RELEASE      [place].    COLONOSCOPY      EPIDURAL BLOCK      EYE SURGERY      [place]    KNEE ARTHROSCOPY      SPINAL CORD STIMULATOR TRIAL W/ LAMINOTOMY      SPINE SURGERY      Kyphoplasty    TUBAL ABDOMINAL LIGATION      VERTEBROPLASTY Bilateral 10/11/2024    Procedure: VERTEBROPLASTY;  Surgeon: Kirk Nascimento DO;  [facility] MAIN OR;  Service: Pain Management;  Laterality: Bilateral;    WRIST SURGERY      tendon release     Family History   Problem Relation Age of Onset     Cancer Mother     Migraines Father     COPD Father     Migraines Sister     Migraines Brother      Social History     Socioeconomic History    Marital status:    Tobacco Use    Smoking status: Former     Current packs/day: 0.00     Types: Cigarettes     Quit date: 1966     Years since quittin.8    Smokeless tobacco: Never   Vaping Use    Vaping status: Never Used   Substance and Sexual Activity    Alcohol use: Never    Drug use: Never    Sexual activity: Not Currently         Review of Systems   Musculoskeletal:  Positive for arthralgias and back pain.         Vitals:    10/15/24 1031   BP: 158/97   Pulse: 96   Resp: 16   SpO2: 96%   PainSc:   8                         Objective   Physical Exam  Musculoskeletal:         General: Tenderness present.        Back:         Legs:    Neurological:      Comments: Motor strength 5/5 b/l LE  Sensory intact b/l LE             Imaging Reviewed:  Right hip x-ray-2024  - Mild degenerative changes of each hip.    MRI lumbar spine without contrast-2024  - L2-3-disc bulge with left paracentral extrusion extending inferiorly.  Facet arthropathy.  Mild to moderate central canal stenosis and moderate bilateral foraminal stenosis  L3-4-disc bulge with small protrusion, bilateral facet arthropathy with ligamentum flavum hypertrophy causing severe central canal stenosis and severe right and moderate left foraminal stenosis  T9-9-lbscrhyjg facet hypertrophy resulting in 5 mm of L4 anterolisthesis.  Severe central canal stenosis, moderate bilateral foraminal stenosis and bilateral L5 lateral recess stenosis most severe on the right  L5-S1-left greater than right facet arthropathy.  Moderate central canal stenosis.  Possible compression of left S1 nerve root prior to entering the lateral recess.  Moderate right and severe left foraminal stenosis.  - Redundancy of the cauda equina nerve roots due to spinal stenosis.  - L3 inferior endplate subacute compression  fracture with mild bone marrow edema which is new prior to previous imaging- subacute fracture.      Lumbar spine MRI without contrast-6/13/2022.  L2-3-moderate disc bulge.  Moderate posterior facet changes.  Mild to moderate central canal narrowing.  Moderate bilateral neural foraminal narrowing.  L3-4-- broad-based disc bulge.  Central posterior disc protrusion causing narrowing of central canal with approximate 5 mm in AP dimension.  Moderate facet changes.  Moderate to severe central canal narrowing.  Mild bilateral neural foraminal narrowing.  L4-5-large broad-based disc bulge.  Severe facet changes.  Severe central canal narrowing.  Moderate bilateral neural foraminal narrowing  L5-S1-large broad-based disc bulge.  Severe facet changes.  Moderate to severe central canal narrowing.  Severe bilateral neural foraminal narrowing.      MRI thoracic spine-6/13/2022  Kyphoplasty at T7 and T10.  - Residual mild to moderate compression fracture deformities  - Mild posterior disc bulges at multiple thoracic levels.  No large extrusions.    MRI brain wo contrast: 8/15/2018  Chiari Malformation type 1 with cerebellar tonsils projecting 7 mm below level of foramen magnum  Chronic b/l maxillary and ethmoid sinusitis     Assessment:    1. Compression fracture of L3 lumbar vertebra, closed, initial encounter    2. Spinal stenosis of lumbar region with neurogenic claudication    3. Postlaminectomy syndrome    4. S/P kyphoplasty          Plan:   1.    UDS consistent with patient's interview on 2/27/2024.   Narcotic contract signed-5/10/2022.  Narcan sent-6/20/2022.  2. We discussed trying a course of formal physical therapy.  Physical therapy can help strengthen and stretch the muscles around the joints. Continue to be as active as possible. Start physical therapy as it will help generalized pain and follow up with HEP.  PT prescription sent to [facility] in [place] as requested by patient.  3.  Patient continues to have  pain despite being on Norco.  She had significant pain relief with tramadol previously.   Due to severe pain stop tramadol and start taking Norco.  4.  Due to severe pain, stop Norco and start Percocet  mg QID PRN (2 week supply).  Stop Norco.  Recommend to bring back Norco on next visit.   Side effects discussed including but not limited to nausea, vomiting, constipation, lightheadedness, dizziness, drowsiness, or headache. This medication may increase serotonin and rarely cause a very serious condition called serotonin syndrome/ toxicity.   5. Continues Ibuprofen 800 mg TID PRN-sent for 6 months on 10/8/2024 patient informed of increased risk of heart attacks, stroke and kidney problems in addition to gastric ulcers with use of non-steroidal anti-inflammatory medications.  6.  Recommend following up with neurology for worsening of migraines.  History of Chiari malformation type I.  We did discuss possibility of Botox injection if she fails multiple medications  7.    Status post L3 kyphoplasty but unable to tell if there is significant benefit due to other pathology in her lower back.  Patient's pain is now unbearable not controlled with pain medications.  Recommend discussing possibility of fusion surgery that was offered by spine surgery.      RTC in 2 weeks.     Kirk Nascimento DO  Pain Management   [facility]           INSPECT REPORT    As part of the patient's treatment plan, I may be prescribing controlled substances. The patient has been made aware of appropriate use of such medications, including potential risk of somnolence, limited ability to drive and/or work safely, and the potential for dependence or overdose. It has also been made clear that these medications are for use by this patient only, without concomitant use of alcohol or other substances unless prescribed.     Patient has completed prescribing agreement detailing terms of continued prescribing of controlled substances, including  monitoring INSPECT reports, urine drug screening, and pill counts if necessary. The patient is aware that inappropriate use will results in cessation of prescribing such medications.    INSPECT report has been reviewed and scanned into the patient's chart.

## 2024-10-21 ENCOUNTER — TELEPHONE (OUTPATIENT)
Dept: PAIN MEDICINE | Facility: CLINIC | Age: 74
End: 2024-10-21
Payer: MEDICARE

## 2024-10-21 NOTE — TELEPHONE ENCOUNTER
Pt has been taking Percocet, she has a refill she can request tomorrow,  Advised again to bring her Norco back at her next appointment

## 2024-10-28 NOTE — PROGRESS NOTES
Subjective   Nola Tariq is a 74 y.o. female is here for follow-up for lower back pain. Continues to have severe pain. New lumbar Xray done at showing new fracture at L4 superior end plate. They have ordered DEXA scan.     On last visit: Started on Percocet-still having significant pain.    Left leg pain is 5/10 on VAS.    Lower back pain is 8/10 on VAS, at maximum 9/10.  Pain is aching and dull in nature.  Referred to right groin, right leg  She has numbness and tingling in bilateral foot.  Pain is constant.  Improved by pain meds.  Worse with walking and standing.    R knee pain is 5/10 on VAS. Worse with weight bearing. She had R knee steroid injection with Rheumatology several months ago with excellent relief.     R shoulder pain is 6/10 on VAS. Normal ROM. Had shoulder bursa issues several years ago which improved with US treatments.     Migraines - She used to take Excedrin and Fioricet for daily migraines but it doesn't work anymore. She is scheduled to see Neurology.  She does wake up with migraines during night or early in morning. She would wake up with headache and goes away with Excedrin. She had 2-3 headaches over last month that didn't go away with Excedrin and lasted for rest of the day. She had seen someone for chiari malformation previous.  History of migraines since teenager and usually right-sided temporal area but states that new migraines are all over the head. Seen by neurology who recommended continuing excedrin.      Previous Injection:   10/11/24 - L3 kyphoplasty - not sure if helped.   9/3/2024-LESI L3-4-no significant improvement.  4/18/2023- SCS trial [employer]- no significant pain relief. Coverage was excellent with patient able to feel paresthesia in all of the painful areas but unfortunately no significant pain relief.  No functional improvement as well.  11/8/2022-right knee injection- good relief.  7/8/22 - LESI L5-S1 - no relief.   RFA at previous pain management - no  relief.     Hx:  Referred by JEM Ashton for  lower back pain.  Pain started around 2008 without any significant injuries and has been getting worse since then.  Her main complaints today are mid back and lower back pain.  Patient had previously seen Dr. Voss and had epidurals, facet joint injections, ablations.  She states that nothing helped at the time. Last seen 1 year ago. Her lower back pain is worse than mid back pain. She is retired RN.   She has since seen [facility] (seen by JEM)- 2022 who recommended medication management.  Patient states that she is not interested in fusion surgery as there is 50% chance of lower back pain improvement. She had also injured her left knee and had steroid injection by Ortho in left knee.  Scheduled to see Ortho in 5 weeks. She has been seeing neurology for migraines and has been started on gabapentin.  Patient has taken gabapentin for about 1 month without significant improvement in her migraines.        PHQ-9- 4  SOAPP-2      PMH:   Tijerina's esophagus, hypertension, migraine, thoracic vertebra fx s/p kypho T7, T10, migraines, smoker, Back surgery- decompression L4-5?-2009 Dr. Rdz, Carpal tunnel surgery 1983 bilateral wrist; R knee steroid injection with orthopedics (Dr. Barton at [facility]).         Current Medications:   Tramadol  mg q6H PRN  Lyrica 25 mg BID  Ibuprofen 800 mg       Past Medications:  Tramadol  mg daily   Meloxicam 15 mg  Celebrex-leg swelling  Gabapentin - didn't help   Norco 5-325 mg BID PRN      Past Modalities:  TENS:                                                                          no                                                  Physical Therapy Within The Last 6 Months              Yes (1.5 years ago with some help).   Psychotherapy                                                            no  Massage Therapy                                                       no     Patient Complains Of:  Uro-Fecal  Incontinence          no  Weight Gain/Loss                   Yes (weight gain 25 lbs - 1 year after smoking).   Fever/Chills                             no  Weakness                               no            Current Outpatient Medications:     Acetaminophen-Caffeine 500-65 MG tablet, EXCEDRIN TENSION HEADACHE 500-65 MG TABS, Disp: , Rfl:     albuterol sulfate  (90 Base) MCG/ACT inhaler, Inhale 2 puffs Every 4 (Four) Hours As Needed for Wheezing., Disp: 6.7 g, Rfl: 0    Cetirizine HCl 10 MG capsule, ZYRTEC ALLERGY 10 MG CAPS, Disp: , Rfl:     denosumab (PROLIA) 60 MG/ML solution prefilled syringe syringe, , Disp: , Rfl:     docusate sodium (COLACE) 100 MG capsule, Take 1 capsule by mouth 2 (Two) Times a Day., Disp: , Rfl:     famotidine (PEPCID) 40 MG tablet, , Disp: , Rfl:     gabapentin (NEURONTIN) 300 MG capsule, , Disp: , Rfl:     ibuprofen (ADVIL,MOTRIN) 800 MG tablet, Take 1 tablet by mouth 3 (Three) Times a Day As Needed for Mild Pain., Disp: 270 tablet, Rfl: 1    Multiple Vitamins-Minerals (MULTIVITAMIN WITH MINERALS) tablet tablet, Take 1 tablet by mouth Daily., Disp: , Rfl:     naloxone (NARCAN) 4 MG/0.1ML nasal spray, Administer 1 spray into the nostril(s) as directed by provider As Needed (narcotic overdose, respiratory depression) for up to 30 days., Disp: 1 each, Rfl: 0    omeprazole (priLOSEC) 40 MG capsule, , Disp: , Rfl:     oxyCODONE-acetaminophen (PERCOCET)  MG per tablet, Take 1 tablet by mouth Every 6 (Six) Hours As Needed for Moderate Pain for up to 7 days., Disp: 28 tablet, Rfl: 0    vitamin D (ERGOCALCIFEROL) 1.25 MG (55402 UT) capsule capsule, Take 1 capsule by mouth 1 (One) Time Per Week., Disp: , Rfl:     fluticasone (FLONASE) 50 MCG/ACT nasal spray, 2 sprays by Each Nare route 2 (Two) Times a Day., Disp: , Rfl:     guaiFENesin (MUCINEX) 600 MG 12 hr tablet, Take 1 tablet by mouth Every 12 (Twelve) Hours., Disp: , Rfl:     oxyCODONE-acetaminophen (PERCOCET)  MG per  tablet, Take 1 tablet by mouth Every 6 (Six) Hours As Needed for Moderate Pain for up to 30 days., Disp: 120 tablet, Rfl: 0    sucralfate (CARAFATE) 1 g tablet, , Disp: , Rfl:     topiramate (TOPAMAX) 25 MG tablet, , Disp: , Rfl:     Voquezna 20 MG tablet, , Disp: , Rfl:     The following portions of the patient's history were reviewed and updated as appropriate: allergies, current medications, past family history, past medical history, past social history, past surgical history, and problem list.      REVIEW OF PERTINENT MEDICAL DATA    Past Medical History:   Diagnosis Date    Arthritis     Tijerina esophagus     Chronic pain disorder     COVID-19     DJD (degenerative joint disease)     Extremity pain     Fall at home     Fall at home     fracture rt. leg    Foot pain, right     Fractures     GERD (gastroesophageal reflux disease)     Headache, tension-type     Hypertension     Joint pain     Leg pain, right     Low back pain     Migraine     Plantar fasciitis     Shingles     Shoulder pain, right     Sleep apnea     Spinal stenosis 9/4/2024    Epidural on 9/4     Past Surgical History:   Procedure Laterality Date    BACK SURGERY  2/2009    Decompression    CARPAL TUNNEL RELEASE      [place].    COLONOSCOPY      EPIDURAL BLOCK      EYE SURGERY      [place]    KNEE ARTHROSCOPY      SPINAL CORD STIMULATOR TRIAL W/ LAMINOTOMY      SPINE SURGERY      Kyphoplasty    TUBAL ABDOMINAL LIGATION      VERTEBROPLASTY Bilateral 10/11/2024    Procedure: VERTEBROPLASTY;  Surgeon: Kirk Nascimento DO;  Location: [facility] MAIN OR;  Service: Pain Management;  Laterality: Bilateral;    WRIST SURGERY      tendon release     Family History   Problem Relation Age of Onset    Cancer Mother     Migraines Father     COPD Father     Migraines Sister     Migraines Brother      Social History     Socioeconomic History    Marital status:    Tobacco Use    Smoking status: Former     Current packs/day: 0.00     Types: Cigarettes     Quit date:  1966     Years since quittin.8    Smokeless tobacco: Never   Vaping Use    Vaping status: Never Used   Substance and Sexual Activity    Alcohol use: Never    Drug use: Never    Sexual activity: Not Currently         Review of Systems   Musculoskeletal:  Positive for arthralgias and back pain.         Vitals:    10/29/24 1103   BP: 168/95   Pulse: 82   Resp: 16   SpO2: 94%   PainSc:   8                           Objective   Physical Exam  Musculoskeletal:         General: Tenderness present.        Back:         Legs:    Neurological:      Comments: Motor strength 5/5 b/l LE  Sensory intact b/l LE             Imaging Reviewed:  Right hip x-ray-2024  - Mild degenerative changes of each hip.    MRI lumbar spine without contrast-2024  - L2-3-disc bulge with left paracentral extrusion extending inferiorly.  Facet arthropathy.  Mild to moderate central canal stenosis and moderate bilateral foraminal stenosis  L3-4-disc bulge with small protrusion, bilateral facet arthropathy with ligamentum flavum hypertrophy causing severe central canal stenosis and severe right and moderate left foraminal stenosis  R6-8-zyzgxkrsw facet hypertrophy resulting in 5 mm of L4 anterolisthesis.  Severe central canal stenosis, moderate bilateral foraminal stenosis and bilateral L5 lateral recess stenosis most severe on the right  L5-S1-left greater than right facet arthropathy.  Moderate central canal stenosis.  Possible compression of left S1 nerve root prior to entering the lateral recess.  Moderate right and severe left foraminal stenosis.  - Redundancy of the cauda equina nerve roots due to spinal stenosis.  - L3 inferior endplate subacute compression fracture with mild bone marrow edema which is new prior to previous imaging- subacute fracture.      Lumbar spine MRI without contrast-2022.  L2-3-moderate disc bulge.  Moderate posterior facet changes.  Mild to moderate central canal narrowing.  Moderate bilateral  neural foraminal narrowing.  L3-4-- broad-based disc bulge.  Central posterior disc protrusion causing narrowing of central canal with approximate 5 mm in AP dimension.  Moderate facet changes.  Moderate to severe central canal narrowing.  Mild bilateral neural foraminal narrowing.  L4-5-large broad-based disc bulge.  Severe facet changes.  Severe central canal narrowing.  Moderate bilateral neural foraminal narrowing  L5-S1-large broad-based disc bulge.  Severe facet changes.  Moderate to severe central canal narrowing.  Severe bilateral neural foraminal narrowing.      MRI thoracic spine-6/13/2022  Kyphoplasty at T7 and T10.  - Residual mild to moderate compression fracture deformities  - Mild posterior disc bulges at multiple thoracic levels.  No large extrusions.    MRI brain wo contrast: 8/15/2018  Chiari Malformation type 1 with cerebellar tonsils projecting 7 mm below level of foramen magnum  Chronic b/l maxillary and ethmoid sinusitis     Assessment:    1. Spinal stenosis of lumbar region with neurogenic claudication    2. Compression fracture of L3 lumbar vertebra, closed, initial encounter    3. Postlaminectomy syndrome    4. S/P kyphoplasty    5. High risk medication use    6. Failed back surgical syndrome            Plan:   1.    UDS consistent with patient's interview on 2/27/2024.   Narcotic contract signed-5/10/2022.  Narcan sent-6/20/2022.  2. We discussed trying a course of formal physical therapy.  Physical therapy can help strengthen and stretch the muscles around the joints. Continue to be as active as possible. Start physical therapy as it will help generalized pain and follow up with HEP.  PT prescription sent to [facility] in [place] as requested by patient.  3.  Patient continues to have pain despite being on Norco.  She had significant pain relief with tramadol previously.   Due to severe pain stop tramadol and start taking Norco.  4.  Due to severe pain, stop Norco and start Percocet   mg QID PRN (10/29/24).  Stop Norco.  Recommend to bring back Norco on next visit.   Side effects discussed including but not limited to nausea, vomiting, constipation, lightheadedness, dizziness, drowsiness, or headache. This medication may increase serotonin and rarely cause a very serious condition called serotonin syndrome/ toxicity.   5. Continues Ibuprofen 800 mg TID PRN-sent for 6 months on 10/8/2024 patient informed of increased risk of heart attacks, stroke and kidney problems in addition to gastric ulcers with use of non-steroidal anti-inflammatory medications.  6.  Recommend following up with neurology for worsening of migraines.  History of Chiari malformation type I.  We did discuss possibility of Botox injection if she fails multiple medications  7.    Status post L3 kyphoplasty but unable to tell if there is significant benefit due to other pathology in her lower back. She has new L4 compression fracture which is probably causing severe pain she is continuously having. She has been on Prolia and has been seeing Dr. Mcgovern.  Recommended to start wearing TLSO bracing to help fracture heal and provide help with muscle weakness.  Discussed with patient that if she continues to have this severe pain, I will recommend obtaining MRI to evaluate the L4 fracture further and possibility of kyphoplasty at this level.  Patient is scheduled to see Ortho and will discuss further.      RTC in 1 month.      Kirk Nascimento DO  Pain Management   [facility]           INSPECT REPORT    As part of the patient's treatment plan, I may be prescribing controlled substances. The patient has been made aware of appropriate use of such medications, including potential risk of somnolence, limited ability to drive and/or work safely, and the potential for dependence or overdose. It has also been made clear that these medications are for use by this patient only, without concomitant use of alcohol or other substances unless  prescribed.     Patient has completed prescribing agreement detailing terms of continued prescribing of controlled substances, including monitoring INSPECT reports, urine drug screening, and pill counts if necessary. The patient is aware that inappropriate use will results in cessation of prescribing such medications.    INSPECT report has been reviewed and scanned into the patient's chart.

## 2024-10-29 ENCOUNTER — OFFICE VISIT (OUTPATIENT)
Dept: PAIN MEDICINE | Facility: CLINIC | Age: 74
End: 2024-10-29
Payer: MEDICARE

## 2024-10-29 VITALS
RESPIRATION RATE: 16 BRPM | SYSTOLIC BLOOD PRESSURE: 168 MMHG | HEART RATE: 82 BPM | OXYGEN SATURATION: 94 % | DIASTOLIC BLOOD PRESSURE: 95 MMHG

## 2024-10-29 DIAGNOSIS — M96.1 FAILED BACK SURGICAL SYNDROME: ICD-10-CM

## 2024-10-29 DIAGNOSIS — S32.030A COMPRESSION FRACTURE OF L3 LUMBAR VERTEBRA, CLOSED, INITIAL ENCOUNTER: ICD-10-CM

## 2024-10-29 DIAGNOSIS — M96.1 POSTLAMINECTOMY SYNDROME: ICD-10-CM

## 2024-10-29 DIAGNOSIS — Z98.890 S/P KYPHOPLASTY: ICD-10-CM

## 2024-10-29 DIAGNOSIS — Z79.899 HIGH RISK MEDICATION USE: ICD-10-CM

## 2024-10-29 DIAGNOSIS — M48.062 SPINAL STENOSIS OF LUMBAR REGION WITH NEUROGENIC CLAUDICATION: Primary | ICD-10-CM

## 2024-10-29 PROCEDURE — 1159F MED LIST DOCD IN RCRD: CPT | Performed by: STUDENT IN AN ORGANIZED HEALTH CARE EDUCATION/TRAINING PROGRAM

## 2024-10-29 PROCEDURE — 99214 OFFICE O/P EST MOD 30 MIN: CPT | Performed by: STUDENT IN AN ORGANIZED HEALTH CARE EDUCATION/TRAINING PROGRAM

## 2024-10-29 PROCEDURE — 1125F AMNT PAIN NOTED PAIN PRSNT: CPT | Performed by: STUDENT IN AN ORGANIZED HEALTH CARE EDUCATION/TRAINING PROGRAM

## 2024-10-29 PROCEDURE — 1160F RVW MEDS BY RX/DR IN RCRD: CPT | Performed by: STUDENT IN AN ORGANIZED HEALTH CARE EDUCATION/TRAINING PROGRAM

## 2024-10-29 RX ORDER — OXYCODONE AND ACETAMINOPHEN 10; 325 MG/1; MG/1
1 TABLET ORAL EVERY 6 HOURS PRN
Qty: 120 TABLET | Refills: 0 | Status: SHIPPED | OUTPATIENT
Start: 2024-10-29 | End: 2024-11-28

## 2024-11-11 NOTE — PROGRESS NOTES
Subjective   Nola Tariq is a 74 y.o. female is here for follow-up for lower back pain.  Last seen on 10/29/2024. Continues to have severe lower back pain. Continues to have severe lower back pain despite conservative management and requiring escalting dosing of opioids.       On last visit:     Left leg pain is 5/10 on VAS.    Lower back pain is 8/10 on VAS, at maximum 9/10.  Pain is aching and dull in nature.  Referred to right groin, right leg  She has numbness and tingling in bilateral foot.  Pain is constant.  Improved by pain meds.  Worse with walking and standing.    R knee pain is 5/10 on VAS. Worse with weight bearing. She had R knee steroid injection with Rheumatology several months ago with excellent relief.     R shoulder pain is 6/10 on VAS. Normal ROM. Had shoulder bursa issues several years ago which improved with US treatments.     Migraines - She used to take Excedrin and Fioricet for daily migraines but it doesn't work anymore. She is scheduled to see Neurology.  She does wake up with migraines during night or early in morning. She would wake up with headache and goes away with Excedrin. She had 2-3 headaches over last month that didn't go away with Excedrin and lasted for rest of the day. She had seen someone for chiari malformation previous.  History of migraines since teenager and usually right-sided temporal area but states that new migraines are all over the head. Seen by neurology who recommended continuing excedrin.      Previous Injection:   10/11/24 - L3 kyphoplasty - not sure if helped.   9/3/2024-LESI L3-4-no significant improvement.  4/18/2023- SCS trial Boxcar- no significant pain relief. Coverage was excellent with patient able to feel paresthesia in all of the painful areas but unfortunately no significant pain relief.  No functional improvement as well.  11/8/2022-right knee injection- good relief.  7/8/22 - LESI L5-S1 - no relief.   RFA at previous pain management -  no relief.     Hx:  Referred by JEM Ashton for  lower back pain.  Pain started around 2008 without any significant injuries and has been getting worse since then.  Her main complaints today are mid back and lower back pain.  Patient had previously seen Dr. Voss and had epidurals, facet joint injections, ablations.  She states that nothing helped at the time. Last seen 1 year ago. Her lower back pain is worse than mid back pain. She is retired RN.   She has since seen [facility] (seen by JEM)- 2022 who recommended medication management.  Patient states that she is not interested in fusion surgery as there is 50% chance of lower back pain improvement. She had also injured her left knee and had steroid injection by Ortho in left knee.  Scheduled to see Ortho in 5 weeks. She has been seeing neurology for migraines and has been started on gabapentin.  Patient has taken gabapentin for about 1 month without significant improvement in her migraines.        PHQ-9- 4  SOAPP-2      PMH:   Tijerina's esophagus, hypertension, migraine, thoracic vertebra fx s/p kypho T7, T10, migraines, smoker, Back surgery- decompression L4-5?-2009 Dr. Rdz, Carpal tunnel surgery 1983 bilateral wrist; R knee steroid injection with orthopedics (Dr. Barton at [facility]).         Current Medications:   Tramadol  mg q6H PRN  Lyrica 25 mg BID  Ibuprofen 800 mg       Past Medications:  Tramadol  mg daily   Meloxicam 15 mg  Celebrex-leg swelling  Gabapentin - didn't help   Norco 5-325 mg BID PRN      Past Modalities:  TENS:                                                                          no                                                  Physical Therapy Within The Last 6 Months              Yes (1.5 years ago with some help).   Psychotherapy                                                            no  Massage Therapy                                                       no     Patient Complains Of:  Uro-Fecal  Incontinence          no  Weight Gain/Loss                   Yes (weight gain 25 lbs - 1 year after smoking).   Fever/Chills                             no  Weakness                               no            Current Outpatient Medications:     Acetaminophen-Caffeine 500-65 MG tablet, EXCEDRIN TENSION HEADACHE 500-65 MG TABS, Disp: , Rfl:     albuterol sulfate  (90 Base) MCG/ACT inhaler, Inhale 2 puffs Every 4 (Four) Hours As Needed for Wheezing., Disp: 6.7 g, Rfl: 0    Cetirizine HCl 10 MG capsule, ZYRTEC ALLERGY 10 MG CAPS, Disp: , Rfl:     denosumab (PROLIA) 60 MG/ML solution prefilled syringe syringe, , Disp: , Rfl:     docusate sodium (COLACE) 100 MG capsule, Take 1 capsule by mouth 2 (Two) Times a Day., Disp: , Rfl:     famotidine (PEPCID) 40 MG tablet, , Disp: , Rfl:     gabapentin (NEURONTIN) 300 MG capsule, , Disp: , Rfl:     ibuprofen (ADVIL,MOTRIN) 800 MG tablet, Take 1 tablet by mouth 3 (Three) Times a Day As Needed for Mild Pain., Disp: 270 tablet, Rfl: 1    Multiple Vitamins-Minerals (MULTIVITAMIN WITH MINERALS) tablet tablet, Take 1 tablet by mouth Daily., Disp: , Rfl:     naloxone (NARCAN) 4 MG/0.1ML nasal spray, Administer 1 spray into the nostril(s) as directed by provider As Needed (narcotic overdose, respiratory depression) for up to 30 days., Disp: 1 each, Rfl: 0    omeprazole (priLOSEC) 40 MG capsule, , Disp: , Rfl:     [START ON 11/28/2024] oxyCODONE-acetaminophen (PERCOCET)  MG per tablet, Take 1 tablet by mouth Every 6 (Six) Hours As Needed for Moderate Pain for up to 30 days., Disp: 120 tablet, Rfl: 0    vitamin D (ERGOCALCIFEROL) 1.25 MG (38257 UT) capsule capsule, Take 1 capsule by mouth 1 (One) Time Per Week., Disp: , Rfl:     fluticasone (FLONASE) 50 MCG/ACT nasal spray, 2 sprays by Each Nare route 2 (Two) Times a Day., Disp: , Rfl:     guaiFENesin (MUCINEX) 600 MG 12 hr tablet, Take 1 tablet by mouth Every 12 (Twelve) Hours., Disp: , Rfl:     sucralfate (CARAFATE) 1 g  tablet, , Disp: , Rfl:     topiramate (TOPAMAX) 25 MG tablet, , Disp: , Rfl:     Voquezna 20 MG tablet, , Disp: , Rfl:     The following portions of the patient's history were reviewed and updated as appropriate: allergies, current medications, past family history, past medical history, past social history, past surgical history, and problem list.      REVIEW OF PERTINENT MEDICAL DATA    Past Medical History:   Diagnosis Date    Arthritis     Tijerina esophagus     Chronic pain disorder     COVID-19     DJD (degenerative joint disease)     Extremity pain     Fall at home     Fall at home     fracture rt. leg    Foot pain, right     Fractures     GERD (gastroesophageal reflux disease)     Headache, tension-type     Hypertension     Joint pain     Leg pain, right     Low back pain     Migraine     Plantar fasciitis     Shingles     Shoulder pain, right     Sleep apnea     Spinal stenosis 2024    Epidural on      Past Surgical History:   Procedure Laterality Date    BACK SURGERY  2009    Decompression    CARPAL TUNNEL RELEASE      colin.    COLONOSCOPY      EPIDURAL BLOCK      EYE SURGERY      catarac    KNEE ARTHROSCOPY      SPINAL CORD STIMULATOR TRIAL W/ LAMINOTOMY      SPINE SURGERY      Kyphoplasty    TUBAL ABDOMINAL LIGATION      VERTEBROPLASTY Bilateral 10/11/2024    Procedure: VERTEBROPLASTY;  Surgeon: Kirk Nascimento DO;  Location: [facility] [place] OR;  Service: Pain Management;  Laterality: Bilateral;    WRIST SURGERY      tendon release     Family History   Problem Relation Age of Onset    Cancer Mother     Migraines Father     COPD Father     Migraines Sister     Migraines Brother      Social History     Socioeconomic History    Marital status:    Tobacco Use    Smoking status: Former     Current packs/day: 0.00     Types: Cigarettes     Quit date: 1966     Years since quittin.8    Smokeless tobacco: Never   Vaping Use    Vaping status: Never Used   Substance and Sexual Activity    Alcohol  use: Never    Drug use: Never    Sexual activity: Not Currently         Review of Systems   Musculoskeletal:  Positive for arthralgias and back pain.         Vitals:    11/12/24 1020   BP: 135/95   Pulse: 79   Resp: 16   SpO2: 98%   Weight: 80.3 kg (177 lb)   PainSc:   7                             Objective   Physical Exam  Musculoskeletal:         General: Tenderness present.        Back:         Legs:    Neurological:      Comments: Motor strength 5/5 b/l LE  Sensory intact b/l LE             Imaging Reviewed:  Right hip x-ray-9/5/2024  - Mild degenerative changes of each hip.    MRI lumbar spine without contrast-8/14/2024  - L2-3-disc bulge with left paracentral extrusion extending inferiorly.  Facet arthropathy.  Mild to moderate central canal stenosis and moderate bilateral foraminal stenosis  L3-4-disc bulge with small protrusion, bilateral facet arthropathy with ligamentum flavum hypertrophy causing severe central canal stenosis and severe right and moderate left foraminal stenosis  C3-6-varrxlnux facet hypertrophy resulting in 5 mm of L4 anterolisthesis.  Severe central canal stenosis, moderate bilateral foraminal stenosis and bilateral L5 lateral recess stenosis most severe on the right  L5-S1-left greater than right facet arthropathy.  Moderate central canal stenosis.  Possible compression of left S1 nerve root prior to entering the lateral recess.  Moderate right and severe left foraminal stenosis.  - Redundancy of the cauda equina nerve roots due to spinal stenosis.  - L3 inferior endplate subacute compression fracture with mild bone marrow edema which is new prior to previous imaging- subacute fracture.      Lumbar spine MRI without contrast-6/13/2022.  L2-3-moderate disc bulge.  Moderate posterior facet changes.  Mild to moderate central canal narrowing.  Moderate bilateral neural foraminal narrowing.  L3-4-- broad-based disc bulge.  Central posterior disc protrusion causing narrowing of central canal  with approximate 5 mm in AP dimension.  Moderate facet changes.  Moderate to severe central canal narrowing.  Mild bilateral neural foraminal narrowing.  L4-5-large broad-based disc bulge.  Severe facet changes.  Severe central canal narrowing.  Moderate bilateral neural foraminal narrowing  L5-S1-large broad-based disc bulge.  Severe facet changes.  Moderate to severe central canal narrowing.  Severe bilateral neural foraminal narrowing.      MRI thoracic spine-6/13/2022  Kyphoplasty at T7 and T10.  - Residual mild to moderate compression fracture deformities  - Mild posterior disc bulges at multiple thoracic levels.  No large extrusions.    MRI brain wo contrast: 8/15/2018  Chiari Malformation type 1 with cerebellar tonsils projecting 7 mm below level of foramen magnum  Chronic b/l maxillary and ethmoid sinusitis     Assessment:    1. Compression fracture of L4 lumbar vertebra, closed, initial encounter    2. Compression fracture of L3 lumbar vertebra, closed, initial encounter    3. Spinal stenosis of lumbar region with neurogenic claudication    4. Right hip pain    5. Postlaminectomy syndrome    6. S/P kyphoplasty    7. High risk medication use    8. Failed back surgical syndrome              Plan:   1.    UDS consistent with patient's interview on 2/27/2024.   Narcotic contract signed-5/10/2022.  Narcan sent-6/20/2022.  2. We discussed trying a course of formal physical therapy.  Physical therapy can help strengthen and stretch the muscles around the joints. Continue to be as active as possible. Start physical therapy as it will help generalized pain and follow up with HEP.  PT prescription sent to [facility] in [place] as requested by patient.  3.  Patient continues to have pain despite being on Norco.  She had significant pain relief with tramadol previously.   Due to severe pain stop tramadol and start taking Norco.  4.  Due to severe pain, stop Norco and start Percocet  mg QID PRN (10/29/24).   Stop Norco.  Patient has Norco at home which we will plan to go back to once her pain improves.   Side effects discussed including but not limited to nausea, vomiting, constipation, lightheadedness, dizziness, drowsiness, or headache. This medication may increase serotonin and rarely cause a very serious condition called serotonin syndrome/ toxicity.   5. Continues Ibuprofen 800 mg TID PRN-sent for 6 months on 10/8/2024 patient informed of increased risk of heart attacks, stroke and kidney problems in addition to gastric ulcers with use of non-steroidal anti-inflammatory medications.  6.  Recommend following up with neurology for worsening of migraines.  History of Chiari malformation type I.  We did discuss possibility of Botox injection if she fails multiple medications  7.    Status post L3 kyphoplasty but unable to tell if there is significant benefit due to other pathology in her lower back. She has new L4 compression fracture which is probably causing severe pain she is continuously having. She has been on Prolia and has been seeing Dr. Mcgovern.  Recommended to continue wearing TLSO bracing to help fracture heal and provide help with muscle weakness.  Patient continues to have severe lower back pain requiring escalating doses of opioids.  Obtain lumbar MRI without contrast.  Will most likely proceed with L4 kyphoplasty if she continues to have pain and she does not have severe retropulsion on lumbar MRI.  8.  Also obtain right hip x-ray with pelvis to rule out any hip issues or any tailbone fractures.    RTC after MRI.     Kirk Nascimento DO  Pain Management   [facility]           INSPECT REPORT    As part of the patient's treatment plan, I may be prescribing controlled substances. The patient has been made aware of appropriate use of such medications, including potential risk of somnolence, limited ability to drive and/or work safely, and the potential for dependence or overdose. It has also been made clear  that these medications are for use by this patient only, without concomitant use of alcohol or other substances unless prescribed.     Patient has completed prescribing agreement detailing terms of continued prescribing of controlled substances, including monitoring INSPECT reports, urine drug screening, and pill counts if necessary. The patient is aware that inappropriate use will results in cessation of prescribing such medications.    INSPECT report has been reviewed and scanned into the patient's chart.

## 2024-11-12 ENCOUNTER — OFFICE VISIT (OUTPATIENT)
Dept: PAIN MEDICINE | Facility: CLINIC | Age: 74
End: 2024-11-12
Payer: MEDICARE

## 2024-11-12 VITALS
SYSTOLIC BLOOD PRESSURE: 135 MMHG | DIASTOLIC BLOOD PRESSURE: 95 MMHG | OXYGEN SATURATION: 98 % | BODY MASS INDEX: 30.38 KG/M2 | WEIGHT: 177 LBS | RESPIRATION RATE: 16 BRPM | HEART RATE: 79 BPM

## 2024-11-12 DIAGNOSIS — M25.551 RIGHT HIP PAIN: ICD-10-CM

## 2024-11-12 DIAGNOSIS — Z98.890 S/P KYPHOPLASTY: ICD-10-CM

## 2024-11-12 DIAGNOSIS — S32.030A COMPRESSION FRACTURE OF L3 LUMBAR VERTEBRA, CLOSED, INITIAL ENCOUNTER: ICD-10-CM

## 2024-11-12 DIAGNOSIS — S32.040A COMPRESSION FRACTURE OF L4 LUMBAR VERTEBRA, CLOSED, INITIAL ENCOUNTER: Primary | ICD-10-CM

## 2024-11-12 DIAGNOSIS — M48.062 SPINAL STENOSIS OF LUMBAR REGION WITH NEUROGENIC CLAUDICATION: ICD-10-CM

## 2024-11-12 DIAGNOSIS — M96.1 POSTLAMINECTOMY SYNDROME: ICD-10-CM

## 2024-11-12 DIAGNOSIS — Z79.899 HIGH RISK MEDICATION USE: ICD-10-CM

## 2024-11-12 DIAGNOSIS — M96.1 FAILED BACK SURGICAL SYNDROME: ICD-10-CM

## 2024-11-12 RX ORDER — OXYCODONE AND ACETAMINOPHEN 10; 325 MG/1; MG/1
1 TABLET ORAL EVERY 6 HOURS PRN
Qty: 120 TABLET | Refills: 0 | Status: SHIPPED | OUTPATIENT
Start: 2024-11-28 | End: 2024-12-28

## 2024-11-12 RX ORDER — IBUPROFEN 800 MG/1
800 TABLET, FILM COATED ORAL 3 TIMES DAILY PRN
Qty: 270 TABLET | Refills: 1 | Status: SHIPPED | OUTPATIENT
Start: 2024-11-12

## 2024-11-12 RX ORDER — IBUPROFEN 800 MG/1
800 TABLET, FILM COATED ORAL 3 TIMES DAILY PRN
Qty: 270 TABLET | Refills: 1 | Status: SHIPPED | OUTPATIENT
Start: 2024-11-12 | End: 2024-11-12

## 2024-11-25 NOTE — PROGRESS NOTES
Subjective   Nola Tariq is a 74 y.o. female is here for follow-up for lower back pain.  Patient was last seen on 11/12/2024.  Lumbar MRI has been done since last visit.  Continues to have severe lower back pain requiring escalating doses of opioids.    On last visit:     Left leg pain is 5/10 on VAS.    Lower back pain is 8/10 on VAS, at maximum 9/10.  Pain is aching and dull in nature.  Referred to right groin, right leg  She has numbness and tingling in bilateral foot.  Pain is constant.  Improved by pain meds.  Worse with walking and standing.    R knee pain is 5/10 on VAS. Worse with weight bearing. She had R knee steroid injection with Rheumatology several months ago with excellent relief.     R shoulder pain is 6/10 on VAS. Normal ROM. Had shoulder bursa issues several years ago which improved with US treatments.     Migraines - She used to take Excedrin and Fioricet for daily migraines but it doesn't work anymore. She is scheduled to see Neurology.  She does wake up with migraines during night or early in morning. She would wake up with headache and goes away with Excedrin. She had 2-3 headaches over last month that didn't go away with Excedrin and lasted for rest of the day. She had seen someone for chiari malformation previous.  History of migraines since teenager and usually right-sided temporal area but states that new migraines are all over the head. Seen by neurology who recommended continuing excedrin.      Previous Injection:   10/11/24 - L3 kyphoplasty - not sure if helped.   9/3/2024-LESI L3-4-no significant improvement.  4/18/2023- SCS trial [employer]- no significant pain relief. Coverage was excellent with patient able to feel paresthesia in all of the painful areas but unfortunately no significant pain relief.  No functional improvement as well.  11/8/2022-right knee injection- good relief.  7/8/22 - LESI L5-S1 - no relief.   RFA at previous pain management - no relief.     Hx:   Referred by JEM Ashton for  lower back pain.  Pain started around 2008 without any significant injuries and has been getting worse since then.  Her main complaints today are mid back and lower back pain.  Patient had previously seen Dr. Voss and had epidurals, facet joint injections, ablations.  She states that nothing helped at the time. Last seen 1 year ago. Her lower back pain is worse than mid back pain. She is retired RN.   She has since seen [facility] (seen by JEM)- 2022 who recommended medication management.  Patient states that she is not interested in fusion surgery as there is 50% chance of lower back pain improvement. She had also injured her left knee and had steroid injection by Ortho in left knee.  Scheduled to see Ortho in 5 weeks. She has been seeing neurology for migraines and has been started on gabapentin.  Patient has taken gabapentin for about 1 month without significant improvement in her migraines.        PHQ-9- 4  SOAPP-2      PMH:   Tijerina's esophagus, hypertension, migraine, thoracic vertebra fx s/p kypho T7, T10, migraines, smoker, Back surgery- decompression L4-5?-2009 Dr. Rdz, Carpal tunnel surgery 1983 bilateral wrist; R knee steroid injection with orthopedics (Dr. Barton at [place]).         Current Medications:   Tramadol  mg q6H PRN  Lyrica 25 mg BID  Ibuprofen 800 mg       Past Medications:  Tramadol  mg daily   Meloxicam 15 mg  Celebrex-leg swelling  Gabapentin - didn't help   Norco 5-325 mg BID PRN      Past Modalities:  TENS:                                                                          no                                                  Physical Therapy Within The Last 6 Months              Yes (1.5 years ago with some help).   Psychotherapy                                                            no  Massage Therapy                                                       no     Patient Complains Of:  Uro-Fecal Incontinence           no  Weight Gain/Loss                   Yes (weight gain 25 lbs - 1 year after smoking).   Fever/Chills                             no  Weakness                               no            Current Outpatient Medications:     Acetaminophen-Caffeine 500-65 MG tablet, EXCEDRIN TENSION HEADACHE 500-65 MG TABS, Disp: , Rfl:     albuterol sulfate  (90 Base) MCG/ACT inhaler, Inhale 2 puffs Every 4 (Four) Hours As Needed for Wheezing., Disp: 6.7 g, Rfl: 0    Cetirizine HCl 10 MG capsule, ZYRTEC ALLERGY 10 MG CAPS, Disp: , Rfl:     denosumab (PROLIA) 60 MG/ML solution prefilled syringe syringe, , Disp: , Rfl:     docusate sodium (COLACE) 100 MG capsule, Take 1 capsule by mouth 2 (Two) Times a Day., Disp: , Rfl:     famotidine (PEPCID) 40 MG tablet, , Disp: , Rfl:     gabapentin (NEURONTIN) 300 MG capsule, , Disp: , Rfl:     ibuprofen (ADVIL,MOTRIN) 800 MG tablet, Take 1 tablet by mouth 3 (Three) Times a Day As Needed for Mild Pain., Disp: 270 tablet, Rfl: 1    Multiple Vitamins-Minerals (MULTIVITAMIN WITH MINERALS) tablet tablet, Take 1 tablet by mouth Daily., Disp: , Rfl:     omeprazole (priLOSEC) 40 MG capsule, , Disp: , Rfl:     [START ON 11/28/2024] oxyCODONE-acetaminophen (PERCOCET)  MG per tablet, Take 1 tablet by mouth Every 6 (Six) Hours As Needed for Moderate Pain for up to 30 days., Disp: 120 tablet, Rfl: 0    vitamin D (ERGOCALCIFEROL) 1.25 MG (32259 UT) capsule capsule, Take 1 capsule by mouth 1 (One) Time Per Week., Disp: , Rfl:     fluticasone (FLONASE) 50 MCG/ACT nasal spray, 2 sprays by Each Nare route 2 (Two) Times a Day., Disp: , Rfl:     guaiFENesin (MUCINEX) 600 MG 12 hr tablet, Take 1 tablet by mouth Every 12 (Twelve) Hours., Disp: , Rfl:     sucralfate (CARAFATE) 1 g tablet, , Disp: , Rfl:     topiramate (TOPAMAX) 25 MG tablet, , Disp: , Rfl:     Voquezna 20 MG tablet, , Disp: , Rfl:     The following portions of the patient's history were reviewed and updated as appropriate: allergies,  current medications, past family history, past medical history, past social history, past surgical history, and problem list.      REVIEW OF PERTINENT MEDICAL DATA    Past Medical History:   Diagnosis Date    Arthritis     Tijerina esophagus     Chronic pain disorder     COVID-19     DJD (degenerative joint disease)     Extremity pain     Fall at home     Fall at home     fracture rt. leg    Foot pain, right     Fractures     GERD (gastroesophageal reflux disease)     Headache, tension-type     Hypertension     Joint pain     Leg pain, right     Low back pain     Migraine     Plantar fasciitis     Shingles     Shoulder pain, right     Sleep apnea     Spinal stenosis 2024    Epidural on      Past Surgical History:   Procedure Laterality Date    BACK SURGERY  2009    Decompression    CARPAL TUNNEL RELEASE      colin.    COLONOSCOPY      EPIDURAL BLOCK      EYE SURGERY      catarac    KNEE ARTHROSCOPY      SPINAL CORD STIMULATOR TRIAL W/ LAMINOTOMY      SPINE SURGERY      Kyphoplasty    TUBAL ABDOMINAL LIGATION      VERTEBROPLASTY Bilateral 10/11/2024    Procedure: VERTEBROPLASTY;  Surgeon: Kirk Nascimento DO;  Location: [facility] MAIN OR;  Service: Pain Management;  Laterality: Bilateral;    WRIST SURGERY      tendon release     Family History   Problem Relation Age of Onset    Cancer Mother     Migraines Father     COPD Father     Migraines Sister     Migraines Brother      Social History     Socioeconomic History    Marital status:    Tobacco Use    Smoking status: Former     Current packs/day: 0.00     Types: Cigarettes     Quit date: 1966     Years since quittin.9    Smokeless tobacco: Never   Vaping Use    Vaping status: Never Used   Substance and Sexual Activity    Alcohol use: Never    Drug use: Never    Sexual activity: Not Currently         Review of Systems   Musculoskeletal:  Positive for arthralgias and back pain.         Vitals:    24 0935   BP: 151/84   Pulse: 89   Resp: 16   SpO2:  96%   Weight: 80.3 kg (177 lb)   PainSc:   7                               Objective   Physical Exam  Musculoskeletal:         General: Tenderness present.        Back:         Legs:    Neurological:      Comments: Motor strength 5/5 b/l LE  Sensory intact b/l LE             Imaging Reviewed:  MRI lumbar spine without contrast-11/19/2024  L2-3-left paracentral disc extrusion, bilateral facet arthropathy with mild to moderate canal stenosis and moderate bilateral neuroforaminal narrowing  L3-4-disc protrusion, bilateral facet hypertrophy, severe canal stenosis and moderate bilateral neuroforaminal narrowing  L4-5-grade 1 anterolisthesis, disc bulge, moderate bilateral facet hypertrophy with ligamentum flavum thickening.  Severe central canal stenosis with moderate to severe bilateral neuroforaminal narrowing  X6-U5-ymrfvhefk facet hypertrophy with disc osteophyte to the left, ligamentum flavum thickening.  Effacement of ventral thecal sac without significant central canal stenosis.  Severe left and moderate right neuroforaminal narrowing.  - Interval changes of vertebroplasty at L3 with new mild superior endplate compression fracture of L4 and an adjacent endplate edema.    Right hip x-ray-11/19/2024  - Unremarkable right hip    Right hip x-ray-9/5/2024  - Mild degenerative changes of each hip.    MRI lumbar spine without contrast-8/14/2024  - L2-3-disc bulge with left paracentral extrusion extending inferiorly.  Facet arthropathy.  Mild to moderate central canal stenosis and moderate bilateral foraminal stenosis  L3-4-disc bulge with small protrusion, bilateral facet arthropathy with ligamentum flavum hypertrophy causing severe central canal stenosis and severe right and moderate left foraminal stenosis  A2-6-xdfarwgyd facet hypertrophy resulting in 5 mm of L4 anterolisthesis.  Severe central canal stenosis, moderate bilateral foraminal stenosis and bilateral L5 lateral recess stenosis most severe on the  right  L5-S1-left greater than right facet arthropathy.  Moderate central canal stenosis.  Possible compression of left S1 nerve root prior to entering the lateral recess.  Moderate right and severe left foraminal stenosis.  - Redundancy of the cauda equina nerve roots due to spinal stenosis.  - L3 inferior endplate subacute compression fracture with mild bone marrow edema which is new prior to previous imaging- subacute fracture.    MRI thoracic spine-6/13/2022  Kyphoplasty at T7 and T10.  - Residual mild to moderate compression fracture deformities  - Mild posterior disc bulges at multiple thoracic levels.  No large extrusions.    MRI brain wo contrast: 8/15/2018  Chiari Malformation type 1 with cerebellar tonsils projecting 7 mm below level of foramen magnum  Chronic b/l maxillary and ethmoid sinusitis     Assessment:    1. Compression fracture of L4 lumbar vertebra, with delayed healing, subsequent encounter    2. Spinal stenosis of lumbar region with neurogenic claudication    3. Postlaminectomy syndrome    4. S/P kyphoplasty    5. High risk medication use    6. Failed back surgical syndrome        Plan:   1.    UDS consistent with patient's interview on 2/27/2024.   Narcotic contract signed-5/10/2022.  Narcan sent-6/20/2022.  2. We discussed trying a course of formal physical therapy.  Physical therapy can help strengthen and stretch the muscles around the joints. Continue to be as active as possible. Start physical therapy as it will help generalized pain and follow up with HEP.  PT prescription sent to [facility] in [place] as requested by patient.  3.  Patient continues to have pain despite being on Norco.  She had significant pain relief with tramadol previously.   Due to severe pain stop tramadol and start taking Norco.  4.  Due to severe pain, stop Norco and start Percocet  mg QID PRN (11/28).  Stop Norco.  Patient has Norco at home which we will plan to go back to once her pain improves.    Side effects discussed including but not limited to nausea, vomiting, constipation, lightheadedness, dizziness, drowsiness, or headache. This medication may increase serotonin and rarely cause a very serious condition called serotonin syndrome/ toxicity.   5. Continues Ibuprofen 800 mg TID PRN-sent for 6 months on 10/8/2024 patient informed of increased risk of heart attacks, stroke and kidney problems in addition to gastric ulcers with use of non-steroidal anti-inflammatory medications.  6.  Recommend following up with neurology for worsening of migraines.  History of Chiari malformation type I.  We did discuss possibility of Botox injection if she fails multiple medications  7.    Status post L3 kyphoplasty but unable to tell if there is significant benefit due to other pathology in her lower back. She has new L4 compression fracture which is probably causing severe pain she is continuously having. She has been on Prolia and has been seeing Dr. Mcgovern.  Patient has tried TLSO bracing for 8 weeks without significant improvement in pain.  Patient continues to have severe lower back pain requiring escalating doses of opioids.  Lumbar MRI shows new L4 compression fracture.  Will proceed with L4 kyphoplasty.  Benefits and risks were discussed with patient.      RTC for L4 kyphoplasty.     Kirk Nascimento DO  Pain Management   [facility]           INSPECT REPORT    As part of the patient's treatment plan, I may be prescribing controlled substances. The patient has been made aware of appropriate use of such medications, including potential risk of somnolence, limited ability to drive and/or work safely, and the potential for dependence or overdose. It has also been made clear that these medications are for use by this patient only, without concomitant use of alcohol or other substances unless prescribed.     Patient has completed prescribing agreement detailing terms of continued prescribing of controlled substances,  including monitoring INSPECT reports, urine drug screening, and pill counts if necessary. The patient is aware that inappropriate use will results in cessation of prescribing such medications.    INSPECT report has been reviewed and scanned into the patient's chart.

## 2024-11-26 ENCOUNTER — OFFICE VISIT (OUTPATIENT)
Dept: PAIN MEDICINE | Facility: CLINIC | Age: 74
End: 2024-11-26
Payer: MEDICARE

## 2024-11-26 VITALS
RESPIRATION RATE: 16 BRPM | HEART RATE: 89 BPM | DIASTOLIC BLOOD PRESSURE: 84 MMHG | OXYGEN SATURATION: 96 % | WEIGHT: 177 LBS | BODY MASS INDEX: 30.38 KG/M2 | SYSTOLIC BLOOD PRESSURE: 151 MMHG

## 2024-11-26 DIAGNOSIS — M96.1 FAILED BACK SURGICAL SYNDROME: ICD-10-CM

## 2024-11-26 DIAGNOSIS — Z98.890 S/P KYPHOPLASTY: ICD-10-CM

## 2024-11-26 DIAGNOSIS — M48.062 SPINAL STENOSIS OF LUMBAR REGION WITH NEUROGENIC CLAUDICATION: ICD-10-CM

## 2024-11-26 DIAGNOSIS — S32.040G COMPRESSION FRACTURE OF L4 LUMBAR VERTEBRA, WITH DELAYED HEALING, SUBSEQUENT ENCOUNTER: Primary | ICD-10-CM

## 2024-11-26 DIAGNOSIS — Z79.899 HIGH RISK MEDICATION USE: ICD-10-CM

## 2024-11-26 DIAGNOSIS — M96.1 POSTLAMINECTOMY SYNDROME: ICD-10-CM

## 2024-11-26 RX ORDER — OXYCODONE AND ACETAMINOPHEN 10; 325 MG/1; MG/1
1 TABLET ORAL EVERY 6 HOURS PRN
Qty: 120 TABLET | Refills: 0 | Status: CANCELLED | OUTPATIENT
Start: 2024-12-28 | End: 2025-01-27

## 2024-12-01 ENCOUNTER — APPOINTMENT (OUTPATIENT)
Dept: ULTRASOUND IMAGING | Facility: HOSPITAL | Age: 74
End: 2024-12-01
Payer: MEDICARE

## 2024-12-01 ENCOUNTER — HOSPITAL ENCOUNTER (INPATIENT)
Facility: HOSPITAL | Age: 74
LOS: 3 days | Discharge: HOME HEALTH | End: 2024-12-06
Attending: EMERGENCY MEDICINE | Admitting: STUDENT IN AN ORGANIZED HEALTH CARE EDUCATION/TRAINING PROGRAM
Payer: MEDICARE

## 2024-12-01 ENCOUNTER — APPOINTMENT (OUTPATIENT)
Dept: CT IMAGING | Facility: HOSPITAL | Age: 74
End: 2024-12-01
Payer: MEDICARE

## 2024-12-01 DIAGNOSIS — R52 PAIN: ICD-10-CM

## 2024-12-01 DIAGNOSIS — M54.50 CHRONIC BILATERAL LOW BACK PAIN WITHOUT SCIATICA: ICD-10-CM

## 2024-12-01 DIAGNOSIS — M25.551 RIGHT HIP PAIN: ICD-10-CM

## 2024-12-01 DIAGNOSIS — M48.04 SPINAL STENOSIS OF THORACIC REGION: ICD-10-CM

## 2024-12-01 DIAGNOSIS — M54.50 ACUTE MIDLINE LOW BACK PAIN WITHOUT SCIATICA: ICD-10-CM

## 2024-12-01 DIAGNOSIS — G89.29 CHRONIC BILATERAL LOW BACK PAIN WITHOUT SCIATICA: ICD-10-CM

## 2024-12-01 DIAGNOSIS — M48.07 STENOSIS OF LUMBOSACRAL SPINE: ICD-10-CM

## 2024-12-01 DIAGNOSIS — S32.030A COMPRESSION FRACTURE OF L3 LUMBAR VERTEBRA, CLOSED, INITIAL ENCOUNTER: ICD-10-CM

## 2024-12-01 DIAGNOSIS — S32.040G COMPRESSION FRACTURE OF L4 LUMBAR VERTEBRA, WITH DELAYED HEALING, SUBSEQUENT ENCOUNTER: ICD-10-CM

## 2024-12-01 DIAGNOSIS — M48.062 SPINAL STENOSIS OF LUMBAR REGION WITH NEUROGENIC CLAUDICATION: Primary | ICD-10-CM

## 2024-12-01 LAB
ALBUMIN SERPL-MCNC: 3.8 G/DL (ref 3.5–5.2)
ALBUMIN/GLOB SERPL: 1.4 G/DL
ALP SERPL-CCNC: 77 U/L (ref 39–117)
ALT SERPL W P-5'-P-CCNC: <5 U/L (ref 1–33)
ANION GAP SERPL CALCULATED.3IONS-SCNC: 9.6 MMOL/L (ref 5–15)
AST SERPL-CCNC: 16 U/L (ref 1–32)
BACTERIA UR QL AUTO: NORMAL /HPF
BASOPHILS # BLD AUTO: 0.05 10*3/MM3 (ref 0–0.2)
BASOPHILS NFR BLD AUTO: 0.5 % (ref 0–1.5)
BILIRUB SERPL-MCNC: 0.3 MG/DL (ref 0–1.2)
BILIRUB UR QL STRIP: NEGATIVE
BUN SERPL-MCNC: 16 MG/DL (ref 8–23)
BUN/CREAT SERPL: 16 (ref 7–25)
CALCIUM SPEC-SCNC: 8.7 MG/DL (ref 8.6–10.5)
CHLORIDE SERPL-SCNC: 105 MMOL/L (ref 98–107)
CLARITY UR: CLEAR
CO2 SERPL-SCNC: 25.4 MMOL/L (ref 22–29)
COLOR UR: YELLOW
CREAT SERPL-MCNC: 1 MG/DL (ref 0.57–1)
DEPRECATED RDW RBC AUTO: 44 FL (ref 37–54)
EGFRCR SERPLBLD CKD-EPI 2021: 58.9 ML/MIN/1.73
EOSINOPHIL # BLD AUTO: 0.33 10*3/MM3 (ref 0–0.4)
EOSINOPHIL NFR BLD AUTO: 3.6 % (ref 0.3–6.2)
ERYTHROCYTE [DISTWIDTH] IN BLOOD BY AUTOMATED COUNT: 12.1 % (ref 12.3–15.4)
GLOBULIN UR ELPH-MCNC: 2.8 GM/DL
GLUCOSE SERPL-MCNC: 134 MG/DL (ref 65–99)
GLUCOSE UR STRIP-MCNC: NEGATIVE MG/DL
HCT VFR BLD AUTO: 35.6 % (ref 34–46.6)
HGB BLD-MCNC: 11.6 G/DL (ref 12–15.9)
HGB UR QL STRIP.AUTO: NEGATIVE
HYALINE CASTS UR QL AUTO: NORMAL /LPF
IMM GRANULOCYTES # BLD AUTO: 0.03 10*3/MM3 (ref 0–0.05)
IMM GRANULOCYTES NFR BLD AUTO: 0.3 % (ref 0–0.5)
KETONES UR QL STRIP: ABNORMAL
LEUKOCYTE ESTERASE UR QL STRIP.AUTO: ABNORMAL
LYMPHOCYTES # BLD AUTO: 1.65 10*3/MM3 (ref 0.7–3.1)
LYMPHOCYTES NFR BLD AUTO: 17.9 % (ref 19.6–45.3)
MCH RBC QN AUTO: 31.7 PG (ref 26.6–33)
MCHC RBC AUTO-ENTMCNC: 32.6 G/DL (ref 31.5–35.7)
MCV RBC AUTO: 97.3 FL (ref 79–97)
MONOCYTES # BLD AUTO: 0.45 10*3/MM3 (ref 0.1–0.9)
MONOCYTES NFR BLD AUTO: 4.9 % (ref 5–12)
NEUTROPHILS NFR BLD AUTO: 6.73 10*3/MM3 (ref 1.7–7)
NEUTROPHILS NFR BLD AUTO: 72.8 % (ref 42.7–76)
NITRITE UR QL STRIP: NEGATIVE
NRBC BLD AUTO-RTO: 0 /100 WBC (ref 0–0.2)
PH UR STRIP.AUTO: 5.5 [PH] (ref 5–8)
PLATELET # BLD AUTO: 224 10*3/MM3 (ref 140–450)
PMV BLD AUTO: 8.4 FL (ref 6–12)
POTASSIUM SERPL-SCNC: 4.1 MMOL/L (ref 3.5–5.2)
PROT SERPL-MCNC: 6.6 G/DL (ref 6–8.5)
PROT UR QL STRIP: ABNORMAL
RBC # BLD AUTO: 3.66 10*6/MM3 (ref 3.77–5.28)
RBC # UR STRIP: NORMAL /HPF
REF LAB TEST METHOD: NORMAL
SODIUM SERPL-SCNC: 140 MMOL/L (ref 136–145)
SP GR UR STRIP: 1.02 (ref 1–1.03)
SQUAMOUS #/AREA URNS HPF: NORMAL /HPF
UROBILINOGEN UR QL STRIP: ABNORMAL
WBC # UR STRIP: NORMAL /HPF
WBC NRBC COR # BLD AUTO: 9.24 10*3/MM3 (ref 3.4–10.8)

## 2024-12-01 PROCEDURE — 72192 CT PELVIS W/O DYE: CPT

## 2024-12-01 PROCEDURE — 25010000002 DEXAMETHASONE SODIUM PHOSPHATE 10 MG/ML SOLUTION

## 2024-12-01 PROCEDURE — G0378 HOSPITAL OBSERVATION PER HR: HCPCS

## 2024-12-01 PROCEDURE — 25010000002 KETOROLAC TROMETHAMINE PER 15 MG

## 2024-12-01 PROCEDURE — 72131 CT LUMBAR SPINE W/O DYE: CPT

## 2024-12-01 PROCEDURE — 81001 URINALYSIS AUTO W/SCOPE: CPT

## 2024-12-01 PROCEDURE — 80053 COMPREHEN METABOLIC PANEL: CPT

## 2024-12-01 PROCEDURE — 25010000002 ONDANSETRON PER 1 MG

## 2024-12-01 PROCEDURE — 85025 COMPLETE CBC W/AUTO DIFF WBC: CPT

## 2024-12-01 PROCEDURE — 25010000002 HYDROMORPHONE 1 MG/ML SOLUTION: Performed by: EMERGENCY MEDICINE

## 2024-12-01 PROCEDURE — 25010000002 MORPHINE PER 10 MG

## 2024-12-01 PROCEDURE — 99285 EMERGENCY DEPT VISIT HI MDM: CPT

## 2024-12-01 PROCEDURE — 76775 US EXAM ABDO BACK WALL LIM: CPT

## 2024-12-01 PROCEDURE — 25010000002 HYDROMORPHONE 1 MG/ML SOLUTION

## 2024-12-01 PROCEDURE — 36415 COLL VENOUS BLD VENIPUNCTURE: CPT

## 2024-12-01 RX ORDER — DEXAMETHASONE SODIUM PHOSPHATE 10 MG/ML
10 INJECTION, SOLUTION INTRAMUSCULAR; INTRAVENOUS ONCE
Status: COMPLETED | OUTPATIENT
Start: 2024-12-01 | End: 2024-12-01

## 2024-12-01 RX ORDER — BISACODYL 5 MG/1
5 TABLET, DELAYED RELEASE ORAL DAILY PRN
Status: DISCONTINUED | OUTPATIENT
Start: 2024-12-01 | End: 2024-12-06 | Stop reason: HOSPADM

## 2024-12-01 RX ORDER — NALOXONE HCL 0.4 MG/ML
0.4 VIAL (ML) INJECTION
Status: DISCONTINUED | OUTPATIENT
Start: 2024-12-01 | End: 2024-12-06 | Stop reason: HOSPADM

## 2024-12-01 RX ORDER — ONDANSETRON 2 MG/ML
4 INJECTION INTRAMUSCULAR; INTRAVENOUS EVERY 6 HOURS PRN
Status: DISCONTINUED | OUTPATIENT
Start: 2024-12-01 | End: 2024-12-06 | Stop reason: HOSPADM

## 2024-12-01 RX ORDER — POLYETHYLENE GLYCOL 3350 17 G/17G
17 POWDER, FOR SOLUTION ORAL DAILY PRN
Status: DISCONTINUED | OUTPATIENT
Start: 2024-12-01 | End: 2024-12-06 | Stop reason: HOSPADM

## 2024-12-01 RX ORDER — SODIUM CHLORIDE 0.9 % (FLUSH) 0.9 %
10 SYRINGE (ML) INJECTION EVERY 12 HOURS SCHEDULED
Status: DISCONTINUED | OUTPATIENT
Start: 2024-12-01 | End: 2024-12-06 | Stop reason: HOSPADM

## 2024-12-01 RX ORDER — SODIUM CHLORIDE 0.9 % (FLUSH) 0.9 %
10 SYRINGE (ML) INJECTION AS NEEDED
Status: DISCONTINUED | OUTPATIENT
Start: 2024-12-01 | End: 2024-12-06 | Stop reason: HOSPADM

## 2024-12-01 RX ORDER — KETOROLAC TROMETHAMINE 30 MG/ML
15 INJECTION, SOLUTION INTRAMUSCULAR; INTRAVENOUS ONCE
Status: COMPLETED | OUTPATIENT
Start: 2024-12-01 | End: 2024-12-01

## 2024-12-01 RX ORDER — AMOXICILLIN 250 MG
2 CAPSULE ORAL 2 TIMES DAILY PRN
Status: DISCONTINUED | OUTPATIENT
Start: 2024-12-01 | End: 2024-12-06 | Stop reason: HOSPADM

## 2024-12-01 RX ORDER — ONDANSETRON 2 MG/ML
4 INJECTION INTRAMUSCULAR; INTRAVENOUS ONCE
Status: COMPLETED | OUTPATIENT
Start: 2024-12-01 | End: 2024-12-01

## 2024-12-01 RX ORDER — BISACODYL 10 MG
10 SUPPOSITORY, RECTAL RECTAL DAILY PRN
Status: DISCONTINUED | OUTPATIENT
Start: 2024-12-01 | End: 2024-12-06 | Stop reason: HOSPADM

## 2024-12-01 RX ORDER — SODIUM CHLORIDE 9 MG/ML
40 INJECTION, SOLUTION INTRAVENOUS AS NEEDED
Status: DISCONTINUED | OUTPATIENT
Start: 2024-12-01 | End: 2024-12-06 | Stop reason: HOSPADM

## 2024-12-01 RX ADMIN — ONDANSETRON 4 MG: 2 INJECTION, SOLUTION INTRAMUSCULAR; INTRAVENOUS at 16:07

## 2024-12-01 RX ADMIN — KETOROLAC TROMETHAMINE 15 MG: 30 INJECTION, SOLUTION INTRAMUSCULAR at 18:37

## 2024-12-01 RX ADMIN — HYDROMORPHONE HYDROCHLORIDE 1 MG: 1 INJECTION, SOLUTION INTRAMUSCULAR; INTRAVENOUS; SUBCUTANEOUS at 20:47

## 2024-12-01 RX ADMIN — MORPHINE SULFATE 4 MG: 4 INJECTION, SOLUTION INTRAMUSCULAR; INTRAVENOUS at 18:37

## 2024-12-01 RX ADMIN — HYDROMORPHONE HYDROCHLORIDE 0.5 MG: 1 INJECTION, SOLUTION INTRAMUSCULAR; INTRAVENOUS; SUBCUTANEOUS at 16:07

## 2024-12-01 RX ADMIN — Medication 10 ML: at 20:48

## 2024-12-01 RX ADMIN — HYDROMORPHONE HYDROCHLORIDE 0.5 MG: 1 INJECTION, SOLUTION INTRAMUSCULAR; INTRAVENOUS; SUBCUTANEOUS at 17:02

## 2024-12-01 RX ADMIN — DEXAMETHASONE SODIUM PHOSPHATE 10 MG: 10 INJECTION, SOLUTION INTRAMUSCULAR; INTRAVENOUS at 18:37

## 2024-12-01 NOTE — ED PROVIDER NOTES
Subjective   Chief Complaint   Patient presents with    Hip Pain       History of Present Illness  Patient is a 74-year-old female who arrives today with reports that she had a kyphoplasty for her L3 however her L4 is also fractured and she has surgery scheduled for 1/31/2025.  She denies any history of recent falls or exacerbations however today her low back and right hip pain became so bad that her home pain medications were not covering it.  She takes Oxy 10 and ibuprofen 800.  These usually help alleviate her pain however today they are not helping at all.  She sees Dr. Nascimento with pain management.  Review of Systems  Per hpi  Past Medical History:   Diagnosis Date    Arthritis     [place] esophagus     Chronic pain disorder     COVID-19     DJD (degenerative joint disease)     Extremity pain     Fall at home     Fall at home     fracture rt. leg    Foot pain, right     Fractures     GERD (gastroesophageal reflux disease)     Headache, tension-type     Hypertension     Joint pain     Leg pain, right     Low back pain     Migraine     Plantar fasciitis     Shingles     Shoulder pain, right     Sleep apnea     Spinal stenosis 9/4/2024    Epidural on 9/4       Allergies   Allergen Reactions    Celebrex [Celecoxib] Swelling       Past Surgical History:   Procedure Laterality Date    BACK SURGERY  2/2009    Decompression    CARPAL TUNNEL RELEASE      colin.    COLONOSCOPY      EPIDURAL BLOCK      EYE SURGERY      catarac    KNEE ARTHROSCOPY      SPINAL CORD STIMULATOR TRIAL W/ LAMINOTOMY      SPINE SURGERY      Kyphoplasty    TUBAL ABDOMINAL LIGATION      VERTEBROPLASTY Bilateral 10/11/2024    Procedure: VERTEBROPLASTY;  Surgeon: Kirk Nascimento DO;  Location: [facility] MAIN OR;  Service: Pain Management;  Laterality: Bilateral;    WRIST SURGERY      tendon release       Family History   Problem Relation Age of Onset    Cancer Mother     Migraines Father     COPD Father     Migraines Sister     Migraines Brother         Social History     Socioeconomic History    Marital status:    Tobacco Use    Smoking status: Former     Current packs/day: 0.00     Types: Cigarettes     Quit date: 1966     Years since quittin.9    Smokeless tobacco: Never   Vaping Use    Vaping status: Never Used   Substance and Sexual Activity    Alcohol use: Never    Drug use: Never    Sexual activity: Not Currently           Objective   Physical Exam  Vitals and nursing note reviewed.   Constitutional:       General: She is not in acute distress.     Appearance: Normal appearance. She is not ill-appearing, toxic-appearing or diaphoretic.   HENT:      Head: Normocephalic and atraumatic.      Right Ear: External ear normal.      Left Ear: External ear normal.      Nose: Nose normal.      Mouth/Throat:      Mouth: Mucous membranes are moist.      Pharynx: Oropharynx is clear.   Eyes:      Extraocular Movements: Extraocular movements intact.      Conjunctiva/sclera: Conjunctivae normal.      Pupils: Pupils are equal, round, and reactive to light.   Cardiovascular:      Rate and Rhythm: Normal rate and regular rhythm.      Pulses: Normal pulses.      Heart sounds: Normal heart sounds.   Pulmonary:      Effort: Pulmonary effort is normal.      Breath sounds: Normal breath sounds.   Abdominal:      General: Bowel sounds are normal.      Palpations: Abdomen is soft.   Musculoskeletal:         General: Tenderness present. No swelling, deformity or signs of injury.      Cervical back: Normal range of motion and neck supple.      Right hip: Decreased range of motion.      Left hip: Normal.      Right lower leg: No edema.      Left lower leg: No edema.   Skin:     General: Skin is dry.      Capillary Refill: Capillary refill takes less than 2 seconds.   Neurological:      General: No focal deficit present.      Mental Status: She is alert.   Psychiatric:         Mood and Affect: Mood normal.         Behavior: Behavior normal.         Thought Content:  "Thought content normal.         Judgment: Judgment normal.         Procedures           ED Course        /84   Pulse 78   Temp 98.2 °F (36.8 °C) (Oral)   Resp 18   Ht 162.6 cm (64\")   Wt 81.2 kg (179 lb 0.2 oz)   SpO2 92%   BMI 30.73 kg/m²   Labs Reviewed   COMPREHENSIVE METABOLIC PANEL - Abnormal; Notable for the following components:       Result Value    Glucose 134 (*)     eGFR 58.9 (*)     All other components within normal limits    Narrative:     GFR Normal >60  Chronic Kidney Disease <60  Kidney Failure <15    The GFR formula is only valid for adults with stable renal function between ages 18 and 70.   URINALYSIS W/ MICROSCOPIC IF INDICATED (NO CULTURE) - Abnormal; Notable for the following components:    Ketones, UA Trace (*)     Protein, UA Trace (*)     Leuk Esterase, UA Trace (*)     All other components within normal limits   CBC WITH AUTO DIFFERENTIAL - Abnormal; Notable for the following components:    RBC 3.66 (*)     Hemoglobin 11.6 (*)     MCV 97.3 (*)     RDW 12.1 (*)     Lymphocyte % 17.9 (*)     Monocyte % 4.9 (*)     All other components within normal limits   URINALYSIS, MICROSCOPIC ONLY   CBC AND DIFFERENTIAL    Narrative:     The following orders were created for panel order CBC & Differential.  Procedure                               Abnormality         Status                     ---------                               -----------         ------                     CBC Auto Differential[[id number]]        Abnormal            Final result                 Please view results for these tests on the individual orders.     Medications   sodium chloride 0.9 % flush 10 mL (has no administration in time range)   sodium chloride 0.9 % flush 10 mL (has no administration in time range)   sodium chloride 0.9 % flush 10 mL (has no administration in time range)   sodium chloride 0.9 % infusion 40 mL (has no administration in time range)   ondansetron (ZOFRAN) injection 4 mg (has no " administration in time range)   melatonin tablet 5 mg (has no administration in time range)   sennosides-docusate (PERICOLACE) 8.6-50 MG per tablet 2 tablet (has no administration in time range)     And   polyethylene glycol (MIRALAX) packet 17 g (has no administration in time range)     And   bisacodyl (DULCOLAX) EC tablet 5 mg (has no administration in time range)     And   bisacodyl (DULCOLAX) suppository 10 mg (has no administration in time range)   Potassium Replacement - Follow Nurse / BPA Driven Protocol (has no administration in time range)   Magnesium Standard Dose Replacement - Follow Nurse / BPA Driven Protocol (has no administration in time range)   Phosphorus Replacement - Follow Nurse / BPA Driven Protocol (has no administration in time range)   Calcium Replacement - Follow Nurse / BPA Driven Protocol (has no administration in time range)   HYDROmorphone (DILAUDID) injection 1 mg (has no administration in time range)     And   naloxone (NARCAN) injection 0.4 mg (has no administration in time range)   HYDROmorphone (DILAUDID) injection 0.5 mg (0.5 mg Intravenous Given 12/1/24 1607)   ondansetron (ZOFRAN) injection 4 mg (4 mg Intravenous Given 12/1/24 1607)   HYDROmorphone (DILAUDID) injection 0.5 mg (0.5 mg Intravenous Given 12/1/24 1702)   morphine injection 4 mg (4 mg Intravenous Given 12/1/24 1837)   ketorolac (TORADOL) injection 15 mg (15 mg Intravenous Given 12/1/24 1837)   dexAMETHasone sodium phosphate injection 10 mg (10 mg Intravenous Given 12/1/24 1837)     .raday  US Renal Bilateral    Result Date: 12/1/2024  Impression: 1.Three  right renal cysts. 2.Otherwise unremarkable renal ultrasound Electronically Signed: Rom Judd MD  12/1/2024 5:57 PM EST  Workstation ID: [id number]    CT Lumbar Spine Without Contrast    Result Date: 12/1/2024  1. Stable nondisplaced fracture at the superior plate of L4. 2. Severe foraminal narrowing at L3-L4. Severe canal stenosis at L4-L5. 3. High density  structure arising from the medial right kidney. Ultrasound of the kidneys recommended. Electronically Signed: Yaya Allen MD  12/1/2024 4:32 PM EST  Workstation ID: [id number]    CT Pelvis Without Contrast    Result Date: 12/1/2024  1. Stable nondisplaced fracture at the superior plate of L4. 2. Severe foraminal narrowing at L3-L4. Severe canal stenosis at L4-L5. 3. High density structure arising from the medial right kidney. Ultrasound of the kidneys recommended. Electronically Signed: Yaya Allen MD  12/1/2024 4:32 PM EST  Workstation ID: [id number]                                                  Medical Decision Making  Patient presents to the ED for the above complaint, underwent the above exam and workup.    Reviewed internal records dated 10/11/2024 for kyphoplasty with Dr. Nascimento.    Patient was treated with the following while in the ED:  Medications   sodium chloride 0.9 % flush 10 mL (has no administration in time range)   sodium chloride 0.9 % flush 10 mL (has no administration in time range)   sodium chloride 0.9 % flush 10 mL (has no administration in time range)   sodium chloride 0.9 % infusion 40 mL (has no administration in time range)   ondansetron (ZOFRAN) injection 4 mg (has no administration in time range)   melatonin tablet 5 mg (has no administration in time range)   sennosides-docusate (PERICOLACE) 8.6-50 MG per tablet 2 tablet (has no administration in time range)     And   polyethylene glycol (MIRALAX) packet 17 g (has no administration in time range)     And   bisacodyl (DULCOLAX) EC tablet 5 mg (has no administration in time range)     And   bisacodyl (DULCOLAX) suppository 10 mg (has no administration in time range)   Potassium Replacement - Follow Nurse / BPA Driven Protocol (has no administration in time range)   Magnesium Standard Dose Replacement - Follow Nurse / BPA Driven Protocol (has no administration in time range)   Phosphorus Replacement - Follow Nurse / BPA Driven Protocol (has  no administration in time range)   Calcium Replacement - Follow Nurse / BPA Driven Protocol (has no administration in time range)   HYDROmorphone (DILAUDID) injection 1 mg (has no administration in time range)     And   naloxone (NARCAN) injection 0.4 mg (has no administration in time range)   HYDROmorphone (DILAUDID) injection 0.5 mg (0.5 mg Intravenous Given 12/1/24 1607)   ondansetron (ZOFRAN) injection 4 mg (4 mg Intravenous Given 12/1/24 1607)   HYDROmorphone (DILAUDID) injection 0.5 mg (0.5 mg Intravenous Given 12/1/24 1702)   morphine injection 4 mg (4 mg Intravenous Given 12/1/24 1837)   ketorolac (TORADOL) injection 15 mg (15 mg Intravenous Given 12/1/24 1837)   dexAMETHasone sodium phosphate injection 10 mg (10 mg Intravenous Given 12/1/24 1837)     Upon evaluation of patient imaging was obtained that showed no acute abnormalities.  As patient has no falls or recent injuries and CT showed growth on kidney, kidney ultrasound was ordered as well as labs to include CBC CMP and UA.  Results all as above.  Patient is still in considerable pain and has no appointment with pain management until January.  Patient admitted for pain control.  Placed in ED observation for pain control and consult with pain management in the morning    Final diagnoses:   Chronic bilateral low back pain without sciatica   Right hip pain   Hx of kyphoplasty   Renal cyst       ED Disposition  ED Disposition       ED Disposition   Decision to Admit    Condition   --    Comment   --               No follow-up provider specified.       Medication List      No changes were made to your prescriptions during this visit.            Nola Frankel, APRN  12/01/24 3266

## 2024-12-01 NOTE — ED NOTES
Nursing report ED to floor  Nola Tariq  74 y.o.  female    HPI:   Chief Complaint   Patient presents with    Hip Pain       Admitting doctor:   Liborio Arroyo MD    Admitting diagnosis:   The primary encounter diagnosis was Chronic bilateral low back pain without sciatica. Diagnoses of Right hip pain, Hx of kyphoplasty, and Renal cyst were also pertinent to this visit.    Code status:   Current Code Status       Date Active Code Status Order ID Comments User Context       12/1/2024 1830 CPR (Attempt to Resuscitate) [id number]  Nola Frankel, [facility] ED        Question Answer    Code Status (Patient has no pulse and is not breathing) CPR (Attempt to Resuscitate)    Medical Interventions (Patient has pulse or is breathing) Full Support    Level Of Support Discussed With Patient                    Allergies:   Celebrex [celecoxib]    Isolation:  No active isolations     Fall Risk:  Fall Risk Assessment was completed, and patient is at high risk for falls.   Predictive Model Details         12 (Low) Factor Value    Calculated 12/1/2024 18:51 Age 74    Risk of Fall Model Active Peripheral IV Present     Imaging order in this encounter Present     Respiratory Rate 18     Magnesium not on file     Number of Distinct Medication Classes administered 4     Number of administrations of Analgesic Narcotics 3     Calcium 8.7 mg/dL     Aime Scale not on file     Number of administrations of Anti-Rheumatics 1     Albumin 3.8 g/dL     Diastolic BP 84     Creatinine 1 mg/dL     ALT <5 U/L     Days after Admission 0.143     Tobacco Use Quit     Potassium 4.1 mmol/L     Total Bilirubin 0.3 mg/dL     Chloride 105 mmol/L         Weight:       12/01/24  1530   Weight: 81.2 kg (179 lb 0.2 oz)       Intake and Output  No intake or output data in the 24 hours ending 12/01/24 1853    Diet:   Dietary Orders (From admission, onward)       Start     Ordered    12/02/24 0001  NPO Diet NPO Type: Strict NPO  Diet Effective Midnight       "  Question:  NPO Type  Answer:  Strict NPO    12/01/24 1830                     Most recent vitals:   Vitals:    12/01/24 1530 12/01/24 1802   BP: 147/70 167/84   BP Location: Right arm    Patient Position: Lying    Pulse: 95 78   Resp: 22 18   Temp: 98.2 °F (36.8 °C)    TempSrc: Oral    SpO2: 95% 92%   Weight: 81.2 kg (179 lb 0.2 oz)    Height: 162.6 cm (64\")        Active LDAs/IV Access:   Lines, Drains & Airways       Active LDAs       Name Placement date Placement time Site Days    Peripheral IV 12/01/24 1606 Right Antecubital 12/01/24  1606  Antecubital  less than 1                    Skin Condition:   Skin Assessments (last day)       Date/Time Skin WDL    12/01/24 16:13:22 WDL             Labs (abnormal labs have a star):   Labs Reviewed   COMPREHENSIVE METABOLIC PANEL - Abnormal; Notable for the following components:       Result Value    Glucose 134 (*)     eGFR 58.9 (*)     All other components within normal limits    Narrative:     GFR Normal >60  Chronic Kidney Disease <60  Kidney Failure <15    The GFR formula is only valid for adults with stable renal function between ages 18 and 70.   URINALYSIS W/ MICROSCOPIC IF INDICATED (NO CULTURE) - Abnormal; Notable for the following components:    Ketones, UA Trace (*)     Protein, UA Trace (*)     Leuk Esterase, UA Trace (*)     All other components within normal limits   CBC WITH AUTO DIFFERENTIAL - Abnormal; Notable for the following components:    RBC 3.66 (*)     Hemoglobin 11.6 (*)     MCV 97.3 (*)     RDW 12.1 (*)     Lymphocyte % 17.9 (*)     Monocyte % 4.9 (*)     All other components within normal limits   URINALYSIS, MICROSCOPIC ONLY   CBC AND DIFFERENTIAL    Narrative:     The following orders were created for panel order CBC & Differential.  Procedure                               Abnormality         Status                     ---------                               -----------         ------                     CBC Auto Differential[[id number]]     "    Abnormal            Final result                 Please view results for these tests on the individual orders.       LOC: Person, Place, Time, and Situation    Telemetry:  Observation Unit    Cardiac Monitoring Ordered: no    EKG:   No orders to display       Medications Given in the ED:   Medications   sodium chloride 0.9 % flush 10 mL (has no administration in time range)   sodium chloride 0.9 % flush 10 mL (has no administration in time range)   sodium chloride 0.9 % flush 10 mL (has no administration in time range)   sodium chloride 0.9 % infusion 40 mL (has no administration in time range)   ondansetron (ZOFRAN) injection 4 mg (has no administration in time range)   melatonin tablet 5 mg (has no administration in time range)   sennosides-docusate (PERICOLACE) 8.6-50 MG per tablet 2 tablet (has no administration in time range)     And   polyethylene glycol (MIRALAX) packet 17 g (has no administration in time range)     And   bisacodyl (DULCOLAX) EC tablet 5 mg (has no administration in time range)     And   bisacodyl (DULCOLAX) suppository 10 mg (has no administration in time range)   Potassium Replacement - Follow Nurse / BPA Driven Protocol (has no administration in time range)   Magnesium Standard Dose Replacement - Follow Nurse / BPA Driven Protocol (has no administration in time range)   Phosphorus Replacement - Follow Nurse / BPA Driven Protocol (has no administration in time range)   Calcium Replacement - Follow Nurse / BPA Driven Protocol (has no administration in time range)   HYDROmorphone (DILAUDID) injection 1 mg (has no administration in time range)     And   naloxone (NARCAN) injection 0.4 mg (has no administration in time range)   HYDROmorphone (DILAUDID) injection 0.5 mg (0.5 mg Intravenous Given 12/1/24 1607)   ondansetron (ZOFRAN) injection 4 mg (4 mg Intravenous Given 12/1/24 1607)   HYDROmorphone (DILAUDID) injection 0.5 mg (0.5 mg Intravenous Given 12/1/24 1702)   morphine injection 4 mg  (4 mg Intravenous Given 24)   ketorolac (TORADOL) injection 15 mg (15 mg Intravenous Given 24)   dexAMETHasone sodium phosphate injection 10 mg (10 mg Intravenous Given 24)       Imaging results:  US Renal Bilateral    Result Date: 2024  Impression: 1.Three  right renal cysts. 2.Otherwise unremarkable renal ultrasound Electronically Signed: Rom Judd MD  2024 5:57 PM EST  Workstation ID: [id number]    CT Lumbar Spine Without Contrast    Result Date: 2024  1. Stable nondisplaced fracture at the superior plate of L4. 2. Severe foraminal narrowing at L3-L4. Severe canal stenosis at L4-L5. 3. High density structure arising from the medial right kidney. Ultrasound of the kidneys recommended. Electronically Signed: Yaya Allen MD  2024 4:32 PM EST  Workstation ID: [id number]    CT Pelvis Without Contrast    Result Date: 2024  1. Stable nondisplaced fracture at the superior plate of L4. 2. Severe foraminal narrowing at L3-L4. Severe canal stenosis at L4-L5. 3. High density structure arising from the medial right kidney. Ultrasound of the kidneys recommended. Electronically Signed: Yaya Allen MD  2024 4:32 PM EST  Workstation ID: [id number]     Social issues:   Social History     Socioeconomic History    Marital status:    Tobacco Use    Smoking status: Former     Current packs/day: 0.00     Types: Cigarettes     Quit date: 1966     Years since quittin.9    Smokeless tobacco: Never   Vaping Use    Vaping status: Never Used   Substance and Sexual Activity    Alcohol use: Never    Drug use: Never    Sexual activity: Not Currently       NIH Stroke Scale:  Interval: (not recorded)  1a. Level of Consciousness: (not recorded)  1b. LOC Questions: (not recorded)  1c. LOC Commands: (not recorded)  2. Best Gaze: (not recorded)  3. Visual: (not recorded)  4. Facial Palsy: (not recorded)  5a. Motor Arm, Left: (not recorded)  5b. Motor Arm, Right: (not  recorded)  6a. Motor Leg, Left: (not recorded)  6b. Motor Leg, Right: (not recorded)  7. Limb Ataxia: (not recorded)  8. Sensory: (not recorded)  9. Best Language: (not recorded)  10. Dysarthria: (not recorded)  11. Extinction and Inattention (formerly Neglect): (not recorded)    Total (NIH Stroke Scale): (not recorded)     Additional notable assessment information:     Nursing report ED to floor:    Alexandra Verduzco RN   12/01/24 18:53 EST

## 2024-12-02 LAB
ANION GAP SERPL CALCULATED.3IONS-SCNC: 8.9 MMOL/L (ref 5–15)
BASOPHILS # BLD AUTO: 0.03 10*3/MM3 (ref 0–0.2)
BASOPHILS NFR BLD AUTO: 0.3 % (ref 0–1.5)
BUN SERPL-MCNC: 21 MG/DL (ref 8–23)
BUN/CREAT SERPL: 22.8 (ref 7–25)
CALCIUM SPEC-SCNC: 9.1 MG/DL (ref 8.6–10.5)
CHLORIDE SERPL-SCNC: 107 MMOL/L (ref 98–107)
CO2 SERPL-SCNC: 24.1 MMOL/L (ref 22–29)
CREAT SERPL-MCNC: 0.92 MG/DL (ref 0.57–1)
DEPRECATED RDW RBC AUTO: 43.3 FL (ref 37–54)
EGFRCR SERPLBLD CKD-EPI 2021: 65.1 ML/MIN/1.73
EOSINOPHIL # BLD AUTO: 0 10*3/MM3 (ref 0–0.4)
EOSINOPHIL NFR BLD AUTO: 0 % (ref 0.3–6.2)
ERYTHROCYTE [DISTWIDTH] IN BLOOD BY AUTOMATED COUNT: 12 % (ref 12.3–15.4)
GLUCOSE BLDC GLUCOMTR-MCNC: 130 MG/DL (ref 70–105)
GLUCOSE BLDC GLUCOMTR-MCNC: 158 MG/DL (ref 70–105)
GLUCOSE SERPL-MCNC: 157 MG/DL (ref 65–99)
HCT VFR BLD AUTO: 38.4 % (ref 34–46.6)
HGB BLD-MCNC: 12.7 G/DL (ref 12–15.9)
IMM GRANULOCYTES # BLD AUTO: 0.04 10*3/MM3 (ref 0–0.05)
IMM GRANULOCYTES NFR BLD AUTO: 0.4 % (ref 0–0.5)
LYMPHOCYTES # BLD AUTO: 0.96 10*3/MM3 (ref 0.7–3.1)
LYMPHOCYTES NFR BLD AUTO: 9.1 % (ref 19.6–45.3)
MCH RBC QN AUTO: 32.1 PG (ref 26.6–33)
MCHC RBC AUTO-ENTMCNC: 33.1 G/DL (ref 31.5–35.7)
MCV RBC AUTO: 97 FL (ref 79–97)
MONOCYTES # BLD AUTO: 0.14 10*3/MM3 (ref 0.1–0.9)
MONOCYTES NFR BLD AUTO: 1.3 % (ref 5–12)
NEUTROPHILS NFR BLD AUTO: 88.9 % (ref 42.7–76)
NEUTROPHILS NFR BLD AUTO: 9.34 10*3/MM3 (ref 1.7–7)
NRBC BLD AUTO-RTO: 0 /100 WBC (ref 0–0.2)
PLATELET # BLD AUTO: 252 10*3/MM3 (ref 140–450)
PMV BLD AUTO: 8.7 FL (ref 6–12)
POTASSIUM SERPL-SCNC: 5 MMOL/L (ref 3.5–5.2)
RBC # BLD AUTO: 3.96 10*6/MM3 (ref 3.77–5.28)
SODIUM SERPL-SCNC: 140 MMOL/L (ref 136–145)
WBC NRBC COR # BLD AUTO: 10.51 10*3/MM3 (ref 3.4–10.8)

## 2024-12-02 PROCEDURE — 82948 REAGENT STRIP/BLOOD GLUCOSE: CPT

## 2024-12-02 PROCEDURE — 25010000002 DIPHENHYDRAMINE PER 50 MG

## 2024-12-02 PROCEDURE — 25010000002 HYDROMORPHONE 1 MG/ML SOLUTION: Performed by: EMERGENCY MEDICINE

## 2024-12-02 PROCEDURE — 25010000002 DEXAMETHASONE PER 1 MG: Performed by: NURSE PRACTITIONER

## 2024-12-02 PROCEDURE — 25010000002 HYDROMORPHONE 1 MG/ML SOLUTION: Performed by: NURSE PRACTITIONER

## 2024-12-02 PROCEDURE — 80048 BASIC METABOLIC PNL TOTAL CA: CPT | Performed by: EMERGENCY MEDICINE

## 2024-12-02 PROCEDURE — 25010000002 HYDRALAZINE PER 20 MG: Performed by: NURSE PRACTITIONER

## 2024-12-02 PROCEDURE — G0378 HOSPITAL OBSERVATION PER HR: HCPCS

## 2024-12-02 PROCEDURE — 25010000002 PROCHLORPERAZINE 10 MG/2ML SOLUTION

## 2024-12-02 PROCEDURE — 85025 COMPLETE CBC W/AUTO DIFF WBC: CPT | Performed by: EMERGENCY MEDICINE

## 2024-12-02 PROCEDURE — 25010000002 DEXAMETHASONE PER 1 MG

## 2024-12-02 PROCEDURE — 99221 1ST HOSP IP/OBS SF/LOW 40: CPT | Performed by: STUDENT IN AN ORGANIZED HEALTH CARE EDUCATION/TRAINING PROGRAM

## 2024-12-02 PROCEDURE — 25010000002 ONDANSETRON PER 1 MG: Performed by: NURSE PRACTITIONER

## 2024-12-02 PROCEDURE — 25010000002 MORPHINE PER 10 MG: Performed by: NURSE PRACTITIONER

## 2024-12-02 PROCEDURE — 25010000002 KETOROLAC TROMETHAMINE PER 15 MG

## 2024-12-02 RX ORDER — IBUPROFEN 600 MG/1
1 TABLET ORAL
Status: DISCONTINUED | OUTPATIENT
Start: 2024-12-02 | End: 2024-12-06 | Stop reason: HOSPADM

## 2024-12-02 RX ORDER — HYDRALAZINE HYDROCHLORIDE 10 MG/1
10 TABLET, FILM COATED ORAL EVERY 12 HOURS SCHEDULED
Status: DISCONTINUED | OUTPATIENT
Start: 2024-12-02 | End: 2024-12-06 | Stop reason: HOSPADM

## 2024-12-02 RX ORDER — MORPHINE SULFATE 2 MG/ML
2 INJECTION, SOLUTION INTRAMUSCULAR; INTRAVENOUS EVERY 4 HOURS PRN
Status: DISCONTINUED | OUTPATIENT
Start: 2024-12-02 | End: 2024-12-03

## 2024-12-02 RX ORDER — NICOTINE POLACRILEX 4 MG
15 LOZENGE BUCCAL
Status: DISCONTINUED | OUTPATIENT
Start: 2024-12-02 | End: 2024-12-06 | Stop reason: HOSPADM

## 2024-12-02 RX ORDER — INSULIN LISPRO 100 [IU]/ML
2-9 INJECTION, SOLUTION INTRAVENOUS; SUBCUTANEOUS
Status: DISCONTINUED | OUTPATIENT
Start: 2024-12-02 | End: 2024-12-06 | Stop reason: HOSPADM

## 2024-12-02 RX ORDER — FAMOTIDINE 20 MG/1
40 TABLET, FILM COATED ORAL DAILY
Status: DISCONTINUED | OUTPATIENT
Start: 2024-12-02 | End: 2024-12-03

## 2024-12-02 RX ORDER — KETOROLAC TROMETHAMINE 30 MG/ML
15 INJECTION, SOLUTION INTRAMUSCULAR; INTRAVENOUS EVERY 6 HOURS PRN
Status: ACTIVE | OUTPATIENT
Start: 2024-12-02 | End: 2024-12-03

## 2024-12-02 RX ORDER — OXYCODONE HYDROCHLORIDE 5 MG/1
10 TABLET ORAL EVERY 4 HOURS PRN
Status: DISCONTINUED | OUTPATIENT
Start: 2024-12-02 | End: 2024-12-04

## 2024-12-02 RX ORDER — IBUPROFEN 400 MG/1
800 TABLET, FILM COATED ORAL 3 TIMES DAILY PRN
Status: DISCONTINUED | OUTPATIENT
Start: 2024-12-02 | End: 2024-12-02

## 2024-12-02 RX ORDER — PROCHLORPERAZINE EDISYLATE 5 MG/ML
5 INJECTION INTRAMUSCULAR; INTRAVENOUS ONCE
Status: COMPLETED | OUTPATIENT
Start: 2024-12-02 | End: 2024-12-02

## 2024-12-02 RX ORDER — DEXTROSE MONOHYDRATE 25 G/50ML
25 INJECTION, SOLUTION INTRAVENOUS
Status: DISCONTINUED | OUTPATIENT
Start: 2024-12-02 | End: 2024-12-06 | Stop reason: HOSPADM

## 2024-12-02 RX ORDER — LIDOCAINE 4 G/G
1 PATCH TOPICAL
Status: DISCONTINUED | OUTPATIENT
Start: 2024-12-02 | End: 2024-12-06 | Stop reason: HOSPADM

## 2024-12-02 RX ORDER — CETIRIZINE HYDROCHLORIDE 10 MG/1
10 TABLET ORAL DAILY
Status: DISCONTINUED | OUTPATIENT
Start: 2024-12-02 | End: 2024-12-06 | Stop reason: HOSPADM

## 2024-12-02 RX ORDER — DEXAMETHASONE SODIUM PHOSPHATE 4 MG/ML
4 INJECTION, SOLUTION INTRA-ARTICULAR; INTRALESIONAL; INTRAMUSCULAR; INTRAVENOUS; SOFT TISSUE ONCE
Status: COMPLETED | OUTPATIENT
Start: 2024-12-02 | End: 2024-12-02

## 2024-12-02 RX ORDER — HYDROMORPHONE HYDROCHLORIDE 1 MG/ML
0.5 INJECTION, SOLUTION INTRAMUSCULAR; INTRAVENOUS; SUBCUTANEOUS EVERY 4 HOURS PRN
Status: DISCONTINUED | OUTPATIENT
Start: 2024-12-02 | End: 2024-12-02

## 2024-12-02 RX ORDER — HYDRALAZINE HYDROCHLORIDE 20 MG/ML
10 INJECTION INTRAMUSCULAR; INTRAVENOUS EVERY 6 HOURS PRN
Status: DISCONTINUED | OUTPATIENT
Start: 2024-12-02 | End: 2024-12-06 | Stop reason: HOSPADM

## 2024-12-02 RX ORDER — HYDRALAZINE HYDROCHLORIDE 10 MG/1
10 TABLET, FILM COATED ORAL EVERY 12 HOURS SCHEDULED
Status: DISCONTINUED | OUTPATIENT
Start: 2024-12-02 | End: 2024-12-02

## 2024-12-02 RX ORDER — DEXAMETHASONE SODIUM PHOSPHATE 4 MG/ML
2 INJECTION, SOLUTION INTRA-ARTICULAR; INTRALESIONAL; INTRAMUSCULAR; INTRAVENOUS; SOFT TISSUE EVERY 8 HOURS
Status: DISCONTINUED | OUTPATIENT
Start: 2024-12-02 | End: 2024-12-06 | Stop reason: HOSPADM

## 2024-12-02 RX ORDER — GABAPENTIN 300 MG/1
300 CAPSULE ORAL 2 TIMES DAILY
Status: DISCONTINUED | OUTPATIENT
Start: 2024-12-02 | End: 2024-12-06 | Stop reason: HOSPADM

## 2024-12-02 RX ORDER — KETOROLAC TROMETHAMINE 30 MG/ML
15 INJECTION, SOLUTION INTRAMUSCULAR; INTRAVENOUS ONCE
Status: COMPLETED | OUTPATIENT
Start: 2024-12-02 | End: 2024-12-02

## 2024-12-02 RX ORDER — DIPHENHYDRAMINE HYDROCHLORIDE 50 MG/ML
25 INJECTION INTRAMUSCULAR; INTRAVENOUS ONCE
Status: COMPLETED | OUTPATIENT
Start: 2024-12-02 | End: 2024-12-02

## 2024-12-02 RX ORDER — DOCUSATE SODIUM 100 MG/1
100 CAPSULE, LIQUID FILLED ORAL 2 TIMES DAILY
Status: DISCONTINUED | OUTPATIENT
Start: 2024-12-02 | End: 2024-12-06 | Stop reason: HOSPADM

## 2024-12-02 RX ADMIN — Medication 10 ML: at 23:51

## 2024-12-02 RX ADMIN — ONDANSETRON 4 MG: 2 INJECTION, SOLUTION INTRAMUSCULAR; INTRAVENOUS at 11:01

## 2024-12-02 RX ADMIN — HYDROMORPHONE HYDROCHLORIDE 1 MG: 1 INJECTION, SOLUTION INTRAMUSCULAR; INTRAVENOUS; SUBCUTANEOUS at 11:05

## 2024-12-02 RX ADMIN — MORPHINE SULFATE 2 MG: 2 INJECTION, SOLUTION INTRAMUSCULAR; INTRAVENOUS at 19:44

## 2024-12-02 RX ADMIN — DEXAMETHASONE SODIUM PHOSPHATE 4 MG: 4 INJECTION, SOLUTION INTRAMUSCULAR; INTRAVENOUS at 01:15

## 2024-12-02 RX ADMIN — DIPHENHYDRAMINE HYDROCHLORIDE 25 MG: 50 INJECTION, SOLUTION INTRAMUSCULAR; INTRAVENOUS at 01:15

## 2024-12-02 RX ADMIN — HYDROMORPHONE HYDROCHLORIDE 1 MG: 1 INJECTION, SOLUTION INTRAMUSCULAR; INTRAVENOUS; SUBCUTANEOUS at 00:51

## 2024-12-02 RX ADMIN — GABAPENTIN 300 MG: 300 CAPSULE ORAL at 23:50

## 2024-12-02 RX ADMIN — HYDROMORPHONE HYDROCHLORIDE 1 MG: 1 INJECTION, SOLUTION INTRAMUSCULAR; INTRAVENOUS; SUBCUTANEOUS at 13:34

## 2024-12-02 RX ADMIN — DEXAMETHASONE SODIUM PHOSPHATE 2 MG: 4 INJECTION, SOLUTION INTRAMUSCULAR; INTRAVENOUS at 23:51

## 2024-12-02 RX ADMIN — HYDRALAZINE HYDROCHLORIDE 10 MG: 20 INJECTION INTRAMUSCULAR; INTRAVENOUS at 15:32

## 2024-12-02 RX ADMIN — PROCHLORPERAZINE EDISYLATE 5 MG: 5 INJECTION INTRAMUSCULAR; INTRAVENOUS at 01:14

## 2024-12-02 RX ADMIN — MORPHINE SULFATE 2 MG: 2 INJECTION, SOLUTION INTRAMUSCULAR; INTRAVENOUS at 23:51

## 2024-12-02 RX ADMIN — KETOROLAC TROMETHAMINE 15 MG: 30 INJECTION, SOLUTION INTRAMUSCULAR at 01:15

## 2024-12-02 RX ADMIN — HYDROMORPHONE HYDROCHLORIDE 1 MG: 1 INJECTION, SOLUTION INTRAMUSCULAR; INTRAVENOUS; SUBCUTANEOUS at 15:31

## 2024-12-02 RX ADMIN — DEXAMETHASONE SODIUM PHOSPHATE 2 MG: 4 INJECTION, SOLUTION INTRAMUSCULAR; INTRAVENOUS at 15:31

## 2024-12-02 RX ADMIN — HYDROMORPHONE HYDROCHLORIDE 1 MG: 1 INJECTION, SOLUTION INTRAMUSCULAR; INTRAVENOUS; SUBCUTANEOUS at 06:13

## 2024-12-02 RX ADMIN — OXYCODONE 10 MG: 5 TABLET ORAL at 09:44

## 2024-12-02 RX ADMIN — DOCUSATE SODIUM 100 MG: 100 CAPSULE, LIQUID FILLED ORAL at 23:51

## 2024-12-02 NOTE — CASE MANAGEMENT/SOCIAL WORK
Discharge Planning Assessment   Ivan     Patient Name: Nola Tariq  MRN: [id number]  Today's Date: 12/2/2024    Admit Date: 12/1/2024    Plan: Routine home w/ brother   Discharge Needs Assessment       Row Name 12/02/24 1122       Living Environment    People in Home sibling(s)    Name(s) of People in Home Brother Steven    Current Living Arrangements home    Potentially Unsafe Housing Conditions none    In the past 12 months has the electric, gas, oil, or water company threatened to shut off services in your home? No    Primary Care Provided by self    Provides Primary Care For no one    Family Caregiver if Needed sibling(s)    Family Caregiver Names brother Steven    Quality of Family Relationships helpful;involved;supportive    Able to Return to Prior Arrangements yes       Resource/Environmental Concerns    Resource/Environmental Concerns none    Transportation Concerns none       Transportation Needs    In the past 12 months, has lack of transportation kept you from medical appointments or from getting medications? no    In the past 12 months, has lack of transportation kept you from meetings, work, or from getting things needed for daily living? No       Food Insecurity    Within the past 12 months, you worried that your food would run out before you got the money to buy more. Never true    Within the past 12 months, the food you bought just didn't last and you didn't have money to get more. Never true       Transition Planning    Patient/Family Anticipates Transition to home with family    Patient/Family Anticipated Services at Transition none    Transportation Anticipated car, drives self;family or friend will provide       Discharge Needs Assessment    Readmission Within the Last 30 Days no previous admission in last 30 days    Equipment Currently Used at Home walker, rolling    Concerns to be Addressed no discharge needs identified    Do you want help finding or keeping work or a job? I do not need  or want help    Do you want help with school or training? For example, starting or completing job training or getting a high school diploma, GED or equivalent No    Anticipated Changes Related to Illness none    Equipment Needed After Discharge none                   Discharge Plan       Row Name 12/02/24 1124       Plan    Plan Routine home w/ brother    Plan Comments CM met with patient at the bedside. Confirmed PCP, insurance, and pharmacy. M2B declined. Patient denies any difficulty affording medications. Patient is not current with any HHC/OPPT/OT services. Patient lives at home with her brother, is mostly indep with ADLS/IADLS; however patient requires help when she is in this much pain. Patient does not drive, brother Steven provides transport and is able to at discharge. DC Barriers: Pain mgmt consult, IV dilaudid.                  Continued Care and Services - Admitted Since 12/1/2024    No active coordination exists for this encounter.       Expected Discharge Date and Time       Expected Discharge Date Expected Discharge Time    Dec 3, 2024            Demographic Summary       Row Name 12/02/24 1122       General Information    Admission Type observation    Arrived From emergency department    Required Notices Provided Observation Status Notice    Referral Source admission list    Reason for Consult discharge planning    Preferred Language English       Contact Information    Permission Granted to Share Info With     Contact Information Obtained for                    Functional Status       Row Name 12/02/24 1122       Functional Status    Usual Activity Tolerance moderate    Current Activity Tolerance moderate       Functional Status, IADL    Medications independent;assistive person    Meal Preparation independent;assistive person    Housekeeping independent;assistive person    Laundry independent;assistive person    Shopping independent;assistive person           Timothy MCDANIELS  ELANA Mora      Cell number [phone]  Office number [phone]

## 2024-12-02 NOTE — PLAN OF CARE
Goal Outcome Evaluation:         New admission. PT c/o of lower back pain r/t to right hip.  Pain medication given with some relief. Pt c/o of migraine.  Migraine cocktail given with little relief.  Pain mangement consult called into Nascimento.

## 2024-12-02 NOTE — SIGNIFICANT NOTE
12/02/24 1255   OTHER   Discipline physical therapist   Rehab Time/Intention   Session Not Performed unable to evaluate, medical status change  (Per nursing hold this date as pt is having a Kyphoplasty tomorrow and is in a lot of pain.  PT to f/u 12/3/24 post surgery when pt is appropriate.)   Therapy Assessment/Plan (PT)   Criteria for Skilled Interventions Met (PT) yes;skilled treatment is necessary   Recommendation   PT - Next Appointment 12/03/24

## 2024-12-02 NOTE — H&P
[facility] H&P    Patient Name: Nola Tariq  : 1950  MRN: [id number]  Primary Care Physician: Miranda Hylton APRN  Date of admission: 2024     Patient Care Team:  Miranda Hylton APRN as PCP - General (Nurse Practitioner)          Subjective   History Present Illness     Chief Complaint:   Chief Complaint   Patient presents with    Hip Pain     Hip Pain     Ms. Tariq is a 74 y.o.  presents to [facility] complaining of hip pain       History of Present Illness    ED 24: Patient is a 74-year-old female who arrives today with reports that she had a kyphoplasty for her L3 however her L4 is also fractured and she has surgery scheduled for 2025. She denies any history of recent falls or exacerbations however today her low back and right hip pain became so bad that her home pain medications were not covering it. She takes Oxy 10 and ibuprofen 800. These usually help alleviate her pain however today they are not helping at all. She sees Dr. Nascimento with pain management.     Observation 24: Patient is a 74-year-old female presenting to the hospital with complaints of low back pain.  Patient is scheduled to have kyphoplasty in January but states she has worsening pain unable to stand upper barely turn in bed.  Patient normally takes oxycodone ibuprofen per pain management but states pain has not been alleviated.  Patient seen by pain management and sent scheduled to have possible kyphoplasty tomorrow.    Review of Systems   Constitutional: Positive for malaise/fatigue.   HENT: Negative.     Eyes: Negative.    Cardiovascular: Negative.    Respiratory: Negative.     Endocrine: Negative.    Skin: Negative.    Musculoskeletal:  Positive for back pain, falls and stiffness.   Gastrointestinal: Negative.    Genitourinary:  Positive for bladder incontinence.   Neurological:  Positive for weakness.   Psychiatric/Behavioral: Negative.             Personal History     Past Medical  History:   Past Medical History:   Diagnosis Date    Arthritis     Tijerina esophagus     Chronic pain disorder     COVID-19     DJD (degenerative joint disease)     Extremity pain     Fall at home     Fall at home     fracture rt. leg    Foot pain, right     Fractures     GERD (gastroesophageal reflux disease)     Headache, tension-type     Hypertension     Joint pain     Leg pain, right     Low back pain     Migraine     Plantar fasciitis     Shingles     Shoulder pain, right     Sleep apnea     Spinal stenosis 9/4/2024    Epidural on 9/4       Surgical History:      Past Surgical History:   Procedure Laterality Date    BACK SURGERY  2/2009    Decompression    CARPAL TUNNEL RELEASE      colin.    COLONOSCOPY      EPIDURAL BLOCK      EYE SURGERY      catarac    KNEE ARTHROSCOPY      SPINAL CORD STIMULATOR TRIAL W/ LAMINOTOMY      SPINE SURGERY      Kyphoplasty    TUBAL ABDOMINAL LIGATION      VERTEBROPLASTY Bilateral 10/11/2024    Procedure: VERTEBROPLASTY;  Surgeon: Kirk Nascimento DO;  Location: [facility] MAIN OR;  Service: Pain Management;  Laterality: Bilateral;    WRIST SURGERY      tendon release           Family History: family history includes COPD in her father; Cancer in her mother; Migraines in her brother, father, and sister. Otherwise pertinent FHx was reviewed and unremarkable.     Social History:  reports that she quit smoking about 58 years ago. Her smoking use included cigarettes. She has never used smokeless tobacco. She reports that she does not drink alcohol and does not use drugs.      Medications:  Prior to Admission medications    Medication Sig Start Date End Date Taking? Authorizing Provider   Cetirizine HCl 10 MG capsule ZYRTEC ALLERGY 10 MG CAPS 8/30/17  Yes ProviderCarol MD   docusate sodium (COLACE) 100 MG capsule Take 1 capsule by mouth 2 (Two) Times a Day.   Yes ProviderCarol MD   famotidine (PEPCID) 40 MG tablet  11/17/22  Yes ProviderCarol MD   gabapentin (NEURONTIN)  300 MG capsule  7/7/23  Yes Carol Tolbert MD   ibuprofen (ADVIL,MOTRIN) 800 MG tablet Take 1 tablet by mouth 3 (Three) Times a Day As Needed for Mild Pain. 11/12/24  Yes Kirk Nascimento DO   omeprazole (priLOSEC) 40 MG capsule  5/2/22  Yes Carol Tolbert MD   oxyCODONE-acetaminophen (PERCOCET)  MG per tablet Take 1 tablet by mouth Every 6 (Six) Hours As Needed for Moderate Pain for up to 30 days. 11/28/24 12/28/24 Yes Kirk Nascimento DO   vitamin D (ERGOCALCIFEROL) 1.25 MG (89897 UT) capsule capsule Take 1 capsule by mouth 1 (One) Time Per Week.   Yes Carol Tolbert MD   Acetaminophen-Caffeine 500-65 MG tablet EXCEDRIN TENSION HEADACHE 500-65 MG TABS 5/17/16   Carol Tolbert MD   albuterol sulfate  (90 Base) MCG/ACT inhaler Inhale 2 puffs Every 4 (Four) Hours As Needed for Wheezing. 8/20/24   Sierra Cooper APRN   denosumab (PROLIA) 60 MG/ML solution prefilled syringe syringe     Carol Tolbert MD   fluticasone (FLONASE) 50 MCG/ACT nasal spray 2 sprays by Each Nare route 2 (Two) Times a Day. 5/8/24   Carol Tolbert MD   guaiFENesin (MUCINEX) 600 MG 12 hr tablet Take 1 tablet by mouth Every 12 (Twelve) Hours. 8/20/24   Sierra Cooper APRN   Multiple Vitamins-Minerals (MULTIVITAMIN WITH MINERALS) tablet tablet Take 1 tablet by mouth Daily.    Carol Tolbert MD   sucralfate (CARAFATE) 1 g tablet  5/2/22   Carol Tolbert MD   topiramate (TOPAMAX) 25 MG tablet  4/14/23   Carol Tolbert MD   Voquezna 20 MG tablet  1/29/24   Carol Tolbert MD       Allergies:    Allergies   Allergen Reactions    Celebrex [Celecoxib] Swelling       Objective   Objective     Vital Signs  Temp:  [98.2 °F (36.8 °C)-98.3 °F (36.8 °C)] 98.3 °F (36.8 °C)  Heart Rate:  [78-98] 95  Resp:  [18-22] 18  BP: (147-177)/() 150/71  SpO2:  [91 %-96 %] 91 %  on  Flow (L/min) (Oxygen Therapy):  [1-3] 3;   Device (Oxygen Therapy): nasal cannula  Body mass index  is 30.73 kg/m².    Physical Exam  Vitals and nursing note reviewed.   Constitutional:       Appearance: Normal appearance.   HENT:      Head: Normocephalic and atraumatic.      Right Ear: External ear normal.      Left Ear: External ear normal.      Nose: Nose normal.      Mouth/Throat:      Pharynx: Oropharynx is clear.   Eyes:      Extraocular Movements: Extraocular movements intact.      Conjunctiva/sclera: Conjunctivae normal.      Pupils: Pupils are equal, round, and reactive to light.   Cardiovascular:      Rate and Rhythm: Normal rate.      Pulses: Normal pulses.   Pulmonary:      Effort: Pulmonary effort is normal.   Abdominal:      General: Bowel sounds are normal.   Musculoskeletal:         General: Tenderness present.      Cervical back: Normal range of motion.   Skin:     General: Skin is warm.      Capillary Refill: Capillary refill takes less than 2 seconds.   Neurological:      Mental Status: She is alert and oriented to person, place, and time.      Motor: Weakness present.      Gait: Gait abnormal.   Psychiatric:         Mood and Affect: Mood normal.         Behavior: Behavior normal.         Thought Content: Thought content normal.         Judgment: Judgment normal.         Results Review:  I have personally reviewed most recent lab results, microbiology results, and radiology images and interpretations and agree with findings, most notably: CBC, CMP, .    Results from last 7 days   Lab Units 12/02/24  0601   WBC 10*3/mm3 10.51   HEMOGLOBIN g/dL 12.7   HEMATOCRIT % 38.4   PLATELETS 10*3/mm3 252     Results from last 7 days   Lab Units 12/02/24  0601 12/01/24  1702   SODIUM mmol/L 140 140   POTASSIUM mmol/L 5.0 4.1   CHLORIDE mmol/L 107 105   CO2 mmol/L 24.1 25.4   BUN mg/dL 21 16   CREATININE mg/dL 0.92 1.00   GLUCOSE mg/dL 157* 134*   CALCIUM mg/dL 9.1 8.7   ALK PHOS U/L  --  77   ALT (SGPT) U/L  --  <5   AST (SGOT) U/L  --  16     Estimated Creatinine Clearance: 54.5 mL/min (by C-G formula based  on SCr of 0.92 mg/dL).  Brief Urine Lab Results  (Last result in the past 365 days)        Color   Clarity   Blood   Leuk Est   Nitrite   Protein   CREAT   Urine HCG        12/01/24 1758 Yellow   Clear   Negative   Trace   Negative   Trace                   Microbiology Results (last 10 days)       ** No results found for the last 240 hours. **            ECG/EMG Results (most recent)       None                    US Renal Bilateral    Result Date: 12/1/2024  Impression: 1.Three  right renal cysts. 2.Otherwise unremarkable renal ultrasound Electronically Signed: Rom Judd MD  12/1/2024 5:57 PM EST  Workstation ID: [id number]    CT Lumbar Spine Without Contrast    Result Date: 12/1/2024  1. Stable nondisplaced fracture at the superior plate of L4. 2. Severe foraminal narrowing at L3-L4. Severe canal stenosis at L4-L5. 3. High density structure arising from the medial right kidney. Ultrasound of the kidneys recommended. Electronically Signed: Yaya Allen MD  12/1/2024 4:32 PM EST  Workstation ID: [id number]    CT Pelvis Without Contrast    Result Date: 12/1/2024  1. Stable nondisplaced fracture at the superior plate of L4. 2. Severe foraminal narrowing at L3-L4. Severe canal stenosis at L4-L5. 3. High density structure arising from the medial right kidney. Ultrasound of the kidneys recommended. Electronically Signed: Yaya Allen MD  12/1/2024 4:32 PM EST  Workstation ID: [id number]       Estimated Creatinine Clearance: 54.5 mL/min (by C-G formula based on SCr of 0.92 mg/dL).    Assessment & Plan   Assessment/Plan       Active Hospital Problems    Diagnosis  POA    **Back pain [M54.9]  Yes    Compression fracture of L4 lumbar vertebra, with delayed healing, subsequent encounter [S32.040G]  Not Applicable      Resolved Hospital Problems   No resolved problems to display.     Back Pain  -CT lumbar spine: moderate canal stenosis and foraminal narrowing, L3-4 disc osteophyte with severe canal stenosis, L4-5 pseudobulge  and disc bulge with facet disease, stable nondisplaced fracture at super plate of L4  -Continue lidocaine, Gabapentin, Toradol   -Bowel regimen  -Given IV Toradol, Dexamethasone, and Dilaudid in ED   -IV/oral analgesics as needed  -NPO at midnight for possible kyphoplast on 12/3/24  -Physical therapy consulted   -Pain management consulted     Hypertension  -Poorly Controlled   BP Readings from Last 1 Encounters:   12/02/24 (!) 183/87   - Continue hydralazine  - Monitor while admitted    GERD  -Continue PPI       VTE Prophylaxis - Active VTE Prophylaxis  Mechanical:        Start        12/01/24 1828  Maintain Sequential Compression Device  Continuous                          Select Pharmacologic VTE Prophylaxis if Desired & Appropriate      CODE STATUS:    Code Status and Medical Interventions: CPR (Attempt to Resuscitate); Full Support   Ordered at: 12/01/24 1830     Level Of Support Discussed With:    Patient     Code Status (Patient has no pulse and is not breathing):    CPR (Attempt to Resuscitate)     Medical Interventions (Patient has pulse or is breathing):    Full Support       This patient has been examined wearing personal protective equipment.     I discussed the patient's findings and my recommendations with patient, family, nursing staff, primary care team, and consulting provider.      Signature:Electronically signed by JEM Murray, 12/02/24, 11:55 AM EST.        I spent 35 minutes caring for Nola on this date of service. This time includes time spent by me in the following activities: reviewing tests, performing a medically appropriate examination and/or evaluation, counseling and educating the patient/family/caregiver, referring and communicating with other health care professionals, documenting information in the medical record, independently interpreting results and communicating that information with the patient/family/caregiver, care coordination, ordering medications, ordering test(s),  ordering procedure(s), obtaining a separately obtained history, and reviewing a separately obtained history.

## 2024-12-02 NOTE — CONSULTS
CHIEF COMPLAINT  Lower back pain with right knee radiculopathy      Subjective   Nola Tariq is a 74 y.o. female is a known patient of our pain management service with history of new L4 compression fracture scheduled for kyphoplasty on 1/31/2024 due to IV fluid shortage and unable to get her on schedule.  Previous history of L3 compression fracture s/p kyphoplasty and severe spinal stenosis.  She presented to ER on 12/1/2024 due to severe pain that was not controlled with pain medications that were prescribed as outpatient basis.  Also complains of severe migraines this morning.  She was given IV Dilaudid in the ER with some improvement in pain.  CT lumbar and pelvis were done and reviewed by me today.    Lower back pain pain is 8/10 on VAS, at maximum is 10/10. Pain is sharp, shooting, and stabbing in nature. Pain is referred right hip, anterior thigh and right knee. The pain is constant. The pain is improved by pain medications somewhat. The pain is worse with any increased activities.     Previous procedures:   10/11/24 - L3 kyphoplasty - not sure if helped.   9/3/2024-LESI L3-4-no significant improvement.  4/18/2023- SCS trial [employer]- no significant pain relief. Coverage was excellent with patient able to feel paresthesia in all of the painful areas but unfortunately no significant pain relief.  No functional improvement as well.  11/8/2022-right knee injection- good relief.  7/8/22 - LESI L5-S1 - no relief.   RFA at previous pain management - no relief.      Hx:  Referred by JEM Ashton for  lower back pain.  Pain started around 2008 without any significant injuries and has been getting worse since then.  Her main complaints today are mid back and lower back pain.  Patient had previously seen Dr. Voss and had epidurals, facet joint injections, ablations.  She states that nothing helped at the time. Last seen 1 year ago. Her lower back pain is worse than mid back pain. She is retired RN.    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  Patient states that she is not interested in fusion surgery as there is 50% chance of lower back pain improvement. She had also injured her left knee and had steroid injection by Ortho in left knee.  Scheduled to see Ortho in 5 weeks. She has been seeing neurology for migraines and has been started on gabapentin.  Patient has taken gabapentin for about 1 month without significant improvement in her migraines.         PHQ-9- 4  SOAPP-2      PMH:   Tijerina's esophagus, hypertension, migraine, thoracic vertebra fx s/p kypho T7, T10, migraines, smoker, Back surgery- decompression L4-5?-2009 Dr. Rdz, Carpal tunnel surgery 1983 bilateral wrist; R knee steroid injection with orthopedics (Dr. Barton at [place]).         Current Medications:   Tramadol  mg q6H PRN  Lyrica 25 mg BID  Ibuprofen 800 mg        Past Medications:  Tramadol  mg daily   Meloxicam 15 mg  Celebrex-leg swelling  Gabapentin - didn't help   Norco 5-325 mg BID PRN      Past Modalities:  TENS:                                                                          no                                                  Physical Therapy Within The Last 6 Months              Yes (1.5 years ago with some help).   Psychotherapy                                                            no  Massage Therapy                                                       no     Patient Complains Of:  Uro-Fecal Incontinence          no  Weight Gain/Loss                   Yes (weight gain 25 lbs - 1 year after smoking).   Fever/Chills                             no  Weakness                               no             Current Facility-Administered Medications:     sennosides-docusate (PERICOLACE) 8.6-50 MG per tablet 2 tablet, 2 tablet, Oral, BID PRN **AND** polyethylene glycol (MIRALAX) packet 17 g, 17 g, Oral, Daily PRN **AND** bisacodyl (DULCOLAX) EC tablet 5 mg, 5  mg, Oral, Daily PRN **AND** bisacodyl (DULCOLAX) suppository 10 mg, 10 mg, Rectal, Daily PRN, Emily Haynes APRN    Calcium Replacement - Follow Nurse / BPA Driven Protocol, , Not Applicable, PRN, Emily Haynes APRN    cetirizine (zyrTEC) tablet 10 mg, 10 mg, Oral, Daily, Emily Haynes APRN    docusate sodium (COLACE) capsule 100 mg, 100 mg, Oral, BID, Emily Haynes APRN    famotidine (PEPCID) tablet 40 mg, 40 mg, Oral, Daily, Emily Haynes APRN    HYDROmorphone (DILAUDID) injection 0.5 mg, 0.5 mg, Intravenous, Q4H PRN, Emily Haynes APRN    HYDROmorphone (DILAUDID) injection 1 mg, 1 mg, Intravenous, Q2H PRN, 1 mg at 12/02/24 0613 **AND** naloxone (NARCAN) injection 0.4 mg, 0.4 mg, Intravenous, Q5 Min PRN, Emily Haynes APRN    ketorolac (TORADOL) injection 15 mg, 15 mg, Intravenous, Q6H PRN, Emily Haynes APRN    Lidocaine 4 % 1 patch, 1 patch, Transdermal, Q24H, Emily Haynes APRN    Magnesium Standard Dose Replacement - Follow Nurse / BPA Driven Protocol, , Not Applicable, PRN, Emily Haynes APRN    melatonin tablet 5 mg, 5 mg, Oral, Nightly PRN, Emily Haynes APRN    ondansetron (ZOFRAN) injection 4 mg, 4 mg, Intravenous, Q6H PRN, Emily Haynes APRN    oxyCODONE (ROXICODONE) immediate release tablet 10 mg, 10 mg, Oral, Q4H PRN, Emily Haynes APRN, 10 mg at 12/02/24 0944    Phosphorus Replacement - Follow Nurse / BPA Driven Protocol, , Not Applicable, PRN, Emily Haynes APRN    Potassium Replacement - Follow Nurse / BPA Driven Protocol, , Not Applicable, PRN, Emily Haynes APRN    [COMPLETED] Insert Peripheral IV, , , Once **AND** sodium chloride 0.9 % flush 10 mL, 10 mL, Intravenous, PRN, Emily Haynes, JEM    sodium chloride 0.9 % flush 10 mL, 10 mL, Intravenous, Q12H, Emily Haynes APRN, 10 mL at 12/01/24 2048    sodium chloride 0.9 % flush 10 mL, 10 mL, Intravenous, PRN, Emily Haynes, JEM    sodium chloride 0.9 % infusion 40 mL, 40 mL, Intravenous, PRN, Emily Haynes, JEM    The following  portions of the patient's history were reviewed and updated as appropriate: allergies, current medications, past family history, past medical history, past social history, past surgical history, and problem list.      REVIEW OF PERTINENT MEDICAL DATA    Past Medical History:   Diagnosis Date    Arthritis     Tijerina esophagus     Chronic pain disorder     COVID-19     DJD (degenerative joint disease)     Extremity pain     Fall at home     Fall at home     fracture rt. leg    Foot pain, right     Fractures     GERD (gastroesophageal reflux disease)     Headache, tension-type     Hypertension     Joint pain     Leg pain, right     Low back pain     Migraine     Plantar fasciitis     Shingles     Shoulder pain, right     Sleep apnea     Spinal stenosis 2024    Epidural on      Past Surgical History:   Procedure Laterality Date    BACK SURGERY  2009    Decompression    CARPAL TUNNEL RELEASE      colin.    COLONOSCOPY      EPIDURAL BLOCK      EYE SURGERY      catarac    KNEE ARTHROSCOPY      SPINAL CORD STIMULATOR TRIAL W/ LAMINOTOMY      SPINE SURGERY      Kyphoplasty    TUBAL ABDOMINAL LIGATION      VERTEBROPLASTY Bilateral 10/11/2024    Procedure: VERTEBROPLASTY;  Surgeon: Kirk Nascimento DO;  Location: [facility] MAIN OR;  Service: Pain Management;  Laterality: Bilateral;    WRIST SURGERY      tendon release     Family History   Problem Relation Age of Onset    Cancer Mother     Migraines Father     COPD Father     Migraines Sister     Migraines Brother      Social History     Socioeconomic History    Marital status:    Tobacco Use    Smoking status: Former     Current packs/day: 0.00     Types: Cigarettes     Quit date: 1966     Years since quittin.9    Smokeless tobacco: Never   Vaping Use    Vaping status: Never Used   Substance and Sexual Activity    Alcohol use: Never    Drug use: Never    Sexual activity: Not Currently         Review of Systems   Musculoskeletal:  Positive for arthralgias and  back pain.         Vitals:    12/01/24 2031 12/01/24 2032 12/02/24 0048 12/02/24 0617   BP: 177/85 155/90 (!) 169/102 150/71   BP Location:  Left arm Left arm Right arm   Patient Position:  Lying Lying Lying   Pulse: 83 88 98 95   Resp: 18  18 18   Temp: 98.3 °F (36.8 °C)  98.3 °F (36.8 °C) 98.3 °F (36.8 °C)   TempSrc: Oral  Oral Oral   SpO2: 96%  92% 91%   Weight:       Height:             Objective   Physical Exam  Musculoskeletal:         General: Tenderness present.        Back:            Imaging Reviewed:  CT pelvis and lumbar- 12/2/24.   1. Stable nondisplaced fracture at the superior plate of L4.   2. Severe foraminal narrowing at L3-L4. Severe canal stenosis at L4-L5.   3. High density structure arising from the medial right kidney. Ultrasound of the kidneys recommended.       CT lumbar:   L2-L3:  Moderate canal stenosis. Moderate foraminal narrowing.   L3-L4: Disc osteophyte complex with ligamentum flavum thickening. Severe canal stenosis. Severe foraminal narrowing.   L4-L5: Pseudobulge and disc bulge with facet disease and ligamentum flavum thickening. Severe canal stenosis. Moderate foraminal narrowing.   L5-S1: Disc osteophyte complex. Moderate canal stenosis. Moderate foraminal narrowing.     IMPRESSION:  1. Stable nondisplaced fracture at the superior plate of L4.  2. Severe foraminal narrowing at L3-L4. Severe canal stenosis at L4-L5.  3. High density structure arising from the medial right kidney. Ultrasound of the kidneys recommended.    Assessment:    1. Compression fracture of L4 lumbar vertebra, with delayed healing, subsequent encounter    2. Chronic bilateral low back pain without sciatica    3. Right hip pain         Plan:   1.  Patient has significantly increased lower back pain from her baseline not controlled with Percocet  mg every 4 hours as needed with ibuprofen and gabapentin.  She is requiring IV pain medications and escalating doses of opioids to help control her pain.   Patient has multiple etiology of pain including L4 subacute L4 compression fracture and severe stenosis at L4-5.  Patient has failed epidurals in the past and has also been evaluated by [facility] spine surgery.  Unfortunately due to IV fluid shortage, we are not able to schedule patient for L4 kyphoplasty until 1/31/2025.  Will call the OR scheduling to see if he could perform L4 kyphoplasty while patient is inpatient due to severity of her pain.  Benefits and risks of procedure were discussed with patient including but not limited to bleeding, infection, nerve damage, cement leak and spinal canal requiring decompression, cement migration to lungs causing pulmonary embolism and death.  Patient understands and agreed to proceed with procedure.  Will work on scheduling her.  2.  Continue Percocet  mg every 4 hours as needed.  3.  Continue Dilaudid 0.5-1 mg every 2 hours as needed.  4. NPO from midnight with hopefully plan for kyphoplasty L4 tomorrow depending upon OR scheduling.       Kirk Nascimento DO  Pain Management   [facility]         INSPECT REPORT    As part of the patient's treatment plan, I may be prescribing controlled substances. The patient has been made aware of appropriate use of such medications, including potential risk of somnolence, limited ability to drive and/or work safely, and the potential for dependence or overdose. It has also been made clear that these medications are for use by this patient only, without concomitant use of alcohol or other substances unless prescribed.     Patient has completed prescribing agreement detailing terms of continued prescribing of controlled substances, including monitoring INSPECT reports, urine drug screening, and pill counts if necessary. The patient is aware that inappropriate use will results in cessation of prescribing such medications.    INSPECT report has been reviewed and scanned into the patient's chart.

## 2024-12-03 ENCOUNTER — ANESTHESIA EVENT (OUTPATIENT)
Dept: PERIOP | Facility: HOSPITAL | Age: 74
End: 2024-12-03
Payer: MEDICARE

## 2024-12-03 ENCOUNTER — ANESTHESIA (OUTPATIENT)
Dept: PERIOP | Facility: HOSPITAL | Age: 74
End: 2024-12-03
Payer: MEDICARE

## 2024-12-03 ENCOUNTER — APPOINTMENT (OUTPATIENT)
Dept: GENERAL RADIOLOGY | Facility: HOSPITAL | Age: 74
End: 2024-12-03
Payer: COMMERCIAL

## 2024-12-03 ENCOUNTER — APPOINTMENT (OUTPATIENT)
Dept: GENERAL RADIOLOGY | Facility: HOSPITAL | Age: 74
End: 2024-12-03
Payer: MEDICARE

## 2024-12-03 LAB
ANION GAP SERPL CALCULATED.3IONS-SCNC: 9.6 MMOL/L (ref 5–15)
BASOPHILS # BLD AUTO: 0.04 10*3/MM3 (ref 0–0.2)
BASOPHILS NFR BLD AUTO: 0.3 % (ref 0–1.5)
BUN SERPL-MCNC: 27 MG/DL (ref 8–23)
BUN/CREAT SERPL: 33.8 (ref 7–25)
CALCIUM SPEC-SCNC: 9.2 MG/DL (ref 8.6–10.5)
CHLORIDE SERPL-SCNC: 103 MMOL/L (ref 98–107)
CO2 SERPL-SCNC: 25.4 MMOL/L (ref 22–29)
CREAT SERPL-MCNC: 0.8 MG/DL (ref 0.57–1)
DEPRECATED RDW RBC AUTO: 43.7 FL (ref 37–54)
EGFRCR SERPLBLD CKD-EPI 2021: 76.9 ML/MIN/1.73
EOSINOPHIL # BLD AUTO: 0.01 10*3/MM3 (ref 0–0.4)
EOSINOPHIL NFR BLD AUTO: 0.1 % (ref 0.3–6.2)
ERYTHROCYTE [DISTWIDTH] IN BLOOD BY AUTOMATED COUNT: 12.1 % (ref 12.3–15.4)
GLUCOSE BLDC GLUCOMTR-MCNC: 122 MG/DL (ref 70–105)
GLUCOSE BLDC GLUCOMTR-MCNC: 122 MG/DL (ref 70–105)
GLUCOSE BLDC GLUCOMTR-MCNC: 158 MG/DL (ref 70–105)
GLUCOSE BLDC GLUCOMTR-MCNC: 93 MG/DL (ref 70–105)
GLUCOSE SERPL-MCNC: 124 MG/DL (ref 65–99)
HCT VFR BLD AUTO: 37.1 % (ref 34–46.6)
HGB BLD-MCNC: 12.1 G/DL (ref 12–15.9)
IMM GRANULOCYTES # BLD AUTO: 0.08 10*3/MM3 (ref 0–0.05)
IMM GRANULOCYTES NFR BLD AUTO: 0.6 % (ref 0–0.5)
LYMPHOCYTES # BLD AUTO: 1.28 10*3/MM3 (ref 0.7–3.1)
LYMPHOCYTES NFR BLD AUTO: 8.9 % (ref 19.6–45.3)
MCH RBC QN AUTO: 31.6 PG (ref 26.6–33)
MCHC RBC AUTO-ENTMCNC: 32.6 G/DL (ref 31.5–35.7)
MCV RBC AUTO: 96.9 FL (ref 79–97)
MONOCYTES # BLD AUTO: 0.43 10*3/MM3 (ref 0.1–0.9)
MONOCYTES NFR BLD AUTO: 3 % (ref 5–12)
NEUTROPHILS NFR BLD AUTO: 12.61 10*3/MM3 (ref 1.7–7)
NEUTROPHILS NFR BLD AUTO: 87.1 % (ref 42.7–76)
NRBC BLD AUTO-RTO: 0 /100 WBC (ref 0–0.2)
PLATELET # BLD AUTO: 244 10*3/MM3 (ref 140–450)
PMV BLD AUTO: 8.6 FL (ref 6–12)
POTASSIUM SERPL-SCNC: 4.6 MMOL/L (ref 3.5–5.2)
RBC # BLD AUTO: 3.83 10*6/MM3 (ref 3.77–5.28)
SODIUM SERPL-SCNC: 138 MMOL/L (ref 136–145)
WBC NRBC COR # BLD AUTO: 14.45 10*3/MM3 (ref 3.4–10.8)

## 2024-12-03 PROCEDURE — 0QU03JZ SUPPLEMENT LUMBAR VERTEBRA WITH SYNTHETIC SUBSTITUTE, PERCUTANEOUS APPROACH: ICD-10-PCS | Performed by: STUDENT IN AN ORGANIZED HEALTH CARE EDUCATION/TRAINING PROGRAM

## 2024-12-03 PROCEDURE — 25010000002 DEXAMETHASONE PER 1 MG: Performed by: STUDENT IN AN ORGANIZED HEALTH CARE EDUCATION/TRAINING PROGRAM

## 2024-12-03 PROCEDURE — 25010000002 SUCCINYLCHOLINE PER 20 MG

## 2024-12-03 PROCEDURE — 25010000002 HYDROMORPHONE 1 MG/ML SOLUTION

## 2024-12-03 PROCEDURE — 25810000003 LACTATED RINGERS PER 1000 ML

## 2024-12-03 PROCEDURE — 76000 FLUOROSCOPY <1 HR PHYS/QHP: CPT

## 2024-12-03 PROCEDURE — 25010000002 MORPHINE PER 10 MG: Performed by: STUDENT IN AN ORGANIZED HEALTH CARE EDUCATION/TRAINING PROGRAM

## 2024-12-03 PROCEDURE — 25010000002 LIDOCAINE 1% - EPINEPHRINE 1:100000 1 %-1:100000 SOLUTION 20 ML VIAL: Performed by: STUDENT IN AN ORGANIZED HEALTH CARE EDUCATION/TRAINING PROGRAM

## 2024-12-03 PROCEDURE — 99233 SBSQ HOSP IP/OBS HIGH 50: CPT | Performed by: STUDENT IN AN ORGANIZED HEALTH CARE EDUCATION/TRAINING PROGRAM

## 2024-12-03 PROCEDURE — 85025 COMPLETE CBC W/AUTO DIFF WBC: CPT | Performed by: EMERGENCY MEDICINE

## 2024-12-03 PROCEDURE — 25010000002 MORPHINE PER 10 MG

## 2024-12-03 PROCEDURE — 25010000002 GLYCOPYRROLATE 0.2 MG/ML SOLUTION

## 2024-12-03 PROCEDURE — 22514 PERQ VERTEBRAL AUGMENTATION: CPT | Performed by: STUDENT IN AN ORGANIZED HEALTH CARE EDUCATION/TRAINING PROGRAM

## 2024-12-03 PROCEDURE — 82948 REAGENT STRIP/BLOOD GLUCOSE: CPT | Performed by: NURSE PRACTITIONER

## 2024-12-03 PROCEDURE — 25010000002 PROPOFOL 200 MG/20ML EMULSION

## 2024-12-03 PROCEDURE — C1713 ANCHOR/SCREW BN/BN,TIS/BN: HCPCS | Performed by: STUDENT IN AN ORGANIZED HEALTH CARE EDUCATION/TRAINING PROGRAM

## 2024-12-03 PROCEDURE — 25010000002 HYDROMORPHONE 1 MG/ML SOLUTION: Performed by: ANESTHESIOLOGY

## 2024-12-03 PROCEDURE — 72100 X-RAY EXAM L-S SPINE 2/3 VWS: CPT

## 2024-12-03 PROCEDURE — 0QS03ZZ REPOSITION LUMBAR VERTEBRA, PERCUTANEOUS APPROACH: ICD-10-PCS | Performed by: STUDENT IN AN ORGANIZED HEALTH CARE EDUCATION/TRAINING PROGRAM

## 2024-12-03 PROCEDURE — 25010000002 MORPHINE PER 10 MG: Performed by: NURSE PRACTITIONER

## 2024-12-03 PROCEDURE — 25010000002 LIDOCAINE PF 2% 2 % SOLUTION

## 2024-12-03 PROCEDURE — 25010000002 DEXAMETHASONE PER 1 MG: Performed by: NURSE PRACTITIONER

## 2024-12-03 PROCEDURE — 25010000002 CEFAZOLIN PER 500 MG: Performed by: STUDENT IN AN ORGANIZED HEALTH CARE EDUCATION/TRAINING PROGRAM

## 2024-12-03 PROCEDURE — 25010000002 DEXAMETHASONE PER 1 MG

## 2024-12-03 PROCEDURE — 82948 REAGENT STRIP/BLOOD GLUCOSE: CPT

## 2024-12-03 PROCEDURE — 80048 BASIC METABOLIC PNL TOTAL CA: CPT | Performed by: EMERGENCY MEDICINE

## 2024-12-03 PROCEDURE — 25010000002 FENTANYL CITRATE (PF) 100 MCG/2ML SOLUTION

## 2024-12-03 PROCEDURE — 25010000002 BUPIVACAINE (PF) 0.25 % SOLUTION 30 ML VIAL: Performed by: STUDENT IN AN ORGANIZED HEALTH CARE EDUCATION/TRAINING PROGRAM

## 2024-12-03 RX ORDER — EPHEDRINE SULFATE 5 MG/ML
5 INJECTION INTRAVENOUS ONCE AS NEEDED
Status: DISCONTINUED | OUTPATIENT
Start: 2024-12-03 | End: 2024-12-03 | Stop reason: HOSPADM

## 2024-12-03 RX ORDER — FLUMAZENIL 0.1 MG/ML
0.2 INJECTION INTRAVENOUS AS NEEDED
Status: DISCONTINUED | OUTPATIENT
Start: 2024-12-03 | End: 2024-12-03 | Stop reason: HOSPADM

## 2024-12-03 RX ORDER — MORPHINE SULFATE 2 MG/ML
2 INJECTION, SOLUTION INTRAMUSCULAR; INTRAVENOUS
Status: DISCONTINUED | OUTPATIENT
Start: 2024-12-03 | End: 2024-12-04

## 2024-12-03 RX ORDER — DIPHENHYDRAMINE HYDROCHLORIDE 50 MG/ML
12.5 INJECTION INTRAMUSCULAR; INTRAVENOUS
Status: DISCONTINUED | OUTPATIENT
Start: 2024-12-03 | End: 2024-12-03 | Stop reason: HOSPADM

## 2024-12-03 RX ORDER — HYDRALAZINE HYDROCHLORIDE 20 MG/ML
5 INJECTION INTRAMUSCULAR; INTRAVENOUS
Status: DISCONTINUED | OUTPATIENT
Start: 2024-12-03 | End: 2024-12-03 | Stop reason: HOSPADM

## 2024-12-03 RX ORDER — GLYCOPYRROLATE 0.2 MG/ML
INJECTION INTRAMUSCULAR; INTRAVENOUS AS NEEDED
Status: DISCONTINUED | OUTPATIENT
Start: 2024-12-03 | End: 2024-12-03 | Stop reason: SURG

## 2024-12-03 RX ORDER — EPHEDRINE SULFATE 5 MG/ML
INJECTION INTRAVENOUS AS NEEDED
Status: DISCONTINUED | OUTPATIENT
Start: 2024-12-03 | End: 2024-12-03 | Stop reason: SURG

## 2024-12-03 RX ORDER — IPRATROPIUM BROMIDE AND ALBUTEROL SULFATE 2.5; .5 MG/3ML; MG/3ML
3 SOLUTION RESPIRATORY (INHALATION) ONCE AS NEEDED
Status: DISCONTINUED | OUTPATIENT
Start: 2024-12-03 | End: 2024-12-03 | Stop reason: HOSPADM

## 2024-12-03 RX ORDER — PANTOPRAZOLE SODIUM 40 MG/10ML
40 INJECTION, POWDER, LYOPHILIZED, FOR SOLUTION INTRAVENOUS
Status: DISCONTINUED | OUTPATIENT
Start: 2024-12-03 | End: 2024-12-06 | Stop reason: HOSPADM

## 2024-12-03 RX ORDER — ONDANSETRON 2 MG/ML
4 INJECTION INTRAMUSCULAR; INTRAVENOUS ONCE AS NEEDED
Status: DISCONTINUED | OUTPATIENT
Start: 2024-12-03 | End: 2024-12-03 | Stop reason: HOSPADM

## 2024-12-03 RX ORDER — FENTANYL CITRATE 50 UG/ML
INJECTION, SOLUTION INTRAMUSCULAR; INTRAVENOUS AS NEEDED
Status: DISCONTINUED | OUTPATIENT
Start: 2024-12-03 | End: 2024-12-03 | Stop reason: SURG

## 2024-12-03 RX ORDER — OXYCODONE HYDROCHLORIDE 5 MG/1
10 TABLET ORAL EVERY 4 HOURS PRN
Status: DISCONTINUED | OUTPATIENT
Start: 2024-12-03 | End: 2024-12-03 | Stop reason: HOSPADM

## 2024-12-03 RX ORDER — PROPOFOL 10 MG/ML
INJECTION, EMULSION INTRAVENOUS AS NEEDED
Status: DISCONTINUED | OUTPATIENT
Start: 2024-12-03 | End: 2024-12-03 | Stop reason: SURG

## 2024-12-03 RX ORDER — SODIUM CHLORIDE, SODIUM LACTATE, POTASSIUM CHLORIDE, CALCIUM CHLORIDE 600; 310; 30; 20 MG/100ML; MG/100ML; MG/100ML; MG/100ML
INJECTION, SOLUTION INTRAVENOUS CONTINUOUS PRN
Status: DISCONTINUED | OUTPATIENT
Start: 2024-12-03 | End: 2024-12-03 | Stop reason: SURG

## 2024-12-03 RX ORDER — OXYCODONE HYDROCHLORIDE 5 MG/1
5 TABLET ORAL ONCE AS NEEDED
Status: DISCONTINUED | OUTPATIENT
Start: 2024-12-03 | End: 2024-12-03 | Stop reason: HOSPADM

## 2024-12-03 RX ORDER — LABETALOL HYDROCHLORIDE 5 MG/ML
5 INJECTION, SOLUTION INTRAVENOUS
Status: DISCONTINUED | OUTPATIENT
Start: 2024-12-03 | End: 2024-12-03 | Stop reason: HOSPADM

## 2024-12-03 RX ORDER — DEXAMETHASONE SODIUM PHOSPHATE 4 MG/ML
INJECTION, SOLUTION INTRA-ARTICULAR; INTRALESIONAL; INTRAMUSCULAR; INTRAVENOUS; SOFT TISSUE AS NEEDED
Status: DISCONTINUED | OUTPATIENT
Start: 2024-12-03 | End: 2024-12-03 | Stop reason: SURG

## 2024-12-03 RX ORDER — SUCCINYLCHOLINE CHLORIDE 20 MG/ML
INJECTION INTRAMUSCULAR; INTRAVENOUS AS NEEDED
Status: DISCONTINUED | OUTPATIENT
Start: 2024-12-03 | End: 2024-12-03 | Stop reason: SURG

## 2024-12-03 RX ORDER — DIPHENHYDRAMINE HYDROCHLORIDE 50 MG/ML
12.5 INJECTION INTRAMUSCULAR; INTRAVENOUS ONCE AS NEEDED
Status: DISCONTINUED | OUTPATIENT
Start: 2024-12-03 | End: 2024-12-03 | Stop reason: HOSPADM

## 2024-12-03 RX ORDER — NALOXONE HCL 0.4 MG/ML
0.4 VIAL (ML) INJECTION AS NEEDED
Status: DISCONTINUED | OUTPATIENT
Start: 2024-12-03 | End: 2024-12-03 | Stop reason: HOSPADM

## 2024-12-03 RX ADMIN — GABAPENTIN 300 MG: 300 CAPSULE ORAL at 10:11

## 2024-12-03 RX ADMIN — HYDROMORPHONE HYDROCHLORIDE 0.5 MG: 1 INJECTION, SOLUTION INTRAMUSCULAR; INTRAVENOUS; SUBCUTANEOUS at 18:14

## 2024-12-03 RX ADMIN — PROPOFOL 130 MG: 10 INJECTION, EMULSION INTRAVENOUS at 16:15

## 2024-12-03 RX ADMIN — HYDRALAZINE HYDROCHLORIDE 10 MG: 10 TABLET ORAL at 10:11

## 2024-12-03 RX ADMIN — CETIRIZINE HYDROCHLORIDE 10 MG: 10 TABLET, FILM COATED ORAL at 10:11

## 2024-12-03 RX ADMIN — MORPHINE SULFATE 2 MG: 2 INJECTION, SOLUTION INTRAMUSCULAR; INTRAVENOUS at 12:45

## 2024-12-03 RX ADMIN — SUCCINYLCHOLINE CHLORIDE 140 MG: 20 INJECTION, SOLUTION INTRAMUSCULAR; INTRAVENOUS at 16:15

## 2024-12-03 RX ADMIN — EPHEDRINE SULFATE 5 MG: 5 INJECTION INTRAVENOUS at 16:44

## 2024-12-03 RX ADMIN — LIDOCAINE PAIN RELIEF 1 PATCH: 560 PATCH TOPICAL at 10:11

## 2024-12-03 RX ADMIN — SODIUM CHLORIDE 2000 MG: 900 INJECTION INTRAVENOUS at 15:59

## 2024-12-03 RX ADMIN — DOCUSATE SODIUM 100 MG: 100 CAPSULE, LIQUID FILLED ORAL at 20:41

## 2024-12-03 RX ADMIN — HYDRALAZINE HYDROCHLORIDE 10 MG: 10 TABLET ORAL at 20:41

## 2024-12-03 RX ADMIN — HYDROMORPHONE HYDROCHLORIDE 0.5 MG: 1 INJECTION, SOLUTION INTRAMUSCULAR; INTRAVENOUS; SUBCUTANEOUS at 16:50

## 2024-12-03 RX ADMIN — HYDROMORPHONE HYDROCHLORIDE 0.5 MG: 1 INJECTION, SOLUTION INTRAMUSCULAR; INTRAVENOUS; SUBCUTANEOUS at 15:41

## 2024-12-03 RX ADMIN — Medication 10 ML: at 23:41

## 2024-12-03 RX ADMIN — DEXAMETHASONE SODIUM PHOSPHATE 2 MG: 4 INJECTION, SOLUTION INTRAMUSCULAR; INTRAVENOUS at 23:42

## 2024-12-03 RX ADMIN — HYDROMORPHONE HYDROCHLORIDE 0.5 MG: 1 INJECTION, SOLUTION INTRAMUSCULAR; INTRAVENOUS; SUBCUTANEOUS at 17:40

## 2024-12-03 RX ADMIN — GLYCOPYRROLATE 0.1 MG: 0.2 INJECTION, SOLUTION INTRAMUSCULAR; INTRAVENOUS at 16:15

## 2024-12-03 RX ADMIN — SODIUM CHLORIDE, SODIUM LACTATE, POTASSIUM CHLORIDE, AND CALCIUM CHLORIDE: .6; .31; .03; .02 INJECTION, SOLUTION INTRAVENOUS at 16:11

## 2024-12-03 RX ADMIN — FAMOTIDINE 40 MG: 20 TABLET, FILM COATED ORAL at 10:11

## 2024-12-03 RX ADMIN — HYDROMORPHONE HYDROCHLORIDE 0.5 MG: 1 INJECTION, SOLUTION INTRAMUSCULAR; INTRAVENOUS; SUBCUTANEOUS at 17:28

## 2024-12-03 RX ADMIN — GLYCOPYRROLATE 0.1 MG: 0.2 INJECTION, SOLUTION INTRAMUSCULAR; INTRAVENOUS at 16:44

## 2024-12-03 RX ADMIN — PANTOPRAZOLE SODIUM 40 MG: 40 INJECTION, POWDER, FOR SOLUTION INTRAVENOUS at 12:45

## 2024-12-03 RX ADMIN — GABAPENTIN 300 MG: 300 CAPSULE ORAL at 20:41

## 2024-12-03 RX ADMIN — MORPHINE SULFATE 2 MG: 2 INJECTION, SOLUTION INTRAMUSCULAR; INTRAVENOUS at 06:00

## 2024-12-03 RX ADMIN — LIDOCAINE HYDROCHLORIDE 100 MG: 20 INJECTION, SOLUTION EPIDURAL; INFILTRATION; INTRACAUDAL; PERINEURAL at 16:15

## 2024-12-03 RX ADMIN — DEXAMETHASONE SODIUM PHOSPHATE 4 MG: 4 INJECTION, SOLUTION INTRAMUSCULAR; INTRAVENOUS at 16:56

## 2024-12-03 RX ADMIN — DEXAMETHASONE SODIUM PHOSPHATE 2 MG: 4 INJECTION, SOLUTION INTRAMUSCULAR; INTRAVENOUS at 06:01

## 2024-12-03 RX ADMIN — MORPHINE SULFATE 2 MG: 2 INJECTION, SOLUTION INTRAMUSCULAR; INTRAVENOUS at 10:11

## 2024-12-03 RX ADMIN — MORPHINE SULFATE 2 MG: 2 INJECTION, SOLUTION INTRAMUSCULAR; INTRAVENOUS at 20:42

## 2024-12-03 RX ADMIN — FENTANYL CITRATE 100 MCG: 50 INJECTION, SOLUTION INTRAMUSCULAR; INTRAVENOUS at 17:44

## 2024-12-03 NOTE — ANESTHESIA PROCEDURE NOTES
Airway  Urgency: elective    Date/Time: 12/3/2024 4:17 PM  Airway not difficult    General Information and Staff    Patient location during procedure: OR  Anesthesiologist: Alfa Jalloh MD  CRNA/CAA: Yareli Galvez CRNA    Indications and Patient Condition  Indications for airway management: airway protection    Preoxygenated: yes  Mask difficulty assessment: 0 - not attempted    Final Airway Details  Final airway type: endotracheal airway      Successful airway: reinforced tube and ETT  Cuffed: yes   Successful intubation technique: video laryngoscopy and RSI  Facilitating devices/methods: intubating stylet and cricoid pressure  Blade: Naranjo  Blade size: 3  ETT size (mm): 7.0  Cormack-Lehane Classification: grade I - full view of glottis  Placement verified by: chest auscultation and capnometry   Measured from: lips  ETT/EBT  to lips (cm): 21  Number of attempts at approach: 1  Assessment: lips, teeth, and gum same as pre-op and atraumatic intubation

## 2024-12-03 NOTE — PROGRESS NOTES
Subjective   Nola Tariq is a 74 y.o. female is admitted under observation since yesterday due to increased lower back pain secondary to L4 compression fracture.  No significant events overnight.  Continues to have severe pain requiring escalating doses of opioids.  Scheduled for L4 kyphoplasty today.      Hx: She is a known patient of our pain management service with history of new L4 compression fracture scheduled for kyphoplasty on 1/31/2024 due to IV fluid shortage and unable to get her on schedule.  Previous history of L3 compression fracture s/p kyphoplasty and severe spinal stenosis.  She presented to ER on 12/1/2024 due to severe pain that was not controlled with pain medications that were prescribed as outpatient basis.  Also complains of severe migraines this morning.  She was given IV Dilaudid in the ER with some improvement in pain.  CT lumbar and pelvis were done and reviewed by me today.     Lower back pain pain is 8/10 on VAS, at maximum is 10/10. Pain is sharp, shooting, and stabbing in nature. Pain is referred right hip, anterior thigh and right knee. The pain is constant. The pain is improved by pain medications somewhat. The pain is worse with any increased activities.                Previous procedures:   10/11/24 - L3 kyphoplasty - not sure if helped.   9/3/2024-LESI L3-4-no significant improvement.  4/18/2023- SCS trial [employer]- no significant pain relief. Coverage was excellent with patient able to feel paresthesia in all of the painful areas but unfortunately no significant pain relief.  No functional improvement as well.  11/8/2022-right knee injection- good relief.  7/8/22 - LESI L5-S1 - no relief.   RFA at previous pain management - no relief.      Hx:  Referred by JEM Ashton for  lower back pain.  Pain started around 2008 without any significant injuries and has been getting worse since then.  Her main complaints today are mid back and lower back pain.  Patient  had previously seen Dr. Voss and had epidurals, facet joint injections, ablations.  She states that nothing helped at the time. Last seen 1 year ago. Her lower back pain is worse than mid back pain. She is retired RN.   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  Patient states that she is not interested in fusion surgery as there is 50% chance of lower back pain improvement. She had also injured her left knee and had steroid injection by Ortho in left knee.  Scheduled to see Ortho in 5 weeks. She has been seeing neurology for migraines and has been started on gabapentin.  Patient has taken gabapentin for about 1 month without significant improvement in her migraines.         PHQ-9- 4  SOAPP-2      PMH:   Tijerina's esophagus, hypertension, migraine, thoracic vertebra fx s/p kypho T7, T10, migraines, smoker, Back surgery- decompression L4-5?-2009 Dr. Rdz, Carpal tunnel surgery 1983 bilateral wrist; R knee steroid injection with orthopedics (Dr. Barton at [facility]).         Current Medications:   Tramadol  mg q6H PRN  Lyrica 25 mg BID  Ibuprofen 800 mg        Past Medications:  Tramadol  mg daily   Meloxicam 15 mg  Celebrex-leg swelling  Gabapentin - didn't help   Norco 5-325 mg BID PRN      Past Modalities:  TENS:                                                                          no                                                  Physical Therapy Within The Last 6 Months              Yes (1.5 years ago with some help).   Psychotherapy                                                            no  Massage Therapy                                                       no     Patient Complains Of:  Uro-Fecal Incontinence          no  Weight Gain/Loss                   Yes (weight gain 25 lbs - 1 year after smoking).   Fever/Chills                             no  Weakness                               no         Current Facility-Administered  Medications:     sennosides-docusate (PERICOLACE) 8.6-50 MG per tablet 2 tablet, 2 tablet, Oral, BID PRN **AND** polyethylene glycol (MIRALAX) packet 17 g, 17 g, Oral, Daily PRN **AND** bisacodyl (DULCOLAX) EC tablet 5 mg, 5 mg, Oral, Daily PRN **AND** bisacodyl (DULCOLAX) suppository 10 mg, 10 mg, Rectal, Daily PRN, Emily Haynes APRN    Calcium Replacement - Follow Nurse / BPA Driven Protocol, , Not Applicable, PRN, Emily Haynes APRN    cetirizine (zyrTEC) tablet 10 mg, 10 mg, Oral, Daily, Emily Haynes APRN, 10 mg at 12/03/24 1011    dexAMETHasone (DECADRON) injection 2 mg, 2 mg, Intravenous, Q8H, Emily Haynes APRN, 2 mg at 12/03/24 0601    dextrose (D50W) (25 g/50 mL) IV injection 25 g, 25 g, Intravenous, Q15 Min PRN, Emily Haynes APRN    dextrose (GLUTOSE) oral gel 15 g, 15 g, Oral, Q15 Min PRN, Emily Haynes APRN    docusate sodium (COLACE) capsule 100 mg, 100 mg, Oral, BID, Emily Haynes APRN, 100 mg at 12/02/24 2351    gabapentin (NEURONTIN) capsule 300 mg, 300 mg, Oral, BID, Emily Haynes APRN, 300 mg at 12/03/24 1011    glucagon (GLUCAGEN) injection 1 mg, 1 mg, Intramuscular, Q15 Min PRN, Emily Haynes APRN    hydrALAZINE (APRESOLINE) injection 10 mg, 10 mg, Intravenous, Q6H PRN, Emily Haynes APRN, 10 mg at 12/02/24 1532    hydrALAZINE (APRESOLINE) tablet 10 mg, 10 mg, Oral, Q12H, Emily Haynes APRN, 10 mg at 12/03/24 1011    HYDROmorphone (DILAUDID) injection 1 mg, 1 mg, Intravenous, Q2H PRN, 1 mg at 12/02/24 1531 **AND** naloxone (NARCAN) injection 0.4 mg, 0.4 mg, Intravenous, Q5 Min PRN, Emily Haynes APRN    insulin lispro (HUMALOG/ADMELOG) injection 2-9 Units, 2-9 Units, Subcutaneous, 4x Daily AC & at Bedtime, Emily Haynes APRN    Lidocaine 4 % 1 patch, 1 patch, Transdermal, Q24H, Emily Haynes APRN, 1 patch at 12/03/24 1011    Magnesium Standard Dose Replacement - Follow Nurse / BPA Driven Protocol, , Not Applicable, PRN, Emily Haynes APRN    melatonin tablet 5 mg, 5 mg, Oral,  Nightly PRN, Emily Haynes, APRN    morphine injection 2 mg, 2 mg, Intravenous, Q4H PRN, Emily Haynes APRN, 2 mg at 12/03/24 1011    ondansetron (ZOFRAN) injection 4 mg, 4 mg, Intravenous, Q6H PRN, Emily Haynes, APRN, 4 mg at 12/02/24 1101    oxyCODONE (ROXICODONE) immediate release tablet 10 mg, 10 mg, Oral, Q4H PRN, Emily Haynes, APRN, 10 mg at 12/02/24 0944    pantoprazole (PROTONIX) injection 40 mg, 40 mg, Intravenous, Q AM, Emily Haynes APRN    Phosphorus Replacement - Follow Nurse / BPA Driven Protocol, , Not Applicable, PRN, Emily Haynes APRN    Potassium Replacement - Follow Nurse / BPA Driven Protocol, , Not Applicable, PRN, Emily Haynes APRN    [COMPLETED] Insert Peripheral IV, , , Once **AND** sodium chloride 0.9 % flush 10 mL, 10 mL, Intravenous, PRN, Emily Haynes, APRN    sodium chloride 0.9 % flush 10 mL, 10 mL, Intravenous, Q12H, Emily Haynes, APRN, 10 mL at 12/02/24 2351    sodium chloride 0.9 % flush 10 mL, 10 mL, Intravenous, PRN, Emily Haynes, APRN    sodium chloride 0.9 % infusion 40 mL, 40 mL, Intravenous, PRN, Emily Haynes, APRN    The following portions of the patient's history were reviewed and updated as appropriate: allergies, current medications, past family history, past medical history, past social history, past surgical history, and problem list.      REVIEW OF PERTINENT MEDICAL DATA    Past Medical History:   Diagnosis Date    Arthritis     Tijerina esophagus     Chronic pain disorder     COVID-19     DJD (degenerative joint disease)     Extremity pain     Fall at home     Fall at home     fracture rt. leg    Foot pain, right     Fractures     GERD (gastroesophageal reflux disease)     Headache, tension-type     Hypertension     Joint pain     Leg pain, right     Low back pain     Migraine     Plantar fasciitis     Shingles     Shoulder pain, right     Sleep apnea     Spinal stenosis 9/4/2024    Epidural on 9/4     Past Surgical History:   Procedure Laterality Date    BACK  SURGERY  2009    Decompression    CARPAL TUNNEL RELEASE      [place].    COLONOSCOPY      EPIDURAL BLOCK      EYE SURGERY      [place]    KNEE ARTHROSCOPY      SPINAL CORD STIMULATOR TRIAL W/ LAMINOTOMY      SPINE SURGERY      Kyphoplasty    TUBAL ABDOMINAL LIGATION      VERTEBROPLASTY Bilateral 10/11/2024    Procedure: VERTEBROPLASTY;  Surgeon: Kirk Nascimento DO;  Location: [facility] MAIN OR;  Service: Pain Management;  Laterality: Bilateral;    WRIST SURGERY      tendon release     Family History   Problem Relation Age of Onset    Cancer Mother     Migraines Father     COPD Father     Migraines Sister     Migraines Brother      Social History     Socioeconomic History    Marital status:    Tobacco Use    Smoking status: Former     Current packs/day: 0.00     Types: Cigarettes     Quit date: 1966     Years since quittin.9    Smokeless tobacco: Never   Vaping Use    Vaping status: Never Used   Substance and Sexual Activity    Alcohol use: Never    Drug use: Never    Sexual activity: Not Currently         Review of Systems   Musculoskeletal:  Positive for arthralgias and back pain.         Vitals:    24 2348 24 0500 24 0750 24 1149   BP: 161/77 173/89 173/88 (!) 182/92   BP Location: Right arm Right arm Right arm Right arm   Patient Position: Lying Lying Lying Lying   Pulse: 77 84 80 82   Resp: 18 9 10 15   Temp: 97.9 °F (36.6 °C) 98.1 °F (36.7 °C) 98.1 °F (36.7 °C) 97.7 °F (36.5 °C)   TempSrc: Oral Oral Oral Oral   SpO2: 97% 97% 97% 93%   Weight:       Height:             Objective   Physical Exam  Musculoskeletal:         General: Tenderness present.        Legs:            Imaging Reviewed:  CT pelvis and lumbar- 24.   1. Stable nondisplaced fracture at the superior plate of L4.   2. Severe foraminal narrowing at L3-L4. Severe canal stenosis at L4-L5.   3. High density structure arising from the medial right kidney. Ultrasound of the kidneys recommended.         CT lumbar:    L2-L3:  Moderate canal stenosis. Moderate foraminal narrowing.   L3-L4: Disc osteophyte complex with ligamentum flavum thickening. Severe canal stenosis. Severe foraminal narrowing.   L4-L5: Pseudobulge and disc bulge with facet disease and ligamentum flavum thickening. Severe canal stenosis. Moderate foraminal narrowing.   L5-S1: Disc osteophyte complex. Moderate canal stenosis. Moderate foraminal narrowing.     IMPRESSION:  1. Stable nondisplaced fracture at the superior plate of L4.  2. Severe foraminal narrowing at L3-L4. Severe canal stenosis at L4-L5.  3. High density structure arising from the medial right kidney. Ultrasound of the kidneys recommended.       Assessment:    1. Compression fracture of L4 lumbar vertebra, with delayed healing, subsequent encounter    2. Chronic bilateral low back pain without sciatica    3. Right hip pain         Plan:     1.  Patient has significantly increased lower back pain from her baseline not controlled with Percocet  mg every 4 hours as needed with ibuprofen and gabapentin.  She is requiring IV pain medications and escalating doses of opioids to help control her pain.  Patient has multiple etiology of pain including L4 subacute L4 compression fracture and severe stenosis at L4-5.  Patient has failed epidurals in the past and has also been evaluated by [facility] spine surgery.  Unfortunately due to IV fluid shortage, we are not able to schedule patient for L4 kyphoplasty until 1/31/2025.  Will call the OR scheduling to see if he could perform L4 kyphoplasty while patient is inpatient due to severity of her pain.  Benefits and risks of procedure were discussed with patient including but not limited to bleeding, infection, nerve damage, cement leak and spinal canal requiring decompression, cement migration to lungs causing pulmonary embolism and death.  Patient understands and agreed to proceed with procedure.  Will work on scheduling her.  2.  Continue Percocet   mg every 4 hours as needed.  3.  Continue Dilaudid 0.5-1 mg every 2 hours as needed.    Will proceed with L4 kyphoplasty as scheduled.        Kirk Nascimento DO  Pain Management   [facility]         INSPECT REPORT    As part of the patient's treatment plan, I may be prescribing controlled substances. The patient has been made aware of appropriate use of such medications, including potential risk of somnolence, limited ability to drive and/or work safely, and the potential for dependence or overdose. It has also been made clear that these medications are for use by this patient only, without concomitant use of alcohol or other substances unless prescribed.     Patient has completed prescribing agreement detailing terms of continued prescribing of controlled substances, including monitoring INSPECT reports, urine drug screening, and pill counts if necessary. The patient is aware that inappropriate use will results in cessation of prescribing such medications.    INSPECT report has been reviewed and scanned into the patient's chart.      EMR Dragon/Transcription Disclaimer:   Much of this encounter note is an electronic transcription/translation of spoken language to printed text. The electronic translation of spoken language may permit erroneous, or at times, nonsensical words or phrases to be inadvertently transcribed; Although I have reviewed the note for such errors, some may still exist.

## 2024-12-03 NOTE — H&P
[facility] Medicine Services  History & Physical    Patient Name: Nola Tariq  : 1950  MRN: [id number]  Primary Care Physician:  Miranda Hylton APRN  Date of admission: 2024  Date and Time of Service: 2024 at 1105    Subjective      Chief Complaint: back pain     History of Present Illness: Nola Tariq is a 74 y.o. female with a CMH of arthritis, spinal stenosis, GERD, HTN, migraines who presented to [facility] on 2024 with back pain. She recently had a kyphoplasty for L3, and is scheduled to have kyphoplasty for her L4 which is also fractures. Her home pain medications were not helping her, so she presented to the ED. She follows with Dr. Nascimento with pain management. The ED gave this patient dilauded which improved her pain. This patient was originally admitted under observation status, but is scheduled to have kyphoplasty today. Hospitalist team to admit for further medical management.       Review of Systems   Constitutional:  Positive for fatigue.   Musculoskeletal:  Positive for arthralgias and back pain.   Neurological:  Positive for weakness.       Personal History     Past Medical History:   Diagnosis Date    Arthritis     [place] esophagus     Chronic pain disorder     COVID-19     DJD (degenerative joint disease)     Extremity pain     Fall at home     Fall at home     fracture rt. leg    Foot pain, right     Fractures     GERD (gastroesophageal reflux disease)     Headache, tension-type     Hypertension     Joint pain     Leg pain, right     Low back pain     Migraine     Plantar fasciitis     Shingles     Shoulder pain, right     Sleep apnea     Spinal stenosis 2024    Epidural on        Past Surgical History:   Procedure Laterality Date    BACK SURGERY  2009    Decompression    CARPAL TUNNEL RELEASE      colin.    COLONOSCOPY      EPIDURAL BLOCK      EYE SURGERY      cat[place]    KNEE ARTHROSCOPY      SPINAL CORD STIMULATOR TRIAL W/ LAMINOTOMY       SPINE SURGERY      Kyphoplasty    TUBAL ABDOMINAL LIGATION      VERTEBROPLASTY Bilateral 10/11/2024    Procedure: VERTEBROPLASTY;  Surgeon: Kirk Nascimento DO;  Location: [facility] MAIN OR;  Service: Pain Management;  Laterality: Bilateral;    WRIST SURGERY      tendon release       Family History: family history includes COPD in her father; Cancer in her mother; Migraines in her brother, father, and sister. Otherwise pertinent FHx was reviewed and not pertinent to current issue.    Social History:  reports that she quit smoking about 58 years ago. Her smoking use included cigarettes. She has never used smokeless tobacco. She reports that she does not drink alcohol and does not use drugs.    Home Medications:  Prior to Admission Medications       Prescriptions Last Dose Informant Patient Reported? Taking?    Cetirizine HCl 10 MG capsule 12/1/2024  Yes Yes    ZYRTEC ALLERGY 10 MG CAPS    docusate sodium (COLACE) 100 MG capsule 12/1/2024  Yes Yes    Take 1 capsule by mouth 2 (Two) Times a Day.    famotidine (PEPCID) 40 MG tablet 12/1/2024  Yes Yes    gabapentin (NEURONTIN) 300 MG capsule 12/1/2024  Yes Yes    ibuprofen (ADVIL,MOTRIN) 800 MG tablet 12/1/2024  No Yes    Take 1 tablet by mouth 3 (Three) Times a Day As Needed for Mild Pain.    omeprazole (priLOSEC) 40 MG capsule 12/1/2024  Yes Yes    oxyCODONE-acetaminophen (PERCOCET)  MG per tablet 12/1/2024  No Yes    Take 1 tablet by mouth Every 6 (Six) Hours As Needed for Moderate Pain for up to 30 days.    vitamin D (ERGOCALCIFEROL) 1.25 MG (55398 UT) capsule capsule 12/1/2024  Yes Yes    Take 1 capsule by mouth 1 (One) Time Per Week.    Acetaminophen-Caffeine 500-65 MG tablet More than a month  Yes No    EXCEDRIN TENSION HEADACHE 500-65 MG TABS    albuterol sulfate  (90 Base) MCG/ACT inhaler   No No    Inhale 2 puffs Every 4 (Four) Hours As Needed for Wheezing.    denosumab (PROLIA) 60 MG/ML solution prefilled syringe syringe More than a month  Yes No     fluticasone (FLONASE) 50 MCG/ACT nasal spray   Yes No    2 sprays by Each Nare route 2 (Two) Times a Day.    guaiFENesin (MUCINEX) 600 MG 12 hr tablet   No No    Take 1 tablet by mouth Every 12 (Twelve) Hours.    Multiple Vitamins-Minerals (MULTIVITAMIN WITH MINERALS) tablet tablet More than a month  Yes No    Take 1 tablet by mouth Daily.    sucralfate (CARAFATE) 1 g tablet   Yes No    topiramate (TOPAMAX) 25 MG tablet   Yes No    Voquezna 20 MG tablet   Yes No              Allergies:  Allergies   Allergen Reactions    Celebrex [Celecoxib] Swelling       Objective      Vitals:   Temp:  [97.3 °F (36.3 °C)-98.1 °F (36.7 °C)] 98.1 °F (36.7 °C)  Heart Rate:  [77-98] 80  Resp:  [9-18] 10  BP: (119-183)/(48-89) 173/88  Body mass index is 30.73 kg/m².  Physical Exam  Vitals and nursing note reviewed.   Constitutional:       Appearance: Normal appearance.   HENT:      Head: Normocephalic and atraumatic.      Nose: Nose normal.      Mouth/Throat:      Mouth: Mucous membranes are moist.   Eyes:      Extraocular Movements: Extraocular movements intact.      Pupils: Pupils are equal, round, and reactive to light.   Cardiovascular:      Rate and Rhythm: Normal rate and regular rhythm.      Pulses: Normal pulses.   Pulmonary:      Effort: Pulmonary effort is normal.      Breath sounds: Normal breath sounds.   Abdominal:      General: Bowel sounds are normal.      Palpations: Abdomen is soft.   Musculoskeletal:      Cervical back: Normal range of motion and neck supple.      Comments: ROM limited in back 2/2 to pain, back tenderness present in lumbar region.    Skin:     General: Skin is warm.      Capillary Refill: Capillary refill takes less than 2 seconds.   Neurological:      Mental Status: She is alert and oriented to person, place, and time.      Motor: Weakness present.      Gait: Gait abnormal.       Diagnostic Data:  Lab Results (last 24 hours)       Procedure Component Value Units Date/Time    POC Glucose 4x Daily  Before Meals & at Bedtime [[id number]]  (Abnormal) Collected: 12/03/24 0748    Specimen: Blood Updated: 12/03/24 0750     Glucose 122 mg/dL      Comment: Serial Number: [id number]:  [id number]       Basic Metabolic Panel [[id number]]  (Abnormal) Collected: 12/03/24 0615    Specimen: Blood from Arm, Left Updated: 12/03/24 0649     Glucose 124 mg/dL      BUN 27 mg/dL      Creatinine 0.80 mg/dL      Sodium 138 mmol/L      Potassium 4.6 mmol/L      Chloride 103 mmol/L      CO2 25.4 mmol/L      Calcium 9.2 mg/dL      BUN/Creatinine Ratio 33.8     Anion Gap 9.6 mmol/L      eGFR 76.9 mL/min/1.73     Narrative:      GFR Normal >60  Chronic Kidney Disease <60  Kidney Failure <15    The GFR formula is only valid for adults with stable renal function between ages 18 and 70.    CBC & Differential [[id number]]  (Abnormal) Collected: 12/03/24 0615    Specimen: Blood from Arm, Left Updated: 12/03/24 0626    Narrative:      The following orders were created for panel order CBC & Differential.  Procedure                               Abnormality         Status                     ---------                               -----------         ------                     CBC Auto Differential[[id number]]        Abnormal            Final result                 Please view results for these tests on the individual orders.    CBC Auto Differential [[id number]]  (Abnormal) Collected: 12/03/24 0615    Specimen: Blood from Arm, Left Updated: 12/03/24 0626     WBC 14.45 10*3/mm3      RBC 3.83 10*6/mm3      Hemoglobin 12.1 g/dL      Hematocrit 37.1 %      MCV 96.9 fL      MCH 31.6 pg      MCHC 32.6 g/dL      RDW 12.1 %      RDW-SD 43.7 fl      MPV 8.6 fL      Platelets 244 10*3/mm3      Neutrophil % 87.1 %      Lymphocyte % 8.9 %      Monocyte % 3.0 %      Eosinophil % 0.1 %      Basophil % 0.3 %      Immature Grans % 0.6 %      Neutrophils, Absolute 12.61 10*3/mm3      Lymphocytes, Absolute 1.28 10*3/mm3      Monocytes, Absolute 0.43 10*3/mm3       Eosinophils, Absolute 0.01 10*3/mm3      Basophils, Absolute 0.04 10*3/mm3      Immature Grans, Absolute 0.08 10*3/mm3      nRBC 0.0 /100 WBC     POC Glucose Once [[id number]]  (Abnormal) Collected: 12/02/24 2023    Specimen: Blood Updated: 12/02/24 2025     Glucose 158 mg/dL      Comment: Serial Number: [id number]:  [id number]       POC Glucose Once [[id number]]  (Abnormal) Collected: 12/02/24 1645    Specimen: Blood Updated: 12/02/24 1647     Glucose 130 mg/dL      Comment: Serial Number: [id number]:  [id number]                Imaging Results (Last 24 Hours)       ** No results found for the last 24 hours. **              Assessment & Plan        This is a 74 y.o. female with:    Active and Resolved Problems  Active Hospital Problems    Diagnosis  POA    **Back pain [M54.9]  Yes    Compression fracture of L4 lumbar vertebra, with delayed healing, subsequent encounter [S32.040G]  Not Applicable      Resolved Hospital Problems   No resolved problems to display.       Back Pain  -CT lumbar spine: moderate canal stenosis and foraminal narrowing, L3-4 disc osteophyte with severe canal stenosis, L4-5 pseudobulge and disc bulge with facet disease, stable nondisplaced fracture at super plate of L4  - Continue lidocaine, Gabapentin, Toradol, pain management is following   - Physical therapy consulted   - Kyphoplast planned for today, 12/3/24, keep NPO for surgery     Hypertension   - BP elevated at 173/88, consider this to be secondary to pain   - Home blood pressure medications continued on admission      GERD  - PPI continued on admission     Anemia   - Hgb 8.9  - No overt s/sx of bleeding   - Continue to monitor CBC  - Transfuse if Hgb < 7    Leukocytosis  - WBC on admit 10.51, today 14.45  - likely 2/2 steroids   - no over s/sx of infection   - continue to monitor with daily CBC    VTE Prophylaxis:  Mechanical VTE prophylaxis orders are present.        The patient desires to be as follows:    CODE STATUS:     Level Of Support Discussed With: Patient  Code Status (Patient has no pulse and is not breathing): CPR (Attempt to Resuscitate)  Medical Interventions (Patient has pulse or is breathing): Full Support        Ambika Beni, who can be contacted at [phone], is the designated person to make medical decisions on the patient's behalf if She is incapable of doing so. This was clarified with patient and/or next of kin on 12/1/2024 during the course of this H&P.    Admission Status:  I believe this patient meets inpatient status.    Expected Length of Stay: > 2 midnights    PDMP and Medication Dispenses via Sidebar reviewed and consistent with patient reported medications.    I discussed the patient's findings and my recommendations with patient.      Signature:     This document has been electronically signed by JEM Montoya on December 3, 2024 11:11 [facility] Hospitalist Team

## 2024-12-03 NOTE — PLAN OF CARE
Goal Outcome Evaluation:                    Morphine given for pain.  NPO at midnight. Consent on chart

## 2024-12-03 NOTE — OP NOTE
PROCEDURE: KYPHOPLASTY    SURGEON: Kirk Nascimento DO    OPERATION:    1. KYPHON® Balloon Kyphoplasty at L4 level    2. Insertion of KYPHON® HV-R™ bone cement under low pressure at L4 levels      ANESTHESIA: General    PREOPERATIVE DIAGNOSIS: 733.13 pathologic fracture of vertebrae, 733.00 osteoporosis with pathologic fracture, vertebral compression fracture.    POSTOPERATIVE DIAGNOSIS: same    PREOPERATIVE ANTIBIOTICS: 2 g IV given 30 minutes prior to incision.    OPERATIVE PROCEDURE: The patient was brought to the operating room suite and general anesthesia with endotracheal intubation was performed. The patient was    positioned prone on the Marcello table. The back was prepped and draped. The image intensifier (C-arm) was brought into position and L4 pedicles were identified and marked with a skin marker. In view of the collapse of L4, a transpedicular approach to the vertebral body was appropriate.    A 22g livia needle was advanced to the pedicle and Lidocaine 1% was infiltrated, total of 5cc.    AP and lateral images were taken to verify position and trajectory. Alongside of the guide needle a 0.25 cm paramedian incision was made. The osteointroducer was advanced through the pedicle on the right L4. Once I was at the junction of the pedicle and the vertebral body, a lateral image was taken to insure that the cannula was positioned approximately 1cm past the vertebral body wall. Through the cannula, a drill was advanced into the vertebral body under fluoroscopic guidance toward the anterior cortex, creating a channel. The anterior cortex was probed with the guide pin to ensure no perforations in the anterior cortex. After completing the entry into the vertebral body, a 15 mm inflatable bone tamp was inserted through the cannula and advanced under fluoroscopic guidance into the vertebral body near the anterior cortex. The radiopaque marker bands on the bone tamp were identified using AP and lateral  images.    The above sequence of instrument placement was then repeated on the left side of the L4 vertebral body.    Once both bone tamps were in position, they were inflated to 2 cc and 180 psi. Expansion of the bone tamps was done sequentially in increments of 0.25 to .5 cc of contrast, with careful attention being paid to the inflation pressures and balloon position. The inflation was monitored with AP and lateral imaging. The final balloon volume was 2.5 cc on the right side and 2.5 cc on the left. There was no breach of the lateral wall or anterior cortex of the vertebral body. Direct reduction of the fracture was achieved, end plate movement was noted and approximately 5 mm of height restoration was achieved.    Under fluoroscopic imaging, and the use of the bone void fillers, internal fixation was achieved through a low-pressure injection of KYPHON® HV-R™ bone cement. The cavity was filled with a total volume of 2.5 cc on the right side and 2.5 cc on the left side. Once the bone cement had hardened, the cannulas were then removed.      Post-procedure, all incisions were closed with sutures. The patient was kept in the prone    position for approximately 10 minutes post cement injection or until hardening of the cement was confirmed. Patient was then turned supine. They were monitored briefly and returned to the floor. Nola Tariq was found to have no change in their baseline neurological exam at this time.    COMPLICATIONS: There were no immediate postprocedure complications.    ESTIMATED BLOOD LOSS: The estimated blood loss was nil.    Specimens as above.    Kirk Nascimento DO    December 3, 2024

## 2024-12-03 NOTE — DISCHARGE INSTRUCTIONS
DISCHARGE INSTRUCTIONS FOR Kyphoplasty     1. Maintain Dressing- Clean Dry and intact.   2. Follow up in one week.   3. Okay to take shower next day.   4. Call the office if you notice fever, chills, drainage from the incision site.  5. If there is a new onset weakness in the legs, loss of bowel or bladder control or severe pain in the back, go to the nearest ER and call the Pain Physician on call.   6. Call [phone] with any emergency.    Follow - up in pain clinic in 1 week.

## 2024-12-03 NOTE — CASE MANAGEMENT/SOCIAL WORK
Continued Stay Note  MARCIE Love     Patient Name: Nola Tariq  MRN: [id number]  Today's Date: 12/3/2024    Admit Date: 12/1/2024    Plan: Routine home w/ brother   Discharge Plan       Row Name 12/03/24 1009       Plan    Plan Comments DC barriers: Pain mgmt following, patient is scheduled to have a kyphoplasty in the OR today around 1700. IV steriods.           Timothy Mora RN     Cell number [phone]  Office number [phone]

## 2024-12-03 NOTE — ANESTHESIA PREPROCEDURE EVALUATION
Anesthesia Evaluation     Patient summary reviewed and Nursing notes reviewed   NPO Solid Status: > 8 hours  NPO Liquid Status: > 8 hours           Airway   Mallampati: II  TM distance: >3 FB  Neck ROM: full  No difficulty expected  Dental - normal exam     Pulmonary    (+) ,sleep apnea  Cardiovascular     (+) hypertension      Neuro/Psych  (+) headaches, numbness  GI/Hepatic/Renal/Endo    (+) GERD poorly controlled    Musculoskeletal     (+) back pain, chronic pain  Abdominal    Substance History      OB/GYN          Other   arthritis,                 Anesthesia Plan    ASA 3     general   Rapid sequence  intravenous induction     Anesthetic plan, risks, benefits, and alternatives have been provided, discussed and informed consent has been obtained with: patient.    Plan discussed with CRNA.    CODE STATUS:    Level Of Support Discussed With: Patient  Code Status (Patient has no pulse and is not breathing): CPR (Attempt to Resuscitate)  Medical Interventions (Patient has pulse or is breathing): Full Support

## 2024-12-03 NOTE — PROGRESS NOTES
[facility]ation Unit Progress Note     Patient Name: Nola Tariq  : 1950  MRN: [id number]  Primary Care Physician: Miranda Hylton APRN  Date of admission: 2024     Patient Care Team:  Miranda Hylton APRN as PCP - General (Nurse Practitioner)          Subjective   History Present Illness     Chief Complaint:   Chief Complaint   Patient presents with    Hip Pain     Hip pain     Ms. Tariq is a 74 y.o.  presents to [facility] complaining of hip pain       History of Present Illness  ED 24: Patient is a 74-year-old female who arrives today with reports that she had a kyphoplasty for her L3 however her L4 is also fractured and she has surgery scheduled for 2025. She denies any history of recent falls or exacerbations however today her low back and right hip pain became so bad that her home pain medications were not covering it. She takes Oxy 10 and ibuprofen 800. These usually help alleviate her pain however today they are not helping at all. She sees Dr. Nascimento with pain management.      Observation 24: Patient is a 74-year-old female presenting to the hospital with complaints of low back pain.  Patient is scheduled to have kyphoplasty in January but states she has worsening pain unable to stand upper barely turn in bed.  Patient normally takes oxycodone ibuprofen per pain management but states pain has not been alleviated.  Patient seen by pain management and sent scheduled to have possible kyphoplasty tomorrow.    12/3/24: Patient reports continued pain to low back that radiates to hip.  Patient able to lay on side today but still pain with movement.  Patient to have kyphoplasty this evening.      Review of Systems   Constitutional: Positive for malaise/fatigue. Negative for fever.   HENT: Negative.     Eyes: Negative.    Cardiovascular:  Negative for chest pain and dyspnea on exertion.   Respiratory:  Negative for shortness of breath.    Endocrine: Negative.    Skin:  Negative.    Musculoskeletal:  Positive for back pain, joint pain and stiffness.   Gastrointestinal: Negative.    Genitourinary: Negative.    Neurological:  Positive for numbness and weakness.   Psychiatric/Behavioral: Negative.             Personal History     Past Medical History:   Past Medical History:   Diagnosis Date    Arthritis     [place] esophagus     Chronic pain disorder     COVID-19     DJD (degenerative joint disease)     Extremity pain     Fall at home     Fall at home     fracture rt. leg    Foot pain, right     Fractures     GERD (gastroesophageal reflux disease)     Headache, tension-type     Hypertension     Joint pain     Leg pain, right     Low back pain     Migraine     Plantar fasciitis     Shingles     Shoulder pain, right     Sleep apnea     Spinal stenosis 9/4/2024    Epidural on 9/4       Surgical History:      Past Surgical History:   Procedure Laterality Date    BACK SURGERY  2/2009    Decompression    CARPAL TUNNEL RELEASE      [place].    COLONOSCOPY      EPIDURAL BLOCK      EYE SURGERY      [place]    KNEE ARTHROSCOPY      SPINAL CORD STIMULATOR TRIAL W/ LAMINOTOMY      SPINE SURGERY      Kyphoplasty    TUBAL ABDOMINAL LIGATION      VERTEBROPLASTY Bilateral 10/11/2024    Procedure: VERTEBROPLASTY;  Surgeon: Kirk Nascimento DO;  Location: [facility] OR;  Service: Pain Management;  Laterality: Bilateral;    WRIST SURGERY      tendon release           Family History: family history includes COPD in her father; Cancer in her mother; Migraines in her brother, father, and sister. Otherwise pertinent FHx was reviewed and unremarkable.     Social History:  reports that she quit smoking about 58 years ago. Her smoking use included cigarettes. She has never used smokeless tobacco. She reports that she does not drink alcohol and does not use drugs.      Medications:  Prior to Admission medications    Medication Sig Start Date End Date Taking? Authorizing Provider   Cetirizine HCl 10 MG capsule ZYRTEC  ALLERGY 10 MG CAPS 8/30/17  Yes Carol Tolbert MD   docusate sodium (COLACE) 100 MG capsule Take 1 capsule by mouth 2 (Two) Times a Day.   Yes Carol Tolbert MD   famotidine (PEPCID) 40 MG tablet  11/17/22  Yes Carol Tolbert MD   gabapentin (NEURONTIN) 300 MG capsule  7/7/23  Yes Carol Tolbert MD   ibuprofen (ADVIL,MOTRIN) 800 MG tablet Take 1 tablet by mouth 3 (Three) Times a Day As Needed for Mild Pain. 11/12/24  Yes Kirk Nascimento,    omeprazole (priLOSEC) 40 MG capsule  5/2/22  Yes Carol Tolbert MD   oxyCODONE-acetaminophen (PERCOCET)  MG per tablet Take 1 tablet by mouth Every 6 (Six) Hours As Needed for Moderate Pain for up to 30 days. 11/28/24 12/28/24 Yes Kirk Nascimento,    vitamin D (ERGOCALCIFEROL) 1.25 MG (23871 UT) capsule capsule Take 1 capsule by mouth 1 (One) Time Per Week.   Yes Carol Tolbert MD   Acetaminophen-Caffeine 500-65 MG tablet EXCEDRIN TENSION HEADACHE 500-65 MG TABS 5/17/16   Carol Tolbert MD   albuterol sulfate  (90 Base) MCG/ACT inhaler Inhale 2 puffs Every 4 (Four) Hours As Needed for Wheezing. 8/20/24   Sierra Cooper APRN   denosumab (PROLIA) 60 MG/ML solution prefilled syringe syringe     Carol Tolbert MD   fluticasone (FLONASE) 50 MCG/ACT nasal spray 2 sprays by Each Nare route 2 (Two) Times a Day. 5/8/24   Carol Tolbert MD   guaiFENesin (MUCINEX) 600 MG 12 hr tablet Take 1 tablet by mouth Every 12 (Twelve) Hours. 8/20/24   Sierra Cooper APRN   Multiple Vitamins-Minerals (MULTIVITAMIN WITH MINERALS) tablet tablet Take 1 tablet by mouth Daily.    Carol Tolbert MD   sucralfate (CARAFATE) 1 g tablet  5/2/22   Carol Tolbert MD   topiramate (TOPAMAX) 25 MG tablet  4/14/23   Carol Tolbert MD   Voquezna 20 MG tablet  1/29/24   Carol Tolbert MD       Allergies:    Allergies   Allergen Reactions    Celebrex [Celecoxib] Swelling       Objective   Objective     Vital  Signs  Temp:  [97.3 °F (36.3 °C)-98.1 °F (36.7 °C)] 98.1 °F (36.7 °C)  Heart Rate:  [77-98] 80  Resp:  [9-18] 10  BP: (119-183)/(48-89) 173/88  SpO2:  [90 %-98 %] 97 %  on  Flow (L/min) (Oxygen Therapy):  [2-3] 3;   Device (Oxygen Therapy): nasal cannula  Body mass index is 30.73 kg/m².    Physical Exam  Vitals and nursing note reviewed.   Constitutional:       Appearance: Normal appearance.   HENT:      Head: Normocephalic and atraumatic.      Right Ear: External ear normal.      Left Ear: External ear normal.      Nose: Nose normal.      Mouth/Throat:      Pharynx: Oropharynx is clear.   Eyes:      Conjunctiva/sclera: Conjunctivae normal.      Pupils: Pupils are equal, round, and reactive to light.   Cardiovascular:      Rate and Rhythm: Normal rate and regular rhythm.      Pulses: Normal pulses.   Pulmonary:      Effort: Pulmonary effort is normal.   Abdominal:      General: Bowel sounds are normal.   Musculoskeletal:         General: Tenderness present.      Cervical back: Normal range of motion.   Skin:     General: Skin is warm.      Capillary Refill: Capillary refill takes less than 2 seconds.   Neurological:      Mental Status: She is alert and oriented to person, place, and time.      Motor: Weakness present.      Gait: Gait abnormal.   Psychiatric:         Mood and Affect: Mood normal.         Behavior: Behavior normal.         Thought Content: Thought content normal.         Judgment: Judgment normal.           Results Review:  I have personally reviewed most recent microbiology results and radiology images and interpretations and agree with findings, most notably: CBC, CMP, CT lumbar spine.    Results from last 7 days   Lab Units 12/03/24  0615   WBC 10*3/mm3 14.45*   HEMOGLOBIN g/dL 12.1   HEMATOCRIT % 37.1   PLATELETS 10*3/mm3 244     Results from last 7 days   Lab Units 12/03/24  0615 12/02/24  0601 12/01/24  1702   SODIUM mmol/L 138   < > 140   POTASSIUM mmol/L 4.6   < > 4.1   CHLORIDE mmol/L 103   <  > 105   CO2 mmol/L 25.4   < > 25.4   BUN mg/dL 27*   < > 16   CREATININE mg/dL 0.80   < > 1.00   GLUCOSE mg/dL 124*   < > 134*   CALCIUM mg/dL 9.2   < > 8.7   ALK PHOS U/L  --   --  77   ALT (SGPT) U/L  --   --  <5   AST (SGOT) U/L  --   --  16    < > = values in this interval not displayed.     Estimated Creatinine Clearance: 62.6 mL/min (by C-G formula based on SCr of 0.8 mg/dL).  Brief Urine Lab Results  (Last result in the past 365 days)        Color   Clarity   Blood   Leuk Est   Nitrite   Protein   CREAT   Urine HCG        12/01/24 1758 Yellow   Clear   Negative   Trace   Negative   Trace                   Microbiology Results (last 10 days)       ** No results found for the last 240 hours. **            ECG/EMG Results (most recent)       Procedure Component Value Units Date/Time    Telemetry Scan [[id number]] Resulted: 12/01/24     Updated: 12/02/24 2151    Telemetry Scan [[id number]] Resulted: 12/01/24     Updated: 12/03/24 0442                    US Renal Bilateral    Result Date: 12/1/2024  Impression: 1.Three  right renal cysts. 2.Otherwise unremarkable renal ultrasound Electronically Signed: Rom Judd MD  12/1/2024 5:57 PM EST  Workstation ID: [id number]    CT Lumbar Spine Without Contrast    Result Date: 12/1/2024  1. Stable nondisplaced fracture at the superior plate of L4. 2. Severe foraminal narrowing at L3-L4. Severe canal stenosis at L4-L5. 3. High density structure arising from the medial right kidney. Ultrasound of the kidneys recommended. Electronically Signed: Yaya Allen MD  12/1/2024 4:32 PM EST  Workstation ID: [id number]    CT Pelvis Without Contrast    Result Date: 12/1/2024  1. Stable nondisplaced fracture at the superior plate of L4. 2. Severe foraminal narrowing at L3-L4. Severe canal stenosis at L4-L5. 3. High density structure arising from the medial right kidney. Ultrasound of the kidneys recommended. Electronically Signed: Yaya Allen MD  12/1/2024 4:32 PM EST  Workstation ID:  [id number]       Estimated Creatinine Clearance: 62.6 mL/min (by C-G formula based on SCr of 0.8 mg/dL).    Assessment & Plan   Assessment/Plan       Active Hospital Problems    Diagnosis  POA    **Back pain [M54.9]  Yes    Compression fracture of L4 lumbar vertebra, with delayed healing, subsequent encounter [S32.040G]  Not Applicable      Resolved Hospital Problems   No resolved problems to display.     Back Pain  -CT lumbar spine: moderate canal stenosis and foraminal narrowing, L3-4 disc osteophyte with severe canal stenosis, L4-5 pseudobulge and disc bulge with facet disease, stable nondisplaced fracture at super plate of L4  -Continue lidocaine, Gabapentin, Toradol   -Bowel regimen  -Given IV Toradol, Dexamethasone, and Dilaudid in ED   -IV/oral analgesics as needed  -Oxygenation for O2 below 90%  -NPO at midnight for kyphoplast on 12/3/24  -Physical therapy consulted   -Pain management consulted      Hypertension  -Poorly Controlled       BP Readings from Last 1 Encounters:   12/03/24 (!) 173/88   - Continue added hydralazine  - Monitor while admitted     GERD  -Continue PPI       VTE Prophylaxis - Active VTE Prophylaxis  Mechanical:        Start        12/01/24 1828  Maintain Sequential Compression Device  Continuous                          Select Pharmacologic VTE Prophylaxis if Desired & Appropriate      CODE STATUS:    Code Status and Medical Interventions: CPR (Attempt to Resuscitate); Full Support   Ordered at: 12/01/24 1830     Level Of Support Discussed With:    Patient     Code Status (Patient has no pulse and is not breathing):    CPR (Attempt to Resuscitate)     Medical Interventions (Patient has pulse or is breathing):    Full Support       This patient has been examined wearing personal protective equipment.     I discussed the patient's findings and my recommendations with patient, family, nursing staff, primary care team, and consulting provider.      Signature:Electronically signed by Emily  JEM Haynes, 12/03/24, 10:41 AM EST.      I spent 35 minutes caring for Nola on this date of service. This time includes time spent by me in the following activities: reviewing tests, performing a medically appropriate examination and/or evaluation, counseling and educating the patient/family/caregiver, referring and communicating with other health care professionals, documenting information in the medical record, independently interpreting results and communicating that information with the patient/family/caregiver, care coordination, ordering medications, ordering test(s), ordering procedure(s), obtaining a separately obtained history, and reviewing a separately obtained history.

## 2024-12-03 NOTE — BRIEF OP NOTE
VERTEBROPLASTY  Progress Note    Nola Tariq  12/3/2024    Pre-op Diagnosis:   Compression fracture of L4 lumbar vertebra, with delayed healing, subsequent encounter [S32.040G]       Post-Op Diagnosis Codes:     * Compression fracture of L4 lumbar vertebra, with delayed healing, subsequent encounter [S32.040G]    Procedure/CPT® Codes:  MI PERQ VERT AGMNTJ CAVITY CRTJ UNI/BI CANNULJ LMBR [45006]      Procedure(s):  KYPHOPLASTY at L4              Surgeon(s):  Kirk Nascimento DO    Anesthesia: General    Staff:   Circulator: Alvina Garsia RN; Ganesh Coreas RN  Radiology Technologist: Rosalba Nguyen  Scrub Person: Helen Lo; Mirza Yusuf  Vendor Representative: Joyce Isabel         Estimated Blood Loss: minimal    Urine Voided: * No values recorded between 12/3/2024  4:08 PM and 12/3/2024  5:37 PM *    Specimens:                None          Drains: * No LDAs found *    Findings:         Complications: None          Kirk Nascimento DO     Date: 12/3/2024  Time: 18:18 EST

## 2024-12-04 LAB
GLUCOSE BLDC GLUCOMTR-MCNC: 110 MG/DL (ref 70–105)
GLUCOSE BLDC GLUCOMTR-MCNC: 113 MG/DL (ref 70–105)
GLUCOSE BLDC GLUCOMTR-MCNC: 116 MG/DL (ref 70–105)
GLUCOSE BLDC GLUCOMTR-MCNC: 189 MG/DL (ref 70–105)

## 2024-12-04 PROCEDURE — 99221 1ST HOSP IP/OBS SF/LOW 40: CPT | Performed by: NURSE PRACTITIONER

## 2024-12-04 PROCEDURE — 25010000002 MORPHINE PER 10 MG: Performed by: HOSPITALIST

## 2024-12-04 PROCEDURE — 99024 POSTOP FOLLOW-UP VISIT: CPT | Performed by: STUDENT IN AN ORGANIZED HEALTH CARE EDUCATION/TRAINING PROGRAM

## 2024-12-04 PROCEDURE — 82948 REAGENT STRIP/BLOOD GLUCOSE: CPT

## 2024-12-04 PROCEDURE — 82948 REAGENT STRIP/BLOOD GLUCOSE: CPT | Performed by: STUDENT IN AN ORGANIZED HEALTH CARE EDUCATION/TRAINING PROGRAM

## 2024-12-04 PROCEDURE — 25010000002 DEXAMETHASONE PER 1 MG: Performed by: STUDENT IN AN ORGANIZED HEALTH CARE EDUCATION/TRAINING PROGRAM

## 2024-12-04 PROCEDURE — 97162 PT EVAL MOD COMPLEX 30 MIN: CPT

## 2024-12-04 RX ORDER — FAMOTIDINE 20 MG/1
40 TABLET, FILM COATED ORAL
Status: DISCONTINUED | OUTPATIENT
Start: 2024-12-04 | End: 2024-12-06 | Stop reason: HOSPADM

## 2024-12-04 RX ORDER — HYDROMORPHONE HYDROCHLORIDE 2 MG/1
2 TABLET ORAL EVERY 4 HOURS PRN
Status: DISCONTINUED | OUTPATIENT
Start: 2024-12-04 | End: 2024-12-06 | Stop reason: HOSPADM

## 2024-12-04 RX ORDER — MORPHINE SULFATE 2 MG/ML
2 INJECTION, SOLUTION INTRAMUSCULAR; INTRAVENOUS EVERY 4 HOURS PRN
Status: DISCONTINUED | OUTPATIENT
Start: 2024-12-04 | End: 2024-12-04

## 2024-12-04 RX ORDER — HYDROMORPHONE HYDROCHLORIDE 2 MG/1
2 TABLET ORAL
Status: DISCONTINUED | OUTPATIENT
Start: 2024-12-04 | End: 2024-12-04

## 2024-12-04 RX ADMIN — DEXAMETHASONE SODIUM PHOSPHATE 2 MG: 4 INJECTION, SOLUTION INTRAMUSCULAR; INTRAVENOUS at 15:24

## 2024-12-04 RX ADMIN — HYDROMORPHONE HYDROCHLORIDE 2 MG: 2 TABLET ORAL at 13:51

## 2024-12-04 RX ADMIN — GABAPENTIN 300 MG: 300 CAPSULE ORAL at 21:29

## 2024-12-04 RX ADMIN — HYDROMORPHONE HYDROCHLORIDE 2 MG: 2 TABLET ORAL at 22:57

## 2024-12-04 RX ADMIN — CETIRIZINE HYDROCHLORIDE 10 MG: 10 TABLET, FILM COATED ORAL at 08:54

## 2024-12-04 RX ADMIN — BISACODYL 5 MG: 5 TABLET, COATED ORAL at 17:53

## 2024-12-04 RX ADMIN — Medication 10 ML: at 21:32

## 2024-12-04 RX ADMIN — LIDOCAINE PAIN RELIEF 1 PATCH: 560 PATCH TOPICAL at 08:55

## 2024-12-04 RX ADMIN — OXYCODONE 10 MG: 5 TABLET ORAL at 06:09

## 2024-12-04 RX ADMIN — FAMOTIDINE 40 MG: 20 TABLET, FILM COATED ORAL at 19:50

## 2024-12-04 RX ADMIN — GABAPENTIN 300 MG: 300 CAPSULE ORAL at 08:54

## 2024-12-04 RX ADMIN — HYDRALAZINE HYDROCHLORIDE 10 MG: 10 TABLET ORAL at 08:54

## 2024-12-04 RX ADMIN — PANTOPRAZOLE SODIUM 40 MG: 40 INJECTION, POWDER, FOR SOLUTION INTRAVENOUS at 05:49

## 2024-12-04 RX ADMIN — OXYCODONE 10 MG: 5 TABLET ORAL at 09:40

## 2024-12-04 RX ADMIN — MORPHINE SULFATE 2 MG: 2 INJECTION, SOLUTION INTRAMUSCULAR; INTRAVENOUS at 11:26

## 2024-12-04 RX ADMIN — DOCUSATE SODIUM 100 MG: 100 CAPSULE, LIQUID FILLED ORAL at 21:29

## 2024-12-04 RX ADMIN — HYDRALAZINE HYDROCHLORIDE 10 MG: 10 TABLET ORAL at 21:29

## 2024-12-04 RX ADMIN — SENNOSIDES AND DOCUSATE SODIUM 2 TABLET: 50; 8.6 TABLET ORAL at 17:53

## 2024-12-04 RX ADMIN — DEXAMETHASONE SODIUM PHOSPHATE 2 MG: 4 INJECTION, SOLUTION INTRAMUSCULAR; INTRAVENOUS at 05:49

## 2024-12-04 RX ADMIN — DOCUSATE SODIUM 100 MG: 100 CAPSULE, LIQUID FILLED ORAL at 08:54

## 2024-12-04 RX ADMIN — HYDROMORPHONE HYDROCHLORIDE 2 MG: 2 TABLET ORAL at 17:53

## 2024-12-04 RX ADMIN — DEXAMETHASONE SODIUM PHOSPHATE 2 MG: 4 INJECTION, SOLUTION INTRAMUSCULAR; INTRAVENOUS at 21:31

## 2024-12-04 NOTE — ANESTHESIA POSTPROCEDURE EVALUATION
Patient: Nola Tariq    Procedure Summary       Date: 12/03/24 Room / Location: [facility] OR 01 / [facility] MAIN OR    Anesthesia Start: 1611 Anesthesia Stop: 1745    Procedure: KYPHOPLASTY at L4 (Bilateral: Spine Lumbar) Diagnosis:       Compression fracture of L4 lumbar vertebra, with delayed healing, subsequent encounter      (Compression fracture of L4 lumbar vertebra, with delayed healing, subsequent encounter [S32.040G])    Surgeons: Kirk Nascimento DO Provider: Alfa Jalloh MD    Anesthesia Type: general ASA Status: 3            Anesthesia Type: general    Vitals  Vitals Value Taken Time   /68 12/03/24 1835   Temp 98 °F (36.7 °C) 12/03/24 1835   Pulse 98 12/03/24 1843   Resp 11 12/03/24 1835   SpO2 88 % 12/03/24 1843   Vitals shown include unfiled device data.        Post Anesthesia Care and Evaluation    Patient location during evaluation: PACU  Patient participation: complete - patient participated  Level of consciousness: awake  Pain scale: See nurse's notes for pain score.  Pain management: adequate    Airway patency: patent  Anesthetic complications: No anesthetic complications  PONV Status: none  Cardiovascular status: acceptable  Respiratory status: acceptable and spontaneous ventilation  Hydration status: acceptable    Comments: Patient seen and examined postoperatively; vital signs stable; SpO2 greater than or equal to 90%; cardiopulmonary status stable; nausea/vomiting adequately controlled; pain adequately controlled; no apparent anesthesia complications; patient discharged from anesthesia care when discharge criteria were met

## 2024-12-04 NOTE — CONSULTS
[facility]UROS[facility] CONSULT NOTE    Patient name: Nola Tariq  Referring Provider: Kirk Nascimento DO   Reason for Consultation: Back and leg pain and rule out cauda equina    Patient Care Team:  Miranda Hylton APRN as PCP - General (Nurse Practitioner)    Chief complaint: Back pain and leg pain    Subjective .     History of present illness:    Patient is a 74 y.o. right handed female that underwent a recent L4 kyphoplasty yesterday with pain management due to continued back pain.  She was diagnosed back in October with L3 and L4 compression fracture.  After yesterday's injection, she had immediate betterment in her back pain symptoms.  This morning patient was experiencing worsening worsening back pain and right leg pain.  There was some concern about cauda equina syndrome and neurosurgery was consulted for further evaluation and offer recommendations.    Upon my evaluation patient is awake alert and oriented.  She is lying in bed on her side.  She describes chronic  pain in her low back radiating down into her anterior thigh to her right knee.  She reports no saddle anesthesia or acute urinary or bowel dysfunction.  She does have some trouble getting to the bathroom on time with some stress incontinence.  She is moving all extremities.  Patient does have a history of chronic lumbar stenosis with spondylolisthesis s/p decompression in 2009.  She has been managed since her decompression with medical management including injection therapy, and evidently underwent spinal cord stimulator trial with no significant betterment in her symptoms or functional improvement.  She has been recently evaluated outside spine surgery center with medical management recommended.  She does have history of osteoporosis with reported Prolia therapy.  Last DEXA scan 2022 with T-score -3.2 showing improvement since prior 2019 DEXA scan.       Review of Systems  Review of Systems   Musculoskeletal:  Positive for back pain.    Neurological:  Positive for weakness.   All other systems reviewed and are negative.      History  PAST MEDICAL HISTORY  Past Medical History:   Diagnosis Date    Arthritis     [place] esophagus     Chronic pain disorder     COVID-19     DJD (degenerative joint disease)     Extremity pain     Fall at home     Fall at home     fracture rt. leg    Foot pain, right     Fractures     GERD (gastroesophageal reflux disease)     Headache, tension-type     Hypertension     Joint pain     Leg pain, right     Low back pain     Migraine     Plantar fasciitis     Shingles     Shoulder pain, right     Sleep apnea     Spinal stenosis 2024    Epidural on        PAST SURGICAL HISTORY  Past Surgical History:   Procedure Laterality Date    BACK SURGERY  2009    Decompression    CARPAL TUNNEL RELEASE      colin.    COLONOSCOPY      EPIDURAL BLOCK      EYE SURGERY      catarac    KNEE ARTHROSCOPY      SPINAL CORD STIMULATOR TRIAL W/ LAMINOTOMY      SPINE SURGERY      Kyphoplasty    TUBAL ABDOMINAL LIGATION      VERTEBROPLASTY Bilateral 10/11/2024    Procedure: VERTEBROPLASTY;  Surgeon: Kirk Nascimento DO;  Location: [facility] MAIN OR;  Service: Pain Management;  Laterality: Bilateral;    WRIST SURGERY      tendon release       FAMILY HISTORY  Family History   Problem Relation Age of Onset    Cancer Mother     Migraines Father     COPD Father     Migraines Sister     Migraines Brother        SOCIAL HISTORY  Social History     Tobacco Use    Smoking status: Former     Current packs/day: 0.00     Types: Cigarettes     Quit date: 1966     Years since quittin.9    Smokeless tobacco: Never   Vaping Use    Vaping status: Never Used   Substance Use Topics    Alcohol use: Never    Drug use: Never     not   retired      Allergies:  Celebrex [celecoxib]    MEDICATIONS:  Medications Prior to Admission   Medication Sig Dispense Refill Last Dose/Taking    Cetirizine HCl 10 MG capsule ZYRTEC ALLERGY 10 MG CAPS   2024 Morning     docusate sodium (COLACE) 100 MG capsule Take 1 capsule by mouth 2 (Two) Times a Day.   12/1/2024 Morning    famotidine (PEPCID) 40 MG tablet    12/1/2024 Morning    gabapentin (NEURONTIN) 300 MG capsule    12/1/2024 Morning    ibuprofen (ADVIL,MOTRIN) 800 MG tablet Take 1 tablet by mouth 3 (Three) Times a Day As Needed for Mild Pain. 270 tablet 1 12/1/2024 Morning    omeprazole (priLOSEC) 40 MG capsule    12/1/2024 Morning    oxyCODONE-acetaminophen (PERCOCET)  MG per tablet Take 1 tablet by mouth Every 6 (Six) Hours As Needed for Moderate Pain for up to 30 days. 120 tablet 0 12/1/2024 Morning    vitamin D (ERGOCALCIFEROL) 1.25 MG (47969 UT) capsule capsule Take 1 capsule by mouth 1 (One) Time Per Week.   12/1/2024    Acetaminophen-Caffeine 500-65 MG tablet EXCEDRIN TENSION HEADACHE 500-65 MG TABS   More than a month    albuterol sulfate  (90 Base) MCG/ACT inhaler Inhale 2 puffs Every 4 (Four) Hours As Needed for Wheezing. 6.7 g 0     denosumab (PROLIA) 60 MG/ML solution prefilled syringe syringe    More than a month    fluticasone (FLONASE) 50 MCG/ACT nasal spray 2 sprays by Each Nare route 2 (Two) Times a Day.       guaiFENesin (MUCINEX) 600 MG 12 hr tablet Take 1 tablet by mouth Every 12 (Twelve) Hours.       Multiple Vitamins-Minerals (MULTIVITAMIN WITH MINERALS) tablet tablet Take 1 tablet by mouth Daily.   More than a month    sucralfate (CARAFATE) 1 g tablet        topiramate (TOPAMAX) 25 MG tablet        Voquezna 20 MG tablet             Current Facility-Administered Medications:     sennosides-docusate (PERICOLACE) 8.6-50 MG per tablet 2 tablet, 2 tablet, Oral, BID PRN **AND** polyethylene glycol (MIRALAX) packet 17 g, 17 g, Oral, Daily PRN **AND** bisacodyl (DULCOLAX) EC tablet 5 mg, 5 mg, Oral, Daily PRN **AND** bisacodyl (DULCOLAX) suppository 10 mg, 10 mg, Rectal, Daily PRN, Kirk Nascimento, DO    Calcium Replacement - Follow Nurse / BPA Driven Protocol, , Not Applicable, PRN, Kirk Nascimento,  DO    ceFAZolin 2000 mg IVPB in 100 mL NS (MBP), 2,000 mg, Intravenous, Once, Nascimento, Kirk, DO    cetirizine (zyrTEC) tablet 10 mg, 10 mg, Oral, Daily, Nascimento, Kirk, DO, 10 mg at 12/04/24 0854    dexAMETHasone (DECADRON) injection 2 mg, 2 mg, Intravenous, Q8H, Nascimento, Kirk, DO, 2 mg at 12/04/24 1524    dextrose (D50W) (25 g/50 mL) IV injection 25 g, 25 g, Intravenous, Q15 Min PRN, Nascimento, Kirk, DO    dextrose (GLUTOSE) oral gel 15 g, 15 g, Oral, Q15 Min PRN, Nascimento, Kirk, DO    docusate sodium (COLACE) capsule 100 mg, 100 mg, Oral, BID, Nascimento, Kirk, DO, 100 mg at 12/04/24 0854    gabapentin (NEURONTIN) capsule 300 mg, 300 mg, Oral, BID, Nascimento, Kirk, DO, 300 mg at 12/04/24 0854    glucagon (GLUCAGEN) injection 1 mg, 1 mg, Intramuscular, Q15 Min PRN, Nascimento, Kirk, DO    hydrALAZINE (APRESOLINE) injection 10 mg, 10 mg, Intravenous, Q6H PRN, Nascimento, Kirk, DO, 10 mg at 12/02/24 1532    hydrALAZINE (APRESOLINE) tablet 10 mg, 10 mg, Oral, Q12H, Nascimento, Kirk, DO, 10 mg at 12/04/24 0854    HYDROmorphone (DILAUDID) tablet 2 mg, 2 mg, Oral, Q4H PRN, Juan Xie MD, 2 mg at 12/04/24 1351    insulin lispro (HUMALOG/ADMELOG) injection 2-9 Units, 2-9 Units, Subcutaneous, 4x Daily AC & at Bedtime, Nascimento, Kirk, DO    Lidocaine 4 % 1 patch, 1 patch, Transdermal, Q24H, Nascimento, Kirk, DO, 1 patch at 12/04/24 0855    Magnesium Standard Dose Replacement - Follow Nurse / BPA Driven Protocol, , Not Applicable, PRN, Nascimento, Kirk, DO    melatonin tablet 5 mg, 5 mg, Oral, Nightly PRN, Nascimento, Kirk, DO    [DISCONTINUED] HYDROmorphone (DILAUDID) injection 1 mg, 1 mg, Intravenous, Q2H PRN, 1 mg at 12/02/24 1531 **AND** naloxone (NARCAN) injection 0.4 mg, 0.4 mg, Intravenous, Q5 Min PRN, Nascimento, Kirk, DO    ondansetron (ZOFRAN) injection 4 mg, 4 mg, Intravenous, Q6H PRN, Nascimento, Kirk, DO, 4 mg at 12/02/24 1101    pantoprazole (PROTONIX) injection 40 mg, 40 mg, Intravenous, Q AM, Nascimento, Kirk, DO, 40 mg at 12/04/24 0549    Phosphorus  Replacement - Follow Nurse / BPA Driven Protocol, , Not Applicable, PRN, Nascimento, Kirk, DO    Potassium Replacement - Follow Nurse / BPA Driven Protocol, , Not Applicable, PRN, Nascimento, Kirk, DO    [COMPLETED] Insert Peripheral IV, , , Once **AND** sodium chloride 0.9 % flush 10 mL, 10 mL, Intravenous, PRN, Nascimento, Kirk, DO    sodium chloride 0.9 % flush 10 mL, 10 mL, Intravenous, Q12H, Nascimento, Kirk, DO, 10 mL at 12/03/24 2341    sodium chloride 0.9 % flush 10 mL, 10 mL, Intravenous, PRN, Nascimento, Kirk, DO    sodium chloride 0.9 % infusion 40 mL, 40 mL, Intravenous, PRN, Nascimento, Kirk, DO      Objective     Results Review:  LABS:  Results from last 7 days   Lab Units 12/03/24  0615 12/02/24  0601 12/01/24  1702   WBC 10*3/mm3 14.45* 10.51 9.24   HEMOGLOBIN g/dL 12.1 12.7 11.6*   HEMATOCRIT % 37.1 38.4 35.6   PLATELETS 10*3/mm3 244 252 224     Results from last 7 days   Lab Units 12/03/24  0615 12/02/24  0601 12/01/24  1702   SODIUM mmol/L 138 140 140   POTASSIUM mmol/L 4.6 5.0 4.1   CHLORIDE mmol/L 103 107 105   CO2 mmol/L 25.4 24.1 25.4   BUN mg/dL 27* 21 16   CREATININE mg/dL 0.80 0.92 1.00   CALCIUM mg/dL 9.2 9.1 8.7   BILIRUBIN mg/dL  --   --  0.3   ALK PHOS U/L  --   --  77   ALT (SGPT) U/L  --   --  <5   AST (SGOT) U/L  --   --  16   GLUCOSE mg/dL 124* 157* 134*         DIAGNOSTICS:      Results Review:   I reviewed the patient's new clinical results.  I personally viewed and interpreted the patient's     MRI lumbar spine 11/19/2024    Grade 1 anterior listhesis of L4-L5 with severe canal stenosis and moderate to severe bilateral neuroforaminal narrowing.  There is also severe canal stenosis at L3-L4.  Vertebral plasty changes noted at L3 with new mild superior endplate compression fracture of L4.  The chronic canal stenosis appear to be stable.    Vital Signs   Temp:  [97.8 °F (36.6 °C)-98.6 °F (37 °C)] 98 °F (36.7 °C)  Heart Rate:  [] 81  Resp:  [11-18] 16  BP: (119-190)/(59-91) 147/78    Physical  "Exam:  Physical Exam  Neurological:      Sensory: Sensation is intact.      Motor: Motor function is intact.       Neurological Exam  Mental Status  Awake, alert and oriented to person, place and time.    Sensory  Normal sensation.      Assessment & Plan       Back pain    Compression fracture of L4 lumbar vertebra, with delayed healing, subsequent encounter      Problem List Items Addressed This Visit          Musculoskeletal and Injuries    * (Principal) Back pain       Other    Compression fracture of L4 lumbar vertebra, with delayed healing, subsequent encounter - Primary    Relevant Orders    FL C Arm During Surgery (Completed)    XR Spine Lumbar 2 or 3 View (Completed)     Other Visit Diagnoses       Right hip pain                 COMORBID CONDITIONS:  Osteoporosis  Lumbar stenosis    PLAN:     Patient is a 74-year-old female with history of lumbar spondylosis with spondylolisthesis with resultant stenosis at L3-L4 and L4-L5 status post L4 kyphoplasty.  She is moving all extremities with good strength.  Last MRI was 15 days ago no cauda equina syndrome noted uncertain if new MRI is warranted at this juncture.  No report of any extravasation of cement.        -Poor elective neurosurgical candidate given bone quality  -Pain control per primary  - Follow-up pain management  - Continue follow-up with Prolia therapy    I discussed the patient's findings and my recommendations with patient, family, and nursing staff and Dr. Mercedes.    During patient visit, I utilized appropriate personal protective equipment including gloves and mask.  Mask used was standard procedure mask. Appropriate PPE was worn during the entire visit.  Hand hygiene was completed before and after.     Mikayla Carlson, JEM  12/04/24  17:07 EST    \"Dictated utilizing Dragon dictation\".      "

## 2024-12-04 NOTE — PLAN OF CARE
Goal Outcome Evaluation:              Outcome Evaluation: Pt is a 73 y/o female presenting to [facility] on 12/1 with reports of kyphoplasty of L3, but L4 also fractured with surgery scheduled for 01/2025 with worsening pain; 12/3 kyphoplasty L4.  Pt A&Ox4. Pt reports pain has improved s/p kyphoplasty, as long as she is resting. Pt reports PLOF of Mod(I) with household mobility/ambulation with rollator. Pt has not been driving last 3 months. Pt denies falls for last three months. Pt lives in [facility] with ramp entry; pt's brother lives with her and can provide MIN A as needed.   Pt able to recall spinal precautions following cues.   At time of therapy eval, pt completes bed mobility with MIN A, transfers and gait x40 ft with Rwx and MIN A. With ambulation, pt verbalizes concerns for d/c home and whether or not she will be able to manage mobility at current functional status with continued pain. SNF discussed/recommended; pt open to possibility, but hopeful for d/c home with HHPT if pain improves.   If pt returns home, information for bed rail provided. Pt owns rollator, verbalizes need for BSC with new urgency, urinary incontinence (nurse notified).    Anticipated Discharge Disposition (PT): skilled nursing facility (vs home with HHPT with improved pain and mobility.)

## 2024-12-04 NOTE — PROGRESS NOTES
[facility] MEDICINE SERVICE  DAILY PROGRESS NOTE    NAME: Nola Tariq  : 1950  MRN: [id number]      LOS: 1 day     PROVIDER OF SERVICE: Juan Xie MD    Chief Complaint: Back pain    Subjective:     Interval History:  History taken from: patient chart RN    Patient still with significant amount of lower back pain some improvement after kyphoplasty yesterday        Review of Systems:   Review of Systems  All negative except as above  Objective:     Vital Signs  Temp:  [97.8 °F (36.6 °C)-98.6 °F (37 °C)] 97.8 °F (36.6 °C)  Heart Rate:  [] 71  Resp:  [11-19] 17  BP: (119-193)/(59-91) 155/72  Flow (L/min) (Oxygen Therapy):  [2] 2   Body mass index is 30.39 kg/m².    Physical Exam  Physical Exam  AOx3  RRR S1 and S2 audible  Lungs with fair air entry  Abdomen soft nontender nondistended     Diagnostic Data    Results from last 7 days   Lab Units 24  0615 24  0601 24  1702   WBC 10*3/mm3 14.45*   < > 9.24   HEMOGLOBIN g/dL 12.1   < > 11.6*   HEMATOCRIT % 37.1   < > 35.6   PLATELETS 10*3/mm3 244   < > 224   GLUCOSE mg/dL 124*   < > 134*   CREATININE mg/dL 0.80   < > 1.00   BUN mg/dL 27*   < > 16   SODIUM mmol/L 138   < > 140   POTASSIUM mmol/L 4.6   < > 4.1   AST (SGOT) U/L  --   --  16   ALT (SGPT) U/L  --   --  <5   ALK PHOS U/L  --   --  77   BILIRUBIN mg/dL  --   --  0.3   ANION GAP mmol/L 9.6   < > 9.6    < > = values in this interval not displayed.       No radiology results for the last day      I reviewed the patient's new clinical results.  I reviewed the patient's new imaging results and agree with the interpretation.    Assessment/Plan:     Active and Resolved Problems  Active Hospital Problems    Diagnosis  POA    **Back pain [M54.9]  Yes    Compression fracture of L4 lumbar vertebra, with delayed healing, subsequent encounter [S32.040G]  Not Applicable      Resolved Hospital Problems   No resolved problems to display.       74-year-old female history of  arthritis, spinal stenosis, GERD, hypertension, migraines admitted to [facility]/1 with back pain    #Back pain secondary to nondisplaced L4 fracture  CT lumbar spine stable nondisplaced fracture right superior plate of L4  Severe foraminal narrowing at L3-L4 severe canal stenosis at L4-L5  Patient continues to require significant IV pain medications  Seen by pain management underwent L4 kyphoplasty 12/3  12/4 patient still with significant pain.  Pain management recommending Dilaudid 2 mg p.o Q4 as needed  Continue PT/OT   dexamethasone per pain management currently on 2 mg Q4  Gabapentin 300 mg twice daily    # Hypertension  Continue hydralazine every 12  Likely exacerbated by underlying pain    #DM2  Hold OHA  ISS lispro      #GERD  Continue PPI    #Leukocytosis attributed to steroids    #Incidental finding of right renal cysts no hydronephrosis    VTE Prophylaxis:  Mechanical VTE prophylaxis orders are present.             Disposition Planning:     Barriers to Discharge: Medical workup  Anticipated Date of Discharge: 12/5  Place of Discharge: Rehab      Time: 45 minutes     Code Status and Medical Interventions: CPR (Attempt to Resuscitate); Full Support   Ordered at: 12/01/24 1830     Level Of Support Discussed With:    Patient     Code Status (Patient has no pulse and is not breathing):    CPR (Attempt to Resuscitate)     Medical Interventions (Patient has pulse or is breathing):    Full Support       Signature: Electronically signed by Juan Xie MD, 12/04/24, 14:01 EST.  [facility]ist [facility]

## 2024-12-04 NOTE — PLAN OF CARE
Problem: Adult Inpatient Plan of Care  Goal: Plan of Care Review  Outcome: Progressing  Flowsheets (Taken 12/4/2024 1817)  Progress: improving  Goal: Patient-Specific Goal (Individualized)  Outcome: Progressing  Goal: Absence of Hospital-Acquired Illness or Injury  Outcome: Progressing  Intervention: Identify and Manage Fall Risk  Recent Flowsheet Documentation  Taken 12/4/2024 1800 by Yuriy Bauman RN  Safety Promotion/Fall Prevention:   nonskid shoes/slippers when out of bed   toileting scheduled   safety round/check completed   clutter free environment maintained   assistive device/personal items within reach  Taken 12/4/2024 1400 by Yuriy Bauman RN  Safety Promotion/Fall Prevention:   nonskid shoes/slippers when out of bed   toileting scheduled   safety round/check completed   clutter free environment maintained   assistive device/personal items within reach  Taken 12/4/2024 1200 by Yuriy Bauman RN  Safety Promotion/Fall Prevention:   nonskid shoes/slippers when out of bed   toileting scheduled   safety round/check completed   clutter free environment maintained   assistive device/personal items within reach  Taken 12/4/2024 1000 by Yuriy Bauman RN  Safety Promotion/Fall Prevention:   nonskid shoes/slippers when out of bed   toileting scheduled   safety round/check completed   clutter free environment maintained   assistive device/personal items within reach  Taken 12/4/2024 0824 by Yuriy Bauman RN  Safety Promotion/Fall Prevention:   nonskid shoes/slippers when out of bed   toileting scheduled   safety round/check completed   clutter free environment maintained   assistive device/personal items within reach  Intervention: Prevent Infection  Recent Flowsheet Documentation  Taken 12/4/2024 1800 by Yuriy Bauman RN  Infection Prevention: environmental surveillance performed  Taken 12/4/2024 1400 by Yuriy Bauman RN  Infection Prevention: environmental surveillance  performed  Taken 12/4/2024 1200 by Yuriy Bauman RN  Infection Prevention: environmental surveillance performed  Taken 12/4/2024 1000 by Yuriy Bauman RN  Infection Prevention: environmental surveillance performed  Taken 12/4/2024 0824 by Yuriy Bauman RN  Infection Prevention: environmental surveillance performed  Goal: Optimal Comfort and Wellbeing  Outcome: Progressing  Goal: Readiness for Transition of Care  Outcome: Progressing     Problem: Pain Acute  Goal: Optimal Pain Control and Function  Outcome: Progressing  Intervention: Prevent or Manage Pain  Recent Flowsheet Documentation  Taken 12/4/2024 1800 by Yuriy Bauman RN  Medication Review/Management: medications reviewed  Taken 12/4/2024 1400 by Yuriy Bauman RN  Medication Review/Management: medications reviewed  Taken 12/4/2024 1200 by Yuriy Bauman RN  Medication Review/Management: medications reviewed  Taken 12/4/2024 1000 by Yuriy Bauman RN  Medication Review/Management: medications reviewed  Taken 12/4/2024 0824 by Yuriy Bauman RN  Medication Review/Management: medications reviewed     Problem: Fall Injury Risk  Goal: Absence of Fall and Fall-Related Injury  Outcome: Progressing  Intervention: Identify and Manage Contributors  Recent Flowsheet Documentation  Taken 12/4/2024 1800 by Yuriy Bauman RN  Medication Review/Management: medications reviewed  Taken 12/4/2024 1400 by Yuriy Bauman RN  Medication Review/Management: medications reviewed  Taken 12/4/2024 1200 by Yuriy Bauman RN  Medication Review/Management: medications reviewed  Taken 12/4/2024 1000 by Yuriy Bauman RN  Medication Review/Management: medications reviewed  Taken 12/4/2024 0824 by Yuriy Bauman RN  Medication Review/Management: medications reviewed  Intervention: Promote Injury-Free Environment  Recent Flowsheet Documentation  Taken 12/4/2024 1800 by Yuriy Bauman RN  Safety Promotion/Fall  Prevention:   nonskid shoes/slippers when out of bed   toileting scheduled   safety round/check completed   clutter free environment maintained   assistive device/personal items within reach  Taken 12/4/2024 1400 by Yuriy Bauman RN  Safety Promotion/Fall Prevention:   nonskid shoes/slippers when out of bed   toileting scheduled   safety round/check completed   clutter free environment maintained   assistive device/personal items within reach  Taken 12/4/2024 1200 by Yuriy Bauman RN  Safety Promotion/Fall Prevention:   nonskid shoes/slippers when out of bed   toileting scheduled   safety round/check completed   clutter free environment maintained   assistive device/personal items within reach  Taken 12/4/2024 1000 by Yuriy Bauman RN  Safety Promotion/Fall Prevention:   nonskid shoes/slippers when out of bed   toileting scheduled   safety round/check completed   clutter free environment maintained   assistive device/personal items within reach  Taken 12/4/2024 0824 by Yuriy Bauman RN  Safety Promotion/Fall Prevention:   nonskid shoes/slippers when out of bed   toileting scheduled   safety round/check completed   clutter free environment maintained   assistive device/personal items within reach   Goal Outcome Evaluation:           Progress: improving

## 2024-12-04 NOTE — PROGRESS NOTES
Subjective   Nola Tariq is a 74 y.o. female is s/p L4 kyphoplasty on 12/3/2024.  Doing significantly better overnight.  Only needed 1 dose of IV pain medications at 8 PM last night.  She was able to get up without excruciating pain.  Overall her pain is significantly better than before kyphoplasty.  Still has some pain from lumbar stenosis and radiculopathy but overall pain is better.  She had 1 dose of oxycodone this morning.    Lower back/right hip pain pain is 3/10 on VAS, at maximum is 6/10. Pain is sharp, shooting, and stabbing in nature. Pain is referred right hip, anterior thigh and right knee. The pain is constant. The pain is improved by pain medications somewhat. The pain is worse with any increased activities.    Hx: She is a known patient of our pain management service with history of new L4 compression fracture scheduled for kyphoplasty on 1/31/2024 due to IV fluid shortage and unable to get her on schedule.  Previous history of L3 compression fracture s/p kyphoplasty and severe spinal stenosis.  She presented to ER on 12/1/2024 due to severe pain that was not controlled with pain medications that were prescribed as outpatient basis.  Also complains of severe migraines this morning.  She was given IV Dilaudid in the ER with some improvement in pain.  CT lumbar and pelvis were done and reviewed by me today.                     Previous procedures:   12/4/2024-L4 kyphoplasty-significant improvement in pain.  10/11/24 - L3 kyphoplasty - not sure if helped.   9/3/2024-LESI L3-4-no significant improvement.  4/18/2023- SCS trial [employer]- no significant pain relief. Coverage was excellent with patient able to feel paresthesia in all of the painful areas but unfortunately no significant pain relief.  No functional improvement as well.  11/8/2022-right knee injection- good relief.  7/8/22 - LESI L5-S1 - no relief.   RFA at previous pain management - no relief.      Hx:  Referred by Miranda  JEM Hylton for  lower back pain.  Pain started around 2008 without any significant injuries and has been getting worse since then.  Her main complaints today are mid back and lower back pain.  Patient had previously seen Dr. Voss and had epidurals, facet joint injections, ablations.  She states that nothing helped at the time. Last seen 1 year ago. Her lower back pain is worse than mid back pain. She is retired RN.   She has since seen [facility] (seen by JEM)- 2022 who recommended medication management.  Patient states that she is not interested in fusion surgery as there is 50% chance of lower back pain improvement. She had also injured her left knee and had steroid injection by Ortho in left knee.  Scheduled to see Ortho in 5 weeks. She has been seeing neurology for migraines and has been started on gabapentin.  Patient has taken gabapentin for about 1 month without significant improvement in her migraines.         PHQ-9- 4  SOAPP-2      PMH:   Tijerina's esophagus, hypertension, migraine, thoracic vertebra fx s/p kypho T7, T10, migraines, smoker, Back surgery- decompression L4-5?-2009 Dr. Rdz, Carpal tunnel surgery 1983 bilateral wrist; R knee steroid injection with orthopedics (Dr. Barton at [facility]).         Current Medications:   Tramadol  mg q6H PRN  Lyrica 25 mg BID  Ibuprofen 800 mg        Past Medications:  Tramadol  mg daily   Meloxicam 15 mg  Celebrex-leg swelling  Gabapentin - didn't help   Norco 5-325 mg BID PRN      Past Modalities:  TENS:                                                                          no                                                  Physical Therapy Within The Last 6 Months              Yes (1.5 years ago with some help).   Psychotherapy                                                            no  Massage Therapy                                                       no     Patient Complains Of:  Uro-Fecal Incontinence          no  Weight Gain/Loss                    Yes (weight gain 25 lbs - 1 year after smoking).   Fever/Chills                             no  Weakness                               no         Current Facility-Administered Medications:     sennosides-docusate (PERICOLACE) 8.6-50 MG per tablet 2 tablet, 2 tablet, Oral, BID PRN **AND** polyethylene glycol (MIRALAX) packet 17 g, 17 g, Oral, Daily PRN **AND** bisacodyl (DULCOLAX) EC tablet 5 mg, 5 mg, Oral, Daily PRN **AND** bisacodyl (DULCOLAX) suppository 10 mg, 10 mg, Rectal, Daily PRN, Nascimento, Kirk, DO    Calcium Replacement - Follow Nurse / BPA Driven Protocol, , Not Applicable, PRN, Nascimento, Kirk, DO    ceFAZolin 2000 mg IVPB in 100 mL NS (MBP), 2,000 mg, Intravenous, Once, Nascimento, Kirk, DO    cetirizine (zyrTEC) tablet 10 mg, 10 mg, Oral, Daily, Nascimento, Kirk, DO, 10 mg at 12/03/24 1011    dexAMETHasone (DECADRON) injection 2 mg, 2 mg, Intravenous, Q8H, Nascimento, Kirk, DO, 2 mg at 12/04/24 0549    dextrose (D50W) (25 g/50 mL) IV injection 25 g, 25 g, Intravenous, Q15 Min PRN, Nascimento, Kirk, DO    dextrose (GLUTOSE) oral gel 15 g, 15 g, Oral, Q15 Min PRN, Nascimento, Kirk, DO    docusate sodium (COLACE) capsule 100 mg, 100 mg, Oral, BID, Nascimento, Kirk, DO, 100 mg at 12/03/24 2041    gabapentin (NEURONTIN) capsule 300 mg, 300 mg, Oral, BID, Nascimento, Kirk, DO, 300 mg at 12/03/24 2041    glucagon (GLUCAGEN) injection 1 mg, 1 mg, Intramuscular, Q15 Min PRN, Nascimento, Kirk, DO    hydrALAZINE (APRESOLINE) injection 10 mg, 10 mg, Intravenous, Q6H PRN, Nascimento, Kirk, DO, 10 mg at 12/02/24 1532    hydrALAZINE (APRESOLINE) tablet 10 mg, 10 mg, Oral, Q12H, Nascimento, Kirk, DO, 10 mg at 12/03/24 2041    insulin lispro (HUMALOG/ADMELOG) injection 2-9 Units, 2-9 Units, Subcutaneous, 4x Daily AC & at Bedtime, Kirk Nascimento, DO    Lidocaine 4 % 1 patch, 1 patch, Transdermal, Q24H, Kirk Nascimento, DO, 1 patch at 12/03/24 1011    Magnesium Standard Dose Replacement - Follow Nurse / BPA Driven Protocol, , Not Applicable, PRNAzam,  Kirk, DO    melatonin tablet 5 mg, 5 mg, Oral, Nightly PRN, Nascimento, Kirk, DO    [DISCONTINUED] HYDROmorphone (DILAUDID) injection 1 mg, 1 mg, Intravenous, Q2H PRN, 1 mg at 12/02/24 1531 **AND** naloxone (NARCAN) injection 0.4 mg, 0.4 mg, Intravenous, Q5 Min PRN, Nascimento, Kirk, DO    ondansetron (ZOFRAN) injection 4 mg, 4 mg, Intravenous, Q6H PRN, Nascimento, Kirk, DO, 4 mg at 12/02/24 1101    oxyCODONE (ROXICODONE) immediate release tablet 10 mg, 10 mg, Oral, Q4H PRN, Nascimento, Kirk, DO, 10 mg at 12/04/24 0609    pantoprazole (PROTONIX) injection 40 mg, 40 mg, Intravenous, Q AM, Nascimento, Kirk, DO, 40 mg at 12/04/24 0549    Phosphorus Replacement - Follow Nurse / BPA Driven Protocol, , Not Applicable, PRN, Nascimento, Kirk, DO    Potassium Replacement - Follow Nurse / BPA Driven Protocol, , Not Applicable, PRN, Nascimento, Kirk, DO    [COMPLETED] Insert Peripheral IV, , , Once **AND** sodium chloride 0.9 % flush 10 mL, 10 mL, Intravenous, PRN, Nascimento, Kirk, DO    sodium chloride 0.9 % flush 10 mL, 10 mL, Intravenous, Q12H, Nascimento, Kirk, DO, 10 mL at 12/03/24 2341    sodium chloride 0.9 % flush 10 mL, 10 mL, Intravenous, PRN, Nascimento, Kirk, DO    sodium chloride 0.9 % infusion 40 mL, 40 mL, Intravenous, PRN, Nascimento, Kirk, DO    The following portions of the patient's history were reviewed and updated as appropriate: allergies, current medications, past family history, past medical history, past social history, past surgical history, and problem list.      REVIEW OF PERTINENT MEDICAL DATA    Past Medical History:   Diagnosis Date    Arthritis     Tijerina esophagus     Chronic pain disorder     COVID-19     DJD (degenerative joint disease)     Extremity pain     Fall at home     Fall at home     fracture rt. leg    Foot pain, right     Fractures     GERD (gastroesophageal reflux disease)     Headache, tension-type     Hypertension     Joint pain     Leg pain, right     Low back pain     Migraine     Plantar fasciitis     Shingles      Shoulder pain, right     Sleep apnea     Spinal stenosis 2024    Epidural on      Past Surgical History:   Procedure Laterality Date    BACK SURGERY  2009    Decompression    CARPAL TUNNEL RELEASE      col[place].    COLONOSCOPY      EPIDURAL BLOCK      EYE SURGERY      [place]    KNEE ARTHROSCOPY      SPINAL CORD STIMULATOR TRIAL W/ LAMINOTOMY      SPINE SURGERY      Kyphoplasty    TUBAL ABDOMINAL LIGATION      VERTEBROPLASTY Bilateral 10/11/2024    Procedure: VERTEBROPLASTY;  Surgeon: Kirk Nascimento DO;  Location: [facility] MAIN OR;  Service: Pain Management;  Laterality: Bilateral;    WRIST SURGERY      tendon release     Family History   Problem Relation Age of Onset    Cancer Mother     Migraines Father     COPD Father     Migraines Sister     Migraines Brother      Social History     Socioeconomic History    Marital status:    Tobacco Use    Smoking status: Former     Current packs/day: 0.00     Types: Cigarettes     Quit date: 1966     Years since quittin.9    Smokeless tobacco: Never   Vaping Use    Vaping status: Never Used   Substance and Sexual Activity    Alcohol use: Never    Drug use: Never    Sexual activity: Not Currently         Review of Systems   Musculoskeletal:  Positive for arthralgias and back pain.         Vitals:    24 1956 24 2339 24 0547 24 0554   BP: 149/89 125/73 (!) 190/91  Comment: Nurse was informed 168/81   BP Location: Left arm Left arm Left arm Right arm   Patient Position: Lying Lying Lying Lying   Pulse: 96 86 85 71   Resp: 18 18 18    Temp: 98.3 °F (36.8 °C) 98.6 °F (37 °C) 98.1 °F (36.7 °C)    TempSrc: Oral Axillary Oral    SpO2: 95% 95% 98%    Weight:   80.3 kg (177 lb 0.5 oz)    Height:             Objective   Physical Exam  Musculoskeletal:         General: Tenderness present.        Legs:            Imaging Reviewed:  CT pelvis and lumbar- 24.   1. Stable nondisplaced fracture at the superior plate of L4.   2. Severe foraminal  narrowing at L3-L4. Severe canal stenosis at L4-L5.   3. High density structure arising from the medial right kidney. Ultrasound of the kidneys recommended.         CT lumbar:   L2-L3:  Moderate canal stenosis. Moderate foraminal narrowing.   L3-L4: Disc osteophyte complex with ligamentum flavum thickening. Severe canal stenosis. Severe foraminal narrowing.   L4-L5: Pseudobulge and disc bulge with facet disease and ligamentum flavum thickening. Severe canal stenosis. Moderate foraminal narrowing.   L5-S1: Disc osteophyte complex. Moderate canal stenosis. Moderate foraminal narrowing.     IMPRESSION:  1. Stable nondisplaced fracture at the superior plate of L4.  2. Severe foraminal narrowing at L3-L4. Severe canal stenosis at L4-L5.  3. High density structure arising from the medial right kidney. Ultrasound of the kidneys recommended.       Assessment:    1. Compression fracture of L4 lumbar vertebra, with delayed healing, subsequent encounter    2. Chronic bilateral low back pain without sciatica    3. Right hip pain         Plan:     1.  S/p L4 kyphoplasty with significant improvement in pain.  2.  Recommend PT evaluation.  3.  Okay to remove dressing.  Okay to take a shower.  Keep incisions dry.  No need for TLSO.  No restriction to activities.  4.  Okay to discharge from pain management perspective.    Follow-up with me outpatient in 1 week.     Kirk Nascimento DO  Pain Management   [facility]         EMR Dragon/Transcription Disclaimer:   Much of this encounter note is an electronic transcription/translation of spoken language to printed text. The electronic translation of spoken language may permit erroneous, or at times, nonsensical words or phrases to be inadvertently transcribed; Although I have reviewed the note for such errors, some may still exist.

## 2024-12-04 NOTE — THERAPY EVALUATION
Patient Name: Nola Tariq  : 1950    MRN: [id number]                              Today's Date: 2024       Admit Date: 2024    Visit Dx:     ICD-10-CM ICD-9-CM   1. Compression fracture of L4 lumbar vertebra, with delayed healing, subsequent encounter  S32.040G V54.17   2. Chronic bilateral low back pain without sciatica  M54.50 724.2    G89.29 338.29   3. Right hip pain  M25.551 719.45     Patient Active Problem List   Diagnosis    Back pain    Closed fracture of thoracic vertebra    Degeneration of intervertebral disc of thoracic region    Family history of diabetes mellitus    Thoracic kyphosis    Lumbosacral radiculopathy    Spinal stenosis of lumbar region    Stenosis of lumbosacral spine    Spinal stenosis of thoracic region    Thoracic radiculopathy    Migraine    COVID-19    Compression fracture of L3 lumbar vertebra, closed, initial encounter    Compression fracture of L4 lumbar vertebra, with delayed healing, subsequent encounter     Past Medical History:   Diagnosis Date    Arthritis     [place] esophagus     Chronic pain disorder     COVID-19     DJD (degenerative joint disease)     Extremity pain     Fall at home     Fall at home     fracture rt. leg    Foot pain, right     Fractures     GERD (gastroesophageal reflux disease)     Headache, tension-type     Hypertension     Joint pain     Leg pain, right     Low back pain     Migraine     Plantar fasciitis     Shingles     Shoulder pain, right     Sleep apnea     Spinal stenosis 2024    Epidural on      Past Surgical History:   Procedure Laterality Date    BACK SURGERY  2009    Decompression    CARPAL TUNNEL RELEASE      colin.    COLONOSCOPY      EPIDURAL BLOCK      EYE SURGERY      cat[place]ac    KNEE ARTHROSCOPY      SPINAL CORD STIMULATOR TRIAL W/ LAMINOTOMY      SPINE SURGERY      Kyphoplasty    TUBAL ABDOMINAL LIGATION      VERTEBROPLASTY Bilateral 10/11/2024    Procedure: VERTEBROPLASTY;  Surgeon: Kirk Nascimento DO;   Location: [facility] MAIN OR;  Service: Pain Management;  Laterality: Bilateral;    WRIST SURGERY      tendon release      General Information       Row Name 12/04/24 1446          Physical Therapy Time and Intention    Document Type evaluation  -JV     Mode of Treatment physical therapy  -JV       Row Name 12/04/24 1446          General Information    Patient Profile Reviewed yes  -JV     Prior Level of Function independent:;all household mobility;ADL's  with rollator  -JV     Existing Precautions/Restrictions fall;spinal  -JV     Barriers to Rehab medically complex;previous functional deficit;physical barrier  pain  -JV       Row Name 12/04/24 1446          Living Environment    People in Home sibling(s)  -JV       Row Name 12/04/24 1446          Home Main Entrance    Number of Stairs, Main Entrance none  -JV       Row Name 12/04/24 1446          Stairs Within Home, Primary    Number of Stairs, Within Home, Primary none  -JV       Row Name 12/04/24 1446          Cognition    Orientation Status (Cognition) oriented x 4  -JV       Row Name 12/04/24 1446          Safety Issues/Impairments Affecting Functional Mobility    Impairments Affecting Function (Mobility) pain;strength;endurance/activity tolerance  -JV               User Key  (r) = Recorded By, (t) = Taken By, (c) = Cosigned By      Initials Name Provider Type    J Brook Pina Physical Therapist                   Mobility       Row Name 12/04/24 1450          Bed Mobility    Bed Mobility supine-sit  -JV     Supine-Sit [place] (Bed Mobility) minimum assist (75% patient effort);verbal cues;nonverbal cues (demo/gesture)  -JV     Assistive Device (Bed Mobility) bed rails  -JV     Comment, (Bed Mobility) cues for log roll sequence / spinal precautions  -JV       Row Name 12/04/24 1450          Bed-Chair Transfer    Bed-Chair [place] (Transfers) minimum assist (75% patient effort);verbal cues;nonverbal cues (demo/gesture)  -JV     Assistive Device  (Bed-Chair Transfers) walker, front-wheeled  -JV       Row Name 12/04/24 1450          Sit-Stand Transfer    Sit-Stand [place] (Transfers) verbal cues;nonverbal cues (demo/gesture);minimum assist (75% patient effort)  -JV     Assistive Device (Sit-Stand Transfers) walker, front-wheeled  -JV       Row Name 12/04/24 1450          Gait/Stairs (Locomotion)    [place] Level (Gait) minimum assist (75% patient effort);verbal cues;nonverbal cues (demo/gesture)  -JV     Assistive Device (Gait) walker, front-wheeled  -JV     Distance in Feet (Gait) 40  -JV     Deviations/Abnormal Patterns (Gait) malachi decreased;base of support, wide;gait speed decreased  -JV     Bilateral Gait Deviations forward flexed posture  -JV     Right Sided Gait Deviations weight shift ability decreased  -JV               User Key  (r) = Recorded By, (t) = Taken By, (c) = Cosigned By      Initials Name Provider Type    Brook Leigh Physical Therapist                   Obj/Interventions       Row Name 12/04/24 1451          Range of Motion Comprehensive    General Range of Motion bilateral lower extremity ROM WFL  -JV       Row Name 12/04/24 1451          Strength Comprehensive (MMT)    Comment, General Manual Muscle Testing (MMT) Assessment BLE grossly 3+/5  -JV       Row Name 12/04/24 1451          Balance    Balance Assessment standing static balance;standing dynamic balance  -JV     Static Standing Balance minimal assist  -JV     Dynamic Standing Balance minimal assist  -JV     Position/Device Used, Standing Balance supported;walker, rolling  -JV       Row Name 12/04/24 1451          Sensory Assessment (Somatosensory)    Sensory Assessment (Somatosensory) LE sensation intact  -JV               User Key  (r) = Recorded By, (t) = Taken By, (c) = Cosigned By      Initials Name Provider Type    Brook Leigh Physical Therapist                   Goals/Plan       Row Name 12/04/24 1455          Bed Mobility Goal 1 (PT)     Activity/Assistive Device (Bed Mobility Goal 1, PT) bed mobility activities, all  -JV     [place] Level/Cues Needed (Bed Mobility Goal 1, PT) modified independence  -JV     Time Frame (Bed Mobility Goal 1, PT) long term goal (LTG);2 weeks  -JV     Strategies/Barriers (Bed Mobility Goal 1, PT) log roll / spinal precautions  -JV       Row Name 12/04/24 1452          Transfer Goal 1 (PT)    Activity/Assistive Device (Transfer Goal 1, PT) sit-to-stand/stand-to-sit;bed-to-chair/chair-to-bed;walker, rolling  -JV     [place] Level/Cues Needed (Transfer Goal 1, PT) modified independence  -JV     Time Frame (Transfer Goal 1, PT) long term goal (LTG);2 weeks  -JV       Row Name 12/04/24 1453          Gait Training Goal 1 (PT)    Activity/Assistive Device (Gait Training Goal 1, PT) gait (walking locomotion);assistive device use;improve balance and speed;increase endurance/gait distance  -JV     [place] Level (Gait Training Goal 1, PT) modified independence  -JV     Distance (Gait Training Goal 1, PT) 70 ft  -JV     Time Frame (Gait Training Goal 1, PT) long term goal (LTG);2 weeks  -JV       Row Name 12/04/24 9076          Therapy Assessment/Plan (PT)    Planned Therapy Interventions (PT) balance training;bed mobility training;gait training;home exercise program;lumbar stabilization;patient/family education;stair training;strengthening;transfer training  -JV               User Key  (r) = Recorded By, (t) = Taken By, (c) = Cosigned By      Initials Name Provider Type    Brook Leigh Physical Therapist                   Clinical Impression       Row Name 12/04/24 1453          Pain    Pretreatment Pain Rating 6/10  -JV     Posttreatment Pain Rating 8/10  -JV     Pain Location back;hip  -JV     Pain Side/Orientation right  -JV     Pain Management Interventions nursing notified  -JV       Row Name 12/04/24 1457          Plan of Care Review    Outcome Evaluation Pt is a 73 y/o female presenting to [facility] on  12/1 with reports of kyphoplasty of L3, but L4 also fractured with surgery scheduled for 01/2025 with worsening pain; 12/3 kyphoplasty L4.  Pt A&Ox4. Pt reports pain has improved s/p kyphoplasty, as long as she is resting. Pt reports PLOF of Mod(I) with household mobility/ambulation with rollator. Pt has not been driving last 3 months. Pt denies falls for last three months. Pt lives in [facility] with ramp entry; pt's brother lives with her and can provide MIN A as needed.   Pt able to recall spinal precautions following cues.   At time of therapy eval, pt completes bed mobility with MIN A, transfers and gait x40 ft with Rwx and MIN A. With ambulation, pt verbalizes concerns for d/c home and whether or not she will be able to manage mobility at current functional status with continued pain. SNF discussed/recommended; pt open to possibility, but hopeful for d/c home with HHPT if pain improves.   If pt returns home, information for bed rail provided. Pt owns rollator, verbalizes need for BSC with new urgency, urinary incontinence (nurse notified).  -JV       Row Name 12/04/24 1451          Therapy Assessment/Plan (PT)    Criteria for Skilled Interventions Met (PT) yes;skilled treatment is necessary;meets criteria  -JV     Therapy Frequency (PT) 5 times/wk  -JV     Predicted Duration of Therapy Intervention (PT) until d/c  -JV       Row Name 12/04/24 1451          Positioning and Restraints    Pre-Treatment Position in bed  -JV     Post Treatment Position chair  -JV     In Chair notified nsg;sitting;call light within reach;encouraged to call for assist;exit alarm on  -JV               User Key  (r) = Recorded By, (t) = Taken By, (c) = Cosigned By      Initials Name Provider Type    Brook Leigh Physical Therapist                   Outcome Measures       Row Name 12/04/24 3436          How much help from another person do you currently need...    Turning from your back to your side while in flat bed without using  bedrails? 3  -TM     Moving from lying on back to sitting on the side of a flat bed without bedrails? 3  -TM     Moving to and from a bed to a chair (including a wheelchair)? 3  -TM     Standing up from a chair using your arms (e.g., wheelchair, bedside chair)? 3  -TM     Climbing 3-5 steps with a railing? 3  -TM     To walk in hospital room? 3  -TM     AM-PAC 6 Clicks Score (PT) 18  -TM               User Key  (r) = Recorded By, (t) = Taken By, (c) = Cosigned By      Initials Name Provider Type    TM Yuriy Bauman, RN Registered Nurse                                 Physical Therapy Education       Title: PT OT SLP Therapies (Done)       Topic: Physical Therapy (Done)       Point: Mobility training (Done)       Learning Progress Summary            Patient Acceptance, E,TB, VU by  at 12/4/2024 1457                                      User Key       Initials Effective Dates Name Provider Type Discipline     03/30/21 -  Brook Pina Physical Therapist PT                  PT Recommendation and Plan  Planned Therapy Interventions (PT): balance training, bed mobility training, gait training, home exercise program, lumbar stabilization, patient/family education, stair training, strengthening, transfer training  Outcome Evaluation: Pt is a 73 y/o female presenting to [facility] on 12/1 with reports of kyphoplasty of L3, but L4 also fractured with surgery scheduled for 01/2025 with worsening pain; 12/3 kyphoplasty L4.  Pt A&Ox4. Pt reports pain has improved s/p kyphoplasty, as long as she is resting. Pt reports PLOF of Mod(I) with household mobility/ambulation with rollator. Pt has not been driving last 3 months. Pt denies falls for last three months. Pt lives in [facility] with ramp entry; pt's brother lives with her and can provide MIN A as needed.   Pt able to recall spinal precautions following cues.   At time of therapy eval, pt completes bed mobility with MIN A, transfers and gait x40 ft with Rwx and MIN A. With  ambulation, pt verbalizes concerns for d/c home and whether or not she will be able to manage mobility at current functional status with continued pain. SNF discussed/recommended; pt open to possibility, but hopeful for d/c home with HHPT if pain improves.   If pt returns home, information for bed rail provided. Pt owns rollator, verbalizes need for BSC with new urgency, urinary incontinence (nurse notified).     Time Calculation:         PT Charges       Row Name 12/04/24 1457             Time Calculation    Start Time 1022  -JV      Stop Time 1048  -JV      Time Calculation (min) 26 min  -JV      PT Received On 12/04/24  -JV      PT - Next Appointment 12/05/24  -JV      PT Goal Re-Cert Due Date 12/18/24  -JV         Time Calculation- PT    Total Timed Code Minutes- PT 0 minute(s)  -JV                User Key  (r) = Recorded By, (t) = Taken By, (c) = Cosigned By      Initials Name Provider Type    Brook Leigh Physical Therapist                  Therapy Charges for Today       Code Description Service Date Service Provider Modifiers Qty    08752111075 HC PT EVAL MOD COMPLEXITY 4 12/4/2024 Brook Pina GP 1            PT G-Codes  AM-PAC 6 Clicks Score (PT): 18  PT Discharge Summary  Anticipated Discharge Disposition (PT): skilled nursing facility (vs home with HHPT with improved pain and mobility.)    Brook Pina  12/4/2024

## 2024-12-05 ENCOUNTER — HOSPITAL ENCOUNTER (OUTPATIENT)
Dept: PAIN MEDICINE | Facility: HOSPITAL | Age: 74
Discharge: HOME | End: 2024-12-05
Payer: MEDICARE

## 2024-12-05 ENCOUNTER — APPOINTMENT (OUTPATIENT)
Dept: PAIN MEDICINE | Facility: HOSPITAL | Age: 74
End: 2024-12-05
Payer: MEDICARE

## 2024-12-05 VITALS
RESPIRATION RATE: 16 BRPM | OXYGEN SATURATION: 96 % | HEART RATE: 112 BPM | SYSTOLIC BLOOD PRESSURE: 137 MMHG | DIASTOLIC BLOOD PRESSURE: 95 MMHG | TEMPERATURE: 96.9 F

## 2024-12-05 DIAGNOSIS — M48.062 SPINAL STENOSIS OF LUMBAR REGION WITH NEUROGENIC CLAUDICATION: Primary | ICD-10-CM

## 2024-12-05 LAB — GLUCOSE BLDC GLUCOMTR-MCNC: 141 MG/DL (ref 70–105)

## 2024-12-05 PROCEDURE — 25010000002 LIDOCAINE PF 1% 1 % SOLUTION: Performed by: STUDENT IN AN ORGANIZED HEALTH CARE EDUCATION/TRAINING PROGRAM

## 2024-12-05 PROCEDURE — 25010000002 HYDRALAZINE PER 20 MG: Performed by: STUDENT IN AN ORGANIZED HEALTH CARE EDUCATION/TRAINING PROGRAM

## 2024-12-05 PROCEDURE — 99024 POSTOP FOLLOW-UP VISIT: CPT | Performed by: STUDENT IN AN ORGANIZED HEALTH CARE EDUCATION/TRAINING PROGRAM

## 2024-12-05 PROCEDURE — 3E0S33Z INTRODUCTION OF ANTI-INFLAMMATORY INTO EPIDURAL SPACE, PERCUTANEOUS APPROACH: ICD-10-PCS | Performed by: STUDENT IN AN ORGANIZED HEALTH CARE EDUCATION/TRAINING PROGRAM

## 2024-12-05 PROCEDURE — 77003 FLUOROGUIDE FOR SPINE INJECT: CPT

## 2024-12-05 PROCEDURE — 25010000002 DEXAMETHASONE SODIUM PHOSPHATE 10 MG/ML SOLUTION: Performed by: STUDENT IN AN ORGANIZED HEALTH CARE EDUCATION/TRAINING PROGRAM

## 2024-12-05 PROCEDURE — 97530 THERAPEUTIC ACTIVITIES: CPT

## 2024-12-05 PROCEDURE — 25010000002 BUPIVACAINE (PF) 0.25 % SOLUTION: Performed by: STUDENT IN AN ORGANIZED HEALTH CARE EDUCATION/TRAINING PROGRAM

## 2024-12-05 PROCEDURE — 82948 REAGENT STRIP/BLOOD GLUCOSE: CPT

## 2024-12-05 PROCEDURE — 25010000002 DEXAMETHASONE PER 1 MG: Performed by: STUDENT IN AN ORGANIZED HEALTH CARE EDUCATION/TRAINING PROGRAM

## 2024-12-05 PROCEDURE — 97116 GAIT TRAINING THERAPY: CPT

## 2024-12-05 RX ORDER — LIDOCAINE HYDROCHLORIDE 10 MG/ML
5 INJECTION, SOLUTION EPIDURAL; INFILTRATION; INTRACAUDAL; PERINEURAL ONCE
Status: COMPLETED | OUTPATIENT
Start: 2024-12-05 | End: 2024-12-05

## 2024-12-05 RX ORDER — BUPIVACAINE HYDROCHLORIDE 2.5 MG/ML
10 INJECTION, SOLUTION EPIDURAL; INFILTRATION; INTRACAUDAL ONCE
Status: COMPLETED | OUTPATIENT
Start: 2024-12-05 | End: 2024-12-05

## 2024-12-05 RX ORDER — DEXAMETHASONE SODIUM PHOSPHATE 10 MG/ML
10 INJECTION, SOLUTION INTRAMUSCULAR; INTRAVENOUS ONCE
Status: COMPLETED | OUTPATIENT
Start: 2024-12-05 | End: 2024-12-05

## 2024-12-05 RX ADMIN — CETIRIZINE HYDROCHLORIDE 10 MG: 10 TABLET, FILM COATED ORAL at 12:11

## 2024-12-05 RX ADMIN — HYDROMORPHONE HYDROCHLORIDE 2 MG: 2 TABLET ORAL at 21:17

## 2024-12-05 RX ADMIN — Medication 10 ML: at 21:29

## 2024-12-05 RX ADMIN — BUPIVACAINE HYDROCHLORIDE 10 ML: 2.5 INJECTION, SOLUTION EPIDURAL; INFILTRATION; INTRACAUDAL; PERINEURAL at 10:01

## 2024-12-05 RX ADMIN — HYDRALAZINE HYDROCHLORIDE 10 MG: 20 INJECTION INTRAMUSCULAR; INTRAVENOUS at 04:56

## 2024-12-05 RX ADMIN — DEXAMETHASONE SODIUM PHOSPHATE 10 MG: 10 INJECTION, SOLUTION INTRAMUSCULAR; INTRAVENOUS at 10:01

## 2024-12-05 RX ADMIN — LIDOCAINE PAIN RELIEF 1 PATCH: 560 PATCH TOPICAL at 12:12

## 2024-12-05 RX ADMIN — DEXAMETHASONE SODIUM PHOSPHATE 2 MG: 4 INJECTION, SOLUTION INTRAMUSCULAR; INTRAVENOUS at 06:27

## 2024-12-05 RX ADMIN — DEXAMETHASONE SODIUM PHOSPHATE 2 MG: 4 INJECTION, SOLUTION INTRAMUSCULAR; INTRAVENOUS at 21:21

## 2024-12-05 RX ADMIN — PANTOPRAZOLE SODIUM 40 MG: 40 INJECTION, POWDER, FOR SOLUTION INTRAVENOUS at 06:27

## 2024-12-05 RX ADMIN — LIDOCAINE HYDROCHLORIDE 5 ML: 10 INJECTION, SOLUTION EPIDURAL; INFILTRATION; INTRACAUDAL; PERINEURAL at 09:54

## 2024-12-05 RX ADMIN — FAMOTIDINE 40 MG: 20 TABLET, FILM COATED ORAL at 16:30

## 2024-12-05 RX ADMIN — HYDRALAZINE HYDROCHLORIDE 10 MG: 10 TABLET ORAL at 21:17

## 2024-12-05 RX ADMIN — HYDROMORPHONE HYDROCHLORIDE 2 MG: 2 TABLET ORAL at 16:30

## 2024-12-05 RX ADMIN — HYDRALAZINE HYDROCHLORIDE 10 MG: 10 TABLET ORAL at 12:11

## 2024-12-05 RX ADMIN — HYDROMORPHONE HYDROCHLORIDE 2 MG: 2 TABLET ORAL at 12:11

## 2024-12-05 RX ADMIN — FAMOTIDINE 40 MG: 20 TABLET, FILM COATED ORAL at 12:11

## 2024-12-05 RX ADMIN — HYDROMORPHONE HYDROCHLORIDE 2 MG: 2 TABLET ORAL at 04:56

## 2024-12-05 RX ADMIN — DOCUSATE SODIUM 100 MG: 100 CAPSULE, LIQUID FILLED ORAL at 21:17

## 2024-12-05 RX ADMIN — DOCUSATE SODIUM 100 MG: 100 CAPSULE, LIQUID FILLED ORAL at 12:11

## 2024-12-05 RX ADMIN — DEXAMETHASONE SODIUM PHOSPHATE 2 MG: 4 INJECTION, SOLUTION INTRAMUSCULAR; INTRAVENOUS at 16:31

## 2024-12-05 RX ADMIN — GABAPENTIN 300 MG: 300 CAPSULE ORAL at 21:17

## 2024-12-05 RX ADMIN — GABAPENTIN 300 MG: 300 CAPSULE ORAL at 12:11

## 2024-12-05 RX ADMIN — SENNOSIDES AND DOCUSATE SODIUM 2 TABLET: 50; 8.6 TABLET ORAL at 12:14

## 2024-12-05 NOTE — DISCHARGE INSTRUCTIONS

## 2024-12-05 NOTE — PROGRESS NOTES
[facility] MEDICINE SERVICE  DAILY PROGRESS NOTE    NAME: Nola Tariq  : 1950  MRN: [id number]      LOS: 2 days     PROVIDER OF SERVICE: Bishop Lopez MD    Chief Complaint: Back pain    Subjective:     Interval History:    Patient seen and evaluated at bedside.   H&P, consults, labs and imaging reviewed.  Still complaining of moderate low back pain  Treatment plan discussed with patient. All questions addressed.     Review of Systems:   All 21 ROS were negative except mentioned above.    Objective:     Vital Signs  Temp:  [96.9 °F (36.1 °C)-98.3 °F (36.8 °C)] 96.9 °F (36.1 °C)  Heart Rate:  [] 118  Resp:  [16-18] 16  BP: (147-171)/(72-93) 147/75   Body mass index is 30.5 kg/m².    Physical Exam   General: No acute distress, appears stated age  Neuro: Awake and alert, oriented x3, no focal deficits appreciated  Head: atraumatic, normocephalic  HEENT: EOMI, anicteric, normal sclera and conjunctivae, moist mucus membranes  Neck: supple, no lymphadenopathy  CV: RRR, soft heart sounds, no murmurs appreciated, no peripheral edema  Pulm: Decreased breath sounds, no increased work of breathing, no adventitious sounds  Abd: Soft, nontender, nondistended  Skin: Warm, dry and intact  Psych: Appropriate mood and affect    Scheduled Meds   ceFAZolin, 2,000 mg, Intravenous, Once  cetirizine, 10 mg, Oral, Daily  dexAMETHasone, 2 mg, Intravenous, Q8H  docusate sodium, 100 mg, Oral, BID  famotidine, 40 mg, Oral, BID AC  gabapentin, 300 mg, Oral, BID  hydrALAZINE, 10 mg, Oral, Q12H  insulin lispro, 2-9 Units, Subcutaneous, 4x Daily AC & at Bedtime  Lidocaine, 1 patch, Transdermal, Q24H  pantoprazole, 40 mg, Intravenous, Q AM  sodium chloride, 10 mL, Intravenous, Q12H       PRN Meds     senna-docusate sodium **AND** polyethylene glycol **AND** bisacodyl **AND** bisacodyl    Calcium Replacement - Follow Nurse / BPA Driven Protocol    dextrose    dextrose    glucagon (human recombinant)    hydrALAZINE     HYDROmorphone    Magnesium Standard Dose Replacement - Follow Nurse / BPA Driven Protocol    melatonin    [DISCONTINUED] HYDROmorphone **AND** naloxone    ondansetron    Phosphorus Replacement - Follow Nurse / BPA Driven Protocol    Potassium Replacement - Follow Nurse / BPA Driven Protocol    [COMPLETED] Insert Peripheral IV **AND** sodium chloride    sodium chloride    sodium chloride   Infusions         Diagnostic Data    Results from last 7 days   Lab Units 12/03/24  0615 12/02/24  0601 12/01/24  1702   WBC 10*3/mm3 14.45*   < > 9.24   HEMOGLOBIN g/dL 12.1   < > 11.6*   HEMATOCRIT % 37.1   < > 35.6   PLATELETS 10*3/mm3 244   < > 224   GLUCOSE mg/dL 124*   < > 134*   CREATININE mg/dL 0.80   < > 1.00   BUN mg/dL 27*   < > 16   SODIUM mmol/L 138   < > 140   POTASSIUM mmol/L 4.6   < > 4.1   AST (SGOT) U/L  --   --  16   ALT (SGPT) U/L  --   --  <5   ALK PHOS U/L  --   --  77   BILIRUBIN mg/dL  --   --  0.3   ANION GAP mmol/L 9.6   < > 9.6    < > = values in this interval not displayed.       No radiology results for the last day    Interval results reviewed.    Assessment/Plan:   Back pain   Compression fracture of L4 lumbar vertebra  Status post kyphoplasty on 12/3/2024  Hypertension  Diabetes  GERD  Right renal cysts    Patient doing well, pain management following the patient  Patient still complaining of low back pain, requiring Dilaudid 2 mg every 4 hours as needed pain management as per pain management  Patient on dexamethasone 2 mg every 8 hours and gabapentin increased to 300 mg 4 times daily by pain management   Continue hydralazine, sliding scale insulin and PPI.  Check CBC in a.m. to follow-up on leukocytosis, most likely due to dexamethasone    Treatment plan discussed with RN.     VTE Prophylaxis:  Mechanical VTE prophylaxis orders are present.         Code status is   Code Status and Medical Interventions: CPR (Attempt to Resuscitate); Full Support   Ordered at: 12/01/24 1830     Level Of Support  Discussed With:    Patient     Code Status (Patient has no pulse and is not breathing):    CPR (Attempt to Resuscitate)     Medical Interventions (Patient has pulse or is breathing):    Full Support       Plan for disposition:     Barriers to Discharge: Awaiting placement  Anticipated Date of Discharge: Awaiting placement  Place of Discharge: rehab      Time: 40 minutes     Signature: Electronically signed by Bishop Lopez MD, 12/05/24, 10:05 EST.  [facility]

## 2024-12-05 NOTE — PLAN OF CARE
Problem: Adult Inpatient Plan of Care  Goal: Plan of Care Review  Outcome: Progressing  Flowsheets (Taken 12/5/2024 1306)  Plan of Care Reviewed With: patient  Goal: Patient-Specific Goal (Individualized)  Outcome: Progressing  Goal: Absence of Hospital-Acquired Illness or Injury  Outcome: Progressing  Intervention: Identify and Manage Fall Risk  Recent Flowsheet Documentation  Taken 12/5/2024 1201 by Yuriy Bauman RN  Safety Promotion/Fall Prevention:   nonskid shoes/slippers when out of bed   toileting scheduled   safety round/check completed   clutter free environment maintained   assistive device/personal items within reach  Taken 12/5/2024 1000 by Yuriy Baumna RN  Safety Promotion/Fall Prevention:   nonskid shoes/slippers when out of bed   toileting scheduled   safety round/check completed   clutter free environment maintained   assistive device/personal items within reach  Taken 12/5/2024 0831 by Yuriy Bauman RN  Safety Promotion/Fall Prevention:   nonskid shoes/slippers when out of bed   toileting scheduled   safety round/check completed   clutter free environment maintained   assistive device/personal items within reach  Intervention: Prevent Infection  Recent Flowsheet Documentation  Taken 12/5/2024 1201 by Yuriy Bauman RN  Infection Prevention: environmental surveillance performed  Taken 12/5/2024 1000 by Yuriy Bauman RN  Infection Prevention: environmental surveillance performed  Taken 12/5/2024 0831 by Yuriy Bauman RN  Infection Prevention: environmental surveillance performed  Goal: Optimal Comfort and Wellbeing  Outcome: Progressing  Goal: Readiness for Transition of Care  Outcome: Progressing     Problem: Pain Acute  Goal: Optimal Pain Control and Function  Outcome: Progressing  Intervention: Optimize Psychosocial Wellbeing  Recent Flowsheet Documentation  Taken 12/5/2024 0830 by Yuriy Bauman RN  Supportive Measures: active listening  utilized  Intervention: Prevent or Manage Pain  Recent Flowsheet Documentation  Taken 12/5/2024 1201 by Yuriy Bauman RN  Medication Review/Management: medications reviewed  Taken 12/5/2024 1000 by Yuriy Buaman RN  Medication Review/Management: medications reviewed  Taken 12/5/2024 0831 by Yuriy Bauman RN  Medication Review/Management: medications reviewed     Problem: Fall Injury Risk  Goal: Absence of Fall and Fall-Related Injury  Outcome: Progressing  Intervention: Identify and Manage Contributors  Recent Flowsheet Documentation  Taken 12/5/2024 1201 by Yuriy Bauman RN  Medication Review/Management: medications reviewed  Taken 12/5/2024 1000 by Yuriy Bauman RN  Medication Review/Management: medications reviewed  Taken 12/5/2024 0831 by Yuriy Baumna RN  Medication Review/Management: medications reviewed  Intervention: Promote Injury-Free Environment  Recent Flowsheet Documentation  Taken 12/5/2024 1201 by Yuriy Bauman RN  Safety Promotion/Fall Prevention:   nonskid shoes/slippers when out of bed   toileting scheduled   safety round/check completed   clutter free environment maintained   assistive device/personal items within reach  Taken 12/5/2024 1000 by Yuriy Bauman RN  Safety Promotion/Fall Prevention:   nonskid shoes/slippers when out of bed   toileting scheduled   safety round/check completed   clutter free environment maintained   assistive device/personal items within reach  Taken 12/5/2024 0831 by Yuriy Bauman RN  Safety Promotion/Fall Prevention:   nonskid shoes/slippers when out of bed   toileting scheduled   safety round/check completed   clutter free environment maintained   assistive device/personal items within reach   Goal Outcome Evaluation:  Plan of Care Reviewed With: patient        Progress: improving

## 2024-12-05 NOTE — CASE MANAGEMENT/SOCIAL WORK
Continued Stay Note  MARCIE Ivan     Patient Name: Nola Tariq  MRN: [id number]  Today's Date: 12/5/2024    Admit Date: 12/1/2024    Plan: [facility], skilled (both pending). PASRR approved, will need pre-cert   Discharge Plan       Row Name 12/05/24 1304       Plan    Plan [facility] skilled (both pending). PASRR approved, will need pre-cert    Plan Comments CM met with patient at the end of shift on 12/4, gave SNF list, and obtained choices of [facility] and [facility]. CM met with patient again this morning to confirm patient was aggreeable for SNF. Patient thinks SNF is best DC plan at this time. Referrals placed and liaison messaged , per [facility] liaison beds might not be available until next week (awaiting final response). CM sent referral to second choice of [facility]-pending. Will need pre-cert. DC barriers: Pain mgmt procedure today, am labs ordered.           Timothy Mora RN     Cell number [phone]  Office number [phone]

## 2024-12-05 NOTE — PROGRESS NOTES
Subjective   Nola Tariq is a 74 y.o. female is s/p L4 kyphoplasty on 12/3/2024.  Patient was doing well yesterday morning on rounds while she was laying but she states that when she tries to stand or walk or sit her pain increases.  She has been started on Dilaudid 2 mg p.o. every 4 hours as needed has been helping minimally.  There was concern about incontinence yesterday while she was working with PT and neurosurgery was consulted.  Cauda equina was ruled out.  Patient states that she just had urgency with urination but no incontinence.  Patient is not a good surgical candidate due to her bone health.  She is now complaining of pain radiating down to bilateral legs.    Lower back/right hip pain pain is 3/10 on VAS, at maximum is 9/10. Pain is sharp, shooting, and stabbing in nature. Pain is referred right hip, anterior thigh and right knee and now to the left anterior thigh as well.. The pain is constant. The pain is improved by pain medications somewhat. The pain is worse with any increased activities.    Hx: She is a known patient of our pain management service with history of new L4 compression fracture scheduled for kyphoplasty on 1/31/2024 due to IV fluid shortage and unable to get her on schedule.  Previous history of L3 compression fracture s/p kyphoplasty and severe spinal stenosis.  She presented to ER on 12/1/2024 due to severe pain that was not controlled with pain medications that were prescribed as outpatient basis.  Also complains of severe migraines this morning.  She was given IV Dilaudid in the ER with some improvement in pain.  CT lumbar and pelvis were done and reviewed by me today.                     Previous procedures:   12/4/2024-L4 kyphoplasty-significant improvement in pain.  10/11/24 - L3 kyphoplasty - not sure if helped.   9/3/2024-LESI L3-4-no significant improvement.  4/18/2023- SCS trial [employer]- no significant pain relief. Coverage was excellent with patient able  to feel paresthesia in all of the painful areas but unfortunately no significant pain relief.  No functional improvement as well.  11/8/2022-right knee injection- good relief.  7/8/22 - LESI L5-S1 - no relief.   RFA at previous pain management - no relief.      Hx:  Referred by JEM Ashton for  lower back pain.  Pain started around 2008 without any significant injuries and has been getting worse since then.  Her main complaints today are mid back and lower back pain.  Patient had previously seen Dr. Voss and had epidurals, facet joint injections, ablations.  She states that nothing helped at the time. Last seen 1 year ago. Her lower back pain is worse than mid back pain. She is retired RN.   She has since seen [facility] (seen by JEM)- 2022 who recommended medication management.  Patient states that she is not interested in fusion surgery as there is 50% chance of lower back pain improvement. She had also injured her left knee and had steroid injection by Ortho in left knee.  Scheduled to see Ortho in 5 weeks. She has been seeing neurology for migraines and has been started on gabapentin.  Patient has taken gabapentin for about 1 month without significant improvement in her migraines.         PHQ-9- 4  SOAPP-2      PMH:   Tijerina's esophagus, hypertension, migraine, thoracic vertebra fx s/p kypho T7, T10, migraines, smoker, Back surgery- decompression L4-5?-2009 Dr. Rdz, Carpal tunnel surgery 1983 bilateral wrist; R knee steroid injection with orthopedics (Dr. Barton at [facility]).         Current Medications:   Tramadol  mg q6H PRN  Lyrica 25 mg BID  Ibuprofen 800 mg        Past Medications:  Tramadol  mg daily   Meloxicam 15 mg  Celebrex-leg swelling  Gabapentin - didn't help   Norco 5-325 mg BID PRN      Past Modalities:  TENS:                                                                          no                                                  Physical Therapy Within The Last 6  Months              Yes (1.5 years ago with some help).   Psychotherapy                                                            no  Massage Therapy                                                       no     Patient Complains Of:  Uro-Fecal Incontinence          no  Weight Gain/Loss                   Yes (weight gain 25 lbs - 1 year after smoking).   Fever/Chills                             no  Weakness                               no         Current Facility-Administered Medications:     sennosides-docusate (PERICOLACE) 8.6-50 MG per tablet 2 tablet, 2 tablet, Oral, BID PRN, 2 tablet at 12/04/24 1753 **AND** polyethylene glycol (MIRALAX) packet 17 g, 17 g, Oral, Daily PRN **AND** bisacodyl (DULCOLAX) EC tablet 5 mg, 5 mg, Oral, Daily PRN, 5 mg at 12/04/24 1753 **AND** bisacodyl (DULCOLAX) suppository 10 mg, 10 mg, Rectal, Daily PRN, Nascimento, Kirk, DO    Calcium Replacement - Follow Nurse / BPA Driven Protocol, , Not Applicable, PRN, Nascimento, Kirk, DO    ceFAZolin 2000 mg IVPB in 100 mL NS (MBP), 2,000 mg, Intravenous, Once, Nascimento, Kirk, DO    cetirizine (zyrTEC) tablet 10 mg, 10 mg, Oral, Daily, Nascimento, Kirk, DO, 10 mg at 12/04/24 0854    dexAMETHasone (DECADRON) injection 2 mg, 2 mg, Intravenous, Q8H, Nascimento, Kirk, DO, 2 mg at 12/05/24 0627    dextrose (D50W) (25 g/50 mL) IV injection 25 g, 25 g, Intravenous, Q15 Min PRN, Nascimento, Kirk, DO    dextrose (GLUTOSE) oral gel 15 g, 15 g, Oral, Q15 Min PRN, Nascimento, Kirk, DO    docusate sodium (COLACE) capsule 100 mg, 100 mg, Oral, BID, Nascimento, Kirk, DO, 100 mg at 12/04/24 2129    famotidine (PEPCID) tablet 40 mg, 40 mg, Oral, BID AC, Brock Whartonn, DO, 40 mg at 12/04/24 1950    gabapentin (NEURONTIN) capsule 300 mg, 300 mg, Oral, BID, Nascimento, Kirk, DO, 300 mg at 12/04/24 2129    glucagon (GLUCAGEN) injection 1 mg, 1 mg, Intramuscular, Q15 Min PRN, Nascimento, Kirk, DO    hydrALAZINE (APRESOLINE) injection 10 mg, 10 mg, Intravenous, Q6H PRN, Nascimento, Kirk, DO, 10 mg  at 12/05/24 0456    hydrALAZINE (APRESOLINE) tablet 10 mg, 10 mg, Oral, Q12H, Nascimento, Kirk, DO, 10 mg at 12/04/24 2129    HYDROmorphone (DILAUDID) tablet 2 mg, 2 mg, Oral, Q4H PRN, Juan Xie MD, 2 mg at 12/05/24 0456    insulin lispro (HUMALOG/ADMELOG) injection 2-9 Units, 2-9 Units, Subcutaneous, 4x Daily AC & at Bedtime, Nascimento, Kirk, DO    Lidocaine 4 % 1 patch, 1 patch, Transdermal, Q24H, Nascimento, Kirk, DO, 1 patch at 12/04/24 0855    Magnesium Standard Dose Replacement - Follow Nurse / BPA Driven Protocol, , Not Applicable, PRN, Nascimento, Kirk, DO    melatonin tablet 5 mg, 5 mg, Oral, Nightly PRN, Nascimento, Kirk, DO    [DISCONTINUED] HYDROmorphone (DILAUDID) injection 1 mg, 1 mg, Intravenous, Q2H PRN, 1 mg at 12/02/24 1531 **AND** naloxone (NARCAN) injection 0.4 mg, 0.4 mg, Intravenous, Q5 Min PRN, Nascimento, Kirk, DO    ondansetron (ZOFRAN) injection 4 mg, 4 mg, Intravenous, Q6H PRN, Nascimento, Kirk, DO, 4 mg at 12/02/24 1101    pantoprazole (PROTONIX) injection 40 mg, 40 mg, Intravenous, Q AM, Nascimento, Kirk, DO, 40 mg at 12/05/24 0627    Phosphorus Replacement - Follow Nurse / BPA Driven Protocol, , Not Applicable, PRN, Nascimento, Kirk, DO    Potassium Replacement - Follow Nurse / BPA Driven Protocol, , Not Applicable, PRN, Nascimento, Kirk, DO    [COMPLETED] Insert Peripheral IV, , , Once **AND** sodium chloride 0.9 % flush 10 mL, 10 mL, Intravenous, PRN, Nascimento, Kirk, DO    sodium chloride 0.9 % flush 10 mL, 10 mL, Intravenous, Q12H, Nascimento, Kirk, DO, 10 mL at 12/04/24 2132    sodium chloride 0.9 % flush 10 mL, 10 mL, Intravenous, PRN, Nascimento, Kirk, DO    sodium chloride 0.9 % infusion 40 mL, 40 mL, Intravenous, PRN, Nascimento, Kirk, DO    The following portions of the patient's history were reviewed and updated as appropriate: allergies, current medications, past family history, past medical history, past social history, past surgical history, and problem list.      REVIEW OF PERTINENT MEDICAL DATA    Past Medical  History:   Diagnosis Date    Arthritis     Tijerina esophagus     Chronic pain disorder     COVID-19     DJD (degenerative joint disease)     Extremity pain     Fall at home     Fall at home     fracture rt. leg    Foot pain, right     Fractures     GERD (gastroesophageal reflux disease)     Headache, tension-type     Hypertension     Joint pain     Leg pain, right     Low back pain     Migraine     Plantar fasciitis     Shingles     Shoulder pain, right     Sleep apnea     Spinal stenosis 2024    Epidural on      Past Surgical History:   Procedure Laterality Date    BACK SURGERY  2009    Decompression    CARPAL TUNNEL RELEASE      colin.    COLONOSCOPY      EPIDURAL BLOCK      EYE SURGERY      catarac    KNEE ARTHROSCOPY      SPINAL CORD STIMULATOR TRIAL W/ LAMINOTOMY      SPINE SURGERY      Kyphoplasty    TUBAL ABDOMINAL LIGATION      VERTEBROPLASTY Bilateral 10/11/2024    Procedure: VERTEBROPLASTY;  Surgeon: Kirk Nascimento DO;  Location: [facility] MAIN OR;  Service: Pain Management;  Laterality: Bilateral;    WRIST SURGERY      tendon release     Family History   Problem Relation Age of Onset    Cancer Mother     Migraines Father     COPD Father     Migraines Sister     Migraines Brother      Social History     Socioeconomic History    Marital status:    Tobacco Use    Smoking status: Former     Current packs/day: 0.00     Types: Cigarettes     Quit date: 1966     Years since quittin.9    Smokeless tobacco: Never   Vaping Use    Vaping status: Never Used   Substance and Sexual Activity    Alcohol use: Never    Drug use: Never    Sexual activity: Not Currently         Review of Systems   Musculoskeletal:  Positive for arthralgias and back pain.         Vitals:    248 24 2351 24 0435 24 0712   BP: 160/81 171/85 167/93 148/78   BP Location: Right arm Right arm Right arm Right arm   Patient Position: Lying Lying Lying Lying   Pulse: 82 84 88 73   Resp: 16 17 17 18   Temp:  97.8 °F (36.6 °C) 98.3 °F (36.8 °C) 98 °F (36.7 °C) 98 °F (36.7 °C)   TempSrc: Oral Oral Oral Oral   SpO2: 98% 99% 98% 96%   Weight:   80.6 kg (177 lb 11.1 oz)    Height:             Objective   Physical Exam  Musculoskeletal:         General: Tenderness present.        Legs:            Imaging Reviewed:  CT pelvis and lumbar- 12/2/24.   1. Stable nondisplaced fracture at the superior plate of L4.   2. Severe foraminal narrowing at L3-L4. Severe canal stenosis at L4-L5.   3. High density structure arising from the medial right kidney. Ultrasound of the kidneys recommended.         CT lumbar:   L2-L3:  Moderate canal stenosis. Moderate foraminal narrowing.   L3-L4: Disc osteophyte complex with ligamentum flavum thickening. Severe canal stenosis. Severe foraminal narrowing.   L4-L5: Pseudobulge and disc bulge with facet disease and ligamentum flavum thickening. Severe canal stenosis. Moderate foraminal narrowing.   L5-S1: Disc osteophyte complex. Moderate canal stenosis. Moderate foraminal narrowing.     IMPRESSION:  1. Stable nondisplaced fracture at the superior plate of L4.  2. Severe foraminal narrowing at L3-L4. Severe canal stenosis at L4-L5.  3. High density structure arising from the medial right kidney. Ultrasound of the kidneys recommended.       Assessment:    1. Spinal stenosis of lumbar region with neurogenic claudication    2. Chronic bilateral low back pain without sciatica    3. Right hip pain    4. Compression fracture of L4 lumbar vertebra, with delayed healing, subsequent encounter         Plan:     1.  S/p L4 kyphoplasty with some improvement in pain her pain is not as severe as it used to be but still continues to have pain most likely secondary to spinal stenosis.  2.  Continue Dilaudid 2 mg p.o. every 4 hours as needed.  3.  Increase gabapentin to 300 mg QID.   4.  Add Tylenol 650 mg 4 times daily scheduled.  5.  Patient has failed LESI in the past.  She has severe canal stenosis at L3-4 and  L4-5.  Pain is in lower back with radiculopathy to bilateral anterior thigh consistent with L3 dermatome.  Discussed with patient that she may benefit from bilateral TFESI L3-4.  Benefits and risks were discussed with patient.  Will proceed with injection.         Kirk Nascimento DO  Pain Management   [facility]         EMR Dragon/Transcription Disclaimer:   Much of this encounter note is an electronic transcription/translation of spoken language to printed text. The electronic translation of spoken language may permit erroneous, or at times, nonsensical words or phrases to be inadvertently transcribed; Although I have reviewed the note for such errors, some may still exist.

## 2024-12-05 NOTE — THERAPY TREATMENT NOTE
"Subjective: Pt agreeable to therapeutic plan of care. \" I think the epidural is finally helping\"    Objective:     Precautions - spinal precautions    Bed mobility - Modified-Independent using bed rail; cues for log roll  Transfers - CGA and with rolling walker  Ambulation - 40 feet x 2CGA and with rolling walker    Vitals: WNL    Pain: 6 VAS   Location: back, RLE  Intervention for pain: Increased Activity    Education: Provided education on the importance of mobility in the acute care setting, Verbal/Tactile Cues, and Transfer Training    Assessment: Nola Tariq presents with functional mobility impairments which indicate the need for skilled intervention. Patient now reporting of less pain to back and RLE , no pain to LLE. Patient is able to perform supine <>sit with modified independence using bed rail. Cues for log roll and patient is able to return demonstrate however she has tendency to lay twisted in bed. Educated on use of pillow between legs when side lying and LE elevation when supine. Patient ambulated 40 ft x 2 this session with CGA using rw; gait slow. Noted to have flexed trunk posture as well as misaligned pelvis/scoliosis? Tolerating session today without incident. Will continue to follow and progress as tolerated. With improved mobility and pain reduction, patient likely can return home with HH follow up for home assessment or OP PT follow up. SNF will also be beneficial for continued mobility training as well as ADL re training with emphasis on spinal precautions.    Plan/Recommendations:   If medically appropriate, Low Intensity Therapy recommended post-acute care - This is recommended as therapy feels this patient would require 2-3 visits per week. OP or HH would be the best option depending on patient's home bound status. Consider, if the patient has other  \"skilled\" needs such as wounds, IV antibiotics, etc. Combined with \"low intensity\" could also equate to a SNF. If patient is medically " complex, consider LTAC. Pt requires BSC at discharge.     Pt desires Home with Home Health, Home with family assist, and Outpatient therapy at discharge. Pt cooperative; agreeable to therapeutic recommendations and plan of care.         Basic Mobility 6-click:  Rollin = Total, A lot = 2, A little = 3; 4 = None  Supine>Sit:   1 = Total, A lot = 2, A little = 3; 4 = None   Sit>Stand with arms:  1 = Total, A lot = 2, A little = 3; 4 = None  Bed>Chair:   1 = Total, A lot = 2, A little = 3; 4 = None  Ambulate in room:  1 = Total, A lot = 2, A little = 3; 4 = None  3-5 Steps with railin = Total, A lot = 2, A little = 3; 4 = None  Score: 18    Post-Tx Position: Supine with HOB Elevated and Call light and personal items within reach  PPE: gloves and surgical mask    Therapy Charges for Today       Code Description Service Date Service Provider Modifiers Qty    [id number] HC GAIT TRAINING EA 15 MIN 2024 Reyes, Carmela, PT GP 1    [id number]  PT THERAPEUTIC ACT EA 15 MIN 2024 Reyes, Carmela, PT GP 1           PT Charges       Row Name 24 1540             Time Calculation    Start Time 1500  -CR      Stop Time 1516  -CR      Time Calculation (min) 16 min  -CR      PT Received On 24  -CR      PT - Next Appointment 24  -CR         Time Calculation- PT    Total Timed Code Minutes- PT 16 minute(s)  -CR                User Key  (r) = Recorded By, (t) = Taken By, (c) = Cosigned By      Initials Name Provider Type    CR Reyes, Carmela, PT Physical Therapist

## 2024-12-05 NOTE — PROCEDURES
Procedure: BILATERAL L3-4 TFESI      PREOPERATIVE DIAGNOSIS:  Lumbar radiculopathy      POSTOPERATIVE DIAGNOSIS:  Same     PROCEDURE NOTE:  After obtaining written informed consent patient was taken to the procedure room. Pre-procedure blood pressure and pulse were stable and recorded in patients clinic chart.      The patient was placed in a prone position and right lumbar area was prepped with chloraprep times three and draped in the usual sterile fashion.  Under fluoroscopic guidance right L3 neural foramen was identified. 2 ml of 1% lidocaine was used to anesthetize the skin. A 22-gauge 5-inch spinal needle was inserted through the skin. The needle was placed in L3 neural foramina under fluoroscopic control. Placement was confirmed under oblique, AP and lateral fluoroscopic view.  2 ml of omnipaque dye was injected and showed good spread of the dye in the nerve root and the epidural space.  5 mg dexamethasone. 1 cc of 0.25% bupivacaine was injected.      Same procedure was repeated at LEFT L3 neural foramen.      After the procedure the needle was flushed and was removed. Skin was cleaned and a sterile dressing was applied.     Following the procedure the patient's vital signs were stable. The patient was discharged home in good condition after being given discharge instructions.     COMPLICATIONS None     Kirk Nascimento DO  Pain Management   [facility]

## 2024-12-05 NOTE — DISCHARGE PLACEMENT REQUEST
"Nola Alba (74 y.o. Female)       Date of Birth   1950    Social Security Number       Address   [address] [facility] [place] [address]    Home Phone   [phone]    MRN   [id number]       Mosque   Patient Refused    Marital Status                               Admission Date   12/1/24    Admission Type   Emergency    Admitting Provider   Juan Cabezas MD    Attending Provider   Bishop Lopez MD    Department, Room/Bed   [facility] OBSERVATION, 115/1       Discharge Date       Discharge Disposition       Discharge Destination                                 Attending Provider: Bishop Lopez MD    Allergies: Celebrex [Celecoxib]    Isolation: None   Infection: None   Code Status: CPR    Ht: 162.6 cm (64\")   Wt: 80.6 kg (177 lb 11.1 oz)    Admission Cmt: None   Principal Problem: Back pain [M54.9]                   Active Insurance as of 12/1/2024       Primary Coverage       Payor Plan Insurance Group Employer/Plan Group    ANTHEM MEDICARE REPLACEMENT AN[employer]M MEDICARE ADVANTAGE [id number]       Payor Plan Address Payor Plan Phone Number Payor Plan Fax Number Effective Dates    [address] [phone]  1/1/2024 - None Entered    [facility] [address]         Subscriber Name Subscriber Birth Date Member ID       NOLA ALBA 1950 [id number]                     Emergency Contacts        (Rel.) Home Phone Work Phone Mobile Phone    TRINA ROUSE (Daughter) -- -- [phone]    GREGBEATRIZ (Sister) -- -- [phone]    Yaya Carlson (Son) -- -- [phone]                "

## 2024-12-05 NOTE — PLAN OF CARE
Assessment: Nola Tariq presents with functional mobility impairments which indicate the need for skilled intervention. Patient now reporting of less pain to back and RLE , no pain to LLE. Patient is able to perform supine <>sit with modified independence using bed rail. Cues for log roll and patient is able to return demonstrate however she has tendency to lay twisted in bed. Educated on use of pillow between legs when side lying and LE elevation when supine. Patient ambulated 40 ft x 2 this session with CGA using rw; gait slow. Noted to have flexed trunk posture as well as misaligned pelvis/scoliosis? Tolerating session today without incident. Will continue to follow and progress as tolerated. With improved mobility and pain reduction, patient likely can return home with HH follow up for home assessment or OP PT follow up. SNF will also be beneficial for continued mobility training as well as ADL re training with emphasis on spinal precautions.

## 2024-12-06 ENCOUNTER — TELEPHONE (OUTPATIENT)
Dept: PAIN MEDICINE | Facility: CLINIC | Age: 74
End: 2024-12-06
Payer: MEDICARE

## 2024-12-06 ENCOUNTER — READMISSION MANAGEMENT (OUTPATIENT)
Dept: CALL CENTER | Facility: HOSPITAL | Age: 74
End: 2024-12-06
Payer: MEDICARE

## 2024-12-06 ENCOUNTER — HOME HEALTH ADMISSION (OUTPATIENT)
Dept: HOME HEALTH SERVICES | Facility: HOME HEALTHCARE | Age: 74
End: 2024-12-06
Payer: COMMERCIAL

## 2024-12-06 ENCOUNTER — TRANSCRIBE ORDERS (OUTPATIENT)
Dept: HOME HEALTH SERVICES | Facility: HOME HEALTHCARE | Age: 74
End: 2024-12-06
Payer: COMMERCIAL

## 2024-12-06 ENCOUNTER — DOCUMENTATION (OUTPATIENT)
Dept: HOME HEALTH SERVICES | Facility: HOME HEALTHCARE | Age: 74
End: 2024-12-06
Payer: COMMERCIAL

## 2024-12-06 VITALS
DIASTOLIC BLOOD PRESSURE: 84 MMHG | SYSTOLIC BLOOD PRESSURE: 157 MMHG | WEIGHT: 165.79 LBS | TEMPERATURE: 98 F | HEART RATE: 77 BPM | OXYGEN SATURATION: 93 % | HEIGHT: 64 IN | RESPIRATION RATE: 15 BRPM | BODY MASS INDEX: 28.3 KG/M2

## 2024-12-06 DIAGNOSIS — G89.29 CHRONIC LOW BACK PAIN WITHOUT SCIATICA, UNSPECIFIED BACK PAIN LATERALITY: ICD-10-CM

## 2024-12-06 DIAGNOSIS — Z98.890 S/P KYPHOPLASTY: Primary | ICD-10-CM

## 2024-12-06 DIAGNOSIS — S32.040G COMPRESSION FRACTURE OF L4 LUMBAR VERTEBRA, WITH DELAYED HEALING, SUBSEQUENT ENCOUNTER: ICD-10-CM

## 2024-12-06 DIAGNOSIS — M54.50 CHRONIC LOW BACK PAIN WITHOUT SCIATICA, UNSPECIFIED BACK PAIN LATERALITY: ICD-10-CM

## 2024-12-06 LAB
BASOPHILS # BLD AUTO: 0.02 10*3/MM3 (ref 0–0.2)
BASOPHILS NFR BLD AUTO: 0.2 % (ref 0–1.5)
DEPRECATED RDW RBC AUTO: 42.4 FL (ref 37–54)
EOSINOPHIL # BLD AUTO: 0 10*3/MM3 (ref 0–0.4)
EOSINOPHIL NFR BLD AUTO: 0 % (ref 0.3–6.2)
ERYTHROCYTE [DISTWIDTH] IN BLOOD BY AUTOMATED COUNT: 12 % (ref 12.3–15.4)
GLUCOSE BLDC GLUCOMTR-MCNC: 130 MG/DL (ref 70–105)
HCT VFR BLD AUTO: 39 % (ref 34–46.6)
HGB BLD-MCNC: 12.6 G/DL (ref 12–15.9)
IMM GRANULOCYTES # BLD AUTO: 0.04 10*3/MM3 (ref 0–0.05)
IMM GRANULOCYTES NFR BLD AUTO: 0.5 % (ref 0–0.5)
LYMPHOCYTES # BLD AUTO: 1.31 10*3/MM3 (ref 0.7–3.1)
LYMPHOCYTES NFR BLD AUTO: 15.2 % (ref 19.6–45.3)
MCH RBC QN AUTO: 30.9 PG (ref 26.6–33)
MCHC RBC AUTO-ENTMCNC: 32.3 G/DL (ref 31.5–35.7)
MCV RBC AUTO: 95.6 FL (ref 79–97)
MONOCYTES # BLD AUTO: 0.58 10*3/MM3 (ref 0.1–0.9)
MONOCYTES NFR BLD AUTO: 6.7 % (ref 5–12)
NEUTROPHILS NFR BLD AUTO: 6.68 10*3/MM3 (ref 1.7–7)
NEUTROPHILS NFR BLD AUTO: 77.4 % (ref 42.7–76)
NRBC BLD AUTO-RTO: 0 /100 WBC (ref 0–0.2)
PLATELET # BLD AUTO: 321 10*3/MM3 (ref 140–450)
PMV BLD AUTO: 8.9 FL (ref 6–12)
RBC # BLD AUTO: 4.08 10*6/MM3 (ref 3.77–5.28)
WBC NRBC COR # BLD AUTO: 8.63 10*3/MM3 (ref 3.4–10.8)

## 2024-12-06 PROCEDURE — 82948 REAGENT STRIP/BLOOD GLUCOSE: CPT | Performed by: STUDENT IN AN ORGANIZED HEALTH CARE EDUCATION/TRAINING PROGRAM

## 2024-12-06 PROCEDURE — 25010000002 DEXAMETHASONE PER 1 MG: Performed by: STUDENT IN AN ORGANIZED HEALTH CARE EDUCATION/TRAINING PROGRAM

## 2024-12-06 PROCEDURE — 85025 COMPLETE CBC W/AUTO DIFF WBC: CPT | Performed by: INTERNAL MEDICINE

## 2024-12-06 RX ORDER — HYDROMORPHONE HYDROCHLORIDE 2 MG/1
2 TABLET ORAL EVERY 6 HOURS PRN
Qty: 56 TABLET | Refills: 0 | Status: SHIPPED | OUTPATIENT
Start: 2024-12-06 | End: 2024-12-10

## 2024-12-06 RX ADMIN — HYDRALAZINE HYDROCHLORIDE 10 MG: 10 TABLET ORAL at 10:58

## 2024-12-06 RX ADMIN — CETIRIZINE HYDROCHLORIDE 10 MG: 10 TABLET, FILM COATED ORAL at 10:58

## 2024-12-06 RX ADMIN — DEXAMETHASONE SODIUM PHOSPHATE 2 MG: 4 INJECTION, SOLUTION INTRAMUSCULAR; INTRAVENOUS at 05:34

## 2024-12-06 RX ADMIN — SENNOSIDES AND DOCUSATE SODIUM 2 TABLET: 50; 8.6 TABLET ORAL at 10:58

## 2024-12-06 RX ADMIN — FAMOTIDINE 40 MG: 20 TABLET, FILM COATED ORAL at 10:58

## 2024-12-06 RX ADMIN — HYDROMORPHONE HYDROCHLORIDE 2 MG: 2 TABLET ORAL at 05:34

## 2024-12-06 RX ADMIN — HYDROMORPHONE HYDROCHLORIDE 2 MG: 2 TABLET ORAL at 01:31

## 2024-12-06 RX ADMIN — PANTOPRAZOLE SODIUM 40 MG: 40 INJECTION, POWDER, FOR SOLUTION INTRAVENOUS at 05:34

## 2024-12-06 RX ADMIN — GABAPENTIN 300 MG: 300 CAPSULE ORAL at 10:58

## 2024-12-06 RX ADMIN — HYDROMORPHONE HYDROCHLORIDE 2 MG: 2 TABLET ORAL at 10:58

## 2024-12-06 RX ADMIN — DOCUSATE SODIUM 100 MG: 100 CAPSULE, LIQUID FILLED ORAL at 10:58

## 2024-12-06 NOTE — SIGNIFICANT NOTE
12/06/24 1041   OTHER   Discipline physical therapist   Rehab Time/Intention   Session Not Performed other (see comments)  (dc summary signed)

## 2024-12-06 NOTE — DISCHARGE SUMMARY
"             [facility] Medicine Services  Discharge Summary    Date of Service: 2024  Patient Name: Nola Tariq  : 1950  MRN: [id number]    Date of Admission: 2024  Discharge Diagnosis: Back pain  Date of Discharge: 2024  Primary Care Physician: Miranda Hylton APRN    Presenting Problem:   Back pain [M54.9]  Renal cyst [N28.1]  Right hip pain [M25.551]  Hx of kyphoplasty [Z98.890]  Chronic bilateral low back pain without sciatica [M54.50, G89.29]    Active and Resolved Hospital Problems:  Active Hospital Problems    Diagnosis POA    **Back pain [M54.9] Yes    Compression fracture of L4 lumbar vertebra, with delayed healing, subsequent encounter [S32.368G] Not Applicable      Resolved Hospital Problems   No resolved problems to display.         Hospital Course     HPI:  \" Nola Tariq is a 74 y.o. female with a CMH of arthritis, spinal stenosis, GERD, HTN, migraines who presented to [facility] on 2024 with back pain. She recently had a kyphoplasty for L3, and is scheduled to have kyphoplasty for her L4 which is also fractures. Her home pain medications were not helping her, so she presented to the ED. She follows with Dr. Nascimento with pain management. The ED gave this patient dilauded which improved her pain. This patient was originally admitted under observation status, but is scheduled to have kyphoplasty today. Hospitalist team to admit for further medical management. \"    Hospital Course:  Pt was admitted to the hospital and pain management consult was obtained.  Patient was started on pain management as well as pain management consult was obtained as patient was having severe pain.  patient underwent kyphoplasty on 12/3/2024 at L4 level.  Neurosurgery consult was also obtained for back and leg pain and to rule out cauda equina.  As per neurosurgery  She is moving all extremities with good strength.  Last MRI was 15 days ago no cauda equina syndrome noted uncertain " if new MRI is warranted at this juncture.  No report of any extravasation of cement.          -Poor elective neurosurgical candidate given bone quality  -Pain control per primary  - Follow-up pain management  Continue Prolia therapy  During hospitalization her blood pressure was elevated contributed to severe pain.  At discharge blood pressure was stable at 157 / 84.  Patient was advised to follow-up with PCP in 2 to 3 days for blood pressure checkup.  Pain management changed her Norco home narcotic dose to Dilaudid 2 mg p.o. every 6 hours as needed severe pain    DISCHARGE Follow Up Recommendations for labs and diagnostics:   Follow-up with PCP in 2 to 3 days  Follow-up with  pain management    Reasons For Change In Medications and Indications for New Medications:  Dilaudid 2 mg p.o. every 6 hours as needed severe pain, discontinue other home narcotics    Day of Discharge     Vital Signs:  Temp:  [97.9 °F (36.6 °C)-98.9 °F (37.2 °C)] 98 °F (36.7 °C)  Heart Rate:  [] 77  Resp:  [14-20] 15  BP: (137-169)/(75-95) 157/84    Physical Exam:  Vitals and nursing note reviewed.   Constitutional:       Appearance: Normal appearance.   HENT:      Head: Normocephalic and atraumatic.   Cardiovascular:      Rate and Rhythm: Normal rate and rhythm.      Heart sounds: Normal heart sounds.   Pulmonary:      Effort: Pulmonary effort is normal.      Breath sounds: Decreased breath sounds.   Abdominal:      Palpations: Abdomen is soft.   Musculoskeletal:      Cervical back: Neck supple.   Neurological:      Mental Status: Mental status is at baseline.     Pertinent  and/or Most Recent Results     LAB RESULTS:      Lab 12/06/24  0137 12/03/24  0615 12/02/24  0601 12/01/24  1702   WBC 8.63 14.45* 10.51 9.24   HEMOGLOBIN 12.6 12.1 12.7 11.6*   HEMATOCRIT 39.0 37.1 38.4 35.6   PLATELETS 321 244 252 224   NEUTROS ABS 6.68 12.61* 9.34* 6.73   IMMATURE GRANS (ABS) 0.04 0.08* 0.04 0.03   LYMPHS ABS 1.31 1.28 0.96 1.65   MONOS ABS 0.58  0.43 0.14 0.45   EOS ABS 0.00 0.01 0.00 0.33   MCV 95.6 96.9 97.0 97.3*         Lab 12/03/24  0615 12/02/24  0601 12/01/24  1702   SODIUM 138 140 140   POTASSIUM 4.6 5.0 4.1   CHLORIDE 103 107 105   CO2 25.4 24.1 25.4   ANION GAP 9.6 8.9 9.6   BUN 27* 21 16   CREATININE 0.80 0.92 1.00   EGFR 76.9 65.1 58.9*   GLUCOSE 124* 157* 134*   CALCIUM 9.2 9.1 8.7         Lab 12/01/24  1702   TOTAL PROTEIN 6.6   ALBUMIN 3.8   GLOBULIN 2.8   ALT (SGPT) <5   AST (SGOT) 16   BILIRUBIN 0.3   ALK PHOS 77                     Brief Urine Lab Results  (Last result in the past 365 days)        Color   Clarity   Blood   Leuk Est   Nitrite   Protein   CREAT   Urine HCG        12/01/24 1758 Yellow   Clear   Negative   Trace   Negative   Trace                 Microbiology Results (last 10 days)       ** No results found for the last 240 hours. **            US Renal Bilateral    Result Date: 12/1/2024  Impression: Impression: 1.Three  right renal cysts. 2.Otherwise unremarkable renal ultrasound Electronically Signed: Rom Judd MD  12/1/2024 5:57 PM EST  Workstation ID: [id number]    CT Lumbar Spine Without Contrast    Result Date: 12/1/2024  Impression: 1. Stable nondisplaced fracture at the superior plate of L4. 2. Severe foraminal narrowing at L3-L4. Severe canal stenosis at L4-L5. 3. High density structure arising from the medial right kidney. Ultrasound of the kidneys recommended. Electronically Signed: Yaya Allen MD  12/1/2024 4:32 PM EST  Workstation ID: [id number]    CT Pelvis Without Contrast    Result Date: 12/1/2024  Impression: 1. Stable nondisplaced fracture at the superior plate of L4. 2. Severe foraminal narrowing at L3-L4. Severe canal stenosis at L4-L5. 3. High density structure arising from the medial right kidney. Ultrasound of the kidneys recommended. Electronically Signed: Yaya Allen MD  12/1/2024 4:32 PM EST  Workstation ID: [id number]                 Labs Pending at Discharge:  Pending Results       None             Procedures Performed  Procedure(s):  KYPHOPLASTY at L4  12/05 0930 MS NJX AA&/STRD TFRML EPI LUMBAR/SACRAL 1 LEVEL    Consults:   Consults       Date and Time Order Name Status Description    12/4/2024  3:19 PM Inpatient Neurosurgery Consult Completed     12/3/2024 10:39 AM Inpatient Hospitalist Consult      12/1/2024  6:31 PM Inpatient Pain Medicine Consult Completed             Discharge Details        Discharge Medications        New Medications        Instructions Start Date   HYDROmorphone 2 MG tablet  Commonly known as: Dilaudid   2 mg, Oral, Every 6 Hours PRN      Melatonin 5 MG capsule   5 mg, Oral, Nightly PRN             Continue These Medications        Instructions Start Date   Acetaminophen-Caffeine 500-65 MG tablet   EXCEDRIN TENSION HEADACHE 500-65 MG TABS      albuterol sulfate  (90 Base) MCG/ACT inhaler  Commonly known as: PROVENTIL HFA;VENTOLIN HFA;PROAIR HFA   2 puffs, Inhalation, Every 4 Hours PRN      Cetirizine HCl 10 MG capsule   ZYRTEC ALLERGY 10 MG CAPS      denosumab 60 MG/ML solution prefilled syringe syringe  Commonly known as: PROLIA       docusate sodium 100 MG capsule  Commonly known as: COLACE   100 mg, 2 Times Daily      famotidine 40 MG tablet  Commonly known as: PEPCID   No dose, route, or frequency recorded.      fluticasone 50 MCG/ACT nasal spray  Commonly known as: FLONASE   2 sprays, Each Nare, 2 Times Daily      gabapentin 300 MG capsule  Commonly known as: NEURONTIN       guaiFENesin 600 MG 12 hr tablet  Commonly known as: MUCINEX   600 mg, Oral, Every 12 Hours Scheduled      multivitamin with minerals tablet tablet   1 tablet, Daily      omeprazole 40 MG capsule  Commonly known as: priLOSEC   No dose, route, or frequency recorded.      sucralfate 1 g tablet  Commonly known as: CARAFATE   No dose, route, or frequency recorded.      topiramate 25 MG tablet  Commonly known as: TOPAMAX   No dose, route, or frequency recorded.      vitamin D 1.25 MG (41259 UT)  capsule capsule  Commonly known as: ERGOCALCIFEROL   50,000 Units, Weekly      Voquezna 20 MG tablet  Generic drug: Vonoprazan Fumarate              Stop These Medications      ibuprofen 800 MG tablet  Commonly known as: ADVIL,MOTRIN     oxyCODONE-acetaminophen  MG per tablet  Commonly known as: PERCOCET              Allergies   Allergen Reactions    Celebrex [Celecoxib] Swelling       Discharge Disposition:   Home or Self Care    Diet:  Diet Instructions       Diet: Cardiac Diets; Low Sodium (2g); Thin (IDDSI 0)      Discharge Diet: Cardiac Diets    Cardiac Diet: Low Sodium (2g)    Fluid Consistency: Thin (IDDSI 0)            Discharge Activity:   Activity Instructions       Gradually Increase Activity Until at Pre-Hospitalization Level              CODE STATUS:  Code Status and Medical Interventions: CPR (Attempt to Resuscitate); Full Support   Ordered at: 12/01/24 1830     Level Of Support Discussed With:    Patient     Code Status (Patient has no pulse and is not breathing):    CPR (Attempt to Resuscitate)     Medical Interventions (Patient has pulse or is breathing):    Full Support       Future Appointments   Date Time Provider Department Center   12/10/2024 10:20 AM Kirk Nascimento DO [facility]       Additional Instructions for the Follow-ups that You Need to Schedule       Ambulatory Referral to Home Health   As directed      Face to Face Visit Date: 12/6/2024   Follow-up provider for Plan of Care?: I treated the patient in an acute care facility and will not continue treatment after discharge.   Follow-up provider: MONA HARRINGTON [[id number]]   Reason/Clinical Findings: weakness   Describe mobility limitations that make leaving home difficult: back pain   Nursing/Therapeutic Services Requested: Skilled Nursing Physical Therapy Occupational Therapy   Skilled nursing orders: Other   Frequency: 1 Week 1        Discharge Follow-up with PCP   As directed       Currently Documented PCP:    Warner  JEM Jean    PCP Phone Number:    [phone]     Follow Up Details: 2-3 d        Discharge Follow-up with Specified Provider: Mikayla Carlson; 2 Weeks   As directed      To: Mikayla Carlson   Follow Up: 2 Weeks   Follow Up Details: f/u on compression Fracture        Discharge Follow-up with Specified Provider: zoila domínguez; 1 Week   As directed      To: zoila domínguez   Follow Up: 1 Week   Follow Up Details: pain management                Time spent on Discharge including face to face service:  >30 minutes    Signature: Electronically signed by Bishop Lopez MD, 12/06/24, 09:56 EST.  [facility] Hospitalist Team

## 2024-12-06 NOTE — PLAN OF CARE
Problem: Adult Inpatient Plan of Care  Goal: Plan of Care Review  12/6/2024 1048 by Yuriy Bauman RN  Outcome: Met  12/6/2024 0952 by Yuriy Bauman RN  Outcome: Progressing  Goal: Patient-Specific Goal (Individualized)  12/6/2024 1048 by Yuriy Bauman RN  Outcome: Met  12/6/2024 0952 by Yuriy Bauman RN  Outcome: Progressing  Goal: Absence of Hospital-Acquired Illness or Injury  12/6/2024 1048 by Yuriy Bauman RN  Outcome: Met  12/6/2024 0952 by Yuriy Bauman RN  Outcome: Progressing  Goal: Optimal Comfort and Wellbeing  12/6/2024 1048 by Yuriy Bauman RN  Outcome: Met  12/6/2024 0952 by Yuriy Bauman RN  Outcome: Progressing  Goal: Readiness for Transition of Care  12/6/2024 1048 by Yuriy Bauman RN  Outcome: Met  12/6/2024 0952 by Yuryi Bauman RN  Outcome: Progressing     Problem: Pain Acute  Goal: Optimal Pain Control and Function  12/6/2024 1048 by Yuriy Bauman RN  Outcome: Met  12/6/2024 0952 by Yuriy Bauman RN  Outcome: Progressing     Problem: Fall Injury Risk  Goal: Absence of Fall and Fall-Related Injury  12/6/2024 1048 by Yuriy Bauman RN  Outcome: Met  12/6/2024 0952 by Yuriy Bauman RN  Outcome: Progressing   Goal Outcome Evaluation:  Plan of Care Reviewed With: patient        Progress: improving

## 2024-12-06 NOTE — CASE MANAGEMENT/SOCIAL WORK
Continued Stay Note   [facility]     Patient Name: Nola Tariq  MRN: [id number]  Today's Date: 12/6/2024    Admit Date: 12/1/2024    Plan: Home with HHC pending acceptance.   Discharge Plan       Row Name 12/06/24 1327       Plan    Plan Home with HHC pending acceptance.    Patient/Family in Agreement with Plan yes    Plan Comments PT now recommends home with HHC. CM met w/pt at bedside, pt selected [facility], they declined. Pt selected [facility], [facility] or any facility that will service [facility]. CM sent mass referral to [facility], [facility] and [facility]. Pt has D/C orders             Expected Discharge Date and Time       Expected Discharge Date Expected Discharge Time    Dec 6, 2024         Zee Montero RN    phone [phone]  fax [phone]

## 2024-12-06 NOTE — DISCHARGE PLACEMENT REQUEST
"Nola Alba (74 y.o. Female)       Date of Birth   1950    Social Security Number       [place]   [address]  [place] [address]    Home Phone   [phone]    MRN   [id number]       Christian   Patient Refused    Marital Status                               Admission Date   12/1/24    Admission Type   Emergency    Admitting Provider   Juan Cabezas MD    Attending Provider   Bishop Lopez MD    Department, Room/Bed   [facility] OBSERVATION, 115/1       Discharge Date       Discharge Disposition   Home-Health Care [facility]    Discharge Destination                                 Attending Provider: Bishop Lopez MD    Allergies: Celebrex [Celecoxib]    Isolation: None   Infection: None   Code Status: CPR    Ht: 162.6 cm (64\")   Wt: 75.2 kg (165 lb 12.6 oz)    Admission Cmt: None   Principal Problem: Back pain [M54.9]                   Active Insurance as of 12/1/2024       Primary Coverage       Payor Plan Insurance Group Employer/Plan Group    ANTHEM MEDICARE REPLACEMENT ANTHEM MEDICARE ADVANTAGE [id number]       Payor Plan Address Payor Plan Phone Number Payor Plan Fax Number Effective Dates    [address] [phone]  1/1/2024 - None Entered    [facility] [phone]         Subscriber Name Subscriber Birth Date Member ID       NOLA ALBA 1950 [id number]                     Emergency Contacts        (Rel.) Home Phone Work Phone Mobile Phone    TRINA ROUSE (Daughter) -- -- [phone]    BEATRIZ GARZA (Sister) -- -- [phone]    Yaya Carlson (Son) -- -- [phone]                "

## 2024-12-06 NOTE — TELEPHONE ENCOUNTER
Okay to start Ibuprofen, continue gabapentin, let's keep Dilaudid 2 mg q6H PRN as she is not in hospital anymore and can't keep that high doses of q4H PRN. Adding ibuprofen and increased gabapentin will help with pain.     Kirk Nascimento DO  Pain Management   [facility]

## 2024-12-06 NOTE — TELEPHONE ENCOUNTER
Caller: Nola Tariq    Relationship to patient: Self    Best call back number: [phone]*    Patient is needing: PT IS GETTING DISCHARGED FROM THE HOSPITAL TODAY.. PT IS CALLING TO ASK A FEW QUESTIONS, SHE IS WANTING TO KNOW IF SHE CAN CONTINUE TAKING THE IBUPROFEN ALONG WITH THE PAIN MEDICATION, SHE IS WANTING TO KNOW IF SHE SHOULD CONTINUE WITH THAT OR SWITCH TO SOMEONE ELSE..    SHE ALSO STATED THAT THEY HAVE BEEN GIVING HER DILAUDID EVERY 6 HOURS AND IS WANTING TO KNOW IF SHE CAN GET IT CHANGED TO EVERY 4 HOURS.. PT WOULD LIKE A CALL BACK ASAP.. PLEASE ADVISE..

## 2024-12-06 NOTE — PROGRESS NOTES
Verified demographics with patient. Pt is agreeable to services and has no other agency    PT/OT    Miranda Hylton NP will be the following provider    Surgeon: Dr. Kirk Nascimento  Pharmacy: [facility] in [place]

## 2024-12-06 NOTE — OUTREACH NOTE
Prep Survey      Flowsheet Row Responses   [facility] discharged [facility]? [facility]   Is LACE score < 7 ? No   Eligibility Readm Mgmt   Discharge diagnosis KYPHOPLASTY at L4   Does the patient have one of the following disease processes/diagnoses(primary or secondary)? General Surgery   Does the patient have Home health ordered? Yes   What is the Home health agency?  Home with [facility]   Prep survey completed? Yes            Ruby MILELR - Registered Nurse

## 2024-12-08 ENCOUNTER — HOME CARE VISIT (OUTPATIENT)
Dept: HOME HEALTH SERVICES | Facility: HOME HEALTHCARE | Age: 74
End: 2024-12-08
Payer: COMMERCIAL

## 2024-12-08 VITALS
WEIGHT: 166 LBS | BODY MASS INDEX: 28.34 KG/M2 | OXYGEN SATURATION: 93 % | TEMPERATURE: 97.8 F | HEART RATE: 83 BPM | DIASTOLIC BLOOD PRESSURE: 84 MMHG | SYSTOLIC BLOOD PRESSURE: 136 MMHG | HEIGHT: 64 IN | RESPIRATION RATE: 18 BRPM

## 2024-12-08 PROCEDURE — G0151 HHCP-SERV OF PT,EA 15 MIN: HCPCS

## 2024-12-08 NOTE — CASE COMMUNICATION
"PT Admission Summary: The patient is a 74 year old female admitted to home health services by PT this day (12/8/2024) following kyphoplasty at L4 on 12/3/2024 to address compression fracture of L4 lumbar vertebra. Patient was hospitalized 12/1 to 12/6/2024.    Focus of Care: Aftercare following spine surgery.    Past Medical History includes:   Chronic low back pain (followed by pain management), past history of kyphoplasty at L3 (10/11 /2024), Spinal stenosis of lumbar region with neurogenic claudication, Lumbar spondylosis, Spinal stenosis of thoracic region, arthritis, DJD (degenerative joint disease), spinal stenosis, back surgery - decompression (2/2009, GERD (gastroesophageal reflux disease), Hypertension, migraines, Tijerina esophagus, sleep apnea.    DME: CPAP but does not use, electric scooter, front wheel walker, rollator walker, bed rail.    Homebound due ashlyn n, weakness, falls risk.    Social History: Lives in her home which has ramp access with brother, Steven, who is able to help patient as needed (including transportation). Daughter comes by to help as needed.  Patient stated goal: \"Move around better and get this pain decreased.\"  Password: Jorje.    Prior Functional Level: Progressively worsening back pain but was able to drive, walk around the home and perform self-care ADL.    Skin  Integrity/wound status: two small surgical sites lumbar spine region with no overt signs/symptoms of infection noted.    Code Status: Full.    Educated on Emergency Plan, steps to take prior to going to the ER and when to Call [facility]. Used teach back to ensure understanding.     Medication issues/Concerns: None.    SDOH Barriers (i.e. caregiver concerns, social isolation, transportation, food insecurity, environment, income e tc.)/Need for MSW: None.    PT Assessment this day (12/08/2024) reveals the problems of lower extremity weakness (graded from 3-/5 to 4/5 with weakness noted to negatively impact " balance, transfers and gait), impaired transfers (requires from stand-by to minimal assistance), impaired gait (limited to 60 feet ambulation using the rollator walker and requiring from stand-by to minimal assistance and exhibiting a flexed posture), imbalance  (high falls risk by a low score of 12/28 on the Tinetti Balance Assessment).    The patient will require the PT interventions of therapeutic exercise, transfer training, gait training, and patient education (including home safety and home exercise program (HEP) instruction) to address the above noted problems.    Rehab potential good due to good availability of caregiver assistance and patient demonstrated ability and willingness to pa rticipate in PT session.    Plan is to see patient 2WK4 for PT pending insurance authorization then discharge patient to care of family with HEP.    Route to: JEM Ashton

## 2024-12-08 NOTE — HOME HEALTH
"PT Admission Summary: The patient is a 74 year old female admitted to home health services by PT this day (12/8/2024) following kyphoplasty at L4 on 12/3/2024 to address compression fracture of L4 lumbar vertebra. Patient was hospitalized 12/1 to 12/6/2024.    Focus of Care: Aftercare following spine surgery.    Past Medical History includes:   Chronic low back pain (followed by pain management), past history of kyphoplasty at L3 (10/11/2024), Spinal stenosis of lumbar region with neurogenic claudication, Lumbar spondylosis, Spinal stenosis of thoracic region, arthritis, DJD (degenerative joint disease), spinal stenosis, back surgery - decompression (2/2009, GERD (gastroesophageal reflux disease), Hypertension, migraines, Tijerina esophagus, sleep apnea.    DME: CPAP but does not use, electric scooter, front wheel walker, rollator walker, bed rail.    Homebound due pain, weakness, falls risk.    Social History: Lives in her home which has ramp access with brother, Steven, who is able to help patient as needed (including transportation). Daughter comes by to help as needed.  Patient stated goal: \"Move around better and get this pain decreased.\"  Password: Jorje.    Prior Functional Level: Progressively worsening back pain but was able to drive, walk around the home and perform self-care ADL.    Skin Integrity/wound status: two small surgical sites lumbar spine region with no overt signs/symptoms of infection noted.    Code Status: Full.    Educated on Emergency Plan, steps to take prior to going to the ER and when to Call [facility]. Used teach back to ensure understanding.     Medication issues/Concerns: None.    SDOH Barriers (i.e. caregiver concerns, social isolation, transportation, food insecurity, environment, income etc.)/Need for MSW: None.    PT Assessment this day (12/08/2024) reveals the problems of lower extremity weakness (graded from 3-/5 to 4/5 with weakness noted to negatively impact balance, " transfers and gait), impaired transfers (requires from stand-by to minimal assistance), impaired gait (limited to 60 feet ambulation using the rollator walker and requiring from stand-by to minimal assistance and exhibiting a flexed posture), imbalance (high falls risk by a low score of 12/28 on the Tinetti Balance Assessment).    The patient will require the PT interventions of therapeutic exercise, transfer training, gait training, and patient education (including home safety and home exercise program (HEP) instruction) to address the above noted problems.    Rehab potential good due to good availability of caregiver assistance and patient demonstrated ability and willingness to participate in PT session.    Plan is to see patient 2WK4 for PT pending insurance authorization then discharge patient to care of family with HEP.

## 2024-12-09 NOTE — PROGRESS NOTES
Subjective   Nola Tariq is a 74 y.o. female is here for follow-up for lower back pain.  Patient was admitted inpatient due to uncontrolled pain and underwent L4 kyphoplasty and bilateral TFESI about 1 week ago.  She was discharged to rehab with prescription of Dilaudid as oxycodone was not effective. Continues to have severe pain despite taking excessive pain medications.  She currently has physical therapy coming at home along with home health.  Only thing she is doing at home is getting up to brush and bath.  Her main complaint is right hip/groin pain.      On last visit:     Left leg pain is 5/10 on VAS.    Lower back pain is 8/10 on VAS, at maximum 9/10.  Pain is aching and dull in nature.  Referred to right groin, right leg  She has numbness and tingling in bilateral foot.  Pain is constant.  Improved by pain meds.  Worse with walking and standing.    R knee pain is 5/10 on VAS. Worse with weight bearing. She had R knee steroid injection with Rheumatology several months ago with excellent relief.     R shoulder pain is 6/10 on VAS. Normal ROM. Had shoulder bursa issues several years ago which improved with US treatments.     Migraines - She used to take Excedrin and Fioricet for daily migraines but it doesn't work anymore. She is scheduled to see Neurology.  She does wake up with migraines during night or early in morning. She would wake up with headache and goes away with Excedrin. She had 2-3 headaches over last month that didn't go away with Excedrin and lasted for rest of the day. She had seen someone for chiari malformation previous.  History of migraines since teenager and usually right-sided temporal area but states that new migraines are all over the head. Seen by neurology who recommended continuing excedrin.      Previous Injection:   12/5/2024-B/L TFESI L3-4- not sure if it helped.   12/3/2024-L4 kyphoplasty [employer]- some help  10/11/24 - L3 kyphoplasty - not sure if helped.   9/3/2024-JEFFREY  L3-4-no significant improvement.  4/18/2023- SCS trial [employer]- no significant pain relief. Coverage was excellent with patient able to feel paresthesia in all of the painful areas but unfortunately no significant pain relief.  No functional improvement as well.  11/8/2022-right knee injection- good relief.  7/8/22 - LESI L5-S1 - no relief.   RFA at previous pain management - no relief.     Hx:  Referred by JEM Ashton for  lower back pain.  Pain started around 2008 without any significant injuries and has been getting worse since then.  Her main complaints today are mid back and lower back pain.  Patient had previously seen Dr. Voss and had epidurals, facet joint injections, ablations.  She states that nothing helped at the time. Last seen 1 year ago. Her lower back pain is worse than mid back pain. She is retired RN.   She has since seen [facility] (seen by JEM)- 2022 who recommended medication management.  Patient states that she is not interested in fusion surgery as there is 50% chance of lower back pain improvement. She had also injured her left knee and had steroid injection by Ortho in left knee.  Scheduled to see Ortho in 5 weeks. She has been seeing neurology for migraines and has been started on gabapentin.  Patient has taken gabapentin for about 1 month without significant improvement in her migraines.        PHQ-9- 4  SOAPP-2      PMH:   Tijerina's esophagus, hypertension, migraine, thoracic vertebra fx s/p kypho T7, T10, migraines, smoker, Back surgery- decompression L4-5?-2009 Dr. Rdz, Carpal tunnel surgery 1983 bilateral wrist; R knee steroid injection with orthopedics (Dr. Barton at [facility]).         Current Medications:   Tramadol  mg q6H PRN  Lyrica 25 mg BID  Ibuprofen 800 mg       Past Medications:  Tramadol  mg daily   Meloxicam 15 mg  Celebrex-leg swelling  Gabapentin - didn't help   Norco 5-325 mg BID PRN      Past Modalities:  TENS:                                                                           no                                                  Physical Therapy Within The Last 6 Months              Yes (1.5 years ago with some help).   Psychotherapy                                                            no  Massage Therapy                                                       no     Patient Complains Of:  Uro-Fecal Incontinence          no  Weight Gain/Loss                   Yes (weight gain 25 lbs - 1 year after smoking).   Fever/Chills                             no  Weakness                               no            Current Outpatient Medications:     Acetaminophen-Caffeine (Excedrin Tension Headache) 500-65 MG tablet, Take 1 tablet by mouth Daily As Needed (headache). Indications: Mild Pain, Disp: , Rfl:     albuterol sulfate  (90 Base) MCG/ACT inhaler, Inhale 2 puffs Every 4 (Four) Hours As Needed for Wheezing., Disp: 6.7 g, Rfl: 0    Cetirizine HCl (ZyrTEC Allergy) 10 MG capsule, Take 10 mg by mouth Daily. Indications: Perennial Allergic Rhinitis, Disp: , Rfl:     denosumab (Prolia) 60 MG/ML solution prefilled syringe syringe, Inject 1 mL under the skin into the appropriate area as directed 1 (One) Time. Indications: Osteoporosis, Disp: , Rfl:     docusate sodium (COLACE) 100 MG capsule, Take 1 capsule by mouth 2 (Two) Times a Day. Indications: Constipation, Disp: , Rfl:     famotidine (PEPCID) 40 MG tablet, Take 1 tablet by mouth 2 (Two) Times a Day. Indications: Gastroesophageal Reflux Disease, Disp: , Rfl:     ibuprofen (ADVIL,MOTRIN) 800 MG tablet, Take 1 tablet by mouth 3 (Three) Times a Day As Needed for Mild Pain. Indications: Pain, Disp: , Rfl:     Melatonin 5 MG capsule, Take 5 mg by mouth At Night As Needed (sleep problem). (Patient taking differently: Take 5 mg by mouth At Night As Needed (sleep problem).), Disp: 30 each, Rfl: 0    Multiple Vitamins-Minerals (MULTIVITAMIN WITH MINERALS) tablet tablet, Take 1 tablet by mouth  Daily. Indications: Nutritional Support, Disp: , Rfl:     omeprazole (priLOSEC) 40 MG capsule, Take 1 capsule by mouth Daily. Indications: Excess Stomach Secretions, Gastroesophageal Reflux Disease, Disp: , Rfl:     vitamin D (ERGOCALCIFEROL) 1.25 MG (33269 UT) capsule capsule, Take 1 capsule by mouth 1 (One) Time Per Week. Indications: Vitamin D Deficiency, Disp: , Rfl:     gabapentin (NEURONTIN) 600 MG tablet, Take 1 tablet by mouth 3 (Three) Times a Day. Indications: Neuropathic Pain, Disp: 90 tablet, Rfl: 1    guaiFENesin (MUCINEX) 600 MG 12 hr tablet, Take 1 tablet by mouth Every 12 (Twelve) Hours. (Patient not taking: Reported on 12/8/2024), Disp: , Rfl:     [START ON 12/18/2024] HYDROmorphone (Dilaudid) 2 MG tablet, Take 1 tablet by mouth Every 6 (Six) Hours As Needed for Moderate Pain for up to 30 days. Indications: Pain, Disp: 120 tablet, Rfl: 0    topiramate (TOPAMAX) 25 MG tablet, Take 1 tablet by mouth Daily. Indications: Migraine Headache, Disp: , Rfl:     The following portions of the patient's history were reviewed and updated as appropriate: allergies, current medications, past family history, past medical history, past social history, past surgical history, and problem list.      REVIEW OF PERTINENT MEDICAL DATA    Past Medical History:   Diagnosis Date    Arthritis     Tijerina esophagus     Chronic pain disorder     COVID-19     DJD (degenerative joint disease)     Extremity pain     Fall at home     Fall at home     fracture rt. leg    Foot pain, right     Fractures     GERD (gastroesophageal reflux disease)     Headache, tension-type     Hypertension     Joint pain     Leg pain, right     Low back pain     Migraine     Plantar fasciitis     Shingles     Shoulder pain, right     Sleep apnea     Spinal stenosis 9/4/2024    Epidural on 9/4     Past Surgical History:   Procedure Laterality Date    BACK SURGERY  2/2009    Decompression    CARPAL TUNNEL RELEASE      colin.    COLONOSCOPY      EPIDURAL  BLOCK      EYE SURGERY      cat[place]ac    KNEE ARTHROSCOPY      SPINAL CORD STIMULATOR TRIAL W/ LAMINOTOMY      SPINE SURGERY      Kyphoplasty    TUBAL ABDOMINAL LIGATION      VERTEBROPLASTY Bilateral 10/11/2024    Procedure: VERTEBROPLASTY;  Surgeon: Kirk Nascimento DO;  Location: [facility] MAIN OR;  Service: Pain Management;  Laterality: Bilateral;    WRIST SURGERY      tendon release     Family History   Problem Relation Age of Onset    Cancer Mother     Migraines Father     COPD Father     Migraines Sister     Migraines Brother      Social History     Socioeconomic History    Marital status:    Tobacco Use    Smoking status: Former     Current packs/day: 0.00     Types: Cigarettes     Quit date: 1966     Years since quittin.9    Smokeless tobacco: Never   Vaping Use    Vaping status: Never Used   Substance and Sexual Activity    Alcohol use: Never    Drug use: Never    Sexual activity: Not Currently         Review of Systems   Musculoskeletal:  Positive for arthralgias and back pain.         Vitals:    12/10/24 1014   BP: 154/83   Pulse: 85   Resp: 16   SpO2: 98%   Weight: 75.3 kg (166 lb)   PainSc:   7                                 Objective   Physical Exam  Musculoskeletal:         General: Tenderness present.        Back:         Legs:    Neurological:      Comments: Motor strength 5/5 b/l LE  Sensory intact b/l LE             Imaging Reviewed:  MRI lumbar spine without contrast-2024  L2-3-left paracentral disc extrusion, bilateral facet arthropathy with mild to moderate canal stenosis and moderate bilateral neuroforaminal narrowing  L3-4-disc protrusion, bilateral facet hypertrophy, severe canal stenosis and moderate bilateral neuroforaminal narrowing  L4-5-grade 1 anterolisthesis, disc bulge, moderate bilateral facet hypertrophy with ligamentum flavum thickening.  Severe central canal stenosis with moderate to severe bilateral neuroforaminal narrowing  U9-V9-turwryqjt facet hypertrophy with  disc osteophyte to the left, ligamentum flavum thickening.  Effacement of ventral thecal sac without significant central canal stenosis.  Severe left and moderate right neuroforaminal narrowing.  - Interval changes of vertebroplasty at L3 with new mild superior endplate compression fracture of L4 and an adjacent endplate edema.    Right hip x-ray-11/19/2024  - Unremarkable right hip    Right hip x-ray-9/5/2024  - Mild degenerative changes of each hip.    MRI lumbar spine without contrast-8/14/2024  - L2-3-disc bulge with left paracentral extrusion extending inferiorly.  Facet arthropathy.  Mild to moderate central canal stenosis and moderate bilateral foraminal stenosis  L3-4-disc bulge with small protrusion, bilateral facet arthropathy with ligamentum flavum hypertrophy causing severe central canal stenosis and severe right and moderate left foraminal stenosis  L2-0-ffhmsoncd facet hypertrophy resulting in 5 mm of L4 anterolisthesis.  Severe central canal stenosis, moderate bilateral foraminal stenosis and bilateral L5 lateral recess stenosis most severe on the right  L5-S1-left greater than right facet arthropathy.  Moderate central canal stenosis.  Possible compression of left S1 nerve root prior to entering the lateral recess.  Moderate right and severe left foraminal stenosis.  - Redundancy of the cauda equina nerve roots due to spinal stenosis.  - L3 inferior endplate subacute compression fracture with mild bone marrow edema which is new prior to previous imaging- subacute fracture.    MRI thoracic spine-6/13/2022  Kyphoplasty at T7 and T10.  - Residual mild to moderate compression fracture deformities  - Mild posterior disc bulges at multiple thoracic levels.  No large extrusions.    MRI brain wo contrast: 8/15/2018  Chiari Malformation type 1 with cerebellar tonsils projecting 7 mm below level of foramen magnum  Chronic b/l maxillary and ethmoid sinusitis     Assessment:    1. Spinal stenosis of lumbar  region with neurogenic claudication    2. Postlaminectomy syndrome    3. S/P kyphoplasty    4. High risk medication use    5. Right hip pain          Plan:   1.    UDS consistent with patient's interview on 2/27/2024.   Narcotic contract signed-5/10/2022.  Narcan sent-6/20/2022.  2. We discussed trying a course of formal physical therapy.  Physical therapy can help strengthen and stretch the muscles around the joints. Continue to be as active as possible. Start physical therapy as it will help generalized pain and follow up with HEP.  PT prescription sent to [facility] in [place] as requested by patient.  3.  Patient continues to have pain despite being on Norco.  She had significant pain relief with tramadol previously.   Due to severe pain stop tramadol and start taking Norco.  4.  Due to severe pain, continue Dilaudid 2 mg every 6 hours as needed-120 tablets sent on 12/18/2024.   Side effects discussed including but not limited to nausea, vomiting, constipation, lightheadedness, dizziness, drowsiness, or headache. This medication may increase serotonin and rarely cause a very serious condition called serotonin syndrome/ toxicity.   5. Continues Ibuprofen 800 mg TID PRN-sent for 6 months on 10/8/2024 patient informed of increased risk of heart attacks, stroke and kidney problems in addition to gastric ulcers with use of non-steroidal anti-inflammatory medications.  6.  Increase gabapentin to 600 mg TID. Side effects discussed with the patient including but not limited to somnolence, dizziness, ataxia, headache, fatigue, blurred vision, cold or flu-like symptoms,delusions, hoarseness, lack or loss of strength, lower back or side pain, swelling of the hands, feet, or lower legs trembling or shaking. Patient understands and agrees with the plan.  7.  Continues to have severe right hip pain.  Right hip x-ray does not look severe but she continues to have severe right groin pain that is not responsive to epidurals  and other pain medications.  Discussed possibility of right hip intra-articular injection.  Benefits and risks were discussed with patient and we will schedule her for the hip injection.  8.  Unfortunately previous spine surgeons have found her to be a nonsurgical candidate due to her bone health.  Due to severe pain not responsive to pain medications and injections, I will have her see Dr. Cantrell for second opinion.      RTC for right hip injection.     Kirk Nascimento DO  Pain Management   [facility]           INSPECT REPORT    As part of the patient's treatment plan, I may be prescribing controlled substances. The patient has been made aware of appropriate use of such medications, including potential risk of somnolence, limited ability to drive and/or work safely, and the potential for dependence or overdose. It has also been made clear that these medications are for use by this patient only, without concomitant use of alcohol or other substances unless prescribed.     Patient has completed prescribing agreement detailing terms of continued prescribing of controlled substances, including monitoring INSPECT reports, urine drug screening, and pill counts if necessary. The patient is aware that inappropriate use will results in cessation of prescribing such medications.    INSPECT report has been reviewed and scanned into the patient's chart.

## 2024-12-09 NOTE — CASE MANAGEMENT/SOCIAL WORK
Case Management Discharge Note      Final Note: Home w/ [facility].         Selected Continued Care - Discharged on 12/6/2024 Admission date: 12/1/2024 - Discharge disposition: Home-Health Care Svc        Home Medical Care Coordination complete.      Service Provider Services Address Phone Fax Patient Preferred    [facility] [phone] [facility] [place] [id number] [phone] --               Transportation Services  Private: Car    Final Discharge Disposition Code: 06 - home with home health care

## 2024-12-10 ENCOUNTER — READMISSION MANAGEMENT (OUTPATIENT)
Dept: CALL CENTER | Facility: HOSPITAL | Age: 74
End: 2024-12-10
Payer: MEDICARE

## 2024-12-10 ENCOUNTER — OFFICE VISIT (OUTPATIENT)
Dept: PAIN MEDICINE | Facility: CLINIC | Age: 74
End: 2024-12-10
Payer: MEDICARE

## 2024-12-10 VITALS
OXYGEN SATURATION: 98 % | DIASTOLIC BLOOD PRESSURE: 83 MMHG | RESPIRATION RATE: 16 BRPM | BODY MASS INDEX: 28.49 KG/M2 | WEIGHT: 166 LBS | SYSTOLIC BLOOD PRESSURE: 154 MMHG | HEART RATE: 85 BPM

## 2024-12-10 DIAGNOSIS — M96.1 POSTLAMINECTOMY SYNDROME: ICD-10-CM

## 2024-12-10 DIAGNOSIS — Z98.890 S/P KYPHOPLASTY: ICD-10-CM

## 2024-12-10 DIAGNOSIS — M48.062 SPINAL STENOSIS OF LUMBAR REGION WITH NEUROGENIC CLAUDICATION: Primary | ICD-10-CM

## 2024-12-10 DIAGNOSIS — Z79.899 HIGH RISK MEDICATION USE: ICD-10-CM

## 2024-12-10 DIAGNOSIS — M25.551 RIGHT HIP PAIN: ICD-10-CM

## 2024-12-10 RX ORDER — HYDROMORPHONE HYDROCHLORIDE 2 MG/1
2 TABLET ORAL EVERY 6 HOURS PRN
Qty: 120 TABLET | Refills: 0 | Status: SHIPPED | OUTPATIENT
Start: 2024-12-18 | End: 2025-01-17

## 2024-12-10 RX ORDER — GABAPENTIN 600 MG/1
600 TABLET ORAL 3 TIMES DAILY
Qty: 90 TABLET | Refills: 1 | Status: SHIPPED | OUTPATIENT
Start: 2024-12-10

## 2024-12-10 NOTE — OUTREACH NOTE
General Surgery Week 1 Survey      Flowsheet Row Responses   [facility] facility patient discharged from? [facility]   Does the patient have one of the following disease processes/diagnoses(primary or secondary)? General Surgery   Week 1 attempt successful? No   Unsuccessful attempts Attempt 1            Victoria Crowe Registered Nurse

## 2024-12-11 ENCOUNTER — HOME CARE VISIT (OUTPATIENT)
Dept: HOME HEALTH SERVICES | Facility: HOME HEALTHCARE | Age: 74
End: 2024-12-11
Payer: COMMERCIAL

## 2024-12-11 VITALS — HEART RATE: 50 BPM | SYSTOLIC BLOOD PRESSURE: 142 MMHG | OXYGEN SATURATION: 97 % | DIASTOLIC BLOOD PRESSURE: 80 MMHG

## 2024-12-11 PROCEDURE — G0152 HHCP-SERV OF OT,EA 15 MIN: HCPCS

## 2024-12-12 ENCOUNTER — HOME CARE VISIT (OUTPATIENT)
Dept: HOME HEALTH SERVICES | Facility: HOME HEALTHCARE | Age: 74
End: 2024-12-12
Payer: COMMERCIAL

## 2024-12-12 VITALS
DIASTOLIC BLOOD PRESSURE: 90 MMHG | SYSTOLIC BLOOD PRESSURE: 135 MMHG | RESPIRATION RATE: 18 BRPM | HEART RATE: 76 BPM | OXYGEN SATURATION: 98 % | TEMPERATURE: 97.8 F

## 2024-12-12 PROCEDURE — G0151 HHCP-SERV OF PT,EA 15 MIN: HCPCS

## 2024-12-12 NOTE — HOME HEALTH
Pt is a 73 yo female who lives in a trailer home with her brother.   Pt recently hospitalized secondary to kyphoplasty at L4 on 12/3/2024 to address compression fracture of L4 lumbar vertebra.          PLOF pt independent with all ADLS      CLOF pt independent with feeding and toileting.   Pt requires Max assist with bathing and dressing and total assist with IADLs      Skilled OT for ther ex and adl training.   Pt and therapist in agreement with goals and poc.    Pt denies any falls or med changes

## 2024-12-13 ENCOUNTER — READMISSION MANAGEMENT (OUTPATIENT)
Dept: CALL CENTER | Facility: HOSPITAL | Age: 74
End: 2024-12-13
Payer: MEDICARE

## 2024-12-13 NOTE — OUTREACH NOTE
General Surgery Week 1 Survey      Flowsheet Row Responses   [facility] patient discharged from? [facility]   Does the patient have one of the following disease processes/diagnoses(primary or secondary)? General Surgery   Week 1 attempt successful? Yes   Call start time 0926   Call end time 0929   Discharge diagnosis KYPHOPLASTY at L4   Meds reviewed with patient/caregiver? Yes   Is the patient having any side effects they believe may be caused by any medication additions or changes? No   Does the patient have all medications related to this admission filled (includes all antibiotics, pain medications, etc.) Yes   Is the patient taking all medications as directed (includes completed medication regime)? Yes   Does the patient have a follow up appointment scheduled with their surgeon? Yes   Has the patient kept scheduled appointments due by today? N/A   What is the Home health agency?  Home with [facility]   Has home health visited the patient within 72 hours of discharge? Yes   Psychosocial issues? No   Did the patient receive a copy of their discharge instructions? Yes   Nursing interventions Reviewed instructions with patient   What is the patient's perception of their health status since discharge? Same   Nursing interventions Nurse provided patient education   Is the patient /caregiver able to teach back basic post-op care? No tub bath, swimming, or hot tub until instructed by MD, Keep incision areas clean,dry and protected   Is the patient/caregiver able to teach back signs and symptoms of incisional infection? Increased redness, swelling or pain at the incisonal site, Increased drainage or bleeding, Incisional warmth, Pus or odor from incision, Fever   Is the patient/caregiver able to teach back steps to recovery at home? Set small, achievable goals for return to baseline health, Rest and rebuild strength, gradually increase activity, Eat a well-balance diet   Is the patient/caregiver able to teach back the hierarchy  of who to call/visit for symptoms/problems? PCP, Specialist, Home health nurse, Urgent Care, ED, 911 Yes   Week 1 call completed? Yes   Is the patient interested in additional calls from an ambulatory ? No   Would this patient benefit from a Referral to Amb Social Work? No   Wrap up additional comments Patient reports back pain not improved since kyphoplasty, she has appt 12/17/24 with Dr. Nascimento for injection.   Call end time [id number]            Marisabel ORELLANA - Registered Nurse

## 2024-12-16 ENCOUNTER — HOME CARE VISIT (OUTPATIENT)
Dept: HOME HEALTH SERVICES | Facility: HOME HEALTHCARE | Age: 74
End: 2024-12-16
Payer: COMMERCIAL

## 2024-12-16 VITALS
HEART RATE: 76 BPM | TEMPERATURE: 97.5 F | DIASTOLIC BLOOD PRESSURE: 80 MMHG | SYSTOLIC BLOOD PRESSURE: 130 MMHG | RESPIRATION RATE: 18 BRPM | OXYGEN SATURATION: 90 %

## 2024-12-16 PROCEDURE — G0151 HHCP-SERV OF PT,EA 15 MIN: HCPCS

## 2024-12-17 ENCOUNTER — HOSPITAL ENCOUNTER (OUTPATIENT)
Dept: PAIN MEDICINE | Facility: HOSPITAL | Age: 74
Discharge: HOME | End: 2024-12-17
Payer: MEDICARE

## 2024-12-17 VITALS
WEIGHT: 166 LBS | BODY MASS INDEX: 28.34 KG/M2 | DIASTOLIC BLOOD PRESSURE: 95 MMHG | RESPIRATION RATE: 15 BRPM | HEART RATE: 75 BPM | TEMPERATURE: 96.9 F | OXYGEN SATURATION: 91 % | SYSTOLIC BLOOD PRESSURE: 144 MMHG | HEIGHT: 64 IN

## 2024-12-17 DIAGNOSIS — R52 PAIN: ICD-10-CM

## 2024-12-17 DIAGNOSIS — M25.551 RIGHT HIP PAIN: Primary | ICD-10-CM

## 2024-12-17 PROCEDURE — 77002 NEEDLE LOCALIZATION BY XRAY: CPT | Performed by: STUDENT IN AN ORGANIZED HEALTH CARE EDUCATION/TRAINING PROGRAM

## 2024-12-17 PROCEDURE — 25010000002 METHYLPREDNISOLONE PER 40 MG: Performed by: STUDENT IN AN ORGANIZED HEALTH CARE EDUCATION/TRAINING PROGRAM

## 2024-12-17 PROCEDURE — 25510000001 IOPAMIDOL 41 % SOLUTION: Performed by: STUDENT IN AN ORGANIZED HEALTH CARE EDUCATION/TRAINING PROGRAM

## 2024-12-17 PROCEDURE — 25010000002 BUPIVACAINE (PF) 0.25 % SOLUTION: Performed by: STUDENT IN AN ORGANIZED HEALTH CARE EDUCATION/TRAINING PROGRAM

## 2024-12-17 PROCEDURE — 77003 FLUOROGUIDE FOR SPINE INJECT: CPT

## 2024-12-17 PROCEDURE — 20610 DRAIN/INJ JOINT/BURSA W/O US: CPT | Performed by: STUDENT IN AN ORGANIZED HEALTH CARE EDUCATION/TRAINING PROGRAM

## 2024-12-17 RX ORDER — BUPIVACAINE HYDROCHLORIDE 2.5 MG/ML
10 INJECTION, SOLUTION EPIDURAL; INFILTRATION; INTRACAUDAL ONCE
Status: COMPLETED | OUTPATIENT
Start: 2024-12-17 | End: 2024-12-17

## 2024-12-17 RX ORDER — METHYLPREDNISOLONE ACETATE 40 MG/ML
40 INJECTION, SUSPENSION INTRA-ARTICULAR; INTRALESIONAL; INTRAMUSCULAR; SOFT TISSUE ONCE
Status: COMPLETED | OUTPATIENT
Start: 2024-12-17 | End: 2024-12-17

## 2024-12-17 RX ORDER — IOPAMIDOL 408 MG/ML
3 INJECTION, SOLUTION INTRATHECAL
Status: COMPLETED | OUTPATIENT
Start: 2024-12-17 | End: 2024-12-17

## 2024-12-17 RX ADMIN — IOPAMIDOL 1 ML: 408 INJECTION, SOLUTION INTRATHECAL at 14:45

## 2024-12-17 RX ADMIN — METHYLPREDNISOLONE ACETATE 40 MG: 40 INJECTION, SUSPENSION INTRA-ARTICULAR; INTRALESIONAL; INTRAMUSCULAR; INTRASYNOVIAL; SOFT TISSUE at 14:46

## 2024-12-17 RX ADMIN — BUPIVACAINE HYDROCHLORIDE 4 ML: 2.5 INJECTION, SOLUTION EPIDURAL; INFILTRATION; INTRACAUDAL; PERINEURAL at 14:45

## 2024-12-17 NOTE — PROCEDURES
PREOPERATIVE DIAGNOSIS:    1. Right  Hip pain  2. Right Hip Osteo arthrtitis      POSTOPERATIVE DIAGNOSIS:  Same    PROCEDURE: Right Intra Articular Hip injection with steroids    PROCEDURE NOTE:  After obtaining written informed consent patient was taken to the procedure room. Pre-procedure blood pressure and pulse were stable and recorded in patients clinic chart.     The patient was placed in a supine position.  Right groin area was prepped with chloraprep and draped in the usual sterile fashion.  The right hip was identified under fluoroscopy. A 22-gauge-3.5-inch needle was inserted into the hip joint through the anterior approach. 1 cc of omnipaque dye was injected. Dye was seen lining the joint space. Following the negative aspiration, 5 ml of 0.25 % bupivacaine with 40 mg of depo-medrol was injected without any complications.     After the procedure the needle was removed and sterile band-aid was placed.    Following the procedure the patient's vital signs were stable. The patient was discharged home in good condition after being given discharge instructions.      COMPLICATIONS: None    Kirk Nascimento DO  Pain Management   [facility]

## 2024-12-17 NOTE — H&P
H and P reviewed from previous visit and no changes to patient's clinical presentation. Will proceed with procedure as planned. Patient denies history of DM and being on blood thinners.    Kirk Nascimento DO  Pain Management   [facility]

## 2024-12-17 NOTE — DISCHARGE INSTRUCTIONS
Joint Steroid Injection    A joint steroid injection is a procedure to relieve swelling and pain in a joint. Steroids are medicines that reduce inflammation. In this procedure, your health care provider uses a syringe and a needle to inject a steroid medicine into a painful and inflamed joint. A pain-relieving medicine (anesthetic) may be injected along with the steroid. In some cases, your health care provider may use an imaging technique such as ultrasound or fluoroscopy to guide the injection.  Joints that are often treated with steroid injections include the knee, shoulder, hip, and spine. These injections may also be used in the elbow, ankle, and joints of the hands or feet. You may have joint steroid injections as part of your treatment for inflammation caused by:  Gout.  Rheumatoid arthritis.  Advanced wear-and-tear arthritis (osteoarthritis).  Tendinitis.  Bursitis.  Joint steroid injections may be repeated, but having them too often can damage a joint or the skin over the joint. You should not have joint steroid injections less than 6 weeks apart or more than four times a year.    Tell a health care provider about:  Any allergies you have.  All medicines you are taking, including vitamins, herbs, eye drops, creams, and over-the-counter medicines.  Any problems you or family members have had with anesthetic medicines.  Any blood disorders you have.  Any surgeries you have had.  Any medical conditions you have.  Whether you are pregnant or may be pregnant.    What are the risks?  Generally, this is a safe treatment. However, problems may occur, including:  Infection.  Bleeding.  Allergic reactions to medicines.  Damage to the joint or tissues around the joint.  Thinning of skin or loss of skin color over the joint.  Temporary flushing of the face or chest.  Temporary increase in pain.  Temporary increase in blood sugar.  Failure to relieve inflammation or pain.    What happens before the  treatment?  Medicines  Ask your health care provider about:  Changing or stopping your regular medicines. This is especially important if you are taking diabetes medicines or blood thinners.  Taking medicines such as aspirin and ibuprofen. These medicines can thin your blood. Do not take these medicines unless your health care provider tells you to take them.  Taking over-the-counter medicines, vitamins, herbs, and supplements.    What happens during the treatment?    Your health care provider will position you for the injection and locate the injection site over your joint.  The skin over the joint will be cleaned with a germ-killing soap.  Your health care provider may:  Spray a numbing solution (topical anesthetic) over the injection site.  Inject a local anesthetic under the skin above your joint.  The needle will be placed through your skin into your joint. Your health care provider may use imaging to guide the needle to the right spot for the injection. If imaging is used, a special contrast dye may be injected to confirm that the needle is in the correct location.  The steroid medicine will be injected into your joint.  Anesthetic may be injected along with the steroid. This may be a medicine that relieves pain for a short time (short-acting anesthetic) or for a longer time (long-acting anesthetic).  The needle will be removed, and an adhesive bandage (dressing) will be placed over the injection site.  The procedure may vary among health care providers and hospitals.    What can I expect after the treatment?  You will be able to go home after the treatment.  It is normal to feel slight flushing for a few days after the injection.  After the treatment, it is common to have an increase in joint pain after the anesthetic has worn off. This may happen about an hour after a short-acting anesthetic or about 8 hours after a longer-acting anesthetic.  You should begin to feel relief from joint pain and swelling after  24 to 48 hours. Sometimes this may be longer.    Follow these instructions at home:  Injection site care  Leave the adhesive dressing over your injection site in place for 24 hours.  Check your injection site every day for signs of infection. Check for:  More redness, swelling, or pain.  Fluid or blood.  Warmth.  Pus or a bad smell.  Activity  Return to your normal activities as told by your health care provider. Ask your health care provider what activities are safe for you. You may be asked to limit activities that put stress on the joint for a few days.  Do joint exercises as told by your health care provider.  Do not take baths, swim, or use a hot tub for 24 hours.  Ask your health care provider if you may take showers.     Managing pain, stiffness, and swelling    If directed, put ice on the joint. To do this:  Put ice in a plastic bag.  Place a towel between your skin and the bag.  Leave the ice on for 20 minutes, 2-3 times a day.  Remove the ice if your skin turns bright red. This is very important. If you cannot feel pain, heat, or cold, you have a greater risk of damage to the area.  Raise (elevate) your joint above the level of your heart when you are sitting or lying down.  No heat to area for 24-48 hours.     General instructions  Take over-the-counter and prescription medicines only as told by your health care provider.  Do not use any products that contain nicotine or tobacco, such as cigarettes, e-cigarettes, and chewing tobacco. These can delay joint healing. If you need help quitting, ask your health care provider.  If you have diabetes, be aware that your blood sugar may be slightly elevated for several days after the injection.  Keep all follow-up visits. This is important.  Contact a health care provider if you have:  Chills or a fever.  Any signs of infection at your injection site.  Increased pain or swelling or no relief after 2 days.  Summary  A joint steroid injection is a treatment to  relieve pain and swelling in a joint.  Steroids are medicines that reduce inflammation. Your health care provider may add an anesthetic along with the steroid.  You may have joint steroid injections as part of your arthritis treatment.  Joint steroid injections may be repeated, but having them too often can damage a joint or the skin over the joint.  Contact your health care provider if you have a fever, chills, or signs of infection, or if you get no relief from joint pain or swelling.  This information is not intended to replace advice given to you by your health care provider. Make sure you discuss any questions you have with your health care provider.  Document Revised: 05/28/2021 Document Reviewed: 05/28/2021  Elsevier Patient Education © 2021 [employer]

## 2024-12-18 ENCOUNTER — HOME CARE VISIT (OUTPATIENT)
Dept: HOME HEALTH SERVICES | Facility: HOME HEALTHCARE | Age: 74
End: 2024-12-18
Payer: COMMERCIAL

## 2024-12-18 VITALS
HEART RATE: 67 BPM | TEMPERATURE: 97.6 F | OXYGEN SATURATION: 97 % | SYSTOLIC BLOOD PRESSURE: 150 MMHG | RESPIRATION RATE: 18 BRPM | DIASTOLIC BLOOD PRESSURE: 85 MMHG

## 2024-12-18 PROCEDURE — G0151 HHCP-SERV OF PT,EA 15 MIN: HCPCS

## 2024-12-19 ENCOUNTER — TELEPHONE (OUTPATIENT)
Dept: PAIN MEDICINE | Facility: CLINIC | Age: 74
End: 2024-12-19
Payer: MEDICARE

## 2024-12-19 NOTE — TELEPHONE ENCOUNTER
Caller: Nola Tariq    Relationship: Self    Best call back number: [phone]    What was the call regarding: PT CALLING TO RELAY THAT DR MARTINEZ'S OFFICE HAS NOT RECEIVED REFERRAL FROM DR CORDOVA YET. PT STATES DR MARTINEZ'S FAX NUMBER [phone].    PT ALSO NEEDS TO SET UP FOLLOW UP WITH DR CORDOVA. PT IS UNSURE THE TIMEFRAME FOR WHEN SHE NEEDS TO SEE DR CORDOVA NEXT.     PLEASE CONTACT PT TO DISCUSS THE ISSUES ABOVE.

## 2024-12-19 NOTE — TELEPHONE ENCOUNTER
Spoke with patient let her know someone will call he to set up a 1 month follow up, referral has been sent to Dr. Cantrell.

## 2024-12-20 ENCOUNTER — HOME CARE VISIT (OUTPATIENT)
Dept: HOME HEALTH SERVICES | Facility: HOME HEALTHCARE | Age: 74
End: 2024-12-20
Payer: COMMERCIAL

## 2024-12-20 PROCEDURE — G0152 HHCP-SERV OF OT,EA 15 MIN: HCPCS

## 2024-12-22 VITALS — DIASTOLIC BLOOD PRESSURE: 80 MMHG | HEART RATE: 80 BPM | SYSTOLIC BLOOD PRESSURE: 110 MMHG | OXYGEN SATURATION: 97 %

## 2024-12-23 ENCOUNTER — HOME CARE VISIT (OUTPATIENT)
Dept: HOME HEALTH SERVICES | Facility: HOME HEALTHCARE | Age: 74
End: 2024-12-23
Payer: COMMERCIAL

## 2024-12-23 PROCEDURE — G0152 HHCP-SERV OF OT,EA 15 MIN: HCPCS

## 2024-12-26 ENCOUNTER — HOME CARE VISIT (OUTPATIENT)
Dept: HOME HEALTH SERVICES | Facility: HOME HEALTHCARE | Age: 74
End: 2024-12-26
Payer: COMMERCIAL

## 2024-12-26 VITALS
SYSTOLIC BLOOD PRESSURE: 140 MMHG | TEMPERATURE: 97.8 F | OXYGEN SATURATION: 98 % | DIASTOLIC BLOOD PRESSURE: 80 MMHG | HEART RATE: 85 BPM | RESPIRATION RATE: 18 BRPM

## 2024-12-26 VITALS — SYSTOLIC BLOOD PRESSURE: 130 MMHG | DIASTOLIC BLOOD PRESSURE: 80 MMHG | OXYGEN SATURATION: 97 % | HEART RATE: 77 BPM

## 2024-12-26 PROCEDURE — G0151 HHCP-SERV OF PT,EA 15 MIN: HCPCS

## 2024-12-27 NOTE — HOME HEALTH
Pt discharged from OT services as pt has met most goals and insurance only approved 2 additional visit

## 2024-12-30 ENCOUNTER — HOME CARE VISIT (OUTPATIENT)
Dept: HOME HEALTH SERVICES | Facility: HOME HEALTHCARE | Age: 74
End: 2024-12-30
Payer: COMMERCIAL

## 2025-01-02 ENCOUNTER — HOME CARE VISIT (OUTPATIENT)
Dept: HOME HEALTH SERVICES | Facility: HOME HEALTHCARE | Age: 75
End: 2025-01-02
Payer: COMMERCIAL

## 2025-01-02 VITALS
RESPIRATION RATE: 18 BRPM | SYSTOLIC BLOOD PRESSURE: 125 MMHG | OXYGEN SATURATION: 97 % | TEMPERATURE: 97.9 F | HEART RATE: 74 BPM | DIASTOLIC BLOOD PRESSURE: 90 MMHG

## 2025-01-02 PROCEDURE — G0151 HHCP-SERV OF PT,EA 15 MIN: HCPCS

## 2025-01-02 NOTE — HOME HEALTH
"Pt seated on power scooter upon PT arrival.  Pt states pain is 7/10 today and reports \"about the same as every day\".  Pt has plateaued in progress with home care PT due to chronic back pain.  Pt has an appointment with a spinal surgeon on 01/14/25.  Pt will not benefit from any continued home care PT at this time due to unchanging back pain.  PT notified pt that home care PT services can be ordered at a later date if/when pain is more well managed.  Pt met goals adequate for DC from home care PT services at this time.  Pt is able to ambulate with rollator for short distances and uses power scooter for all other necessary mobility.  Pt verbalized understanding and agreement to PT DC this date and has no further questions or concerns.    DME Justification:   Pt will benefit from 3in1 bedside commode due to chronic pain and urinary frequency/urgency causing inability to utilize toileting facilities in her home without accident.  Patient is room confined due to chronic pain and will require a bedside commode.  Using bedside commode will assist pt in reducing frequency of urinary incontinence accidents and subsequently reduce risk of falls in home.    Height: 64 inches  Weight: 166 lbs"

## 2025-01-02 NOTE — Clinical Note
Please be on the lookout for a signed order for bedside commode from [facility].  Once signed order is obtained, please fax with pt's facesheet and PT OASIS DC documentation from 01/02/25 to Venkata Engel at [facility]

## 2025-01-03 ENCOUNTER — READMISSION MANAGEMENT (OUTPATIENT)
Dept: CALL CENTER | Facility: HOSPITAL | Age: 75
End: 2025-01-03
Payer: MEDICARE

## 2025-01-03 NOTE — OUTREACH NOTE
General Surgery Week 3 Survey      Flowsheet Row Responses   [facility] patient discharged from? [facility]   Does the patient have one of the following disease processes/diagnoses(primary or secondary)? General Surgery   Week 3 attempt successful? Yes   Call start time 0932   Call end time 0938   Discharge diagnosis KYPHOPLASTY at L4   Person spoke with today (if not patient) and relationship patient   Meds reviewed with patient/caregiver? Yes   Is the patient having any side effects they believe may be caused by any medication additions or changes? No   Does the patient have all medications related to this admission filled (includes all antibiotics, pain medications, etc.) Yes   Is the patient taking all medications as directed (includes completed medication regime)? Yes   Does the patient have a follow up appointment scheduled with their surgeon? Yes   Has the patient kept scheduled appointments due by today? Yes   What is the Home health agency?  Home with [facility]   Has home health visited the patient within 72 hours of discharge? Yes   Psychosocial issues? No   Did the patient receive a copy of their discharge instructions? Yes   Nursing interventions Reviewed instructions with patient   What is the patient's perception of their health status since discharge? Same  [Still having alot of back pain. Seeedna BARNES again on 1/14]   Nursing interventions Nurse provided patient education   Is the patient/caregiver able to teach back signs and symptoms of incisional infection? Increased redness, swelling or pain at the incisonal site, Incisional warmth, Fever, Pus or odor from incision, Increased drainage or bleeding   Is the patient/caregiver able to teach back steps to recovery at home? Set small, achievable goals for return to baseline health, Rest and rebuild strength, gradually increase activity   If the patient is a current smoker, are they able to teach back resources for cessation? Not a smoker   Is the  patient/caregiver able to teach back the hierarchy of who to call/visit for symptoms/problems? PCP, Specialist, Home health nurse, Urgent Care, ED, 911 Yes   Week 3 call completed? Yes   Graduated Yes   Is the patient interested in additional calls from an ambulatory ? No   Would this patient benefit from a Referral to [facility] Social Work? No   Wrap up additional comments Patient reports back pain not improved since kyphoplasty   Call end time 0938            Kevin CERVANTES - Registered Nurse

## 2025-01-15 ENCOUNTER — TELEPHONE (OUTPATIENT)
Dept: PAIN MEDICINE | Facility: CLINIC | Age: 75
End: 2025-01-15
Payer: MEDICARE

## 2025-01-15 NOTE — TELEPHONE ENCOUNTER
Caller: Nola Tariq    Relationship to patient: Self    Best call back number: [phone] (home)     Patient is needing: PATIENT CALLED TO ROB AN UPDATE TO DR CORDOVA. SHE IS SCHEDULED FOR SX WITH DR MUSA ON 2/10/25 TO GET LOWER BACK SX

## 2025-01-17 ENCOUNTER — TRANSCRIBE ORDERS (OUTPATIENT)
Dept: ADMINISTRATIVE | Facility: HOSPITAL | Age: 75
End: 2025-01-17
Payer: MEDICARE

## 2025-01-17 ENCOUNTER — HOSPITAL ENCOUNTER (OUTPATIENT)
Dept: CARDIOLOGY | Facility: HOSPITAL | Age: 75
Discharge: HOME | End: 2025-01-17
Payer: MEDICARE

## 2025-01-17 ENCOUNTER — LAB (OUTPATIENT)
Dept: LAB | Facility: HOSPITAL | Age: 75
End: 2025-01-17
Payer: MEDICARE

## 2025-01-17 DIAGNOSIS — Z01.818 OTHER SPECIFIED PRE-OPERATIVE EXAMINATION: ICD-10-CM

## 2025-01-17 DIAGNOSIS — Z01.818 OTHER SPECIFIED PRE-OPERATIVE EXAMINATION: Primary | ICD-10-CM

## 2025-01-17 LAB
ANION GAP SERPL CALCULATED.3IONS-SCNC: 11 MMOL/L (ref 5–15)
BUN SERPL-MCNC: 18 MG/DL (ref 8–23)
BUN/CREAT SERPL: 18.2 (ref 7–25)
CALCIUM SPEC-SCNC: 9.2 MG/DL (ref 8.6–10.5)
CHLORIDE SERPL-SCNC: 105 MMOL/L (ref 98–107)
CO2 SERPL-SCNC: 23 MMOL/L (ref 22–29)
CREAT SERPL-MCNC: 0.99 MG/DL (ref 0.57–1)
EGFRCR SERPLBLD CKD-EPI 2021: 59.6 ML/MIN/1.73
GLUCOSE SERPL-MCNC: 91 MG/DL (ref 65–99)
POTASSIUM SERPL-SCNC: 4.3 MMOL/L (ref 3.5–5.2)
QT INTERVAL: 381 MS
QTC INTERVAL: 431 MS
SODIUM SERPL-SCNC: 139 MMOL/L (ref 136–145)

## 2025-01-17 PROCEDURE — 80048 BASIC METABOLIC PNL TOTAL CA: CPT

## 2025-01-17 PROCEDURE — 36415 COLL VENOUS BLD VENIPUNCTURE: CPT

## 2025-01-17 PROCEDURE — 93005 ELECTROCARDIOGRAM TRACING: CPT | Performed by: PHYSICIAN ASSISTANT

## 2025-01-21 ENCOUNTER — TELEPHONE (OUTPATIENT)
Dept: PAIN MEDICINE | Facility: CLINIC | Age: 75
End: 2025-01-21
Payer: MEDICARE

## 2025-01-21 DIAGNOSIS — M96.1 POSTLAMINECTOMY SYNDROME: ICD-10-CM

## 2025-01-21 DIAGNOSIS — Z79.899 HIGH RISK MEDICATION USE: ICD-10-CM

## 2025-01-21 DIAGNOSIS — M48.062 SPINAL STENOSIS OF LUMBAR REGION WITH NEUROGENIC CLAUDICATION: Primary | ICD-10-CM

## 2025-01-21 RX ORDER — HYDROMORPHONE HYDROCHLORIDE 2 MG/1
2 TABLET ORAL EVERY 6 HOURS PRN
Qty: 120 TABLET | Refills: 0 | Status: SHIPPED | OUTPATIENT
Start: 2025-01-21 | End: 2025-02-20

## 2025-01-21 NOTE — TELEPHONE ENCOUNTER
Spoke to patient. Both pharmacist are out of this medicine.  Patient will call back with pharmacy name.

## 2025-01-21 NOTE — TELEPHONE ENCOUNTER
Provider: TASHA    Caller: Nola Tariq    Relationship to Patient: Self    Phone Number: [phone]    Reason for Call: PT CALLED STATING DILSHAJIID WAS CALLED INTO [facility] BUT THEY ARE OUT OF MEDICATION AND WON'T HAVE ANY IN STOCK FOR A COUPLE OF DAYS. PT ONLY HAS TWO PILLS LEFT AND NEEDS PRESCRIPTION SENT ELSEWHERE ASAP. PT STATES EITHER [facility] IN [place] AND IF THEY DON'T HAVE IT IT CAN BE SENT TO [facility] IN [place]. PLEASE CALL PT AND ADVISE.

## 2025-01-21 NOTE — TELEPHONE ENCOUNTER
Caller: Nola Tariq    Relationship to Patient: Self    Phone Number: [phone]    Reason for Call: PATIENT STATES SHE CALLED SEVERAL OTHER PHARMACIES, BUT NO ONE HAS HER MEDICATION IN STOCK. SHE STATES [facility] IS ORDERING IT BUT IT WILL BE SEVERAL DAYS BEFORE IT COMES IN. SHE IS ASKING WHAT HER OPTIONS ARE.

## 2025-01-27 NOTE — PROGRESS NOTES
Subjective   Nola Tariq is a 74 y.o. female is here for follow-up for lower back pain.  Patient was last seen on 12/10/2024. She is scheduled with Dr. Cantrell for laminectomy on 2/10/25.     On last visit:     Left leg pain is 5/10 on VAS.    Lower back pain is 8/10 on VAS, at maximum 9/10.  Pain is aching and dull in nature.  Referred to right groin, right leg  She has numbness and tingling in bilateral foot.  Pain is constant.  Improved by pain meds.  Worse with walking and standing.    R knee pain is 5/10 on VAS. Worse with weight bearing. She had R knee steroid injection with Rheumatology several months ago with excellent relief.     R shoulder pain is 6/10 on VAS. Normal ROM. Had shoulder bursa issues several years ago which improved with US treatments.     Migraines - She used to take Excedrin and Fioricet for daily migraines but it doesn't work anymore. She is scheduled to see Neurology.  She does wake up with migraines during night or early in morning. She would wake up with headache and goes away with Excedrin. She had 2-3 headaches over last month that didn't go away with Excedrin and lasted for rest of the day. She had seen someone for chiari malformation previous.  History of migraines since teenager and usually right-sided temporal area but states that new migraines are all over the head. Seen by neurology who recommended continuing excedrin.      Previous Injection:   12/5/2024-B/L TFESI L3-4- not sure if it helped.   12/3/2024-L4 kyphoplasty [employer]- some help  10/11/24 - L3 kyphoplasty - not sure if helped.   9/3/2024-LESI L3-4-no significant improvement.  4/18/2023- SCS trial [employer]- no significant pain relief. Coverage was excellent with patient able to feel paresthesia in all of the painful areas but unfortunately no significant pain relief.  No functional improvement as well.  11/8/2022-right knee injection- good relief.  7/8/22 - LESI L5-S1 - no relief.   RFA at previous pain  Spoke to the patient conveyed Pap-smear results. Patient understood results per Doctors notes.   management - no relief.     Hx:  Referred by JEM Ashton for  lower back pain.  Pain started around 2008 without any significant injuries and has been getting worse since then.  Her main complaints today are mid back and lower back pain.  Patient had previously seen Dr. Voss and had epidurals, facet joint injections, ablations.  She states that nothing helped at the time. Last seen 1 year ago. Her lower back pain is worse than mid back pain. She is retired RN.   She has since seen [facility] (seen by [facility])- 2022 who recommended medication management.  Patient states that she is not interested in fusion surgery as there is 50% chance of lower back pain improvement. She had also injured her left knee and had steroid injection by Ortho in left knee.  Scheduled to see Ortho in 5 weeks. She has been seeing neurology for migraines and has been started on gabapentin.  Patient has taken gabapentin for about 1 month without significant improvement in her migraines.        PHQ-9- 4  SOAPP-2      PMH:   Tijerina's esophagus, hypertension, migraine, thoracic vertebra fx s/p kypho T7, T10, migraines, smoker, Back surgery- decompression L4-5?-2009 Dr. Rzd, Carpal tunnel surgery 1983 bilateral wrist; R knee steroid injection with orthopedics (Dr. Barton at [place]).         Current Medications:   Tramadol  mg q6H PRN  Lyrica 25 mg BID  Ibuprofen 800 mg       Past Medications:  Tramadol  mg daily   Meloxicam 15 mg  Celebrex-leg swelling  Gabapentin - didn't help   Norco 5-325 mg BID PRN      Past Modalities:  TENS:                                                                          no                                                  Physical Therapy Within The Last 6 Months              Yes (1.5 years ago with some help).   Psychotherapy                                                            no  Massage Therapy                                                       no     Patient Complains  Of:  Uro-Fecal Incontinence          no  Weight Gain/Loss                   Yes (weight gain 25 lbs - 1 year after smoking).   Fever/Chills                             no  Weakness                               no            Current Outpatient Medications:     Acetaminophen-Caffeine (Excedrin Tension Headache) 500-65 MG tablet, Take 1 tablet by mouth Daily As Needed (headache). Indications: Mild Pain, Disp: , Rfl:     albuterol sulfate  (90 Base) MCG/ACT inhaler, Inhale 2 puffs Every 4 (Four) Hours As Needed for Wheezing., Disp: 6.7 g, Rfl: 0    Cetirizine HCl (ZyrTEC Allergy) 10 MG capsule, Take 10 mg by mouth Daily. Indications: Perennial Allergic Rhinitis, Disp: , Rfl:     denosumab (Prolia) 60 MG/ML solution prefilled syringe syringe, Inject 1 mL under the skin into the appropriate area as directed 1 (One) Time. Indications: Osteoporosis, Disp: , Rfl:     docusate sodium (COLACE) 100 MG capsule, Take 1 capsule by mouth 2 (Two) Times a Day. Indications: Constipation, Disp: , Rfl:     famotidine (PEPCID) 40 MG tablet, Take 1 tablet by mouth 2 (Two) Times a Day. Indications: Gastroesophageal Reflux Disease, Disp: , Rfl:     gabapentin (NEURONTIN) 600 MG tablet, Take 1 tablet by mouth 3 (Three) Times a Day. Indications: Neuropathic Pain, Disp: 90 tablet, Rfl: 1    HYDROmorphone (Dilaudid) 2 MG tablet, Take 1 tablet by mouth Every 6 (Six) Hours As Needed for Moderate Pain for up to 30 days., Disp: 120 tablet, Rfl: 0    ibuprofen (ADVIL,MOTRIN) 800 MG tablet, Take 1 tablet by mouth 3 (Three) Times a Day As Needed for Mild Pain. Indications: Pain, Disp: , Rfl:     Melatonin 5 MG capsule, Take 5 mg by mouth At Night As Needed (sleep problem). (Patient taking differently: Take 5 mg by mouth At Night As Needed (sleep problem).), Disp: 30 each, Rfl: 0    Multiple Vitamins-Minerals (MULTIVITAMIN WITH MINERALS) tablet tablet, Take 1 tablet by mouth Daily. Indications: Nutritional Support, Disp: , Rfl:     omeprazole  (priLOSEC) 40 MG capsule, Take 1 capsule by mouth Daily. Indications: Excess Stomach Secretions, Gastroesophageal Reflux Disease, Disp: , Rfl:     vitamin D (ERGOCALCIFEROL) 1.25 MG (05803 UT) capsule capsule, Take 1 capsule by mouth 1 (One) Time Per Week. Indications: Vitamin D Deficiency, Disp: , Rfl:     guaiFENesin (MUCINEX) 600 MG 12 hr tablet, Take 1 tablet by mouth Every 12 (Twelve) Hours. (Patient not taking: Reported on 12/8/2024), Disp: , Rfl:     topiramate (TOPAMAX) 25 MG tablet, Take 1 tablet by mouth Daily. Indications: Migraine Headache, Disp: , Rfl:     The following portions of the patient's history were reviewed and updated as appropriate: allergies, current medications, past family history, past medical history, past social history, past surgical history, and problem list.      REVIEW OF PERTINENT MEDICAL DATA    Past Medical History:   Diagnosis Date    Arthritis     Tijerina esophagus     Chronic pain disorder     COVID-19     DJD (degenerative joint disease)     Extremity pain     Fall at home     Fall at home     fracture rt. leg    Foot pain, right     Fractures     GERD (gastroesophageal reflux disease)     Headache, tension-type     Hypertension     Joint pain     Leg pain, right     Low back pain     Migraine     Plantar fasciitis     Shingles     Shoulder pain, right     Sleep apnea     Spinal stenosis 9/4/2024    Epidural on 9/4     Past Surgical History:   Procedure Laterality Date    BACK SURGERY  2/2009    Decompression    CARPAL TUNNEL RELEASE      [place].    COLONOSCOPY      EPIDURAL BLOCK      EYE SURGERY      [place]    KNEE ARTHROSCOPY      SPINAL CORD STIMULATOR TRIAL W/ LAMINOTOMY      SPINE SURGERY      Kyphoplasty    TUBAL ABDOMINAL LIGATION      VERTEBROPLASTY Bilateral 10/11/2024    Procedure: VERTEBROPLASTY;  Surgeon: Kirk Nascimento DO;  Location: [facility] MAIN OR;  Service: Pain Management;  Laterality: Bilateral;    VERTEBROPLASTY Bilateral 12/3/2024    Procedure: KYPHOPLASTY  at L4;  Surgeon: Kirk Nascimento DO;  Location: [facility] MAIN OR;  Service: Pain Management;  Laterality: Bilateral;    WRIST SURGERY      tendon release     Family History   Problem Relation Age of Onset    Cancer Mother     Migraines Father     COPD Father     Migraines Sister     Migraines Brother      Social History     Socioeconomic History    Marital status:    Tobacco Use    Smoking status: Former     Current packs/day: 0.00     Types: Cigarettes     Quit date: 1966     Years since quittin.0    Smokeless tobacco: Never   Vaping Use    Vaping status: Never Used   Substance and Sexual Activity    Alcohol use: Never    Drug use: Never    Sexual activity: Not Currently         Review of Systems   Musculoskeletal:  Positive for arthralgias and back pain.         Vitals:    25 0950   BP: 144/88   Pulse: 94   Resp: 16   SpO2: 92%   Weight: 79.8 kg (176 lb)   PainSc:   7                                   Objective   Physical Exam  Musculoskeletal:         General: Tenderness present.        Back:         Legs:    Neurological:      Comments: Motor strength 5/5 b/l LE  Sensory intact b/l LE             Imaging Reviewed:  MRI lumbar spine without contrast-2024  L2-3-left paracentral disc extrusion, bilateral facet arthropathy with mild to moderate canal stenosis and moderate bilateral neuroforaminal narrowing  L3-4-disc protrusion, bilateral facet hypertrophy, severe canal stenosis and moderate bilateral neuroforaminal narrowing  L4-5-grade 1 anterolisthesis, disc bulge, moderate bilateral facet hypertrophy with ligamentum flavum thickening.  Severe central canal stenosis with moderate to severe bilateral neuroforaminal narrowing  J1-D2-lgdiocyin facet hypertrophy with disc osteophyte to the left, ligamentum flavum thickening.  Effacement of ventral thecal sac without significant central canal stenosis.  Severe left and moderate right neuroforaminal narrowing.  - Interval changes of  vertebroplasty at L3 with new mild superior endplate compression fracture of L4 and an adjacent endplate edema.    Right hip x-ray-11/19/2024  - Unremarkable right hip    Right hip x-ray-9/5/2024  - Mild degenerative changes of each hip.    MRI lumbar spine without contrast-8/14/2024  - L2-3-disc bulge with left paracentral extrusion extending inferiorly.  Facet arthropathy.  Mild to moderate central canal stenosis and moderate bilateral foraminal stenosis  L3-4-disc bulge with small protrusion, bilateral facet arthropathy with ligamentum flavum hypertrophy causing severe central canal stenosis and severe right and moderate left foraminal stenosis  H9-6-[id number] facet hypertrophy resulting in 5 mm of L4 anterolisthesis.  Severe central canal stenosis, moderate bilateral foraminal stenosis and bilateral L5 lateral recess stenosis most severe on the right  L5-S1-left greater than right facet arthropathy.  Moderate central canal stenosis.  Possible compression of left S1 nerve root prior to entering the lateral recess.  Moderate right and severe left foraminal stenosis.  - Redundancy of the cauda equina nerve roots due to spinal stenosis.  - L3 inferior endplate subacute compression fracture with mild bone marrow edema which is new prior to previous imaging- subacute fracture.    MRI thoracic spine-6/13/2022  Kyphoplasty at T7 and T10.  - Residual mild to moderate compression fracture deformities  - Mild posterior disc bulges at multiple thoracic levels.  No large extrusions.    MRI brain wo contrast: 8/15/2018  Chiari Malformation type 1 with cerebellar tonsils projecting 7 mm below level of foramen magnum  Chronic b/l maxillary and ethmoid sinusitis     Assessment:    1. Spinal stenosis of lumbar region with neurogenic claudication    2. Postlaminectomy syndrome    3. High risk medication use    4. S/P kyphoplasty            Plan:   1.    UDS consistent with patient's interview on 2/27/2024.   Narcotic contract  signed-5/10/2022.  Narcan sent-6/20/2022.  2. We discussed trying a course of formal physical therapy.  Physical therapy can help strengthen and stretch the muscles around the joints. Continue to be as active as possible. Start physical therapy as it will help generalized pain and follow up with HEP.  PT prescription sent to [facility] in [place] as requested by patient.  3.   Due to severe pain, continue Dilaudid 2 mg every 6 hours as needed-120 tablets sent on 2/21/25. Side effects discussed including but not limited to nausea, vomiting, constipation, lightheadedness, dizziness, drowsiness, or headache. This medication may increase serotonin and rarely cause a very serious condition called serotonin syndrome/ toxicity.   5. Continues Ibuprofen 800 mg TID PRN-sent for 6 months on 10/8/2024 patient informed of increased risk of heart attacks, stroke and kidney problems in addition to gastric ulcers with use of non-steroidal anti-inflammatory medications.  6.  Increase gabapentin to 600 mg TID. Side effects discussed with the patient including but not limited to somnolence, dizziness, ataxia, headache, fatigue, blurred vision, cold or flu-like symptoms,delusions, hoarseness, lack or loss of strength, lower back or side pain, swelling of the hands, feet, or lower legs trembling or shaking. Patient understands and agrees with the plan.  7.  Continues to have severe right hip pain.  Right hip x-ray does not look severe but she continues to have severe right groin pain that is not responsive to epidurals and other pain medications.  Discussed possibility of right hip intra-articular injection.  Benefits and risks were discussed with patient and we will schedule her for the hip injection.  8.  Unfortunately previous spine surgeons have found her to be a nonsurgical candidate due to her bone health.  Due to severe pain not responsive to pain medications and injections, I will have her see Dr. Cantrell for second opinion-  scheduled for laminectomy.      RTC after surgery.      Kirk Nascimento DO  Pain Management   [facility]           INSPECT REPORT    As part of the patient's treatment plan, I may be prescribing controlled substances. The patient has been made aware of appropriate use of such medications, including potential risk of somnolence, limited ability to drive and/or work safely, and the potential for dependence or overdose. It has also been made clear that these medications are for use by this patient only, without concomitant use of alcohol or other substances unless prescribed.     Patient has completed prescribing agreement detailing terms of continued prescribing of controlled substances, including monitoring INSPECT reports, urine drug screening, and pill counts if necessary. The patient is aware that inappropriate use will results in cessation of prescribing such medications.    INSPECT report has been reviewed and scanned into the patient's chart.

## 2025-01-28 ENCOUNTER — OFFICE VISIT (OUTPATIENT)
Dept: PAIN MEDICINE | Facility: CLINIC | Age: 75
End: 2025-01-28
Payer: MEDICARE

## 2025-01-28 VITALS
WEIGHT: 176 LBS | HEART RATE: 94 BPM | OXYGEN SATURATION: 92 % | RESPIRATION RATE: 16 BRPM | DIASTOLIC BLOOD PRESSURE: 88 MMHG | BODY MASS INDEX: 30.21 KG/M2 | SYSTOLIC BLOOD PRESSURE: 144 MMHG

## 2025-01-28 DIAGNOSIS — M25.551 RIGHT HIP PAIN: ICD-10-CM

## 2025-01-28 DIAGNOSIS — Z98.890 S/P KYPHOPLASTY: ICD-10-CM

## 2025-01-28 DIAGNOSIS — M48.062 SPINAL STENOSIS OF LUMBAR REGION WITH NEUROGENIC CLAUDICATION: Primary | ICD-10-CM

## 2025-01-28 DIAGNOSIS — Z79.899 HIGH RISK MEDICATION USE: ICD-10-CM

## 2025-01-28 DIAGNOSIS — M96.1 POSTLAMINECTOMY SYNDROME: ICD-10-CM

## 2025-01-28 PROCEDURE — 1160F RVW MEDS BY RX/DR IN RCRD: CPT | Performed by: STUDENT IN AN ORGANIZED HEALTH CARE EDUCATION/TRAINING PROGRAM

## 2025-01-28 PROCEDURE — 99214 OFFICE O/P EST MOD 30 MIN: CPT | Performed by: STUDENT IN AN ORGANIZED HEALTH CARE EDUCATION/TRAINING PROGRAM

## 2025-01-28 PROCEDURE — 1159F MED LIST DOCD IN RCRD: CPT | Performed by: STUDENT IN AN ORGANIZED HEALTH CARE EDUCATION/TRAINING PROGRAM

## 2025-01-28 PROCEDURE — 1125F AMNT PAIN NOTED PAIN PRSNT: CPT | Performed by: STUDENT IN AN ORGANIZED HEALTH CARE EDUCATION/TRAINING PROGRAM

## 2025-01-28 RX ORDER — HYDROMORPHONE HYDROCHLORIDE 2 MG/1
2 TABLET ORAL EVERY 6 HOURS PRN
Qty: 120 TABLET | Refills: 0 | Status: SHIPPED | OUTPATIENT
Start: 2025-02-21 | End: 2025-03-23

## 2025-01-28 RX ORDER — GABAPENTIN 600 MG/1
600 TABLET ORAL 3 TIMES DAILY
Qty: 90 TABLET | Refills: 1 | Status: SHIPPED | OUTPATIENT
Start: 2025-01-28

## 2025-02-18 ENCOUNTER — APPOINTMENT (OUTPATIENT)
Dept: MRI IMAGING | Facility: HOSPITAL | Age: 75
DRG: 871 | End: 2025-02-18
Payer: MEDICARE

## 2025-02-18 ENCOUNTER — HOSPITAL ENCOUNTER (INPATIENT)
Facility: HOSPITAL | Age: 75
LOS: 5 days | Discharge: HOME HEALTH | DRG: 871 | End: 2025-02-23
Attending: EMERGENCY MEDICINE | Admitting: STUDENT IN AN ORGANIZED HEALTH CARE EDUCATION/TRAINING PROGRAM
Payer: COMMERCIAL

## 2025-02-18 ENCOUNTER — APPOINTMENT (OUTPATIENT)
Dept: CT IMAGING | Facility: HOSPITAL | Age: 75
DRG: 871 | End: 2025-02-18
Payer: MEDICARE

## 2025-02-18 ENCOUNTER — APPOINTMENT (OUTPATIENT)
Dept: GENERAL RADIOLOGY | Facility: HOSPITAL | Age: 75
DRG: 871 | End: 2025-02-18
Payer: MEDICARE

## 2025-02-18 DIAGNOSIS — S32.040G COMPRESSION FRACTURE OF L4 LUMBAR VERTEBRA, WITH DELAYED HEALING, SUBSEQUENT ENCOUNTER: ICD-10-CM

## 2025-02-18 DIAGNOSIS — M54.14 THORACIC RADICULOPATHY: ICD-10-CM

## 2025-02-18 DIAGNOSIS — R53.1 GENERAL WEAKNESS: ICD-10-CM

## 2025-02-18 DIAGNOSIS — A41.9 SEPSIS, DUE TO UNSPECIFIED ORGANISM, UNSPECIFIED WHETHER ACUTE ORGAN DYSFUNCTION PRESENT: Primary | ICD-10-CM

## 2025-02-18 DIAGNOSIS — M48.062 SPINAL STENOSIS OF LUMBAR REGION WITH NEUROGENIC CLAUDICATION: ICD-10-CM

## 2025-02-18 LAB
ALBUMIN SERPL-MCNC: 3.4 G/DL (ref 3.5–5.2)
ALBUMIN/GLOB SERPL: 1.2 G/DL
ALP SERPL-CCNC: 51 U/L (ref 39–117)
ALT SERPL W P-5'-P-CCNC: 6 U/L (ref 1–33)
ANION GAP SERPL CALCULATED.3IONS-SCNC: 9.3 MMOL/L (ref 5–15)
AST SERPL-CCNC: 14 U/L (ref 1–32)
B PARAPERT DNA SPEC QL NAA+PROBE: NOT DETECTED
B PERT DNA SPEC QL NAA+PROBE: NOT DETECTED
BASOPHILS # BLD AUTO: 0.05 10*3/MM3 (ref 0–0.2)
BASOPHILS NFR BLD AUTO: 0.3 % (ref 0–1.5)
BILIRUB SERPL-MCNC: 0.4 MG/DL (ref 0–1.2)
BILIRUB UR QL STRIP: NEGATIVE
BUN SERPL-MCNC: 15 MG/DL (ref 8–23)
BUN/CREAT SERPL: 13.8 (ref 7–25)
C PNEUM DNA NPH QL NAA+NON-PROBE: NOT DETECTED
CALCIUM SPEC-SCNC: 8.5 MG/DL (ref 8.6–10.5)
CHLORIDE SERPL-SCNC: 105 MMOL/L (ref 98–107)
CLARITY UR: CLEAR
CO2 SERPL-SCNC: 25.7 MMOL/L (ref 22–29)
COLOR UR: YELLOW
CREAT SERPL-MCNC: 1.09 MG/DL (ref 0.57–1)
D-LACTATE SERPL-SCNC: 0.5 MMOL/L (ref 0.3–2)
DEPRECATED RDW RBC AUTO: 43.6 FL (ref 37–54)
EGFRCR SERPLBLD CKD-EPI 2021: 53.1 ML/MIN/1.73
EOSINOPHIL # BLD AUTO: 0.12 10*3/MM3 (ref 0–0.4)
EOSINOPHIL NFR BLD AUTO: 0.7 % (ref 0.3–6.2)
ERYTHROCYTE [DISTWIDTH] IN BLOOD BY AUTOMATED COUNT: 12.6 % (ref 12.3–15.4)
FLUAV SUBTYP SPEC NAA+PROBE: NOT DETECTED
FLUBV RNA ISLT QL NAA+PROBE: NOT DETECTED
GLOBULIN UR ELPH-MCNC: 2.9 GM/DL
GLUCOSE SERPL-MCNC: 113 MG/DL (ref 65–99)
GLUCOSE UR STRIP-MCNC: NEGATIVE MG/DL
HADV DNA SPEC NAA+PROBE: NOT DETECTED
HCOV 229E RNA SPEC QL NAA+PROBE: NOT DETECTED
HCOV HKU1 RNA SPEC QL NAA+PROBE: NOT DETECTED
HCOV NL63 RNA SPEC QL NAA+PROBE: NOT DETECTED
HCOV OC43 RNA SPEC QL NAA+PROBE: NOT DETECTED
HCT VFR BLD AUTO: 34.1 % (ref 34–46.6)
HGB BLD-MCNC: 10.8 G/DL (ref 12–15.9)
HGB UR QL STRIP.AUTO: NEGATIVE
HMPV RNA NPH QL NAA+NON-PROBE: NOT DETECTED
HOLD SPECIMEN: NORMAL
HOLD SPECIMEN: NORMAL
HPIV1 RNA ISLT QL NAA+PROBE: NOT DETECTED
HPIV2 RNA SPEC QL NAA+PROBE: NOT DETECTED
HPIV3 RNA NPH QL NAA+PROBE: NOT DETECTED
HPIV4 P GENE NPH QL NAA+PROBE: NOT DETECTED
IMM GRANULOCYTES # BLD AUTO: 0.09 10*3/MM3 (ref 0–0.05)
IMM GRANULOCYTES NFR BLD AUTO: 0.5 % (ref 0–0.5)
KETONES UR QL STRIP: NEGATIVE
LEUKOCYTE ESTERASE UR QL STRIP.AUTO: NEGATIVE
LYMPHOCYTES # BLD AUTO: 0.8 10*3/MM3 (ref 0.7–3.1)
LYMPHOCYTES NFR BLD AUTO: 4.8 % (ref 19.6–45.3)
M PNEUMO IGG SER IA-ACNC: NOT DETECTED
MCH RBC QN AUTO: 30.1 PG (ref 26.6–33)
MCHC RBC AUTO-ENTMCNC: 31.7 G/DL (ref 31.5–35.7)
MCV RBC AUTO: 95 FL (ref 79–97)
MONOCYTES # BLD AUTO: 1.04 10*3/MM3 (ref 0.1–0.9)
MONOCYTES NFR BLD AUTO: 6.3 % (ref 5–12)
MRSA DNA SPEC QL NAA+PROBE: NORMAL
NEUTROPHILS NFR BLD AUTO: 14.48 10*3/MM3 (ref 1.7–7)
NEUTROPHILS NFR BLD AUTO: 87.4 % (ref 42.7–76)
NITRITE UR QL STRIP: NEGATIVE
NRBC BLD AUTO-RTO: 0 /100 WBC (ref 0–0.2)
PH UR STRIP.AUTO: 7.5 [PH] (ref 5–8)
PLATELET # BLD AUTO: 281 10*3/MM3 (ref 140–450)
PMV BLD AUTO: 8.4 FL (ref 6–12)
POTASSIUM SERPL-SCNC: 4.3 MMOL/L (ref 3.5–5.2)
PROT SERPL-MCNC: 6.3 G/DL (ref 6–8.5)
PROT UR QL STRIP: NEGATIVE
RBC # BLD AUTO: 3.59 10*6/MM3 (ref 3.77–5.28)
RHINOVIRUS RNA SPEC NAA+PROBE: NOT DETECTED
RSV RNA NPH QL NAA+NON-PROBE: NOT DETECTED
SARS-COV-2 RNA RESP QL NAA+PROBE: NOT DETECTED
SODIUM SERPL-SCNC: 140 MMOL/L (ref 136–145)
SP GR UR STRIP: 1.02 (ref 1–1.03)
UROBILINOGEN UR QL STRIP: NORMAL
WBC NRBC COR # BLD AUTO: 16.58 10*3/MM3 (ref 3.4–10.8)
WHOLE BLOOD HOLD COAG: NORMAL

## 2025-02-18 PROCEDURE — 85025 COMPLETE CBC W/AUTO DIFF WBC: CPT | Performed by: EMERGENCY MEDICINE

## 2025-02-18 PROCEDURE — 25010000002 CEFEPIME PER 500 MG: Performed by: EMERGENCY MEDICINE

## 2025-02-18 PROCEDURE — 99285 EMERGENCY DEPT VISIT HI MDM: CPT

## 2025-02-18 PROCEDURE — 72148 MRI LUMBAR SPINE W/O DYE: CPT

## 2025-02-18 PROCEDURE — 25810000003 SODIUM CHLORIDE 0.9 % SOLUTION: Performed by: EMERGENCY MEDICINE

## 2025-02-18 PROCEDURE — 87040 BLOOD CULTURE FOR BACTERIA: CPT | Performed by: EMERGENCY MEDICINE

## 2025-02-18 PROCEDURE — P9612 CATHETERIZE FOR URINE SPEC: HCPCS

## 2025-02-18 PROCEDURE — 36415 COLL VENOUS BLD VENIPUNCTURE: CPT

## 2025-02-18 PROCEDURE — 83605 ASSAY OF LACTIC ACID: CPT

## 2025-02-18 PROCEDURE — 81003 URINALYSIS AUTO W/O SCOPE: CPT | Performed by: EMERGENCY MEDICINE

## 2025-02-18 PROCEDURE — 70450 CT HEAD/BRAIN W/O DYE: CPT

## 2025-02-18 PROCEDURE — 99291 CRITICAL CARE FIRST HOUR: CPT

## 2025-02-18 PROCEDURE — 25010000002 VANCOMYCIN 1.5-0.9 GM/500ML-% SOLUTION: Performed by: STUDENT IN AN ORGANIZED HEALTH CARE EDUCATION/TRAINING PROGRAM

## 2025-02-18 PROCEDURE — 71045 X-RAY EXAM CHEST 1 VIEW: CPT

## 2025-02-18 PROCEDURE — 93005 ELECTROCARDIOGRAM TRACING: CPT | Performed by: EMERGENCY MEDICINE

## 2025-02-18 PROCEDURE — 0202U NFCT DS 22 TRGT SARS-COV-2: CPT | Performed by: NURSE PRACTITIONER

## 2025-02-18 PROCEDURE — 87641 MR-STAPH DNA AMP PROBE: CPT | Performed by: STUDENT IN AN ORGANIZED HEALTH CARE EDUCATION/TRAINING PROGRAM

## 2025-02-18 PROCEDURE — 25010000002 HEPARIN (PORCINE) PER 1000 UNITS: Performed by: STUDENT IN AN ORGANIZED HEALTH CARE EDUCATION/TRAINING PROGRAM

## 2025-02-18 PROCEDURE — 87449 NOS EACH ORGANISM AG IA: CPT | Performed by: NURSE PRACTITIONER

## 2025-02-18 PROCEDURE — 80053 COMPREHEN METABOLIC PANEL: CPT | Performed by: EMERGENCY MEDICINE

## 2025-02-18 PROCEDURE — 93005 ELECTROCARDIOGRAM TRACING: CPT

## 2025-02-18 RX ORDER — AMOXICILLIN 250 MG
2 CAPSULE ORAL 2 TIMES DAILY PRN
Status: DISCONTINUED | OUTPATIENT
Start: 2025-02-18 | End: 2025-02-23 | Stop reason: HOSPADM

## 2025-02-18 RX ORDER — HEPARIN SODIUM 5000 [USP'U]/ML
5000 INJECTION, SOLUTION INTRAVENOUS; SUBCUTANEOUS EVERY 8 HOURS SCHEDULED
Status: DISCONTINUED | OUTPATIENT
Start: 2025-02-18 | End: 2025-02-19

## 2025-02-18 RX ORDER — ACETAMINOPHEN 650 MG/1
650 SUPPOSITORY RECTAL ONCE
Status: COMPLETED | OUTPATIENT
Start: 2025-02-18 | End: 2025-02-18

## 2025-02-18 RX ORDER — ACETAMINOPHEN 650 MG/1
650 SUPPOSITORY RECTAL EVERY 4 HOURS PRN
Status: DISCONTINUED | OUTPATIENT
Start: 2025-02-18 | End: 2025-02-23 | Stop reason: HOSPADM

## 2025-02-18 RX ORDER — SODIUM CHLORIDE 0.9 % (FLUSH) 0.9 %
10 SYRINGE (ML) INJECTION AS NEEDED
Status: DISCONTINUED | OUTPATIENT
Start: 2025-02-18 | End: 2025-02-23 | Stop reason: HOSPADM

## 2025-02-18 RX ORDER — BISACODYL 10 MG
10 SUPPOSITORY, RECTAL RECTAL DAILY PRN
Status: DISCONTINUED | OUTPATIENT
Start: 2025-02-18 | End: 2025-02-23 | Stop reason: HOSPADM

## 2025-02-18 RX ORDER — SODIUM CHLORIDE 9 MG/ML
40 INJECTION, SOLUTION INTRAVENOUS AS NEEDED
Status: DISCONTINUED | OUTPATIENT
Start: 2025-02-18 | End: 2025-02-23 | Stop reason: HOSPADM

## 2025-02-18 RX ORDER — SODIUM CHLORIDE 0.9 % (FLUSH) 0.9 %
10 SYRINGE (ML) INJECTION EVERY 12 HOURS SCHEDULED
Status: DISCONTINUED | OUTPATIENT
Start: 2025-02-18 | End: 2025-02-23 | Stop reason: HOSPADM

## 2025-02-18 RX ORDER — ACETAMINOPHEN 160 MG/5ML
650 SOLUTION ORAL EVERY 4 HOURS PRN
Status: DISCONTINUED | OUTPATIENT
Start: 2025-02-18 | End: 2025-02-23 | Stop reason: HOSPADM

## 2025-02-18 RX ORDER — VANCOMYCIN/0.9 % SOD CHLORIDE 1.5G/250ML
1500 PLASTIC BAG, INJECTION (ML) INTRAVENOUS ONCE
Status: COMPLETED | OUTPATIENT
Start: 2025-02-18 | End: 2025-02-18

## 2025-02-18 RX ORDER — BISACODYL 5 MG/1
5 TABLET, DELAYED RELEASE ORAL DAILY PRN
Status: DISCONTINUED | OUTPATIENT
Start: 2025-02-18 | End: 2025-02-23 | Stop reason: HOSPADM

## 2025-02-18 RX ORDER — POLYETHYLENE GLYCOL 3350 17 G/17G
17 POWDER, FOR SOLUTION ORAL DAILY PRN
Status: DISCONTINUED | OUTPATIENT
Start: 2025-02-18 | End: 2025-02-23 | Stop reason: HOSPADM

## 2025-02-18 RX ORDER — ACETAMINOPHEN 325 MG/1
650 TABLET ORAL EVERY 4 HOURS PRN
Status: DISCONTINUED | OUTPATIENT
Start: 2025-02-18 | End: 2025-02-23 | Stop reason: HOSPADM

## 2025-02-18 RX ADMIN — SODIUM CHLORIDE 1000 ML: 9 INJECTION, SOLUTION INTRAVENOUS at 21:33

## 2025-02-18 RX ADMIN — ACETAMINOPHEN 650 MG: 650 SUPPOSITORY RECTAL at 15:41

## 2025-02-18 RX ADMIN — CEFEPIME 2000 MG: 2 INJECTION, POWDER, FOR SOLUTION INTRAVENOUS at 19:05

## 2025-02-18 RX ADMIN — HEPARIN SODIUM 5000 UNITS: 5000 INJECTION INTRAVENOUS; SUBCUTANEOUS at 21:34

## 2025-02-18 RX ADMIN — Medication 1500 MG: at 21:33

## 2025-02-18 NOTE — ED PROVIDER NOTES
Subjective   History of Present Illness  Chief complaint: Altered mental status    74-year-old female presents with altered mental status.  Patient had a lumbar fusion about 8 days ago.  Symptoms started this morning.  Patient has been somewhat confused and has had generalized weakness.  She reports some shortness of breath and cough.  She has also developed a fever.  She has had no vomiting or diarrhea.  No focal numbness, weakness, tingling.    History provided by:  Patient      Review of Systems   Constitutional:  Positive for fever.   HENT:  Negative for congestion.    Respiratory:  Positive for cough and shortness of breath.    Cardiovascular:  Negative for chest pain.   Gastrointestinal:  Negative for abdominal pain, diarrhea and vomiting.   Neurological:  Negative for weakness, numbness and headaches.   Psychiatric/Behavioral:  Positive for confusion.        Past Medical History:   Diagnosis Date    Arthritis     [place] esophagus     Chronic pain disorder     COVID-19     DJD (degenerative joint disease)     Extremity pain     Fall at home     Fall at home     fracture rt. leg    Foot pain, right     Fractures     GERD (gastroesophageal reflux disease)     Headache, tension-type     Hypertension     Joint pain     Leg pain, right     Low back pain     Migraine     Plantar fasciitis     Shingles     Shoulder pain, right     Sleep apnea     Spinal stenosis 9/4/2024    Epidural on 9/4       Allergies   Allergen Reactions    Celebrex [Celecoxib] Swelling       Past Surgical History:   Procedure Laterality Date    BACK SURGERY  2/2009    Decompression    CARPAL TUNNEL RELEASE      colin.    COLONOSCOPY      EPIDURAL BLOCK      EYE SURGERY      catarac    KNEE ARTHROSCOPY      SPINAL CORD STIMULATOR TRIAL W/ LAMINOTOMY      SPINE SURGERY      Kyphoplasty    TUBAL ABDOMINAL LIGATION      VERTEBROPLASTY Bilateral 10/11/2024    Procedure: VERTEBROPLASTY;  Surgeon: Kirk Nascimento DO;  Location: [facility] MAIN OR;  Service:  Pain Management;  Laterality: Bilateral;    VERTEBROPLASTY Bilateral 12/3/2024    Procedure: KYPHOPLASTY at L4;  Surgeon: Kirk Nascimento DO;  Location: [facility] MAIN OR;  Service: Pain Management;  Laterality: Bilateral;    WRIST SURGERY      tendon release       Family History   Problem Relation Age of Onset    Cancer Mother     Migraines Father     COPD Father     Migraines Sister     Migraines Brother        Social History     Socioeconomic History    Marital status:    Tobacco Use    Smoking status: Former     Current packs/day: 0.00     Types: Cigarettes     Quit date: 1966     Years since quittin.1    Smokeless tobacco: Never   Vaping Use    Vaping status: Never Used   Substance and Sexual Activity    Alcohol use: Never    Drug use: Never    Sexual activity: Not Currently       /52   Pulse 87   Temp 98.8 °F (37.1 °C)   Resp 20   SpO2 95%       Objective   Physical Exam  Vitals and nursing note reviewed.   Constitutional:       Appearance: She is well-developed.   HENT:      Head: Normocephalic and atraumatic.      Mouth/Throat:      Mouth: Mucous membranes are moist.   Eyes:      Pupils: Pupils are equal, round, and reactive to light.   Cardiovascular:      Rate and Rhythm: Normal rate and regular rhythm.      Heart sounds: Normal heart sounds.   Pulmonary:      Effort: Pulmonary effort is normal. No respiratory distress.      Breath sounds: Normal breath sounds.   Abdominal:      General: Bowel sounds are normal.      Palpations: Abdomen is soft.      Tenderness: There is no abdominal tenderness.   Musculoskeletal:      Comments: Surgical incision to the lumbar spine is clean dry and intact.  No discharge.  No surrounding erythema or warmth.   Skin:     General: Skin is warm and dry.   Neurological:      Mental Status: She is alert.      Comments: Patient is oriented x 3 however she is somewhat confused in conversation.  No focal motor or sensory deficit appreciated.          Procedures           ED Course      My interpretation of EKG shows sinus rhythm, rate of 98, no ST elevation                                     Results for orders placed or performed during the hospital encounter of 02/18/25   ECG 12 Lead Altered Mental Status    Collection Time: 02/18/25  3:37 PM   Result Value Ref Range    QT Interval 331 ms    QTC Interval 422 ms   Urinalysis With Culture If Indicated - Straight Cath    Collection Time: 02/18/25  4:30 PM    Specimen: Straight Cath; Urine   Result Value Ref Range    Color, UA Yellow Yellow, Straw    Appearance, UA Clear Clear    pH, UA 7.5 5.0 - 8.0    Specific Gravity, UA 1.019 1.005 - 1.030    Glucose, UA Negative Negative    Ketones, UA Negative Negative    Bilirubin, UA Negative Negative    Blood, UA Negative Negative    Protein, UA Negative Negative    Leuk Esterase, UA Negative Negative    Nitrite, UA Negative Negative    Urobilinogen, UA 1.0 E.U./dL 0.2 - 1.0 E.U./dL   Respiratory Panel PCR w/COVID-19(SARS-CoV-2) PADMINI/LUIS FELIPE/SHANNON/PAD/COR/SILVINO In-House, NP Swab in [place], 2 HR TAT - Swab, Nasopharynx    Collection Time: 02/18/25  4:31 PM    Specimen: Nasopharynx; Swab   Result Value Ref Range    ADENOVIRUS, PCR Not Detected Not Detected    Coronavirus 229E Not Detected Not Detected    Coronavirus HKU1 Not Detected Not Detected    Coronavirus NL63 Not Detected Not Detected    Coronavirus OC43 Not Detected Not Detected    COVID19 Not Detected Not Detected - Ref. Range    Human Metapneumovirus Not Detected Not Detected    Human Rhinovirus/Enterovirus Not Detected Not Detected    Influenza A PCR Not Detected Not Detected    Influenza B PCR Not Detected Not Detected    Parainfluenza Virus 1 Not Detected Not Detected    Parainfluenza Virus 2 Not Detected Not Detected    Parainfluenza Virus 3 Not Detected Not Detected    Parainfluenza Virus 4 Not Detected Not Detected    RSV, PCR Not Detected Not Detected    Bordetella pertussis pcr Not Detected Not Detected     Bordetella parapertussis PCR Not Detected Not Detected    Chlamydophila pneumoniae PCR Not Detected Not Detected    Mycoplasma pneumo by PCR Not Detected Not Detected   Comprehensive Metabolic Panel    Collection Time: 02/18/25  4:57 PM    Specimen: Blood   Result Value Ref Range    Glucose 113 (H) 65 - 99 mg/dL    BUN 15 8 - 23 mg/dL    Creatinine 1.09 (H) 0.57 - 1.00 mg/dL    Sodium 140 136 - 145 mmol/L    Potassium 4.3 3.5 - 5.2 mmol/L    Chloride 105 98 - 107 mmol/L    CO2 25.7 22.0 - 29.0 mmol/L    Calcium 8.5 (L) 8.6 - 10.5 mg/dL    Total Protein 6.3 6.0 - 8.5 g/dL    Albumin 3.4 (L) 3.5 - 5.2 g/dL    ALT (SGPT) 6 1 - 33 U/L    AST (SGOT) 14 1 - 32 U/L    Alkaline Phosphatase 51 39 - 117 U/L    Total Bilirubin 0.4 0.0 - 1.2 mg/dL    Globulin 2.9 gm/dL    A/G Ratio 1.2 g/dL    BUN/Creatinine Ratio 13.8 7.0 - 25.0    Anion Gap 9.3 5.0 - 15.0 mmol/L    eGFR 53.1 (L) >60.0 mL/min/1.73   CBC Auto Differential    Collection Time: 02/18/25  4:57 PM    Specimen: Blood   Result Value Ref Range    WBC 16.58 (H) 3.40 - 10.80 10*3/mm3    RBC 3.59 (L) 3.77 - 5.28 10*6/mm3    Hemoglobin 10.8 (L) 12.0 - 15.9 g/dL    Hematocrit 34.1 34.0 - 46.6 %    MCV 95.0 79.0 - 97.0 fL    MCH 30.1 26.6 - 33.0 pg    MCHC 31.7 31.5 - 35.7 g/dL    RDW 12.6 12.3 - 15.4 %    RDW-SD 43.6 37.0 - 54.0 fl    MPV 8.4 6.0 - 12.0 fL    Platelets 281 140 - 450 10*3/mm3    Neutrophil % 87.4 (H) 42.7 - 76.0 %    Lymphocyte % 4.8 (L) 19.6 - 45.3 %    Monocyte % 6.3 5.0 - 12.0 %    Eosinophil % 0.7 0.3 - 6.2 %    Basophil % 0.3 0.0 - 1.5 %    Immature Grans % 0.5 0.0 - 0.5 %    Neutrophils, Absolute 14.48 (H) 1.70 - 7.00 10*3/mm3    Lymphocytes, Absolute 0.80 0.70 - 3.10 10*3/mm3    Monocytes, Absolute 1.04 (H) 0.10 - 0.90 10*3/mm3    Eosinophils, Absolute 0.12 0.00 - 0.40 10*3/mm3    Basophils, Absolute 0.05 0.00 - 0.20 10*3/mm3    Immature Grans, Absolute 0.09 (H) 0.00 - 0.05 10*3/mm3    nRBC 0.0 0.0 - 0.2 /100 WBC   Green Top (Gel)    Collection Time:  02/18/25  4:57 PM   Result Value Ref Range    Extra Tube Hold for add-ons.    Gold Top - SST    Collection Time: 02/18/25  4:57 PM   Result Value Ref Range    Extra Tube Hold for add-ons.    Light Blue Top    Collection Time: 02/18/25  4:57 PM   Result Value Ref Range    Extra Tube Hold for add-ons.    POC Lactate    Collection Time: 02/18/25  5:00 PM    Specimen: Blood   Result Value Ref Range    Lactate 0.5 0.3 - 2.0 mmol/L     CT Head Without Contrast    Result Date: 2/18/2025  Impression: No acute intracranial pathology. Electronically Signed: Yaya Allen MD  2/18/2025 7:02 PM EST  Workstation ID: [id number]    XR Chest 1 View    Result Date: 2/18/2025  Impression: No active disease. Electronically Signed: Colton Costello MD  2/18/2025 4:20 PM EST  Workstation ID: [id number]                 Medical Decision Making  Problems Addressed:  Sepsis, due to unspecified organism, unspecified whether acute organ dysfunction present: complicated acute illness or injury    Amount and/or Complexity of Data Reviewed  Labs: ordered.  Radiology: ordered.  ECG/medicine tests: ordered.    Risk  OTC drugs.  Decision regarding hospitalization.      Patient had the above evaluation.  Results were discussed with the patient.  CT head shows no acute intracranial abnormality.  My interpretation of chest x-ray shows no infiltrate or effusion.  White blood cell count is elevated at 16.58.  Lactic acid was normal.  Blood cultures were obtained.  Patient was febrile up to 102.8 on arrival.  She was given rectal Tylenol.  Patient was started on IV antibiotics.  CMP is unremarkable.  Respiratory panel is negative.  Urinalysis shows no UTI.  No obvious source of infection identified at this point.  Patient has MRI of the lumbar spine ordered to rule out this as a source of infection.  Her incision looks okay.  I discussed with the hospitalist and the patient will be admitted for further evaluation and management.      Final diagnoses:   Sepsis,  due to unspecified organism, unspecified whether acute organ dysfunction present   General weakness       ED Disposition  ED Disposition       ED Disposition   Decision to Admit    Condition   --    Comment   Level of Care: Telemetry [5]   Admitting Physician: LLANES ALVAREZ, CARLOS [[id number]]   Attending Physician: LLANES ALVAREZ, CARLOS [[id number]]                 No follow-up provider specified.       Medication List      No changes were made to your prescriptions during this visit.            Cliff Aldridge MD  02/18/25 1934

## 2025-02-18 NOTE — Clinical Note
Level of Care: Telemetry [5]   Admitting Physician: LLANES ALVAREZ, CARLOS [[id number]]   Attending Physician: LLANES ALVAREZ, CARLOS [[id number]]

## 2025-02-19 ENCOUNTER — APPOINTMENT (OUTPATIENT)
Dept: CT IMAGING | Facility: HOSPITAL | Age: 75
DRG: 871 | End: 2025-02-19
Payer: MEDICARE

## 2025-02-19 LAB
ANION GAP SERPL CALCULATED.3IONS-SCNC: 7.8 MMOL/L (ref 5–15)
APTT PPP: 134.2 SECONDS (ref 22.7–35.4)
APTT PPP: 35.1 SECONDS (ref 22.7–35.4)
APTT PPP: 35.3 SECONDS (ref 22.7–35.4)
BASOPHILS # BLD AUTO: 0.08 10*3/MM3 (ref 0–0.2)
BASOPHILS NFR BLD AUTO: 0.4 % (ref 0–1.5)
BUN SERPL-MCNC: 14 MG/DL (ref 8–23)
BUN/CREAT SERPL: 17.7 (ref 7–25)
CALCIUM SPEC-SCNC: 8.4 MG/DL (ref 8.6–10.5)
CHLORIDE SERPL-SCNC: 107 MMOL/L (ref 98–107)
CO2 SERPL-SCNC: 24.2 MMOL/L (ref 22–29)
CREAT SERPL-MCNC: 0.79 MG/DL (ref 0.57–1)
DEPRECATED RDW RBC AUTO: 45.4 FL (ref 37–54)
DEPRECATED RDW RBC AUTO: 45.7 FL (ref 37–54)
EGFRCR SERPLBLD CKD-EPI 2021: 78.1 ML/MIN/1.73
EOSINOPHIL # BLD AUTO: 0.12 10*3/MM3 (ref 0–0.4)
EOSINOPHIL NFR BLD AUTO: 0.6 % (ref 0.3–6.2)
ERYTHROCYTE [DISTWIDTH] IN BLOOD BY AUTOMATED COUNT: 12.8 % (ref 12.3–15.4)
ERYTHROCYTE [DISTWIDTH] IN BLOOD BY AUTOMATED COUNT: 12.9 % (ref 12.3–15.4)
GLUCOSE SERPL-MCNC: 96 MG/DL (ref 65–99)
HCT VFR BLD AUTO: 33 % (ref 34–46.6)
HCT VFR BLD AUTO: 35.9 % (ref 34–46.6)
HGB BLD-MCNC: 10.2 G/DL (ref 12–15.9)
HGB BLD-MCNC: 10.9 G/DL (ref 12–15.9)
IMM GRANULOCYTES # BLD AUTO: 0.09 10*3/MM3 (ref 0–0.05)
IMM GRANULOCYTES NFR BLD AUTO: 0.4 % (ref 0–0.5)
INR PPP: 1.35 (ref 0.9–1.1)
L PNEUMO1 AG UR QL IA: NEGATIVE
LYMPHOCYTES # BLD AUTO: 1.72 10*3/MM3 (ref 0.7–3.1)
LYMPHOCYTES NFR BLD AUTO: 8.5 % (ref 19.6–45.3)
MCH RBC QN AUTO: 29.5 PG (ref 26.6–33)
MCH RBC QN AUTO: 30.1 PG (ref 26.6–33)
MCHC RBC AUTO-ENTMCNC: 30.4 G/DL (ref 31.5–35.7)
MCHC RBC AUTO-ENTMCNC: 30.9 G/DL (ref 31.5–35.7)
MCV RBC AUTO: 97.3 FL (ref 79–97)
MCV RBC AUTO: 97.3 FL (ref 79–97)
MONOCYTES # BLD AUTO: 1.22 10*3/MM3 (ref 0.1–0.9)
MONOCYTES NFR BLD AUTO: 6.1 % (ref 5–12)
NEUTROPHILS NFR BLD AUTO: 16.9 10*3/MM3 (ref 1.7–7)
NEUTROPHILS NFR BLD AUTO: 84 % (ref 42.7–76)
NRBC BLD AUTO-RTO: 0 /100 WBC (ref 0–0.2)
PLATELET # BLD AUTO: 264 10*3/MM3 (ref 140–450)
PLATELET # BLD AUTO: 270 10*3/MM3 (ref 140–450)
PMV BLD AUTO: 8.5 FL (ref 6–12)
PMV BLD AUTO: 8.6 FL (ref 6–12)
POTASSIUM SERPL-SCNC: 4.5 MMOL/L (ref 3.5–5.2)
PROTHROMBIN TIME: 16.6 SECONDS (ref 11.7–14.2)
QT INTERVAL: 331 MS
QTC INTERVAL: 422 MS
RBC # BLD AUTO: 3.39 10*6/MM3 (ref 3.77–5.28)
RBC # BLD AUTO: 3.69 10*6/MM3 (ref 3.77–5.28)
S PNEUM AG SPEC QL LA: NEGATIVE
SODIUM SERPL-SCNC: 139 MMOL/L (ref 136–145)
WBC NRBC COR # BLD AUTO: 12.7 10*3/MM3 (ref 3.4–10.8)
WBC NRBC COR # BLD AUTO: 20.13 10*3/MM3 (ref 3.4–10.8)

## 2025-02-19 PROCEDURE — 25510000001 IOPAMIDOL PER 1 ML: Performed by: STUDENT IN AN ORGANIZED HEALTH CARE EDUCATION/TRAINING PROGRAM

## 2025-02-19 PROCEDURE — 85027 COMPLETE CBC AUTOMATED: CPT | Performed by: INTERNAL MEDICINE

## 2025-02-19 PROCEDURE — 85025 COMPLETE CBC W/AUTO DIFF WBC: CPT | Performed by: STUDENT IN AN ORGANIZED HEALTH CARE EDUCATION/TRAINING PROGRAM

## 2025-02-19 PROCEDURE — 25010000002 HEPARIN (PORCINE) 25000-0.45 UT/250ML-% SOLUTION: Performed by: STUDENT IN AN ORGANIZED HEALTH CARE EDUCATION/TRAINING PROGRAM

## 2025-02-19 PROCEDURE — 71275 CT ANGIOGRAPHY CHEST: CPT

## 2025-02-19 PROCEDURE — 85730 THROMBOPLASTIN TIME PARTIAL: CPT | Performed by: INTERNAL MEDICINE

## 2025-02-19 PROCEDURE — 80048 BASIC METABOLIC PNL TOTAL CA: CPT | Performed by: INTERNAL MEDICINE

## 2025-02-19 PROCEDURE — 85610 PROTHROMBIN TIME: CPT | Performed by: STUDENT IN AN ORGANIZED HEALTH CARE EDUCATION/TRAINING PROGRAM

## 2025-02-19 PROCEDURE — 25010000002 MORPHINE PER 10 MG: Performed by: STUDENT IN AN ORGANIZED HEALTH CARE EDUCATION/TRAINING PROGRAM

## 2025-02-19 PROCEDURE — 85730 THROMBOPLASTIN TIME PARTIAL: CPT | Performed by: STUDENT IN AN ORGANIZED HEALTH CARE EDUCATION/TRAINING PROGRAM

## 2025-02-19 PROCEDURE — 25010000002 CEFEPIME PER 500 MG: Performed by: STUDENT IN AN ORGANIZED HEALTH CARE EDUCATION/TRAINING PROGRAM

## 2025-02-19 PROCEDURE — 25010000002 PIPERACILLIN SOD-TAZOBACTAM PER 1 G: Performed by: NURSE PRACTITIONER

## 2025-02-19 RX ORDER — GABAPENTIN 300 MG/1
600 CAPSULE ORAL EVERY 8 HOURS SCHEDULED
Status: DISCONTINUED | OUTPATIENT
Start: 2025-02-19 | End: 2025-02-23 | Stop reason: HOSPADM

## 2025-02-19 RX ORDER — SODIUM CHLORIDE, SODIUM LACTATE, POTASSIUM CHLORIDE, CALCIUM CHLORIDE 600; 310; 30; 20 MG/100ML; MG/100ML; MG/100ML; MG/100ML
9 INJECTION, SOLUTION INTRAVENOUS CONTINUOUS PRN
OUTPATIENT
Start: 2025-02-19 | End: 2025-02-20

## 2025-02-19 RX ORDER — ALBUTEROL SULFATE 90 UG/1
2 INHALANT RESPIRATORY (INHALATION) EVERY 4 HOURS PRN
Status: DISCONTINUED | OUTPATIENT
Start: 2025-02-19 | End: 2025-02-19

## 2025-02-19 RX ORDER — PANTOPRAZOLE SODIUM 40 MG/1
40 TABLET, DELAYED RELEASE ORAL
Status: DISCONTINUED | OUTPATIENT
Start: 2025-02-19 | End: 2025-02-23 | Stop reason: HOSPADM

## 2025-02-19 RX ORDER — OXYCODONE HCL 10 MG/1
10 TABLET, FILM COATED, EXTENDED RELEASE ORAL ONCE
OUTPATIENT
Start: 2025-02-20

## 2025-02-19 RX ORDER — ALBUTEROL SULFATE 0.83 MG/ML
2.5 SOLUTION RESPIRATORY (INHALATION) EVERY 4 HOURS PRN
Status: DISCONTINUED | OUTPATIENT
Start: 2025-02-19 | End: 2025-02-23 | Stop reason: HOSPADM

## 2025-02-19 RX ORDER — OXYCODONE HYDROCHLORIDE 5 MG/1
5 TABLET ORAL EVERY 4 HOURS PRN
Status: DISCONTINUED | OUTPATIENT
Start: 2025-02-19 | End: 2025-02-23 | Stop reason: HOSPADM

## 2025-02-19 RX ORDER — TOPIRAMATE 25 MG/1
25 TABLET, FILM COATED ORAL DAILY
Status: DISCONTINUED | OUTPATIENT
Start: 2025-02-19 | End: 2025-02-23 | Stop reason: HOSPADM

## 2025-02-19 RX ORDER — IOPAMIDOL 755 MG/ML
100 INJECTION, SOLUTION INTRAVASCULAR
Status: COMPLETED | OUTPATIENT
Start: 2025-02-19 | End: 2025-02-19

## 2025-02-19 RX ORDER — HEPARIN SODIUM 10000 [USP'U]/100ML
18 INJECTION, SOLUTION INTRAVENOUS
Status: DISCONTINUED | OUTPATIENT
Start: 2025-02-19 | End: 2025-02-20

## 2025-02-19 RX ORDER — ACETAMINOPHEN 500 MG
1000 TABLET ORAL ONCE
OUTPATIENT
Start: 2025-02-20

## 2025-02-19 RX ORDER — PANTOPRAZOLE SODIUM 40 MG/1
40 TABLET, DELAYED RELEASE ORAL
Status: DISCONTINUED | OUTPATIENT
Start: 2025-02-20 | End: 2025-02-19

## 2025-02-19 RX ORDER — SODIUM CHLORIDE 0.9 % (FLUSH) 0.9 %
10 SYRINGE (ML) INJECTION AS NEEDED
OUTPATIENT
Start: 2025-02-19

## 2025-02-19 RX ORDER — SODIUM CHLORIDE 0.9 % (FLUSH) 0.9 %
10 SYRINGE (ML) INJECTION EVERY 12 HOURS SCHEDULED
OUTPATIENT
Start: 2025-02-19

## 2025-02-19 RX ADMIN — MORPHINE SULFATE 4 MG: 4 INJECTION, SOLUTION INTRAMUSCULAR; INTRAVENOUS at 07:52

## 2025-02-19 RX ADMIN — MORPHINE SULFATE 4 MG: 4 INJECTION, SOLUTION INTRAMUSCULAR; INTRAVENOUS at 20:39

## 2025-02-19 RX ADMIN — PIPERACILLIN AND TAZOBACTAM 3.38 G: 3; .375 INJECTION, POWDER, FOR SOLUTION INTRAVENOUS at 17:45

## 2025-02-19 RX ADMIN — ACETAMINOPHEN 650 MG: 325 TABLET, FILM COATED ORAL at 06:11

## 2025-02-19 RX ADMIN — POLYETHYLENE GLYCOL 3350 17 G: 17 POWDER, FOR SOLUTION ORAL at 07:51

## 2025-02-19 RX ADMIN — IOPAMIDOL 100 ML: 755 INJECTION, SOLUTION INTRAVENOUS at 01:13

## 2025-02-19 RX ADMIN — Medication 10 ML: at 08:09

## 2025-02-19 RX ADMIN — HEPARIN SODIUM 18 UNITS/KG/HR: 10000 INJECTION, SOLUTION INTRAVENOUS at 14:54

## 2025-02-19 RX ADMIN — GABAPENTIN 600 MG: 300 CAPSULE ORAL at 21:38

## 2025-02-19 RX ADMIN — GABAPENTIN 600 MG: 300 CAPSULE ORAL at 13:01

## 2025-02-19 RX ADMIN — HEPARIN SODIUM 18 UNITS/KG/HR: 10000 INJECTION, SOLUTION INTRAVENOUS at 03:45

## 2025-02-19 RX ADMIN — TOPIRAMATE 25 MG: 25 TABLET, FILM COATED ORAL at 12:57

## 2025-02-19 RX ADMIN — CEFEPIME 2000 MG: 2 INJECTION, POWDER, FOR SOLUTION INTRAVENOUS at 03:26

## 2025-02-19 RX ADMIN — PIPERACILLIN AND TAZOBACTAM 3.38 G: 3; .375 INJECTION, POWDER, FOR SOLUTION INTRAVENOUS at 12:57

## 2025-02-19 RX ADMIN — SENNOSIDES AND DOCUSATE SODIUM 2 TABLET: 50; 8.6 TABLET ORAL at 06:11

## 2025-02-19 RX ADMIN — MORPHINE SULFATE 4 MG: 4 INJECTION, SOLUTION INTRAMUSCULAR; INTRAVENOUS at 03:26

## 2025-02-19 RX ADMIN — OXYCODONE 5 MG: 5 TABLET ORAL at 17:45

## 2025-02-19 RX ADMIN — BISACODYL 5 MG: 5 TABLET, COATED ORAL at 21:58

## 2025-02-19 RX ADMIN — Medication 10 ML: at 21:38

## 2025-02-19 RX ADMIN — MORPHINE SULFATE 4 MG: 4 INJECTION, SOLUTION INTRAMUSCULAR; INTRAVENOUS at 12:56

## 2025-02-19 RX ADMIN — PANTOPRAZOLE SODIUM 40 MG: 40 TABLET, DELAYED RELEASE ORAL at 14:53

## 2025-02-19 NOTE — PLAN OF CARE
Problem: Adult Inpatient Plan of Care  Goal: Plan of Care Review  Outcome: Progressing  Flowsheets (Taken 2/19/2025 1759)  Progress: improving  Plan of Care Reviewed With:   patient   child  Goal: Patient-Specific Goal (Individualized)  Outcome: Progressing  Goal: Absence of Hospital-Acquired Illness or Injury  Outcome: Progressing  Intervention: Identify and Manage Fall Risk  Recent Flowsheet Documentation  Taken 2/19/2025 1600 by Sarita Gonzalez RN  Safety Promotion/Fall Prevention: safety round/check completed  Taken 2/19/2025 1400 by Sarita Gonzalez RN  Safety Promotion/Fall Prevention: safety round/check completed  Taken 2/19/2025 1200 by Sarita Gonzalez RN  Safety Promotion/Fall Prevention: safety round/check completed  Taken 2/19/2025 1000 by Sarita Gonzalez RN  Safety Promotion/Fall Prevention: safety round/check completed  Taken 2/19/2025 0752 by Sarita Gonzalez RN  Safety Promotion/Fall Prevention: safety round/check completed  Intervention: Prevent Skin Injury  Recent Flowsheet Documentation  Taken 2/19/2025 0752 by Sarita Gonzalez RN  Body Position: position changed independently  Skin Protection:   incontinence pads utilized   transparent dressing maintained  Goal: Optimal Comfort and Wellbeing  Outcome: Progressing  Intervention: Provide Person-Centered Care  Recent Flowsheet Documentation  Taken 2/19/2025 0752 by Sarita Gonzalez RN  Trust Relationship/Rapport:   care explained   questions answered  Goal: Readiness for Transition of Care  Outcome: Progressing     Problem: Fall Injury Risk  Goal: Absence of Fall and Fall-Related Injury  Outcome: Progressing  Intervention: Promote Injury-Free Environment  Recent Flowsheet Documentation  Taken 2/19/2025 1600 by Sarita Gonzalez RN  Safety Promotion/Fall Prevention: safety round/check completed  Taken 2/19/2025 1400 by Sarita Gonzalez RN  Safety Promotion/Fall Prevention: safety round/check completed  Taken 2/19/2025 1200 by Carlos  Sarita, ELANA  Safety Promotion/Fall Prevention: safety round/check completed  Taken 2/19/2025 1000 by Sarita Gonzalez RN  Safety Promotion/Fall Prevention: safety round/check completed  Taken 2/19/2025 0752 by Sarita Gonzalez RN  Safety Promotion/Fall Prevention: safety round/check completed     Problem: Pain Acute  Goal: Optimal Pain Control and Function  Outcome: Progressing  Intervention: Optimize Psychosocial Wellbeing  Recent Flowsheet Documentation  Taken 2/19/2025 0752 by Sarita Gonzalez, RN  Diversional Activities: television     Problem: Pain Chronic (Persistent)  Goal: Optimal Pain Control and Function  Outcome: Progressing  Intervention: Optimize Psychosocial Wellbeing  Recent Flowsheet Documentation  Taken 2/19/2025 0752 by Sarita Gonzalez RN  Diversional Activities: television     Problem: Sepsis/Septic Shock  Goal: Optimal Coping  Outcome: Progressing  Goal: Absence of Bleeding  Outcome: Progressing  Goal: Blood Glucose Level Within Target Range  Outcome: Progressing  Goal: Absence of Infection Signs and Symptoms  Outcome: Progressing  Intervention: Promote Recovery  Recent Flowsheet Documentation  Taken 2/19/2025 0752 by Sarita Gonzalez, RN  Activity Management: sitting, edge of bed  Goal: Optimal Nutrition Delivery  Outcome: Progressing   Goal Outcome Evaluation:  Plan of Care Reviewed With: patient, child        Progress: improving

## 2025-02-19 NOTE — CASE MANAGEMENT/SOCIAL WORK
Discharge Planning Assessment   Ivan     Patient Name: Nola Tariq  MRN: [id number]  Today's Date: 2/19/2025    Admit Date: 2/18/2025    Plan: From home with brother and [facility] (will need MANAV order).   Discharge Needs Assessment       Row Name 02/19/25 1251       Living Environment    People in Home sibling(s)    Name(s) of People in Home Marisabel    Current Living Arrangements home    Potentially Unsafe Housing Conditions none    In the past 12 months has the electric, gas, oil, or water company threatened to shut off services in your home? No    Primary Care Provided by self;child(kwesi)    Provides Primary Care For no one, unable/limited ability to care for self    Family Caregiver if Needed child(kwesi), adult;sibling(s)    Family Caregiver Names Daughter-Arlette, BrotherAditi    Quality of Family Relationships helpful;involved;supportive    Able to Return to Prior Arrangements yes       Resource/Environmental Concerns    Resource/Environmental Concerns none    Transportation Concerns none       Transportation Needs    In the past 12 months, has lack of transportation kept you from medical appointments or from getting medications? no    In the past 12 months, has lack of transportation kept you from meetings, work, or from getting things needed for daily living? No       Food Insecurity    Within the past 12 months, you worried that your food would run out before you got the money to buy more. Never true    Within the past 12 months, the food you bought just didn't last and you didn't have money to get more. Never true       Transition Planning    Patient/Family Anticipates Transition to other (see comments)  possible SNF    Patient/Family Anticipated Services at Transition skilled nursing;rehabilitation services;home health care    Transportation Anticipated family or friend will provide       Discharge Needs Assessment    Readmission Within the Last 30 Days no previous admission in last 30 days     Current Outpatient/Agency/Support Group homecare agency  [facility] (had not yet admitted pt)    Equipment Currently Used at Home bp cuff;ramp;wheelchair, motorized;rollator;commode    Concerns to be Addressed discharge planning;care coordination/care conferences    Do you want help finding or keeping work or a job? Patient declined    Do you want help with school or training? For example, starting or completing job training or getting a high school diploma, GED or equivalent No    Anticipated Changes Related to Illness none    Equipment Needed After Discharge none    Provided Post Acute Provider List? N/A    Provided Post Acute Provider Quality & Resource List? N/A                   Discharge Plan       Row Name 02/19/25 9625       Plan    Plan From home with brother and [facility] (will need MANAV order).    Patient/Family in Agreement with Plan yes    Plan Comments CM met with patient and her daughter Arlette at bedside. Pt lives at home with her brother Steven, has not been driving for the last few months d/t back pain, reports being independent with ADL's. PCP and pharmacy confirmed- pt agreeable to use [facility]. DME includes motorized scooter, rollator, BSC, BP cuff, and ramp into the home. Pt had surgery at [facility] with Dr Cantrell on Mon. 02/10 and was set up with home health services after being denied at [facility]. CM received notification from liaison Katie with [facility] that they had received referral on pt after another [facility] agency was unable to see. Nurse saw pt on 02/18 and recommended EMS be called, so pt has not yet been admitted to services. Pt to have a washout of spine tomorrow 2/20.                  Continued Care and Services - Admitted Since 2/18/2025       Home Medical Care       Service Provider Request Status Services Address Phone Fax Patient Preferred    [facility] Accepted -- [phone] [facility], [address], [place] [address] [phone]                    Expected Discharge Date and Time       Expected Discharge Date Expected Discharge Time    Feb 22, 2025            Demographic Summary       Row Name 02/19/25 1251       General Information    Admission Type inpatient    Arrived From emergency department    Referral Source admission list    Reason for Consult care coordination/care conference;discharge planning    Preferred Language English       Contact Information    Permission Granted to Share Info With                    Functional Status       Row Name 02/19/25 1251       Functional Status    Usual Activity Tolerance moderate    Current Activity Tolerance moderate       Functional Status, IADL    Medications assistive person    Meal Preparation assistive person    Housekeeping assistive person    Laundry assistive person    Shopping assistive person    If for any reason you need help with day-to-day activities such as bathing, preparing meals, shopping, managing finances, etc., do you get the help you need? I get all the help I need             Megan Naegele, RN     Office Phone: [phone]  Office Cell: [phone]

## 2025-02-19 NOTE — CONSULTS
Infectious Diseases Consult Note    Referring Provider: Daphney Sargent MD    Reason for Consultation: sepsis    Patient Care Team:  Miranda Hylton APRN as PCP - General (Nurse Practitioner)    Chief complaint weakness, confusion, fever    Subjective     History of present illness:      This is a 74-year-old female presents to the hospital on 2/18/2024 due to weakness and confusion along with fever.  Daughter is at bedside and states that patient was much more confused last evening but has improved today.  Patient recently had a spinal fusion approximately a week ago and states that she has not had any worsening back pain, extremity numbness tingling, weakness or any bowel or bladder incontinence.  Patient and family said her weakness was just generalized.  Is having some shortness of breath states she has had a cough for several weeks.  Denies GI symptoms or urinary symptoms.    Review of Systems   Review of Systems   Constitutional:  Positive for fatigue and fever.   HENT: Negative.     Eyes: Negative.    Respiratory:  Positive for cough and shortness of breath.    Cardiovascular: Negative.    Gastrointestinal: Negative.    Endocrine: Negative.    Genitourinary: Negative.    Musculoskeletal:  Positive for back pain.   Skin: Negative.    Neurological:  Positive for weakness.   Psychiatric/Behavioral: Negative.     All other systems reviewed and are negative.      Medications  Medications Prior to Admission   Medication Sig Dispense Refill Last Dose/Taking    Acetaminophen-Caffeine (Excedrin Tension Headache) 500-65 MG tablet Take 1 tablet by mouth Daily As Needed (headache). Indications: Mild Pain       albuterol sulfate  (90 Base) MCG/ACT inhaler Inhale 2 puffs Every 4 (Four) Hours As Needed for Wheezing. 6.7 g 0     Cetirizine HCl (ZyrTEC Allergy) 10 MG capsule Take 10 mg by mouth Daily. Indications: Perennial Allergic Rhinitis       denosumab (Prolia) 60 MG/ML solution prefilled syringe syringe  Inject 1 mL under the skin into the appropriate area as directed 1 (One) Time. Indications: Osteoporosis       docusate sodium (COLACE) 100 MG capsule Take 1 capsule by mouth 2 (Two) Times a Day. Indications: Constipation       famotidine (PEPCID) 40 MG tablet Take 1 tablet by mouth 2 (Two) Times a Day. Indications: Gastroesophageal Reflux Disease       gabapentin (NEURONTIN) 600 MG tablet Take 1 tablet by mouth 3 (Three) Times a Day. Indications: Neuropathic Pain 90 tablet 1     guaiFENesin (MUCINEX) 600 MG 12 hr tablet Take 1 tablet by mouth Every 12 (Twelve) Hours. (Patient not taking: Reported on 12/8/2024)       [START ON 2/21/2025] HYDROmorphone (Dilaudid) 2 MG tablet Take 1 tablet by mouth Every 6 (Six) Hours As Needed for Moderate Pain for up to 30 days. 120 tablet 0     ibuprofen (ADVIL,MOTRIN) 800 MG tablet Take 1 tablet by mouth 3 (Three) Times a Day As Needed for Mild Pain. Indications: Pain       Melatonin 5 MG capsule Take 5 mg by mouth At Night As Needed (sleep problem). (Patient taking differently: Take 5 mg by mouth At Night As Needed (sleep problem).) 30 each 0     Multiple Vitamins-Minerals (MULTIVITAMIN WITH MINERALS) tablet tablet Take 1 tablet by mouth Daily. Indications: Nutritional Support       omeprazole (priLOSEC) 40 MG capsule Take 1 capsule by mouth Daily. Indications: Excess Stomach Secretions, Gastroesophageal Reflux Disease       topiramate (TOPAMAX) 25 MG tablet Take 1 tablet by mouth Daily. Indications: Migraine Headache       vitamin D (ERGOCALCIFEROL) 1.25 MG (50444 UT) capsule capsule Take 1 capsule by mouth 1 (One) Time Per Week. Indications: Vitamin D Deficiency          History  Past Medical History:   Diagnosis Date    Arthritis     [place] esophagus     Chronic pain disorder     COVID-19     DJD (degenerative joint disease)     Extremity pain     Fall at home     Fall at home     fracture rt. leg    Foot pain, right     Fractures     GERD (gastroesophageal reflux disease)      Headache, tension-type     Hypertension     Joint pain     Leg pain, right     Low back pain     Migraine     Plantar fasciitis     Shingles     Shoulder pain, right     Sleep apnea     Spinal stenosis 9/4/2024    Epidural on 9/4     Past Surgical History:   Procedure Laterality Date    BACK SURGERY  2/2009    Decompression    CARPAL TUNNEL RELEASE      col[place].    COLONOSCOPY      EPIDURAL BLOCK      EYE SURGERY      [place]    KNEE ARTHROSCOPY      SPINAL CORD STIMULATOR TRIAL W/ LAMINOTOMY      SPINE SURGERY      Kyphoplasty    TUBAL ABDOMINAL LIGATION      VERTEBROPLASTY Bilateral 10/11/2024    Procedure: VERTEBROPLASTY;  Surgeon: Kirk Nascimento DO;  Location: [facility] MAIN OR;  Service: Pain Management;  Laterality: Bilateral;    VERTEBROPLASTY Bilateral 12/3/2024    Procedure: KYPHOPLASTY at L4;  Surgeon: Kirk Nascimento DO;  Location: [facility] MAIN OR;  Service: Pain Management;  Laterality: Bilateral;    WRIST SURGERY      tendon release       Family History  Family History   Problem Relation Age of Onset    Cancer Mother     Migraines Father     COPD Father     Migraines Sister     Migraines Brother        Social History   reports that she quit smoking about 59 years ago. Her smoking use included cigarettes. She has never used smokeless tobacco. She reports that she does not drink alcohol and does not use drugs.    Allergies  Celebrex [celecoxib]    Objective     Vital Signs   Vital Signs (last 24 hours)         02/18 0700  02/19 0659 02/19 0700  02/19 1209   Most Recent      Temp (°F) 98.8 -  102.8      98.7     98.7 (37.1) 02/19 0749    Heart Rate 78 -  97      73     73 02/19 0749    Resp 14 -  20      19 19 02/19 0749    /40 -  138/60      127/72     127/72 02/19 0749    SpO2 (%) 83 -  98      93     93 02/19 0749    Flow (L/min) (Oxygen Therapy) 2 -  3      2     2 02/19 0752            Physical Exam:  Physical Exam  Vitals and nursing note reviewed.   Constitutional:       General: She is not in  acute distress.     Appearance: She is well-developed and normal weight. She is ill-appearing. She is not diaphoretic.   HENT:      Head: Normocephalic and atraumatic.   Eyes:      General: No scleral icterus.     Extraocular Movements: Extraocular movements intact.      Conjunctiva/sclera: Conjunctivae normal.      Pupils: Pupils are equal, round, and reactive to light.   Cardiovascular:      Rate and Rhythm: Normal rate and regular rhythm.      Heart sounds: Normal heart sounds, S1 normal and S2 normal. No murmur heard.  Pulmonary:      Effort: Pulmonary effort is normal. No respiratory distress.      Breath sounds: No stridor. Rales present. No wheezing.   Chest:      Chest wall: No tenderness.   Abdominal:      General: Bowel sounds are normal. There is no distension.      Palpations: Abdomen is soft. There is no mass.      Tenderness: There is no abdominal tenderness. There is no guarding.   Musculoskeletal:         General: No swelling, tenderness or deformity. Normal range of motion.      Cervical back: Neck supple.   Skin:     General: Skin is warm and dry.      Coloration: Skin is not pale.      Findings: No bruising, erythema or rash.      Comments: Lumbar incision without significant fluctuance erythema or drainage   Neurological:      Mental Status: She is alert and oriented to person, place, and time.      Comments: No significant extremity weakness         Microbiology  Microbiology Results (last 10 days)       Procedure Component Value - Date/Time    MRSA Screen, PCR (Inpatient) - Swab, Nares [[id number]]  (Normal) Collected: 02/18/25 2150    Lab Status: Final result Specimen: Swab from Nares Updated: 02/18/25 2312     MRSA PCR No MRSA Detected    Narrative:      The negative predictive value of this diagnostic test is high and should only be used to consider de-escalating anti-MRSA therapy. A positive result may indicate colonization with MRSA and must be correlated clinically.    Respiratory Panel  PCR w/COVID-19(SARS-CoV-2) PADMINI/LUIS FELIPE/SHANNON/PAD/COR/SILVINO In-House, NP Swab in UTM/VTM, 2 HR TAT - Swab, Nasopharynx [[id number]]  (Normal) Collected: 02/18/25 1631    Lab Status: Final result Specimen: Swab from Nasopharynx Updated: 02/18/25 1733     ADENOVIRUS, PCR Not Detected     Coronavirus 229E Not Detected     Coronavirus HKU1 Not Detected     Coronavirus NL63 Not Detected     Coronavirus OC43 Not Detected     COVID19 Not Detected     Human Metapneumovirus Not Detected     Human Rhinovirus/Enterovirus Not Detected     Influenza A PCR Not Detected     Influenza B PCR Not Detected     Parainfluenza Virus 1 Not Detected     Parainfluenza Virus 2 Not Detected     Parainfluenza Virus 3 Not Detected     Parainfluenza Virus 4 Not Detected     RSV, PCR Not Detected     Bordetella pertussis pcr Not Detected     Bordetella parapertussis PCR Not Detected     Chlamydophila pneumoniae PCR Not Detected     Mycoplasma pneumo by PCR Not Detected    Narrative:      In the setting of a positive respiratory panel with a viral infection PLUS a negative procalcitonin without other underlying concern for bacterial infection, consider observing off antibiotics or discontinuation of antibiotics and continue supportive care. If the respiratory panel is positive for atypical bacterial infection (Bordetella pertussis, Chlamydophila pneumoniae, or Mycoplasma pneumoniae), consider antibiotic de-escalation to target atypical bacterial infection.    Legionella Antigen, Urine - Urine, Straight Cath [[id number]]  (Normal) Collected: 02/18/25 1630    Lab Status: Final result Specimen: Urine from Straight Cath Updated: 02/19/25 0945     LEGIONELLA ANTIGEN, URINE Negative    S. Pneumo Ag Urine or CSF - Urine, Straight Cath [[id number]]  (Normal) Collected: 02/18/25 1630    Lab Status: Final result Specimen: Urine from Straight Cath Updated: 02/19/25 0945     Strep Pneumo Ag Negative            Laboratory  Results from last 7 days   Lab Units  02/19/25  0228   WBC 10*3/mm3 20.13*   HEMOGLOBIN g/dL 10.2*   HEMATOCRIT % 33.0*   PLATELETS 10*3/mm3 270     Results from last 7 days   Lab Units 02/18/25  1657   SODIUM mmol/L 140   POTASSIUM mmol/L 4.3   CHLORIDE mmol/L 105   CO2 mmol/L 25.7   BUN mg/dL 15   CREATININE mg/dL 1.09*   GLUCOSE mg/dL 113*   CALCIUM mg/dL 8.5*     Results from last 7 days   Lab Units 02/18/25  1657   SODIUM mmol/L 140   POTASSIUM mmol/L 4.3   CHLORIDE mmol/L 105   CO2 mmol/L 25.7   BUN mg/dL 15   CREATININE mg/dL 1.09*   GLUCOSE mg/dL 113*   CALCIUM mg/dL 8.5*                   Radiology  Imaging Results (Last 72 Hours)       Procedure Component Value Units Date/Time    CT Angiogram Chest Pulmonary Embolism [[id number]] Collected: 02/19/25 0146     Updated: 02/19/25 0202    Narrative:      CT ANGIOGRAM CHEST PULMONARY EMBOLISM    Date of Exam: 2/19/2025 12:45 AM EST    Indication: Acute hypoxia.    Comparison: 10/24/2023    Technique: Axial CT images were obtained of the chest after the uneventful intravenous administration of iodinated contrast utilizing pulmonary embolism protocol.  In addition, a 3-D volume rendered image was created for interpretation.  Sagittal and   coronal reconstructions were performed.  Automated exposure control and iterative reconstruction methods were used.    Findings:  There are bilateral thyroid nodules that can be assessed with outpatient ultrasound if indicated. Left side measures 1.7 cm, and the right side measures 1.6 cm. There is nonocclusive pulmonary embolus in a right middle lobe segmental branch. No other   pulmonary embolism is identified. No evidence of right heart strain. No aortic dissection. There is atherosclerotic disease and coronary artery disease. No pleural or pericardial effusion is identified. There is a large hiatal hernia containing the   gastric fundus. This has worsened from prior. There is gastroesophageal reflux. No adenopathy is identified. Upper abdominal images demonstrate  right renal cysts. There is some atrophy of the pancreas.    Lung windows demonstrate emphysema. There are infiltrates in the left upper and left lower lobe concerning for pneumonia. There are some tree-in-bud infiltrates in the right lower lobe as well, and there is minimal patchy infiltrate in the right middle   lobe, also compatible with pneumonia. No pulmonary edema is identified. No pneumothorax. Kyphoplasty changes are present at T10 and T7.      Impression:      1.There is a small nonocclusive pulmonary embolus in a right middle lobe segmental branch. No other pulmonary embolism is identified. No evidence of right heart strain.  2.Bilateral pneumonia, left greater than right.  3.Emphysema.  4.Large hiatal hernia with gastroesophageal reflux.  5.Coronary artery disease.  6.Additional findings as given above.    NOTIFICATION: Critical Value/emergent results were called by telephone at the time of interpretation on 2/19/2025 2:00 AM EST to Rashmi, the patient's nurse who verbally acknowledged these results. She reports she will notify the patient's physician   immediately.            Electronically Signed: Walter Lemus MD    2/19/2025 2:00 AM EST    Workstation ID: [id number]    MRI Lumbar Spine Without Contrast [[id number]] Collected: 02/18/25 2109     Updated: 02/18/25 2131    Narrative:      MRI LUMBAR SPINE WO CONTRAST    Date of Exam: 2/18/2025 8:36 PM EST    Indication: fever, recent lumbar surgery.     Comparison: CT lumbar spine 12/1/2024. Lumbar spine radiograph 12/3/2024. MR lumbar spine 11/19/2024.    Technique:  Routine multiplanar/multisequence sequence images of the lumbar spine were obtained without contrast administration.        Findings:  Vertebral numbering: The last well formed disc is labeled L5-S1.    Alignment: Appearing grade 1 anterolisthesis of L4 and L5 and mild retrolisthesis of L5 on S1.    Vertebrae: There are kyphoplasty changes at L3 with cement within the L3-4 disc space. There  is an evolving fracture at the superior endplate of L4 with similar appearing height loss and with mild associated STIR hyperintense signal abnormality which   extends into the posterior elements.   There has been interval laminectomies from L3-L5.    Discs and spinal canal: Multilevel disc desiccation and disc height loss. Degenerative changes detailed below by level.    Spinal cord and cauda equina: The visible spinal cord has normal signal and morphology. No cauda equina compression. The conus tip lies at .    Adjacent soft tissues: Presumed bilateral renal cysts. There is a T2 hypointense lesion in the right kidney which appears stable compared to prior and corresponds to a hemorrhagic or proteinaceous cyst on comparison CT. There is fluid in the laminectomy   beds and posterior paraspinal soft tissue edema which indents the dorsal thecal sac.    Findings by level:    T11-12: No significant interval change. Mild diffuse disc bulge. No significant spinal canal or foraminal stenosis.    T12-L1: No significant interval change. No significant spinal canal or foraminal stenosis.    L1-2: No significant interval change. Mild diffuse disc bulge and mild bilateral facet arthropathy. No significant spinal canal or foraminal stenosis.    L2-3: No significant interval change. Diffuse disc bulge with disc uncovering and osteophytic ridging. Bilateral facet arthropathy. Moderate spinal canal stenosis. Moderate to severe right and mild to moderate left foraminal narrowing.    L3-4: Interval postoperative changes. Similar diffuse disc bulge with osteophytic ridging. Bilateral facet arthropathy.. Severe spinal canal stenosis with cauda equina compression as mildly increased. Severe right and moderate left foraminal stenosis.    L4-5: Interval postoperative changes. Diffuse disc bulge with disc uncovering and osteophytic ridging. Severe bilateral facet arthropathy. Mildly increased severe spinal canal stenosis with some  preserved CSF. Moderate bilateral foraminal stenosis.    L5-S1: Interval postoperative changes. Diffuse disc bulge with osteophytic ridging and disc uncovering. Bilateral facet arthropathy. Similar moderate spinal canal stenosis. Subarticular recess narrowing with contact/compression of both traversing S1   nerve roots. Moderate to severe bilateral foraminal stenosis.      Impression:      Impression:  Interval postoperative changes of laminectomies from L3-L5 with fluid in the laminectomy beds which indents the dorsal thecal sac at these levels. This may represent postoperative changes/seromas, though superimposed infection is not excluded by imaging.    Mildly increased severe spinal canal stenosis at L3-4 (with cauda equina compression) at L4-5.    Multilevel degenerative changes are not significantly changed since 11/19/2024 otherwise.    Electronically Signed: Manan Casey    2/18/2025 9:29 PM EST    Workstation ID: [id number]    CT Head Without Contrast [[id number]] Collected: 02/18/25 1901     Updated: 02/18/25 1904    Narrative:      CT HEAD WO CONTRAST    Date of Exam: 2/18/2025 6:44 PM EST    Indication: altered mental status.    Comparison: None available.    Technique: Axial CT images were obtained of the head without contrast administration.  Coronal reconstructions were performed.  Automated exposure control and iterative reconstruction methods were used.    Findings: No intracranial hemorrhage. Gray-white matter differentiation is maintained without evidence of an acute infarction. Multiple foci of decreased attenuation are present within the subcortical, deep cerebral, and periventricular white matter   consistent with chronic small vessel/microangiopathic ischemic changes. No extra-axial mass or collection. The ventricles and sulci are prominent commensurate with involutional changes. The posterior fossa appears grossly normal. Sellar and suprasellar   structures are normal.    Orbital and  periorbital soft tissues are normal. Chronic mucosal thickening with mucoperiosteal reactive changes of the left maxillary sinus.. The bony calvarium is intact.      Impression:      Impression: No acute intracranial pathology.          Electronically Signed: Yaya Allen MD    2/18/2025 7:02 PM EST    Workstation ID: [id number]    XR Chest 1 View [[id number]] Collected: 02/18/25 1619     Updated: 02/18/25 1622    Narrative:      XR CHEST 1 VW    Date of Exam: 2/18/2025 4:10 PM EST    Indication: fever    Comparison: 10/11/2024    Findings:  Cardiac size is normal for the projection. The patient is rotated. The pulmonary vascular markings are normal and the lungs are clear. There are old healed left-sided rib fractures and there are postop changes of prior vertebroplasty.      Impression:      Impression:  No active disease.        Electronically Signed: Colton Costello MD    2/18/2025 4:20 PM EST    Workstation ID: [id number]            Cardiology      Results Review:  I have reviewed all clinical data, test, lab, and imaging results.       Schedule Meds  cefepime, 2,000 mg, Intravenous, Q12H  sodium chloride, 10 mL, Intravenous, Q12H  vancomycin, 1,250 mg, Intravenous, Q24H        Infusion Meds  heparin, 18 Units/kg/hr, Last Rate: Stopped (02/19/25 0730)  Pharmacy to dose vancomycin,   Pharmacy To Dose:,         PRN Meds    acetaminophen **OR** acetaminophen **OR** acetaminophen    senna-docusate sodium **AND** polyethylene glycol **AND** bisacodyl **AND** bisacodyl    Calcium Replacement - Follow Nurse / BPA Driven Protocol    heparin    heparin    Magnesium Standard Dose Replacement - Follow Nurse / BPA Driven Protocol    Morphine    Pharmacy to dose vancomycin    Pharmacy To Dose:    Phosphorus Replacement - Follow Nurse / BPA Driven Protocol    Potassium Replacement - Follow Nurse / BPA Driven Protocol    sodium chloride    sodium chloride      Assessment & Plan       Assessment    Bilateral lower lobe infiltrates  mostly on the left side.  Concerning for aspiration pneumonia.  MRSA screen was negative as well as urine for Legionella and pneumococcal antigen.  Viral PCR panel was negative  The patient is currently on 2 L of oxygen    Febrile illness on admission secondary to above.    Toxic metabolic encephalopathy most likely secondary to infection.  The patient currently back to her baseline    Recent lumbar spinal infusion as an outpatient.  No obvious infection during the examination of the incision site.  MRI did show postoperative changes of laminectomies from L3-L5 with some fluid in the laminectomy beds that could possibly be postoperative changes of seroma    Plan    Discontinue IV cefepime-cefepime can worsen confusion in elderly people  Discontinue IV vancomycin  Start IV Zosyn 3.375 g every 8 hours  Encourage incentive spirometer use  Continue supportive care  A.m. labs  Case discussed with patient and daughter at bedside  Case discussed with ELANA Diaz, APRN  02/19/25  12:09 EST    Note is dictated utilizing voice recognition software/Dragon

## 2025-02-19 NOTE — PROGRESS NOTES
[facility] MEDICINE SERVICE  DAILY PROGRESS NOTE    NAME: Nola Tariq  : 1950  MRN: [id number]      LOS: 1 day     PROVIDER OF SERVICE: Daphney Sargent MD    Chief Complaint: Sepsis    Subjective:     Interval History:  History taken from: patient    No new complaint    Review of Systems:   Review of Systems   All other systems reviewed and are negative.      Objective:     Vital Signs  Temp:  [98 °F (36.7 °C)-99.5 °F (37.5 °C)] 98 °F (36.7 °C)  Heart Rate:  [73-96] 95  Resp:  [14-19] 15  BP: (102-138)/(40-73) 119/64  Flow (L/min) (Oxygen Therapy):  [2-3] 2   Body mass index is 30.58 kg/m².    Physical Exam  Physical Exam  Constitutional:       Appearance: Normal appearance.   HENT:      Head: Normocephalic and atraumatic.      Nose: Nose normal.      Mouth/Throat:      Mouth: Mucous membranes are moist.   Eyes:      Extraocular Movements: Extraocular movements intact.      Pupils: Pupils are equal, round, and reactive to light.   Cardiovascular:      Rate and Rhythm: Normal rate and regular rhythm.   Pulmonary:      Effort: Pulmonary effort is normal.      Breath sounds: Normal breath sounds.   Abdominal:      General: Abdomen is flat. Bowel sounds are normal.      Palpations: Abdomen is soft.   Musculoskeletal:         General: Normal range of motion.      Cervical back: Normal range of motion and neck supple.   Skin:     General: Skin is warm and dry.   Neurological:      General: No focal deficit present.      Mental Status: She is alert and oriented to person, place, and time.   Psychiatric:         Mood and Affect: Mood normal.         Behavior: Behavior normal.         Thought Content: Thought content normal.         Judgment: Judgment normal.         Current Medications:  Scheduled Meds:gabapentin, 600 mg, Oral, Q8H  pantoprazole, 40 mg, Oral, Q AM  piperacillin-tazobactam, 3.375 g, Intravenous, Q8H  sodium chloride, 10 mL, Intravenous, Q12H  topiramate, 25 mg, Oral,  Daily      Continuous Infusions:heparin, 18 Units/kg/hr, Last Rate: 18 Units/kg/hr (02/19/25 1454)      PRN Meds:.  acetaminophen **OR** acetaminophen **OR** acetaminophen    albuterol    senna-docusate sodium **AND** polyethylene glycol **AND** bisacodyl **AND** bisacodyl    Calcium Replacement - Follow Nurse / BPA Driven Protocol    heparin    heparin    Magnesium Standard Dose Replacement - Follow Nurse / BPA Driven Protocol    Morphine    oxyCODONE    Phosphorus Replacement - Follow Nurse / BPA Driven Protocol    Potassium Replacement - Follow Nurse / BPA Driven Protocol    sodium chloride    sodium chloride       Diagnostic Data    Results from last 7 days   Lab Units 02/19/25  1408 02/19/25  0228 02/18/25  1657   WBC 10*3/mm3  --  20.13* 16.58*   HEMOGLOBIN g/dL  --  10.2* 10.8*   HEMATOCRIT %  --  33.0* 34.1   PLATELETS 10*3/mm3  --  270 281   GLUCOSE mg/dL 96  --  113*   CREATININE mg/dL 0.79  --  1.09*   BUN mg/dL 14  --  15   SODIUM mmol/L 139  --  140   POTASSIUM mmol/L 4.5  --  4.3   AST (SGOT) U/L  --   --  14   ALT (SGPT) U/L  --   --  6   ALK PHOS U/L  --   --  51   BILIRUBIN mg/dL  --   --  0.4   ANION GAP mmol/L 7.8  --  9.3       CT Angiogram Chest Pulmonary Embolism    Result Date: 2/19/2025  1.There is a small nonocclusive pulmonary embolus in a right middle lobe segmental branch. No other pulmonary embolism is identified. No evidence of right heart strain. 2.Bilateral pneumonia, left greater than right. 3.Emphysema. 4.Large hiatal hernia with gastroesophageal reflux. 5.Coronary artery disease. 6.Additional findings as given above. NOTIFICATION: Critical Value/emergent results were called by telephone at the time of interpretation on 2/19/2025 2:00 AM EST to Rashmi, the patient's nurse who verbally acknowledged these results. She reports she will notify the patient's physician immediately. Electronically Signed: Walter Lemus MD  2/19/2025 2:00 AM EST  Workstation ID: [id number]    MRI Lumbar  Spine Without Contrast    Result Date: 2/18/2025  Impression: Interval postoperative changes of laminectomies from L3-L5 with fluid in the laminectomy beds which indents the dorsal thecal sac at these levels. This may represent postoperative changes/seromas, though superimposed infection is not excluded by imaging. Mildly increased severe spinal canal stenosis at L3-4 (with cauda equina compression) at L4-5. Multilevel degenerative changes are not significantly changed since 11/19/2024 otherwise. Electronically Signed: Manan Casey  2/18/2025 9:29 PM EST  Workstation ID: [id number]    CT Head Without Contrast    Result Date: 2/18/2025  Impression: No acute intracranial pathology. Electronically Signed: Yaya Allen MD  2/18/2025 7:02 PM EST  Workstation ID: [id number]    XR Chest 1 View    Result Date: 2/18/2025  Impression: No active disease. Electronically Signed: Colton Costello MD  2/18/2025 4:20 PM EST  Workstation ID: [id number]       I reviewed the patient's new clinical results.    Assessment/Plan:     Active and Resolved Problems  Active Hospital Problems    Diagnosis  POA    **Sepsis [A41.9]  Yes      Resolved Hospital Problems   No resolved problems to display.       Acute hypoxic respiratory failure  Pulmonary embolism  Pneumonia  Sepsis  Possible lumbar spinal surgical site infection      -Currently on 2 L of oxygen via nasal cannula  -Continue IV Zosyn and follow-up on blood and sputum cultures with regards to pneumonia and sepsis.  - Lumbar surgical site does not look infected.  Incision with apparent sutures still in place.  Orthopedic spine surgery is planning exploration and washout tomorrow.  Patient complaining of pain over lumbar incision site/lower back pain.  Ordered as needed pain medications.    - Continue IV heparin for pulmonary embolism.    - Continue current management.      VTE Prophylaxis:  Pharmacologic VTE prophylaxis orders are present.             Disposition Planning:     Barriers to  Discharge: Pending clinical improvement  Anticipated Date of Discharge: 2/25/2020  Place of Discharge: Home      Code Status and Medical Interventions: CPR (Attempt to Resuscitate); Full Support   Ordered at: 02/18/25 2000     Code Status (Patient has no pulse and is not breathing):    CPR (Attempt to Resuscitate)     Medical Interventions (Patient has pulse or is breathing):    Full Support       Signature: Electronically signed by Daphney Sargent MD, 02/19/25, 16:50 EST.  [facility] Hospitalist Team

## 2025-02-19 NOTE — DISCHARGE PLACEMENT REQUEST
"Nola Alba (74 y.o. Female)       Date of Birth   1950    Social Security Number       Address   [address] [facility] [place] [address]    Home Phone   [phone]    MRN   [id number]       Mandaeism   Patient Refused    Marital Status                               Admission Date   2/18/25    Admission Type   Emergency    Admitting Provider   Llanes Alvarez, Carlos, MD    Attending Provider   Daphney Sargent MD    Department, Room/Bed   [facility] INPATIENT, 366/1       Discharge Date       Discharge Disposition       Discharge Destination                                 Attending Provider: Daphney Sargent MD    Allergies: Celebrex [Celecoxib]    Isolation: None   Infection: None   Code Status: CPR    Ht: 162.6 cm (64\")   Wt: 80.8 kg (178 lb 2.1 oz)    Admission Cmt: None   Principal Problem: Sepsis [A41.9]                   Active Insurance as of 2/18/2025       Primary Coverage       Payor Plan Insurance Group Employer/Plan Group    ANTHEM MEDICARE REPLACEMENT ANTHEM MED ADV HMO [id number]       Payor Plan Address Payor Plan Phone Number Payor Plan Fax Number Effective Dates    [address] [phone]  1/1/2024 - None Entered    [facility] [id number]         Subscriber Name Subscriber Birth Date Member ID       NOLA ALBA 1950 [id number]                     Emergency Contacts        (Rel.) Home Phone Work Phone Mobile Phone    TRINA ROUSE (Daughter) -- -- [phone]    BEATRIZ GARZA (Sister) -- -- [phone]    Yaya Carlson (Son) -- -- [phone]          "

## 2025-02-19 NOTE — PLAN OF CARE
Goal Outcome Evaluation:               Patient remains on IV abx. Hepatin gtt infusing per order. Remains on 2L NC. Patient stable at this time.

## 2025-02-19 NOTE — PROGRESS NOTES
Pharmacy Antimicrobial Dosing Service    Subjective:  Nola Tariq is a 74 y.o.female admitted with sepsis. Pharmacy has been consulted to dose Vancomycin and Cefepime for possible bone/joint infection.      Assessment/Plan    1. Day #1/7 Vancomycin: Goal -600 mcg*h/mL. Will give 1500mg (~23mg/kg DBW) IV once followed by 1250mg (~19mg/kg DBW) IV q24h for a predicted AUC ~480mcg*h/mL. Will obtain peak on 2/20 at 0200 and trough at 2100 prior to third dose.     2. Day #1/7 Cefepime: 2g IV q12h for estCrCl 30-59 mL/min.    3. MRSA: Pending.     Will continue to monitor drug levels, renal function, culture and sensitivities, and patient clinical status.       Objective:  Relevant clinical data and objective history reviewed:          Ideal body weight: 54.7 kg (120 lb 9.5 oz)  Adjusted ideal body weight: 64.8 kg (142 lb 12.1 oz)  There is no height or weight on file to calculate BMI.        Results from last 7 days   Lab Units 02/18/25  1657   CREATININE mg/dL 1.09*     Estimated Creatinine Clearance: 45.5 mL/min (A) (by C-G formula based on SCr of 1.09 mg/dL (H)).  No intake/output data recorded.    Results from last 7 days   Lab Units 02/18/25  1657   WBC 10*3/mm3 16.58*     Temperature    02/18/25 1535 02/18/25 1917   Temp: (!) 102.8 °F (39.3 °C) 98.8 °F (37.1 °C)     Baseline culture/source/susceptibility:  Microbiology Results (last 10 days)       Procedure Component Value - Date/Time    Respiratory Panel PCR w/COVID-19(SARS-CoV-2) PADMINI/LUIS FELIPE/SHANNON/PAD/COR/SILVINO In-House, NP Swab in UTM/VTM, 2 HR TAT - Swab, Nasopharynx [[id number]]  (Normal) Collected: 02/18/25 1631    Lab Status: Final result Specimen: Swab from Nasopharynx Updated: 02/18/25 4068     ADENOVIRUS, PCR Not Detected     Coronavirus 229E Not Detected     Coronavirus HKU1 Not Detected     Coronavirus NL63 Not Detected     Coronavirus OC43 Not Detected     COVID19 Not Detected     Human Metapneumovirus Not Detected     Human Rhinovirus/Enterovirus Not  Detected     Influenza A PCR Not Detected     Influenza B PCR Not Detected     Parainfluenza Virus 1 Not Detected     Parainfluenza Virus 2 Not Detected     Parainfluenza Virus 3 Not Detected     Parainfluenza Virus 4 Not Detected     RSV, PCR Not Detected     Bordetella pertussis pcr Not Detected     Bordetella parapertussis PCR Not Detected     Chlamydophila pneumoniae PCR Not Detected     Mycoplasma pneumo by PCR Not Detected    Narrative:      In the setting of a positive respiratory panel with a viral infection PLUS a negative procalcitonin without other underlying concern for bacterial infection, consider observing off antibiotics or discontinuation of antibiotics and continue supportive care. If the respiratory panel is positive for atypical bacterial infection (Bordetella pertussis, Chlamydophila pneumoniae, or Mycoplasma pneumoniae), consider antibiotic de-escalation to target atypical bacterial infection.            Tahira Florian, PharmD  02/18/25 20:49 EST

## 2025-02-19 NOTE — H&P
"[facility] Medicine Services  History & Physical    Patient Name: Nola Tariq  : 1950  MRN: [id number]  Primary Care Physician:  Miranda Hylton APRN  Date of admission: 2025  Date and Time of Service: 2025 at 2030    Subjective      Chief Complaint: \"generalized weakness, increasing confusion\"    History of Present Illness: Nola Tariq is a 74 y.o. female with a PMH of chronic lower back pain S/P lumbar spine fusion around 8 days ago, who presented to [facility] on 2025 with increasing confusion and generalized weakness. History is obtained from patient's daughter at bedside, patient is a poor historian. Patient's daughter states that patient was not able for work with her physical therapist earlier today due to increasing confusion and inability to ambulate which prompted them to come to ED. In ED she is found to be febrile t max 102.8. MRI spine showed Interval postoperative changes of laminectomies from L3-L5 with fluid in the laminectomy beds which indents the dorsal thecal sac at these levels. This may represent postoperative changes/seromas, though superimposed infection is not excluded by imaging. CBC labs notable for leukocytosis with neutrophilic predominance. The decision to admit was made.       Review of Systems   Constitutional:  Positive for fatigue and fever.   Respiratory:  Positive for cough. Negative for choking and shortness of breath.    Cardiovascular:  Negative for chest pain and leg swelling.   Gastrointestinal:  Negative for abdominal pain and diarrhea.   Endocrine: Negative for polydipsia.   Genitourinary:  Negative for dysuria, flank pain, hematuria and urgency.   Musculoskeletal:  Positive for back pain.   Psychiatric/Behavioral:  Negative for confusion.        Personal History     Past Medical History:   Diagnosis Date    Arthritis     [place] esophagus     Chronic pain disorder     COVID-19     DJD (degenerative joint disease)     Extremity " pain     Fall at home     Fall at home     fracture rt. leg    Foot pain, right     Fractures     GERD (gastroesophageal reflux disease)     Headache, tension-type     Hypertension     Joint pain     Leg pain, right     Low back pain     Migraine     Plantar fasciitis     Shingles     Shoulder pain, right     Sleep apnea     Spinal stenosis 9/4/2024    Epidural on 9/4       Past Surgical History:   Procedure Laterality Date    BACK SURGERY  2/2009    Decompression    CARPAL TUNNEL RELEASE      colin.    COLONOSCOPY      EPIDURAL BLOCK      EYE SURGERY      catarac    KNEE ARTHROSCOPY      SPINAL CORD STIMULATOR TRIAL W/ LAMINOTOMY      SPINE SURGERY      Kyphoplasty    TUBAL ABDOMINAL LIGATION      VERTEBROPLASTY Bilateral 10/11/2024    Procedure: VERTEBROPLASTY;  Surgeon: Kirk Nascimento DO;  Location: [facility] MAIN OR;  Service: Pain Management;  Laterality: Bilateral;    VERTEBROPLASTY Bilateral 12/3/2024    Procedure: KYPHOPLASTY at L4;  Surgeon: Kirk Nascimento DO;  Location: [facility] MAIN OR;  Service: Pain Management;  Laterality: Bilateral;    WRIST SURGERY      tendon release       Family History: family history includes COPD in her father; Cancer in her mother; Migraines in her brother, father, and sister. Otherwise pertinent FHx was reviewed and not pertinent to current issue.    Social History:  reports that she quit smoking about 59 years ago. Her smoking use included cigarettes. She has never used smokeless tobacco. She reports that she does not drink alcohol and does not use drugs.    Home Medications:  Prior to Admission Medications       Prescriptions Last Dose Informant Patient Reported? Taking?    Acetaminophen-Caffeine (Excedrin Tension Headache) 500-65 MG tablet   Yes No    Take 1 tablet by mouth Daily As Needed (headache). Indications: Mild Pain    albuterol sulfate  (90 Base) MCG/ACT inhaler  Self No No    Inhale 2 puffs Every 4 (Four) Hours As Needed for Wheezing.    Cetirizine HCl (ZyrTEC  Allergy) 10 MG capsule   Yes No    Take 10 mg by mouth Daily. Indications: Perennial Allergic Rhinitis    denosumab (Prolia) 60 MG/ML solution prefilled syringe syringe   Yes No    Inject 1 mL under the skin into the appropriate area as directed 1 (One) Time. Indications: Osteoporosis    docusate sodium (COLACE) 100 MG capsule   Yes No    Take 1 capsule by mouth 2 (Two) Times a Day. Indications: Constipation    famotidine (PEPCID) 40 MG tablet   Yes No    Take 1 tablet by mouth 2 (Two) Times a Day. Indications: Gastroesophageal Reflux Disease    gabapentin (NEURONTIN) 600 MG tablet   No No    Take 1 tablet by mouth 3 (Three) Times a Day. Indications: Neuropathic Pain    HYDROmorphone (Dilaudid) 2 MG tablet   No No    Take 1 tablet by mouth Every 6 (Six) Hours As Needed for Moderate Pain for up to 30 days.    ibuprofen (ADVIL,MOTRIN) 800 MG tablet   Yes No    Take 1 tablet by mouth 3 (Three) Times a Day As Needed for Mild Pain. Indications: Pain    Melatonin 5 MG capsule  Self No No    Take 5 mg by mouth At Night As Needed (sleep problem).    Patient taking differently:  Take 5 mg by mouth At Night As Needed (sleep problem).    Multiple Vitamins-Minerals (MULTIVITAMIN WITH MINERALS) tablet tablet   Yes No    Take 1 tablet by mouth Daily. Indications: Nutritional Support    omeprazole (priLOSEC) 40 MG capsule   Yes No    Take 1 capsule by mouth Daily. Indications: Excess Stomach Secretions, Gastroesophageal Reflux Disease    vitamin D (ERGOCALCIFEROL) 1.25 MG (83221 UT) capsule capsule   Yes No    Take 1 capsule by mouth 1 (One) Time Per Week. Indications: Vitamin D Deficiency    guaiFENesin (MUCINEX) 600 MG 12 hr tablet  Self No No    Take 1 tablet by mouth Every 12 (Twelve) Hours.    Patient not taking:  Reported on 12/8/2024    topiramate (TOPAMAX) 25 MG tablet   Yes No    Take 1 tablet by mouth Daily. Indications: Migraine Headache              Allergies:  Allergies   Allergen Reactions    Celebrex [Celecoxib]  Swelling       Objective      Vitals:   Temp:  [98.8 °F (37.1 °C)-102.8 °F (39.3 °C)] 98.8 °F (37.1 °C)  Heart Rate:  [83-97] 87  Resp:  [20] 20  BP: (102-129)/(40-75) 108/52  There is no height or weight on file to calculate BMI.  Physical Exam  Constitutional:       Appearance: She is obese.   HENT:      Head: Normocephalic.      Mouth/Throat:      Mouth: Mucous membranes are dry.   Eyes:      Extraocular Movements: Extraocular movements intact.      Pupils: Pupils are equal, round, and reactive to light.   Cardiovascular:      Rate and Rhythm: Normal rate and regular rhythm.      Pulses: Normal pulses.   Pulmonary:      Effort: Pulmonary effort is normal.      Breath sounds: Normal breath sounds.   Abdominal:      General: Abdomen is flat. Bowel sounds are normal. There is no distension.      Palpations: Abdomen is soft.      Tenderness: There is no abdominal tenderness.   Musculoskeletal:         General: Normal range of motion.      Cervical back: Neck supple.      Comments: Surgical site looks clean, no active drainage or erythema. Mild tenderness but no swelling.    Skin:     Capillary Refill: Capillary refill takes less than 2 seconds.   Neurological:      Mental Status: She is alert and oriented to person, place, and time.      Comments: Patient is lethargic but opens eyes to verbal stimuli, she answers questions appropriately. Strength is 5/5 bilateral LE. Sensation intact to touch.    Psychiatric:         Behavior: Behavior normal.         Diagnostic Data:  Lab Results (last 24 hours)       Procedure Component Value Units Date/Time    Respiratory Panel PCR w/COVID-19(SARS-CoV-2) PADMINI/LUIS FELIPE/SHANNON/PAD/COR/SILVINO In-House, NP Swab in UTM/VTM, 2 HR TAT - Swab, Nasopharynx [[id number]]  (Normal) Collected: 02/18/25 1631    Specimen: Swab from Nasopharynx Updated: 02/18/25 1733     ADENOVIRUS, PCR Not Detected     Coronavirus 229E Not Detected     Coronavirus HKU1 Not Detected     Coronavirus NL63 Not Detected      Coronavirus OC43 Not Detected     COVID19 Not Detected     Human Metapneumovirus Not Detected     Human Rhinovirus/Enterovirus Not Detected     Influenza A PCR Not Detected     Influenza B PCR Not Detected     Parainfluenza Virus 1 Not Detected     Parainfluenza Virus 2 Not Detected     Parainfluenza Virus 3 Not Detected     Parainfluenza Virus 4 Not Detected     RSV, PCR Not Detected     Bordetella pertussis pcr Not Detected     Bordetella parapertussis PCR Not Detected     Chlamydophila pneumoniae PCR Not Detected     Mycoplasma pneumo by PCR Not Detected    Narrative:      In the setting of a positive respiratory panel with a viral infection PLUS a negative procalcitonin without other underlying concern for bacterial infection, consider observing off antibiotics or discontinuation of antibiotics and continue supportive care. If the respiratory panel is positive for atypical bacterial infection (Bordetella pertussis, Chlamydophila pneumoniae, or Mycoplasma pneumoniae), consider antibiotic de-escalation to target atypical bacterial infection.    Blood Culture - Blood, Arm, Right [[id number]] Collected: 02/18/25 1720    Specimen: Blood from Arm, Right Updated: 02/18/25 1728    Comprehensive Metabolic Panel [[id number]]  (Abnormal) Collected: 02/18/25 1657    Specimen: Blood Updated: 02/18/25 1727     Glucose 113 mg/dL      BUN 15 mg/dL      Creatinine 1.09 mg/dL      Sodium 140 mmol/L      Potassium 4.3 mmol/L      Chloride 105 mmol/L      CO2 25.7 mmol/L      Calcium 8.5 mg/dL      Total Protein 6.3 g/dL      Albumin 3.4 g/dL      ALT (SGPT) 6 U/L      AST (SGOT) 14 U/L      Alkaline Phosphatase 51 U/L      Total Bilirubin 0.4 mg/dL      Globulin 2.9 gm/dL      A/G Ratio 1.2 g/dL      BUN/Creatinine Ratio 13.8     Anion Gap 9.3 mmol/L      eGFR 53.1 mL/min/1.73     Narrative:      GFR Categories in Chronic Kidney Disease (CKD)      GFR Category          GFR (mL/min/1.73)    Interpretation  G1                     90  or greater         Normal or high (1)  G2                      60-89                Mild decrease (1)  G3a                   45-59                Mild to moderate decrease  G3b                   30-44                Moderate to severe decrease  G4                    15-29                Severe decrease  G5                    14 or less           Kidney failure          (1)In the absence of evidence of kidney disease, neither GFR category G1 or G2 fulfill the criteria for CKD.    eGFR calculation 2021 CKD-EPI creatinine equation, which does not include race as a factor    [place] Draw [[id number]] Collected: 02/18/25 1657    Specimen: Blood Updated: 02/18/25 1716    Narrative:      The following orders were created for panel order [place] Draw.  Procedure                               Abnormality         Status                     ---------                               -----------         ------                     Green Top (Gel)[[id number]]                                  Final result               Lavender Top[[id number]]                                                                Gold Top - SST[[id number]]                                   Final result               Light Blue Top[[id number]]                                   Final result                 Please view results for these tests on the individual orders.    Green Top (Gel) [[id number]] Collected: 02/18/25 1657    Specimen: Blood Updated: 02/18/25 1716     Extra Tube Hold for add-ons.     Comment: Auto resulted.       Gold Top - SST [[id number]] Collected: 02/18/25 1657    Specimen: Blood Updated: 02/18/25 1716     Extra Tube Hold for add-ons.     Comment: Auto resulted.       Light Blue Top [[id number]] Collected: 02/18/25 1657    Specimen: Blood Updated: 02/18/25 1716     Extra Tube Hold for add-ons.     Comment: Auto resulted       Blood Culture - Blood, Arm, Left [[id number]] Collected: 02/18/25 1709    Specimen: Blood from Arm, Left Updated: 02/18/25  1714    CBC & Differential [[id number]]  (Abnormal) Collected: 02/18/25 1657    Specimen: Blood Updated: 02/18/25 1702    Narrative:      The following orders were created for panel order CBC & Differential.  Procedure                               Abnormality         Status                     ---------                               -----------         ------                     CBC Auto Differential[[id number]]        Abnormal            Final result                 Please view results for these tests on the individual orders.    CBC Auto Differential [[id number]]  (Abnormal) Collected: 02/18/25 1657    Specimen: Blood Updated: 02/18/25 1702     WBC 16.58 10*3/mm3      RBC 3.59 10*6/mm3      Hemoglobin 10.8 g/dL      Hematocrit 34.1 %      MCV 95.0 fL      MCH 30.1 pg      MCHC 31.7 g/dL      RDW 12.6 %      RDW-SD 43.6 fl      MPV 8.4 fL      Platelets 281 10*3/mm3      Neutrophil % 87.4 %      Lymphocyte % 4.8 %      Monocyte % 6.3 %      Eosinophil % 0.7 %      Basophil % 0.3 %      Immature Grans % 0.5 %      Neutrophils, Absolute 14.48 10*3/mm3      Lymphocytes, Absolute 0.80 10*3/mm3      Monocytes, Absolute 1.04 10*3/mm3      Eosinophils, Absolute 0.12 10*3/mm3      Basophils, Absolute 0.05 10*3/mm3      Immature Grans, Absolute 0.09 10*3/mm3      nRBC 0.0 /100 WBC     POC Lactate [[id number]]  (Normal) Collected: 02/18/25 1700    Specimen: Blood Updated: 02/18/25 1702     Lactate 0.5 mmol/L      Comment: Serial Number: [id number]:  [id number]       Urinalysis With Culture If Indicated - Straight Cath [[id number]]  (Normal) Collected: 02/18/25 1630    Specimen: Urine from Straight Cath Updated: 02/18/25 1640     Color, UA Yellow     Appearance, UA Clear     pH, UA 7.5     Specific Gravity, UA 1.019     Glucose, UA Negative     Ketones, UA Negative     Bilirubin, UA Negative     Blood, UA Negative     Protein, UA Negative     Leuk Esterase, UA Negative     Nitrite, UA Negative     Urobilinogen, UA 1.0  E.U./dL    Narrative:      In absence of clinical symptoms, the presence of pyuria, bacteria, and/or nitrites on the urinalysis result does not correlate with infection.  Urine microscopic not indicated.             Imaging Results (Last 24 Hours)       Procedure Component Value Units Date/Time    CT Head Without Contrast [[id number]] Collected: 02/18/25 1901     Updated: 02/18/25 1904    Narrative:      CT HEAD WO CONTRAST    Date of Exam: 2/18/2025 6:44 PM EST    Indication: altered mental status.    Comparison: None available.    Technique: Axial CT images were obtained of the head without contrast administration.  Coronal reconstructions were performed.  Automated exposure control and iterative reconstruction methods were used.    Findings: No intracranial hemorrhage. Gray-white matter differentiation is maintained without evidence of an acute infarction. Multiple foci of decreased attenuation are present within the subcortical, deep cerebral, and periventricular white matter   consistent with chronic small vessel/microangiopathic ischemic changes. No extra-axial mass or collection. The ventricles and sulci are prominent commensurate with involutional changes. The posterior fossa appears grossly normal. Sellar and suprasellar   structures are normal.    Orbital and periorbital soft tissues are normal. Chronic mucosal thickening with mucoperiosteal reactive changes of the left maxillary sinus.. The bony calvarium is intact.      Impression:      Impression: No acute intracranial pathology.          Electronically Signed: Yaya Allen MD    2/18/2025 7:02 PM EST    Workstation ID: [id number]    XR Chest 1 View [[id number]] Collected: 02/18/25 1619     Updated: 02/18/25 1622    Narrative:      XR CHEST 1 VW    Date of Exam: 2/18/2025 4:10 PM EST    Indication: fever    Comparison: 10/11/2024    Findings:  Cardiac size is normal for the projection. The patient is rotated. The pulmonary vascular markings are normal and  the lungs are clear. There are old healed left-sided rib fractures and there are postop changes of prior vertebroplasty.      Impression:      Impression:  No active disease.        Electronically Signed: Colton Costello MD    2/18/2025 4:20 PM EST    Workstation ID: [id number]              Assessment & Plan        This is a 74 y.o. female with:    Active and Resolved Problems  Active Hospital Problems    Diagnosis  POA    **Sepsis [A41.9]  Yes      Resolved Hospital Problems   No resolved problems to display.       Sepsis due to unclear source  R/O lumbar spine surgical site infection  History of recent L spine fusion  Acute metabolic encephalopathy due to sepsis  Patient meets SIRS criteria with WBC 16.58, Temp 102.8  UA negative for infection, CXR did not show consolidation  MRI L spine ordered in ED showed   Interval postoperative changes of laminectomies from L3-L5 with fluid in the laminectomy beds which indents the dorsal thecal sac at these levels. This may represent postoperative changes/seromas, though superimposed infection is not excluded by imaging.   I discussed with neurosurgeon on call who recommended to reach out to surgeon who performed surgery. As per neurosurgeon, patient was seen by Dr. Mercedes over a month ago and he advised against surgery at that time.   I reach out to Dr. Cantrell from Orthopedic surgery through his answering service, he called me back and MRI L spine findings discussed. He stated he does not have privileges at [facility] however he does not feel that the patient is septic due to L spine surgery site infection in a short period of time. He stated he would reach out to [facility] in am but he did not recommend transfer at present.   The patient is started on empiric cefepime and IV vancomycin  Will hold on giving total 30 ml/kg IVF bolus given patient has remained normotensive.   ID consultation     Acute hypoxic respiratory failure  Patient saturating 83 % on room air and requiring 3 L NC  oxygen supplementation   A CT chest PE protocol is ordered.   Continue to wean oxygen as tolerated        VTE Prophylaxis:  Pharmacologic VTE prophylaxis orders are present.        The patient desires to be as follows:    CODE STATUS:    Code Status (Patient has no pulse and is not breathing): CPR (Attempt to Resuscitate)  Medical Interventions (Patient has pulse or is breathing): Full Support        Ambika Bazan, who can be contacted at , is the designated person to make medical decisions on the patient's behalf if She is incapable of doing so. This was clarified with patient and/or next of kin on 2/18/2025 during the course of this H&P.    Admission Status:  I believe this patient meets inpatient status.    Expected Length of Stay: 2-3    PDMP and Medication Dispenses via Sidebar reviewed and consistent with patient reported medications.    I discussed the patient's findings and my recommendations with patient, family, and nursing staff.      Signature:     This document has been electronically signed by Carlos Llanes Alvarez, MD on February 18, 2025 20:04 [facility] Hospitalist Team

## 2025-02-19 NOTE — PROGRESS NOTES
[facility]     Progress Note    Patient Name: Nola Fitzpatrick  : 1950  MRN: [id number]  Primary Care Physician:  Miranda Hylton APRN  Date of admission: (Not on file)    Subjective   Subjective     Chief Complaint: fever, confusion, sob, back with leg pain      History of Present Illness  Ms fitzpatrick was admitted to [facility] on 2/10/2025 for a L3-S1 laminotomy. She was kept for 3 days after surgery for pain control and to attemtp to get rehab placement. Placement was denied as her pain/functionality had improved. She was discharged home with home health. According to her and her daughter she was progressing at home but with continued pain until Monday morning. Home healt came and noted a change in her affect. She appeared ill and confused prompting a recommendation to visit the ED where she was admited for confusion and meeting sepsis protocol.     She has remained neuro intact ble. States her surgial pain has not worsened. She is ambulating with use of a walker. Denies leg weakness. Denies drainage or intensifying pain around the incision.     An mri lumbar without contrast showed continued stenosis in the surgical area and fluid collection within the laminectomy defects at L3-4, L4-5. Ct head was negative for acute processes. She was put on iv antibiotics which quickly improved her condition.    SOB and desats prompted a ct chest with PE protocol which is positive for small PE as well as infiltrates.    When visited today in the hospital she appeared back to her baseline with the exception of needing NC O2.Vitals have normalized but still has had fever in a 24 hr period. Infectious disease has changed antibiotics as her clinical picture has begun to clarify itself.    Review of Systems    Objective   Objective     Vitals:   Temp:  [98 °F (36.7 °C)-102.8 °F (39.3 °C)] 98 °F (36.7 °C)  Heart Rate:  [73-97] 75  Resp:  [14-20] 14  BP: (102-138)/(40-75) 131/73  Flow (L/min) (Oxygen Therapy):   [2-3] 2    Physical Exam  Muscle testing and sensation testing is normal both lower extremities to soft touch and proprioception. 5/5 ehl, dorsiflextion, plantar flextion, knee flexion/extension and hip flexion.     Incision with minimal erythema, no fluctuance or dehiscence    Result Review    [unfilled]    Assessment & Plan   Assessment / Plan     Brief Patient Summary:  Nola Tariq is a 74 y.o. female ,approx one week s/p lumbar decompression without hardware, who has been admitted for post op delirium, sepsis, pneumonia and PE. Fl;uid collection in the surgical site favored at this time to be seroma not infection.    Plan:   From a spine standpoint we will monitor. No wash out planned now. She would have to be taken off heperine for 24hrs or given ffp. No need for emergent surgery. Surgery is a possibly to alleviate the continued stenosis, if she deteriorates neurologically, or if she fails to improve whith IV antibiotics for upper respiratory causes.    Will follow daily.      VTE Prophylaxis:  Heperine drip in place        CODE STATUS:       Disposition:  recommend discharge to inpatient rehab    ASHLEY Conklin

## 2025-02-20 LAB
ANION GAP SERPL CALCULATED.3IONS-SCNC: 6.6 MMOL/L (ref 5–15)
APTT PPP: 195.9 SECONDS (ref 22.7–35.4)
APTT PPP: 94.5 SECONDS (ref 22.7–35.4)
BUN SERPL-MCNC: 14 MG/DL (ref 8–23)
BUN/CREAT SERPL: 14.9 (ref 7–25)
CALCIUM SPEC-SCNC: 9.1 MG/DL (ref 8.6–10.5)
CHLORIDE SERPL-SCNC: 105 MMOL/L (ref 98–107)
CO2 SERPL-SCNC: 28.4 MMOL/L (ref 22–29)
CREAT SERPL-MCNC: 0.94 MG/DL (ref 0.57–1)
EGFRCR SERPLBLD CKD-EPI 2021: 63.4 ML/MIN/1.73
GLUCOSE SERPL-MCNC: 117 MG/DL (ref 65–99)
POTASSIUM SERPL-SCNC: 4.2 MMOL/L (ref 3.5–5.2)
SODIUM SERPL-SCNC: 140 MMOL/L (ref 136–145)

## 2025-02-20 PROCEDURE — 97166 OT EVAL MOD COMPLEX 45 MIN: CPT

## 2025-02-20 PROCEDURE — 97162 PT EVAL MOD COMPLEX 30 MIN: CPT

## 2025-02-20 PROCEDURE — 25010000002 PIPERACILLIN SOD-TAZOBACTAM PER 1 G: Performed by: NURSE PRACTITIONER

## 2025-02-20 PROCEDURE — 85730 THROMBOPLASTIN TIME PARTIAL: CPT | Performed by: INTERNAL MEDICINE

## 2025-02-20 RX ADMIN — PIPERACILLIN AND TAZOBACTAM 3.38 G: 3; .375 INJECTION, POWDER, FOR SOLUTION INTRAVENOUS at 11:48

## 2025-02-20 RX ADMIN — GABAPENTIN 600 MG: 300 CAPSULE ORAL at 17:13

## 2025-02-20 RX ADMIN — GABAPENTIN 600 MG: 300 CAPSULE ORAL at 21:51

## 2025-02-20 RX ADMIN — GABAPENTIN 600 MG: 300 CAPSULE ORAL at 06:09

## 2025-02-20 RX ADMIN — PIPERACILLIN AND TAZOBACTAM 3.38 G: 3; .375 INJECTION, POWDER, FOR SOLUTION INTRAVENOUS at 02:18

## 2025-02-20 RX ADMIN — OXYCODONE 5 MG: 5 TABLET ORAL at 11:48

## 2025-02-20 RX ADMIN — TOPIRAMATE 25 MG: 25 TABLET, FILM COATED ORAL at 08:38

## 2025-02-20 RX ADMIN — APIXABAN 10 MG: 5 TABLET, FILM COATED ORAL at 12:32

## 2025-02-20 RX ADMIN — PANTOPRAZOLE SODIUM 40 MG: 40 TABLET, DELAYED RELEASE ORAL at 06:09

## 2025-02-20 RX ADMIN — APIXABAN 10 MG: 5 TABLET, FILM COATED ORAL at 21:51

## 2025-02-20 RX ADMIN — PIPERACILLIN AND TAZOBACTAM 3.38 G: 3; .375 INJECTION, POWDER, FOR SOLUTION INTRAVENOUS at 17:13

## 2025-02-20 RX ADMIN — Medication 10 ML: at 21:55

## 2025-02-20 RX ADMIN — OXYCODONE 5 MG: 5 TABLET ORAL at 06:09

## 2025-02-20 RX ADMIN — Medication 10 ML: at 10:17

## 2025-02-20 RX ADMIN — OXYCODONE 5 MG: 5 TABLET ORAL at 02:18

## 2025-02-20 NOTE — PROGRESS NOTES
[facility]     Progress Note    Patient Name: Nola Fitzpatrick  : 1950  MRN: [id number]  Primary Care Physician:  Miranda Hylton APRN  Date of admission: 2025    Subjective   Subjective     Chief Complaint: fever, confusion, sob, back with leg pain      History of Present Illness    Update   Pt is improved overnight. Anitbiotics seem to be working. Still with back and some leg pain which will be a chronic problem with her. She is not displaying signs or symptoms of post op infection.    Ms fitzpatrick was admitted to [facility] on 2/10/2025 for a L3-S1 laminotomy. She was kept for 3 days after surgery for pain control and to attemtp to get rehab placement. Placement was denied as her pain/functionality had improved. She was discharged home with home health. According to her and her daughter she was progressing at home but with continued pain until Monday morning. Home healt came and noted a change in her affect. She appeared ill and confused prompting a recommendation to visit the ED where she was admited for confusion and meeting sepsis protocol.     She has remained neuro intact ble. States her surgial pain has not worsened. She is ambulating with use of a walker. Denies leg weakness. Denies drainage or intensifying pain around the incision.     An mri lumbar without contrast showed continued stenosis in the surgical area and fluid collection within the laminectomy defects at L3-4, L4-5. Ct head was negative for acute processes. She was put on iv antibiotics which quickly improved her condition.    SOB and desats prompted a ct chest with PE protocol which is positive for small PE as well as infiltrates.    When visited today in the hospital she appeared back to her baseline with the exception of needing NC O2.Vitals have normalized but still has had fever in a 24 hr period. Infectious disease has changed antibiotics as her clinical picture has begun to clarify itself.    Review of  Systems    Objective   Objective     Vitals:   Temp:  [97.3 °F (36.3 °C)-98.8 °F (37.1 °C)] 97.3 °F (36.3 °C)  Heart Rate:  [64-95] 78  Resp:  [11-15] 15  BP: (119-145)/(59-88) 144/70  Flow (L/min) (Oxygen Therapy):  [2] 2    Physical Exam  Muscle testing and sensation testing is normal both lower extremities to soft touch and proprioception. 5/5 ehl, dorsiflextion, plantar flextion, knee flexion/extension and hip flexion.     Incision with minimal erythema, no fluctuance or dehiscence    Result Review    [unfilled]    Assessment & Plan   Assessment / Plan     Brief Patient Summary:  Nola Tariq is a 74 y.o. female ,approx one week s/p lumbar decompression without hardware, who has been admitted for post op delirium, sepsis, pneumonia and PE. Fl;uid collection in the surgical site favored at this time to be seroma not infection.    Plan:   Will monitor. Ice incision for pain control. Pt to ambulate and hope to d/c to inpatient rehab when medically stable. She has a f/u with us next week in the office.    Will follow daily.      VTE Prophylaxis:  lovanox        CODE STATUS:    Code Status (Patient has no pulse and is not breathing): CPR (Attempt to Resuscitate)  Medical Interventions (Patient has pulse or is breathing): Full Support    Disposition:  recommend discharge to inpatient rehab    ASHLEY Conklin

## 2025-02-20 NOTE — THERAPY EVALUATION
Patient Name: Nola Tariq  : 1950    MRN: [id number]                              Today's Date: 2025       Admit Date: 2025    Visit Dx:     ICD-10-CM ICD-9-CM   1. Sepsis, due to unspecified organism, unspecified whether acute organ dysfunction present  A41.9 038.9     995.91   2. General weakness  R53.1 780.79     Patient Active Problem List   Diagnosis    Back pain    Closed fracture of thoracic vertebra    Degeneration of intervertebral disc of thoracic region    Family history of diabetes mellitus    Thoracic kyphosis    Lumbosacral radiculopathy    Spinal stenosis of lumbar region    Stenosis of lumbosacral spine    Spinal stenosis of thoracic region    Thoracic radiculopathy    Migraine    COVID-19    Compression fracture of L3 lumbar vertebra, closed, initial encounter    Compression fracture of L4 lumbar vertebra, with delayed healing, subsequent encounter    Sepsis     Past Medical History:   Diagnosis Date    Arthritis     [place] esophagus     Chronic pain disorder     COVID-19     DJD (degenerative joint disease)     Extremity pain     Fall at home     Fall at home     fracture rt. leg    Foot pain, right     Fractures     GERD (gastroesophageal reflux disease)     Headache, tension-type     Hypertension     Joint pain     Leg pain, right     Low back pain     Migraine     Plantar fasciitis     Shingles     Shoulder pain, right     Sleep apnea     Spinal stenosis 2024    Epidural on      Past Surgical History:   Procedure Laterality Date    BACK SURGERY  2009    Decompression    CARPAL TUNNEL RELEASE      col[place].    COLONOSCOPY      EPIDURAL BLOCK      EYE SURGERY      catarac    KNEE ARTHROSCOPY      SPINAL CORD STIMULATOR TRIAL W/ LAMINOTOMY      SPINE SURGERY      Kyphoplasty    TUBAL ABDOMINAL LIGATION      VERTEBROPLASTY Bilateral 10/11/2024    Procedure: VERTEBROPLASTY;  Surgeon: Kirk Nascimento DO;  Location: [facility] MAIN OR;  Service: Pain Management;  Laterality:  Bilateral;    VERTEBROPLASTY Bilateral 12/3/2024    Procedure: KYPHOPLASTY at L4;  Surgeon: Kirk Nascimento DO;  Location: [facility] MAIN OR;  Service: Pain Management;  Laterality: Bilateral;    WRIST SURGERY      tendon release      General Information       Row Name 02/20/25 1533          Physical Therapy Time and Intention    Document Type evaluation  -CR     Mode of Treatment physical therapy  -CR       Row Name 02/20/25 1533          General Information    Patient Profile Reviewed yes  -CR     Prior Level of Function independent:;all household mobility  prior to surgery; using rollator.  -CR     Existing Precautions/Restrictions spinal;fall  -CR     Barriers to Rehab medically complex  -CR       Row Name 02/20/25 1533          Living Environment    People in Home sibling(s)  -CR       Row Name 02/20/25 1533          Cognition    Orientation Status (Cognition) oriented x 4  -CR       Row Name 02/20/25 1533          Safety Issues/Impairments Affecting Functional Mobility    Safety Issues Affecting Function (Mobility) safety precautions follow-through/compliance  -CR     Impairments Affecting Function (Mobility) pain;postural/trunk control;endurance/activity tolerance;shortness of breath;strength  -CR               User Key  (r) = Recorded By, (t) = Taken By, (c) = Cosigned By      Initials Name Provider Type    CR Reyes, Carmela, PT Physical Therapist                   Mobility       Row Name 02/20/25 1536          Bed Mobility    Comment, (Bed Mobility) up in chair  -CR       Row Name 02/20/25 1536          Bed-Chair Transfer    Bed-Chair [place] (Transfers) standby assist  -CR     Assistive Device (Bed-Chair Transfers) walker, front-wheeled  -CR       Row Name 02/20/25 1536          Sit-Stand Transfer    Sit-Stand [place] (Transfers) contact guard  -CR     Assistive Device (Sit-Stand Transfers) walker, front-wheeled  -CR       Row Name 02/20/25 1536          Gait/Stairs (Locomotion)    [place] Level  (Gait) contact guard  -CR     Assistive Device (Gait) walker, front-wheeled  -CR     Distance in Feet (Gait) 10  -CR     Deviations/Abnormal Patterns (Gait) gait speed decreased  -CR     Bilateral Gait Deviations forward flexed posture  -CR               User Key  (r) = Recorded By, (t) = Taken By, (c) = Cosigned By      Initials Name Provider Type    CR Reyes, Carmela, PT Physical Therapist                   Obj/Interventions       Row Name 02/20/25 1537          Range of Motion Comprehensive    Comment, General Range of Motion AROM BLE min limit due to pain  -CR       Row Name 02/20/25 1537          Strength Comprehensive (MMT)    Comment, General Manual Muscle Testing (MMT) Assessment BLE grossly 3/5  -CR       Row Name 02/20/25 1537          Balance    Balance Assessment sitting static balance;standing static balance;standing dynamic balance  -CR     Static Sitting Balance independent  -CR     Position, Sitting Balance sitting in chair  -CR     Static Standing Balance standby assist  -CR     Dynamic Standing Balance contact guard  -CR     Position/Device Used, Standing Balance walker, front-wheeled  -CR               User Key  (r) = Recorded By, (t) = Taken By, (c) = Cosigned By      Initials Name Provider Type    CR Reyes, Carmela, PT Physical Therapist                   Goals/Plan       Row Name 02/20/25 1344          Bed Mobility Goal 1 (PT)    Activity/Assistive Device (Bed Mobility Goal 1, PT) sit to supine/supine to sit  -CR     [place] Level/Cues Needed (Bed Mobility Goal 1, PT) standby assist  -CR     Time Frame (Bed Mobility Goal 1, PT) long term goal (LTG);2 weeks  -CR     Strategies/Barriers (Bed Mobility Goal 1, PT) following log roll technique  -CR       Row Name 02/20/25 8812          Gait Training Goal 1 (PT)    Activity/Assistive Device (Gait Training Goal 1, PT) assistive device use;diminish gait deviation;decrease fall risk;increase endurance/gait distance;improve balance and speed;walker,  rolling  -CR     [place] Level (Gait Training Goal 1, PT) contact guard required  -CR     Distance (Gait Training Goal 1, PT) 50  -CR     Time Frame (Gait Training Goal 1, PT) long term goal (LTG);2 weeks  -CR       Row Name 02/20/25 1556          Therapy Assessment/Plan (PT)    Planned Therapy Interventions (PT) balance training;bed mobility training;gait training;home exercise program;transfer training;strengthening;neuromuscular re-education;postural re-education;patient/family education  -CR               User Key  (r) = Recorded By, (t) = Taken By, (c) = Cosigned By      Initials Name Provider Type    CR Reyes, Carmela, PT Physical Therapist                   Clinical Impression       Row Name 02/20/25 1542          Pain    Pain Location hip;back  -CR     Pain Side/Orientation right  -CR     Pain Management Interventions cold applied  -CR     Additional Documentation Pain Scale: FACES Pre/Post-Treatment (Group)  -CR       Row Name 02/20/25 1548          Pain Scale: FACES Pre/Post-Treatment    Pain: FACES Scale, Pretreatment 6-->hurts even more  -CR     Posttreatment Pain Rating 8-->hurts whole lot  -CR       Row Name 02/20/25 1548          Plan of Care Review    Plan of Care Reviewed With patient  -CR     Outcome Evaluation 73 y/o female admitted on 2/18 due to weakness, confusion and fever. MRI showed + fluid s/p laminectomies L3-5. CT showed + PE , pna and emphysema. Patient is s/p lumbar decompression without hardware in another facility on 2/10/25. PMH includes SA. Patient lives with brother and prior to surgery was independent using rollator. At time of this eval, patient alert and oriented x 4. Patient c/o R hip and back pain, radiating to R knee worse with gait and improved with icing and supine position. Patient needed min A to come to standing from bedside chair using rw for support. Only tolerated 10 ft of gait with CGA ; gait very slow with patient unable to bring trunk upright while walking but  can achieve upright posture in standing. Patient needs continued skilled rehab services at discharge for trunk and LE mm strengthening, mobility training .  -CR       Row Name 02/20/25 1548          Therapy Assessment/Plan (PT)    Patient/Family Therapy Goals Statement (PT) rehab  -CR     Rehab Potential (PT) good  -CR     Criteria for Skilled Interventions Met (PT) yes;skilled treatment is necessary  -CR     Therapy Frequency (PT) 5 times/wk  -CR     Predicted Duration of Therapy Intervention (PT) dc  -CR       Row Name 02/20/25 1548          Positioning and Restraints    Post Treatment Position chair  -CR     In Chair reclined;call light within reach  -CR               User Key  (r) = Recorded By, (t) = Taken By, (c) = Cosigned By      Initials Name Provider Type    CR Reyes, Carmela, PT Physical Therapist                   Outcome Measures       Row Name 02/20/25 1556 02/20/25 0850       How much help from another person do you currently need...    Turning from your back to your side while in flat bed without using bedrails? 3  -CR 3  -RH    Moving from lying on back to sitting on the side of a flat bed without bedrails? 3  -CR 3  -RH    Moving to and from a bed to a chair (including a wheelchair)? 3  -CR 3  -RH    Standing up from a chair using your arms (e.g., wheelchair, bedside chair)? 3  -CR 3  -RH    Climbing 3-5 steps with a railing? 2  -CR 3  -RH    To walk in hospital room? 2  -CR 3  -RH    AM-PAC 6 Clicks Score (PT) 16  -CR 18  -RH              User Key  (r) = Recorded By, (t) = Taken By, (c) = Cosigned By      Initials Name Provider Type    CR Reyes, Carmela, NICHOLE Physical Therapist    RH Shivani Zuleta, RN Registered Nurse                                 Physical Therapy Education       Title: PT OT SLP Therapies (In Progress)       Topic: Physical Therapy (In Progress)       Point: Mobility training (In Progress)       Learning Progress Summary            Patient Acceptance, E, NR by CR at 2/20/2025  [id number]                      Point: Home exercise program (Not Started)       Learner Progress:  Not documented in this visit.              Point: Body mechanics (Not Started)       Learner Progress:  Not documented in this visit.              Point: Precautions (Not Started)       Learner Progress:  Not documented in this visit.                              User Key       Initials Effective Dates Name Provider Type Discipline    CR 06/16/21 -  Reyes, Carmela, PT Physical Therapist PT                  PT Recommendation and Plan  Planned Therapy Interventions (PT): balance training, bed mobility training, gait training, home exercise program, transfer training, strengthening, neuromuscular re-education, postural re-education, patient/family education  Outcome Evaluation: 73 y/o female admitted on 2/18 due to weakness, confusion and fever. MRI showed + fluid s/p laminectomies L3-5. CT showed + PE , pna and emphysema. Patient is s/p lumbar decompression without hardware in another facility on 2/10/25. PMH includes SA. Patient lives with brother and prior to surgery was independent using rollator. At time of this eval, patient alert and oriented x 4. Patient c/o R hip and back pain, radiating to R knee worse with gait and improved with icing and supine position. Patient needed min A to come to standing from bedside chair using rw for support. Only tolerated 10 ft of gait with CGA ; gait very slow with patient unable to bring trunk upright while walking but can achieve upright posture in standing. Patient needs continued skilled rehab services at discharge for trunk and LE mm strengthening, mobility training .     Time Calculation:         PT Charges       Row Name 02/20/25 1557             Time Calculation    Start Time 1118  -CR      Stop Time 1150  -CR      Time Calculation (min) 32 min  -CR      PT Received On 02/20/25  -CR      PT - Next Appointment 02/21/25  -CR      PT Goal Re-Cert Due Date 03/06/25  -CR         Time  Calculation- PT    Total Timed Code Minutes- PT 0 minute(s)  -CR                User Key  (r) = Recorded By, (t) = Taken By, (c) = Cosigned By      Initials Name Provider Type    CR Reyes, Carmela, NICHOLE Physical Therapist                  Therapy Charges for Today       Code Description Service Date Service Provider Modifiers Qty    80775103225 HC PT EVAL MOD COMPLEXITY 4 2/20/2025 Reyes, Carmela, PT GP 1            PT G-Codes  AM-PAC 6 Clicks Score (PT): 16  PT Discharge Summary  Anticipated Discharge Disposition (PT): skilled nursing facility    Carmela Reyes, PT  2/20/2025

## 2025-02-20 NOTE — PROGRESS NOTES
Infectious Diseases Progress Note      LOS: 2 days   Patient Care Team:  Miranda Hylton APRN as PCP - General (Nurse Practitioner)    Chief Complaint: Shortness of breath,, cough    Subjective       The patient has been afebrile for the last 24 hours.  The patient is on 2 L of oxygen by nasal cannula hemodynamically stable, and is tolerating antimicrobial therapy.  Currently in the chair and feeling little better today      Review of Systems:   Review of Systems   Constitutional: Negative.  Positive for fatigue.   HENT: Negative.     Eyes: Negative.    Respiratory: Negative.  Positive for cough and shortness of breath.    Cardiovascular: Negative.    Gastrointestinal: Negative.    Endocrine: Negative.    Genitourinary: Negative.    Musculoskeletal: Negative.  Positive for back pain.   Skin: Negative.    Neurological: Negative.  Positive for weakness.   Psychiatric/Behavioral: Negative.     All other systems reviewed and are negative.       Objective     Vital Signs  Temp:  [97.3 °F (36.3 °C)-98.9 °F (37.2 °C)] 98.9 °F (37.2 °C)  Heart Rate:  [64-95] 76  Resp:  [11-16] 16  BP: (113-145)/(59-88) 113/62    Physical Exam:  Physical Exam  Vitals reviewed.   Constitutional:       Appearance: She is well-developed and normal weight. She is ill-appearing.   HENT:      Head: Normocephalic.   Eyes:      Pupils: Pupils are equal, round, and reactive to light.   Cardiovascular:      Rate and Rhythm: Normal rate and regular rhythm.      Heart sounds: Normal heart sounds.   Pulmonary:      Effort: Pulmonary effort is normal.      Breath sounds: Rales present.   Abdominal:      General: Bowel sounds are normal.      Palpations: Abdomen is soft.   Musculoskeletal:         General: Normal range of motion.      Cervical back: Neck supple.   Skin:     General: Skin is warm and dry.      Comments: Lumbar incision without significant fluctuance erythema or drainage   Neurological:      Mental Status: She is alert and oriented to  person, place, and time.      Comments: No significant extremity weakness          Results Review:    I have reviewed all clinical data, test, lab, and imaging results.     Radiology  No Radiology Exams Resulted Within Past 24 Hours    Cardiology    Laboratory    Results from last 7 days   Lab Units 02/19/25  2351 02/19/25  0228 02/18/25  1657   WBC 10*3/mm3 12.70* 20.13* 16.58*   HEMOGLOBIN g/dL 10.9* 10.2* 10.8*   HEMATOCRIT % 35.9 33.0* 34.1   PLATELETS 10*3/mm3 264 270 281     Results from last 7 days   Lab Units 02/19/25  2351 02/19/25  1408 02/18/25  1657   SODIUM mmol/L 140 139 140   POTASSIUM mmol/L 4.2 4.5 4.3   CHLORIDE mmol/L 105 107 105   CO2 mmol/L 28.4 24.2 25.7   BUN mg/dL 14 14 15   CREATININE mg/dL 0.94 0.79 1.09*   GLUCOSE mg/dL 117* 96 113*   ALBUMIN g/dL  --   --  3.4*   BILIRUBIN mg/dL  --   --  0.4   ALK PHOS U/L  --   --  51   AST (SGOT) U/L  --   --  14   ALT (SGPT) U/L  --   --  6   CALCIUM mg/dL 9.1 8.4* 8.5*                 Microbiology   Microbiology Results (last 10 days)       Procedure Component Value - Date/Time    MRSA Screen, PCR (Inpatient) - Swab, Nares [[id number]]  (Normal) Collected: 02/18/25 2150    Lab Status: Final result Specimen: Swab from Nares Updated: 02/18/25 2312     MRSA PCR No MRSA Detected    Narrative:      The negative predictive value of this diagnostic test is high and should only be used to consider de-escalating anti-MRSA therapy. A positive result may indicate colonization with MRSA and must be correlated clinically.    Blood Culture - Blood, Arm, Right [[id number]]  (Normal) Collected: 02/18/25 1720    Lab Status: Preliminary result Specimen: Blood from Arm, Right Updated: 02/19/25 1731     Blood Culture No growth at 24 hours    Blood Culture - Blood, Arm, Left [[id number]]  (Normal) Collected: 02/18/25 1709    Lab Status: Preliminary result Specimen: Blood from Arm, Left Updated: 02/19/25 9736     Blood Culture No growth at 24 hours    Respiratory Panel PCR  w/COVID-19(SARS-CoV-2) PADMINI/LUIS FELIPE/SHANNON/PAD/COR/SILVINO In-House, NP Swab in UTM/VTM, 2 HR TAT - Swab, Nasopharynx [[id number]]  (Normal) Collected: 02/18/25 1631    Lab Status: Final result Specimen: Swab from Nasopharynx Updated: 02/18/25 1733     ADENOVIRUS, PCR Not Detected     Coronavirus 229E Not Detected     Coronavirus HKU1 Not Detected     Coronavirus NL63 Not Detected     Coronavirus OC43 Not Detected     COVID19 Not Detected     Human Metapneumovirus Not Detected     Human Rhinovirus/Enterovirus Not Detected     Influenza A PCR Not Detected     Influenza B PCR Not Detected     Parainfluenza Virus 1 Not Detected     Parainfluenza Virus 2 Not Detected     Parainfluenza Virus 3 Not Detected     Parainfluenza Virus 4 Not Detected     RSV, PCR Not Detected     Bordetella pertussis pcr Not Detected     Bordetella parapertussis PCR Not Detected     Chlamydophila pneumoniae PCR Not Detected     Mycoplasma pneumo by PCR Not Detected    Narrative:      In the setting of a positive respiratory panel with a viral infection PLUS a negative procalcitonin without other underlying concern for bacterial infection, consider observing off antibiotics or discontinuation of antibiotics and continue supportive care. If the respiratory panel is positive for atypical bacterial infection (Bordetella pertussis, Chlamydophila pneumoniae, or Mycoplasma pneumoniae), consider antibiotic de-escalation to target atypical bacterial infection.    Legionella Antigen, Urine - Urine, Straight Cath [[id number]]  (Normal) Collected: 02/18/25 1630    Lab Status: Final result Specimen: Urine from Straight Cath Updated: 02/19/25 0945     LEGIONELLA ANTIGEN, URINE Negative    S. Pneumo Ag Urine or CSF - Urine, Straight Cath [[id number]]  (Normal) Collected: 02/18/25 1630    Lab Status: Final result Specimen: Urine from Straight Cath Updated: 02/19/25 0945     Strep Pneumo Ag Negative            Medication Review:       Schedule Meds  apixaban, 10 mg, Oral,  BID   Followed by  [START ON 2/27/2025] apixaban, 5 mg, Oral, BID  gabapentin, 600 mg, Oral, Q8H  pantoprazole, 40 mg, Oral, Q AM  piperacillin-tazobactam, 3.375 g, Intravenous, Q8H  sodium chloride, 10 mL, Intravenous, Q12H  topiramate, 25 mg, Oral, Daily        Infusion Meds       PRN Meds    acetaminophen **OR** acetaminophen **OR** acetaminophen    albuterol    senna-docusate sodium **AND** polyethylene glycol **AND** bisacodyl **AND** bisacodyl    Calcium Replacement - Follow Nurse / BPA Driven Protocol    Magnesium Standard Dose Replacement - Follow Nurse / BPA Driven Protocol    Morphine    oxyCODONE    Phosphorus Replacement - Follow Nurse / BPA Driven Protocol    Potassium Replacement - Follow Nurse / BPA Driven Protocol    sodium chloride    sodium chloride        Assessment & Plan       Antimicrobial Therapy   1.  IV Zosyn        2.        3.        4.        5.              Assessment     Bilateral lower lobe infiltrates mostly on the left side.  Concerning for aspiration pneumonia.  MRSA screen was negative as well as urine for Legionella and pneumococcal antigen.  Viral PCR panel was negative  The patient is currently on 2 L of oxygen     Febrile illness on admission secondary to above..  Resolving    Reactive leukocytosis-likely related to the above.  Trending down     Toxic metabolic encephalopathy most likely secondary to infection.  The patient currently back to her baseline     Recent lumbar spinal infusion as an outpatient.  No obvious infection during the examination of the incision site.  MRI did show postoperative changes of laminectomies from L3-L5 with some fluid in the laminectomy beds that could possibly be postoperative changes of seroma     Plan     Continue IV Zosyn 3.375 g every 8 hours  Can likely switch to oral antibiotics in a day or 2  Encourage incentive spirometer use  Continue supportive care  A.m. labs  Case discussed with patient and spinal surgery PA at bedside      Karen MADRID  JEM Diaz  02/20/25  14:13 EST    Note is dictated utilizing voice recognition software/Dragon

## 2025-02-20 NOTE — PLAN OF CARE
Goal Outcome Evaluation:  Plan of Care Reviewed With: patient        Progress: no change  Outcome Evaluation: Pt is a 73 y/o F admitted to [facility] 2/18/25 with AMS and generalized weakness, hospital dx sepsis. Of note, pt had L3-L5 lumbar fusion 2/10/25. Orthopedic following and monitoring at this time. At baseline pt resides with brother in [facility] with ramp entry. Pt typically (I) with ADLs, (I) with mobility without AD however progressively requiring RW and electric scooter. Pt brother, daughter and son assisted at home post-op recent sx. Pt cleared for OT by nursing, oriented x4 on 2L O2 sitting up in chair upon arrival. Pt c/o 4/10 back pain. Pt reports she was not able progress with therapy and mobility at dc when dc home. Pt comes to standing from chair with CGA with RW, ambulates to BSC with CGA RW. Pt requires min A to come to standing from BSC d/t lower surface, demo fair follow through out spinal precautions with ADLs requiring verbal cues to adhere to. Pt demo poor activity tolerance and anticipate to continue to require significant assist with ADLs to adhere to spinal precautions safety. OT recommend SNF when medically approrpiate for dc. OT will follow within acute setting and progress as appropriate.    Anticipated Discharge Disposition (OT): skilled nursing facility

## 2025-02-20 NOTE — PLAN OF CARE
Goal Outcome Evaluation:  Plan of Care Reviewed With: patient           Outcome Evaluation: 75 y/o female admitted on 2/18 due to weakness, confusion and fever. MRI showed + fluid s/p laminectomies L3-5. CT showed + PE , pna and emphysema. Patient is s/p lumbar decompression without hardware in another facility on 2/10/25. PMH includes SA. Patient lives with brother and prior to surgery was independent using rollator. At time of this eval, patient alert and oriented x 4. Patient c/o R hip and back pain, radiating to R knee worse with gait and improved with icing and supine position. Patient needed min A to come to standing from bedside chair using rw for support. Only tolerated 10 ft of gait with CGA ; gait very slow with patient unable to bring trunk upright while walking but can achieve upright posture in standing. Patient needs continued skilled rehab services at discharge for trunk and LE mm strengthening, mobility training .    Anticipated Discharge Disposition (PT): skilled nursing facility

## 2025-02-20 NOTE — THERAPY EVALUATION
Patient Name: Nola Tariq  : 1950    MRN: [id number]                              Today's Date: 2025       Admit Date: 2025    Visit Dx:     ICD-10-CM ICD-9-CM   1. Sepsis, due to unspecified organism, unspecified whether acute organ dysfunction present  A41.9 038.9     995.91   2. General weakness  R53.1 780.79     Patient Active Problem List   Diagnosis    Back pain    Closed fracture of thoracic vertebra    Degeneration of intervertebral disc of thoracic region    Family history of diabetes mellitus    Thoracic kyphosis    Lumbosacral radiculopathy    Spinal stenosis of lumbar region    Stenosis of lumbosacral spine    Spinal stenosis of thoracic region    Thoracic radiculopathy    Migraine    COVID-19    Compression fracture of L3 lumbar vertebra, closed, initial encounter    Compression fracture of L4 lumbar vertebra, with delayed healing, subsequent encounter    Sepsis     Past Medical History:   Diagnosis Date    Arthritis     [place] esophagus     Chronic pain disorder     COVID-19     DJD (degenerative joint disease)     Extremity pain     Fall at home     Fall at home     fracture rt. leg    Foot pain, right     Fractures     GERD (gastroesophageal reflux disease)     Headache, tension-type     Hypertension     Joint pain     Leg pain, right     Low back pain     Migraine     Plantar fasciitis     Shingles     Shoulder pain, right     Sleep apnea     Spinal stenosis 2024    Epidural on      Past Surgical History:   Procedure Laterality Date    BACK SURGERY  2009    Decompression    CARPAL TUNNEL RELEASE      colin.    COLONOSCOPY      EPIDURAL BLOCK      EYE SURGERY      [place]    KNEE ARTHROSCOPY      SPINAL CORD STIMULATOR TRIAL W/ LAMINOTOMY      SPINE SURGERY      Kyphoplasty    TUBAL ABDOMINAL LIGATION      VERTEBROPLASTY Bilateral 10/11/2024    Procedure: VERTEBROPLASTY;  Surgeon: Kirk Nascimento DO;  Location: [facility] MAIN OR;  Service: Pain Management;  Laterality:  Bilateral;    VERTEBROPLASTY Bilateral 12/3/2024    Procedure: KYPHOPLASTY at L4;  Surgeon: iKrk Nascimento DO;  Location: [facility] MAIN OR;  Service: Pain Management;  Laterality: Bilateral;    WRIST SURGERY      tendon release      General Information       Row Name 02/20/25 1604          OT Time and Intention    Subjective Information complains of;pain  -MS     Document Type evaluation  -MS     Mode of Treatment occupational therapy  -MS     Patient Effort good  -MS       Row Name 02/20/25 1604          General Information    Patient Profile Reviewed yes  -MS     Prior Level of Function independent:;ADL's;all household mobility;community mobility  prior to recent L3-L5 fusion  -MS     Existing Precautions/Restrictions fall;oxygen therapy device and L/min;spinal  no home O2, 2L O2 currently  -MS     Barriers to Rehab medically complex;previous functional deficit  -MS       Row Name 02/20/25 1600          Occupational Profile    Reason for Services/Referral (Occupational Profile) Pt is a 75 y/o F admitted to [facility] 2/18/25 with AMS and generalized weakness, hospital dx sepsis. Of note, pt had L3-L5 lumbar fusion 2/10/25. Orthopedic following and monitoring at this time. CXR (-) acute. CT head (-) acute. MRI lumbar spine indicates post-operative changes.seromas L3-L5, increased severe spinal canal stenosis with cauda equina compression. PMHx significant for chronic pain disorder, hx falls, and kyphoplasty. At baseline pt resides with brother in [facility] with ramp entry. Pt typically (I) with ADLs, (I) with mobility without AD however progressively requiring RW and electric scooter. Pt brother, daughter and son assisted at home post-op recent sx.  -MS     Environmental Supports and Barriers (Occupational Profile) supportive family  -MS       Row Name 02/20/25 1604          Living Environment    People in Home sibling(s)  -MS       Row Name 02/20/25 1604          Home Main Entrance    Number of Stairs, Main Entrance other (see  comments)  ramp entry  -MS       Row Name 02/20/25 1604          Stairs Within Home, Primary    Number of Stairs, Within Home, Primary none  -MS       Row Name 02/20/25 1604          Cognition    Orientation Status (Cognition) oriented x 4  -MS       Row Name 02/20/25 1604          Safety Issues/Impairments Affecting Functional Mobility    Impairments Affecting Function (Mobility) endurance/activity tolerance;pain;strength  -MS               User Key  (r) = Recorded By, (t) = Taken By, (c) = Cosigned By      Initials Name Provider Type    MS Lauren Macario, OT Occupational Therapist                     Mobility/ADL's       Row Name 02/20/25 1606          Bed Mobility    Bed Mobility bed mobility (all) activities  -MS     All Activities, [place] (Bed Mobility) unable to assess  -MS     Comment, (Bed Mobility) sitting up in chair upon arrival  -MS       Row Name 02/20/25 1606          Transfers    Transfers sit-stand transfer;toilet transfer;bed-chair transfer  -MS       Row Name 02/20/25 1606          Bed-Chair Transfer    Bed-Chair [place] (Transfers) standby assist  -MS     Assistive Device (Bed-Chair Transfers) walker, front-wheeled  -MS       Row Name 02/20/25 1606          Sit-Stand Transfer    Sit-Stand [place] (Transfers) contact guard  -MS     Assistive Device (Sit-Stand Transfers) walker, front-wheeled  -MS       Row Name 02/20/25 1606          Toilet Transfer    Type (Toilet Transfer) sit-stand  -MS     [place] Level (Toilet Transfer) minimum assist (75% patient effort)  -MS     Assistive Device (Toilet Transfer) commode, bedside without drop arms;walker, front-wheeled  -MS       Row Name 02/20/25 1606          Functional Mobility    Patient was able to Ambulate yes  -MS       Row Name 02/20/25 1606          Activities of Daily Living    BADL Assessment/Intervention toileting  -MS       Row Name 02/20/25 1606          Toileting Assessment/Training    [place] Level (Toileting)  toileting skills;verbal cues;perform perineal hygiene;adjust/manage clothing;standby assist  -MS     Assistive Devices (Toileting) commode, bedside without drop arms  -MS     Position (Toileting) unsupported sitting  -MS     Comment, (Toileting) requiring verbal cues to adhere to spinal precautions  -MS               User Key  (r) = Recorded By, (t) = Taken By, (c) = Cosigned By      Initials Name Provider Type    Lauren Royal OT Occupational Therapist                   Obj/Interventions       Row Name 02/20/25 1607          Sensory Assessment (Somatosensory)    Sensory Assessment (Somatosensory) UE sensation intact  -MS       Row Name 02/20/25 1607          Vision Assessment/Intervention    Visual Impairment/Limitations WFL  -MS       Row Name 02/20/25 1607          Range of Motion Comprehensive    General Range of Motion bilateral upper extremity ROM WFL  -MS       Row Name 02/20/25 1607          Strength Comprehensive (MMT)    Comment, General Manual Muscle Testing (MMT) Assessment BUE grossly 3+/5  -MS       Row Name 02/20/25 1607          Balance    Balance Assessment sitting static balance;sitting dynamic balance;standing static balance;standing dynamic balance  -MS     Static Sitting Balance modified independence  -MS     Dynamic Sitting Balance modified independence  -MS     Position, Sitting Balance supported;sitting in chair  -MS     Static Standing Balance standby assist  -MS     Dynamic Standing Balance contact guard  -MS     Position/Device Used, Standing Balance supported;walker, front-wheeled  -MS               User Key  (r) = Recorded By, (t) = Taken By, (c) = Cosigned By      Initials Name Provider Type    Lauren Royal OT Occupational Therapist                   Goals/Plan       Row Name 02/20/25 1613          Bed Mobility Goal 1 (OT)    Activity/Assistive Device (Bed Mobility Goal 1, OT) bed mobility activities, all  -MS     [place] Level/Cues Needed (Bed Mobility Goal 1, OT)  modified independence  -MS     Time Frame (Bed Mobility Goal 1, OT) long term goal (LTG);2 weeks  -MS     Progress/Outcomes (Bed Mobility Goal 1, OT) new goal  -MS       Row Name 02/20/25 1613          Transfer Goal 1 (OT)    Activity/Assistive Device (Transfer Goal 1, OT) transfers, all  -MS     [place] Level/Cues Needed (Transfer Goal 1, OT) modified independence  -MS     Time Frame (Transfer Goal 1, OT) long term goal (LTG);2 weeks  -MS     Progress/Outcome (Transfer Goal 1, OT) new goal  -MS       Row Name 02/20/25 1613          Dressing Goal 1 (OT)    Activity/Device (Dressing Goal 1, OT) dressing skills, all  -MS     [place]/Cues Needed (Dressing Goal 1, OT) minimum assist (75% or more patient effort)  -MS     Time Frame (Dressing Goal 1, OT) long term goal (LTG);2 weeks  -MS     Progress/Outcome (Dressing Goal 1, OT) new goal  -MS       Row Name 02/20/25 1613          Toileting Goal 1 (OT)    Activity/Device (Toileting Goal 1, OT) toileting skills, all  -MS     [place] Level/Cues Needed (Toileting Goal 1, OT) modified independence  -MS     Time Frame (Toileting Goal 1, OT) long term goal (LTG);2 weeks  -MS     Progress/Outcome (Toileting Goal 1, OT) new goal  -MS       Row Name 02/20/25 1613          Therapy Assessment/Plan (OT)    Planned Therapy Interventions (OT) activity tolerance training;adaptive equipment training;BADL retraining;IADL retraining;functional balance retraining;neuromuscular control/coordination retraining;occupation/activity based interventions;ROM/therapeutic exercise;patient/caregiver education/training;passive ROM/stretching;strengthening exercise;transfer/mobility retraining  -MS               User Key  (r) = Recorded By, (t) = Taken By, (c) = Cosigned By      Initials Name Provider Type    Lauren Royal, OT Occupational Therapist                   Clinical Impression       Row Name 02/20/25 1607          Pain Assessment    Pretreatment Pain Rating 4/10  -MS      Posttreatment Pain Rating 4/10  -MS     Pain Location back  -MS     Pain Management Interventions positioning techniques utilized  -MS       Row Name 02/20/25 1607          Plan of Care Review    Plan of Care Reviewed With patient  -MS     Progress no change  -MS     Outcome Evaluation Pt is a 73 y/o F admitted to [facility] 2/18/25 with AMS and generalized weakness, hospital dx sepsis. Of note, pt had L3-L5 lumbar fusion 2/10/25. Orthopedic following and monitoring at this time. At baseline pt resides with brother in [facility] with ramp entry. Pt typically (I) with ADLs, (I) with mobility without AD however progressively requiring RW and electric scooter. Pt brother, daughter and son assisted at home post-op recent sx. Pt cleared for OT by nursing, oriented x4 on 2L O2 sitting up in chair upon arrival. Pt c/o 4/10 back pain. Pt reports she was not able progress with therapy and mobility at dc when dc home. Pt comes to standing from chair with CGA with RW, ambulates to BSC with CGA RW. Pt requires min A to come to standing from BSC d/t lower surface, demo fair follow through out spinal precautions with ADLs requiring verbal cues to adhere to. Pt demo poor activity tolerance and anticipate to continue to require significant assist with ADLs to adhere to spinal precautions safety. OT recommend SNF when medically approrpiate for dc. OT will follow within acute setting and progress as appropriate.  -MS       Row Name 02/20/25 1607          Therapy Assessment/Plan (OT)    Rehab Potential (OT) good  -MS     Criteria for Skilled Therapeutic Interventions Met (OT) yes;meets criteria;skilled treatment is necessary  -MS     Therapy Frequency (OT) 5 times/wk  -MS     Predicted Duration of Therapy Intervention (OT) until d/c  -MS       Row Name 02/20/25 1607          Therapy Plan Review/Discharge Plan (OT)    Anticipated Discharge Disposition (OT) skilled nursing facility  -MS       Row Name 02/20/25 1607          Vital Signs     Pretreatment Heart Rate (beats/min) 75  -MS     Pretreatment Resp Rate (breaths/min) 13  -MS     Pre SpO2 (%) 93  -MS     O2 Delivery Pre Treatment nasal cannula  -MS     Intra SpO2 (%) 89  -MS     O2 Delivery Intra Treatment room air  -MS     Post SpO2 (%) 93  -MS     O2 Delivery Post Treatment nasal cannula  -MS     Pre Patient Position Sitting  -MS     Intra Patient Position Standing  -MS     Post Patient Position Sitting  -MS       Row Name 02/20/25 1607          Positioning and Restraints    Pre-Treatment Position sitting in chair/recliner  -MS     Post Treatment Position chair  -MS     In Chair reclined;call light within reach;encouraged to call for assist;exit alarm on;legs elevated  -MS               User Key  (r) = Recorded By, (t) = Taken By, (c) = Cosigned By      Initials Name Provider Type    Lauren Royal, OT Occupational Therapist                   Outcome Measures       Row Name 02/20/25 1614          How much help from another is currently needed...    Putting on and taking off regular lower body clothing? 3  -MS     Bathing (including washing, rinsing, and drying) 3  -MS     Toileting (which includes using toilet bed pan or urinal) 3  -MS     Putting on and taking off regular upper body clothing 4  -MS     Taking care of personal grooming (such as brushing teeth) 4  -MS     Eating meals 4  -MS     AM-PAC 6 Clicks Score (OT) 21  -MS       Row Name 02/20/25 1556 02/20/25 0850       How much help from another person do you currently need...    Turning from your back to your side while in flat bed without using bedrails? 3  -CR 3  -RH    Moving from lying on back to sitting on the side of a flat bed without bedrails? 3  -CR 3  -RH    Moving to and from a bed to a chair (including a wheelchair)? 3  -CR 3  -RH    Standing up from a chair using your arms (e.g., wheelchair, bedside chair)? 3  -CR 3  -RH    Climbing 3-5 steps with a railing? 2  -CR 3  -RH    To walk in hospital room? 2  -CR 3  -RH     AM-PAC 6 Clicks Score (PT) 16  -CR 18  -      Row Name 02/20/25 1614          Functional Assessment    Outcome Measure Options AM-PAC 6 Clicks Daily Activity (OT)  -MS               User Key  (r) = Recorded By, (t) = Taken By, (c) = Cosigned By      Initials Name Provider Type    CR Reyes, Carmela, PT Physical Therapist     Shivani Zuleta, RN Registered Nurse    MS Lauren Macario, OT Occupational Therapist                    Occupational Therapy Education       Title: PT OT SLP Therapies (In Progress)       Topic: Occupational Therapy (In Progress)       Point: ADL training (Done)       Description:   Instruct learner(s) on proper safety adaptation and remediation techniques during self care or transfers.   Instruct in proper use of assistive devices.                  Learning Progress Summary            Patient Acceptance, E,TB, VU by MS at 2/20/2025 1614                      Point: Home exercise program (Not Started)       Description:   Instruct learner(s) on appropriate technique for monitoring, assisting and/or progressing therapeutic exercises/activities.                  Learner Progress:  Not documented in this visit.              Point: Precautions (Done)       Description:   Instruct learner(s) on prescribed precautions during self-care and functional transfers.                  Learning Progress Summary            Patient Acceptance, E,TB, VU by MS at 2/20/2025 1614                      Point: Body mechanics (Done)       Description:   Instruct learner(s) on proper positioning and spine alignment during self-care, functional mobility activities and/or exercises.                  Learning Progress Summary            Patient Acceptance, E,TB, VU by MS at 2/20/2025 1614                                      User Key       Initials Effective Dates Name Provider Type Discipline    MS 07/13/22 -  Lauren Macario OT Occupational Therapist OT                  OT Recommendation and Plan  Planned Therapy  Interventions (OT): activity tolerance training, adaptive equipment training, BADL retraining, IADL retraining, functional balance retraining, neuromuscular control/coordination retraining, occupation/activity based interventions, ROM/therapeutic exercise, patient/caregiver education/training, passive ROM/stretching, strengthening exercise, transfer/mobility retraining  Therapy Frequency (OT): 5 times/wk  Plan of Care Review  Plan of Care Reviewed With: patient  Progress: no change  Outcome Evaluation: Pt is a 73 y/o F admitted to [facility] 2/18/25 with AMS and generalized weakness, hospital dx sepsis. Of note, pt had L3-L5 lumbar fusion 2/10/25. Orthopedic following and monitoring at this time. At baseline pt resides with brother in [facility] with ramp entry. Pt typically (I) with ADLs, (I) with mobility without AD however progressively requiring RW and electric scooter. Pt brother, daughter and son assisted at home post-op recent sx. Pt cleared for OT by nursing, oriented x4 on 2L O2 sitting up in chair upon arrival. Pt c/o 4/10 back pain. Pt reports she was not able progress with therapy and mobility at dc when dc home. Pt comes to standing from chair with CGA with RW, ambulates to BSC with CGA RW. Pt requires min A to come to standing from BSC d/t lower surface, demo fair follow through out spinal precautions with ADLs requiring verbal cues to adhere to. Pt demo poor activity tolerance and anticipate to continue to require significant assist with ADLs to adhere to spinal precautions safety. OT recommend SNF when medically approrpiate for dc. OT will follow within acute setting and progress as appropriate.     Time Calculation:         Time Calculation- OT       Row Name 02/20/25 1614             Time Calculation- OT    OT Start Time 1349  -MS      OT Stop Time 1416  -MS      OT Time Calculation (min) 27 min  -MS      Total Timed Code Minutes- OT 0 minute(s)  -MS      OT Received On 02/20/25  -MS      OT - Next Appointment  02/21/25  -MS      OT Goal Re-Cert Due Date 03/06/25  -MS                User Key  (r) = Recorded By, (t) = Taken By, (c) = Cosigned By      Initials Name Provider Type    Lauren Royal OT Occupational Therapist                  Therapy Charges for Today       Code Description Service Date Service Provider Modifiers Qty    09231141741 HC OT EVAL MOD COMPLEXITY 4 2/20/2025 Lauren Macario OT GO 1                 Lauren Macario OT  2/20/2025

## 2025-02-20 NOTE — CASE MANAGEMENT/SOCIAL WORK
Continued Stay Note  MARCIE Ivan     Patient Name: Nola Tariq  MRN: [id number]  Today's Date: 2/20/2025    Admit Date: 2/18/2025    Plan: From home with brother and VNA C (will need MANAV order).   Discharge Plan       Row Name 02/20/25 1547       Plan    Plan Comments DC barriers: Awaiting therapy evals. No plans for surgery from Ortho. Heparin gtt stopped today and pt started on Eliquis for PE.             Megan Naegele, RN     Office Phone: [phone]  Office Cell: [phone]

## 2025-02-20 NOTE — PLAN OF CARE
Goal Outcome Evaluation:      Patient complained of pain this shift, see MAR. Heparin gtt infusing. Remains on IV abx. Patient stable at this time.

## 2025-02-20 NOTE — PHARMACY PATIENT ASSISTANCE
Transitions of Care (test claim):    Drug Eliquis:   Covered/PA Required: Covered without PA  Copay Per Month: $145.67  Is the medication eligible for a trial card/copay card? Free trial available from     Drug Xarelto:   Covered/PA Required: Covered without PA  Copay Per Month: $143.69  Is the medication eligible for a trial card/copay card? Free trial available from     This test claim coverage is only valid on the date the note is published. Copay/coverage could vary depending on patient coverage at a later date.  Additionally this test claim is for the sole purpose of a price check not a clinical recommendation.     For billing questions please call [facility] Pharmacist at ext: [phone]  For M2B questions please call [facility] at ext: [phone]    Ngoc MarianoD

## 2025-02-21 LAB
ANION GAP SERPL CALCULATED.3IONS-SCNC: 8.6 MMOL/L (ref 5–15)
BASOPHILS # BLD AUTO: 0.04 10*3/MM3 (ref 0–0.2)
BASOPHILS NFR BLD AUTO: 0.5 % (ref 0–1.5)
BUN SERPL-MCNC: 13 MG/DL (ref 8–23)
BUN/CREAT SERPL: 12 (ref 7–25)
CALCIUM SPEC-SCNC: 8.6 MG/DL (ref 8.6–10.5)
CHLORIDE SERPL-SCNC: 104 MMOL/L (ref 98–107)
CO2 SERPL-SCNC: 23.4 MMOL/L (ref 22–29)
CREAT SERPL-MCNC: 1.08 MG/DL (ref 0.57–1)
DEPRECATED RDW RBC AUTO: 46.1 FL (ref 37–54)
EGFRCR SERPLBLD CKD-EPI 2021: 53.7 ML/MIN/1.73
EOSINOPHIL # BLD AUTO: 0.16 10*3/MM3 (ref 0–0.4)
EOSINOPHIL NFR BLD AUTO: 2.1 % (ref 0.3–6.2)
ERYTHROCYTE [DISTWIDTH] IN BLOOD BY AUTOMATED COUNT: 12.9 % (ref 12.3–15.4)
GLUCOSE SERPL-MCNC: 101 MG/DL (ref 65–99)
HCT VFR BLD AUTO: 32.3 % (ref 34–46.6)
HGB BLD-MCNC: 9.9 G/DL (ref 12–15.9)
IMM GRANULOCYTES # BLD AUTO: 0.03 10*3/MM3 (ref 0–0.05)
IMM GRANULOCYTES NFR BLD AUTO: 0.4 % (ref 0–0.5)
LYMPHOCYTES # BLD AUTO: 1.55 10*3/MM3 (ref 0.7–3.1)
LYMPHOCYTES NFR BLD AUTO: 20.5 % (ref 19.6–45.3)
MCH RBC QN AUTO: 29.7 PG (ref 26.6–33)
MCHC RBC AUTO-ENTMCNC: 30.7 G/DL (ref 31.5–35.7)
MCV RBC AUTO: 97 FL (ref 79–97)
MONOCYTES # BLD AUTO: 0.86 10*3/MM3 (ref 0.1–0.9)
MONOCYTES NFR BLD AUTO: 11.4 % (ref 5–12)
NEUTROPHILS NFR BLD AUTO: 4.91 10*3/MM3 (ref 1.7–7)
NEUTROPHILS NFR BLD AUTO: 65.1 % (ref 42.7–76)
NRBC BLD AUTO-RTO: 0 /100 WBC (ref 0–0.2)
PLATELET # BLD AUTO: 252 10*3/MM3 (ref 140–450)
PMV BLD AUTO: 9.1 FL (ref 6–12)
POTASSIUM SERPL-SCNC: 4.2 MMOL/L (ref 3.5–5.2)
RBC # BLD AUTO: 3.33 10*6/MM3 (ref 3.77–5.28)
SODIUM SERPL-SCNC: 136 MMOL/L (ref 136–145)
WBC NRBC COR # BLD AUTO: 7.55 10*3/MM3 (ref 3.4–10.8)

## 2025-02-21 PROCEDURE — 97530 THERAPEUTIC ACTIVITIES: CPT

## 2025-02-21 PROCEDURE — 80048 BASIC METABOLIC PNL TOTAL CA: CPT | Performed by: INTERNAL MEDICINE

## 2025-02-21 PROCEDURE — 85025 COMPLETE CBC W/AUTO DIFF WBC: CPT | Performed by: NURSE PRACTITIONER

## 2025-02-21 PROCEDURE — 25010000002 PIPERACILLIN SOD-TAZOBACTAM PER 1 G: Performed by: NURSE PRACTITIONER

## 2025-02-21 PROCEDURE — 97535 SELF CARE MNGMENT TRAINING: CPT

## 2025-02-21 PROCEDURE — 97116 GAIT TRAINING THERAPY: CPT

## 2025-02-21 RX ADMIN — GABAPENTIN 600 MG: 300 CAPSULE ORAL at 15:10

## 2025-02-21 RX ADMIN — Medication 10 ML: at 20:30

## 2025-02-21 RX ADMIN — GABAPENTIN 600 MG: 300 CAPSULE ORAL at 21:37

## 2025-02-21 RX ADMIN — PIPERACILLIN AND TAZOBACTAM 3.38 G: 3; .375 INJECTION, POWDER, FOR SOLUTION INTRAVENOUS at 01:41

## 2025-02-21 RX ADMIN — BISACODYL 5 MG: 5 TABLET, COATED ORAL at 09:10

## 2025-02-21 RX ADMIN — PIPERACILLIN AND TAZOBACTAM 3.38 G: 3; .375 INJECTION, POWDER, FOR SOLUTION INTRAVENOUS at 09:05

## 2025-02-21 RX ADMIN — OXYCODONE 5 MG: 5 TABLET ORAL at 20:43

## 2025-02-21 RX ADMIN — PANTOPRAZOLE SODIUM 40 MG: 40 TABLET, DELAYED RELEASE ORAL at 06:00

## 2025-02-21 RX ADMIN — OXYCODONE 5 MG: 5 TABLET ORAL at 06:03

## 2025-02-21 RX ADMIN — Medication 10 ML: at 09:10

## 2025-02-21 RX ADMIN — OXYCODONE 5 MG: 5 TABLET ORAL at 15:10

## 2025-02-21 RX ADMIN — APIXABAN 10 MG: 5 TABLET, FILM COATED ORAL at 20:30

## 2025-02-21 RX ADMIN — GABAPENTIN 600 MG: 300 CAPSULE ORAL at 06:00

## 2025-02-21 RX ADMIN — APIXABAN 10 MG: 5 TABLET, FILM COATED ORAL at 09:04

## 2025-02-21 RX ADMIN — AMOXICILLIN AND CLAVULANATE POTASSIUM 1 TABLET: 875; 125 TABLET, FILM COATED ORAL at 20:30

## 2025-02-21 RX ADMIN — ACETAMINOPHEN 650 MG: 325 TABLET, FILM COATED ORAL at 20:30

## 2025-02-21 NOTE — PROGRESS NOTES
Infectious Diseases Progress Note      LOS: 3 days   Patient Care Team:  Miranda Hylton APRN as PCP - General (Nurse Practitioner)    Chief Complaint: Shortness of breath,, cough    Subjective       The patient has been afebrile for the last 24 hours.  The patient is on room air, hemodynamically stable, and is tolerating antimicrobial therapy.  Currently in the chair and feeling little better today-was able to walk with less back pain today      Review of Systems:   Review of Systems   HENT: Negative.     Eyes: Negative.    Respiratory:  Positive for cough and shortness of breath.    Cardiovascular: Negative.    Gastrointestinal: Negative.    Endocrine: Negative.    Genitourinary: Negative.    Musculoskeletal:  Positive for back pain.   Skin: Negative.    Neurological:  Positive for weakness.   Psychiatric/Behavioral: Negative.     All other systems reviewed and are negative.       Objective     Vital Signs  Temp:  [97.8 °F (36.6 °C)-99.4 °F (37.4 °C)] (P) 99.4 °F (37.4 °C)  Heart Rate:  [68-92] (P) 71  Resp:  [13-19] (P) 15  BP: (126-153)/(48-92) (P) 136/66    Physical Exam:  Physical Exam  Vitals reviewed.   Constitutional:       Appearance: She is well-developed and normal weight. She is ill-appearing.   HENT:      Head: Normocephalic.   Eyes:      Pupils: Pupils are equal, round, and reactive to light.   Cardiovascular:      Rate and Rhythm: Normal rate and regular rhythm.      Heart sounds: Normal heart sounds.   Pulmonary:      Effort: Pulmonary effort is normal.      Breath sounds: Rales present.   Abdominal:      General: Bowel sounds are normal.      Palpations: Abdomen is soft.   Musculoskeletal:         General: Normal range of motion.      Cervical back: Neck supple.   Skin:     General: Skin is warm and dry.      Comments: Lumbar incision without significant fluctuance erythema or drainage   Neurological:      Mental Status: She is alert and oriented to person, place, and time.      Comments: No  significant extremity weakness          Results Review:    I have reviewed all clinical data, test, lab, and imaging results.     Radiology  No Radiology Exams Resulted Within Past 24 Hours    Cardiology    Laboratory    Results from last 7 days   Lab Units 02/21/25  0133 02/19/25 2351 02/19/25  0228 02/18/25  1657   WBC 10*3/mm3 7.55 12.70* 20.13* 16.58*   HEMOGLOBIN g/dL 9.9* 10.9* 10.2* 10.8*   HEMATOCRIT % 32.3* 35.9 33.0* 34.1   PLATELETS 10*3/mm3 252 264 270 281     Results from last 7 days   Lab Units 02/21/25  0133 02/19/25  2351 02/19/25  1408 02/18/25  1657   SODIUM mmol/L 136 140 139 140   POTASSIUM mmol/L 4.2 4.2 4.5 4.3   CHLORIDE mmol/L 104 105 107 105   CO2 mmol/L 23.4 28.4 24.2 25.7   BUN mg/dL 13 14 14 15   CREATININE mg/dL 1.08* 0.94 0.79 1.09*   GLUCOSE mg/dL 101* 117* 96 113*   ALBUMIN g/dL  --   --   --  3.4*   BILIRUBIN mg/dL  --   --   --  0.4   ALK PHOS U/L  --   --   --  51   AST (SGOT) U/L  --   --   --  14   ALT (SGPT) U/L  --   --   --  6   CALCIUM mg/dL 8.6 9.1 8.4* 8.5*                 Microbiology   Microbiology Results (last 10 days)       Procedure Component Value - Date/Time    MRSA Screen, PCR (Inpatient) - Swab, Nares [[id number]]  (Normal) Collected: 02/18/25 2150    Lab Status: Final result Specimen: Swab from Nares Updated: 02/18/25 2312     MRSA PCR No MRSA Detected    Narrative:      The negative predictive value of this diagnostic test is high and should only be used to consider de-escalating anti-MRSA therapy. A positive result may indicate colonization with MRSA and must be correlated clinically.    Blood Culture - Blood, Arm, Right [[id number]]  (Normal) Collected: 02/18/25 1720    Lab Status: Preliminary result Specimen: Blood from Arm, Right Updated: 02/20/25 1731     Blood Culture No growth at 2 days    Blood Culture - Blood, Arm, Left [[id number]]  (Normal) Collected: 02/18/25 170    Lab Status: Preliminary result Specimen: Blood from Arm, Left Updated: 02/20/25 0666      Blood Culture No growth at 2 days    Respiratory Panel PCR w/COVID-19(SARS-CoV-2) PADMINI/LUIS FELIPE/SHANNON/PAD/COR/SILVINO In-House, NP Swab in UTM/VTM, 2 HR TAT - Swab, Nasopharynx [[id number]]  (Normal) Collected: 02/18/25 1631    Lab Status: Final result Specimen: Swab from Nasopharynx Updated: 02/18/25 1733     ADENOVIRUS, PCR Not Detected     Coronavirus 229E Not Detected     Coronavirus HKU1 Not Detected     Coronavirus NL63 Not Detected     Coronavirus OC43 Not Detected     COVID19 Not Detected     Human Metapneumovirus Not Detected     Human Rhinovirus/Enterovirus Not Detected     Influenza A PCR Not Detected     Influenza B PCR Not Detected     Parainfluenza Virus 1 Not Detected     Parainfluenza Virus 2 Not Detected     Parainfluenza Virus 3 Not Detected     Parainfluenza Virus 4 Not Detected     RSV, PCR Not Detected     Bordetella pertussis pcr Not Detected     Bordetella parapertussis PCR Not Detected     Chlamydophila pneumoniae PCR Not Detected     Mycoplasma pneumo by PCR Not Detected    Narrative:      In the setting of a positive respiratory panel with a viral infection PLUS a negative procalcitonin without other underlying concern for bacterial infection, consider observing off antibiotics or discontinuation of antibiotics and continue supportive care. If the respiratory panel is positive for atypical bacterial infection (Bordetella pertussis, Chlamydophila pneumoniae, or Mycoplasma pneumoniae), consider antibiotic de-escalation to target atypical bacterial infection.    Legionella Antigen, Urine - Urine, Straight Cath [[id number]]  (Normal) Collected: 02/18/25 1630    Lab Status: Final result Specimen: Urine from Straight Cath Updated: 02/19/25 0945     LEGIONELLA ANTIGEN, URINE Negative    S. Pneumo Ag Urine or CSF - Urine, Straight Cath [[id number]]  (Normal) Collected: 02/18/25 1630    Lab Status: Final result Specimen: Urine from Straight Cath Updated: 02/19/25 0945     Strep Pneumo Ag Negative             Medication Review:       Schedule Meds  apixaban, 10 mg, Oral, BID   Followed by  [START ON 2/27/2025] apixaban, 5 mg, Oral, BID  gabapentin, 600 mg, Oral, Q8H  pantoprazole, 40 mg, Oral, Q AM  piperacillin-tazobactam, 3.375 g, Intravenous, Q8H  sodium chloride, 10 mL, Intravenous, Q12H  topiramate, 25 mg, Oral, Daily        Infusion Meds       PRN Meds    acetaminophen **OR** acetaminophen **OR** acetaminophen    albuterol    senna-docusate sodium **AND** polyethylene glycol **AND** bisacodyl **AND** bisacodyl    Calcium Replacement - Follow Nurse / BPA Driven Protocol    Magnesium Standard Dose Replacement - Follow Nurse / BPA Driven Protocol    Morphine    oxyCODONE    Phosphorus Replacement - Follow Nurse / BPA Driven Protocol    Potassium Replacement - Follow Nurse / BPA Driven Protocol    sodium chloride    sodium chloride        Assessment & Plan       Antimicrobial Therapy   1.  P.o. Augmentin        2.        3.        4.        5.              Assessment     Bilateral lower lobe infiltrates mostly on the left side.  Concerning for aspiration pneumonia.  MRSA screen was negative as well as urine for Legionella and pneumococcal antigen.  Viral PCR panel was negative.  now on room air     Febrile illness on admission secondary to above..  Resolving    Reactive leukocytosis-likely related to the above.  Trending down     Toxic metabolic encephalopathy most likely secondary to infection.  The patient currently back to her baseline     Recent lumbar spinal infusion as an outpatient.  No obvious infection during the examination of the incision site.  MRI did show postoperative changes of laminectomies from L3-L5 with some fluid in the laminectomy beds that could possibly be postoperative changes of seroma     Plan     Discontinue IV Zosyn   Start p.o. Augmentin 875/125 mg twice daily for 8 days for 10 days of treatment  Encourage incentive spirometer use  Continue supportive care  Okay to discharge from  Infectious Disease standpoint      Karen Diaz, APRN  02/21/25  14:36 EST    Note is dictated utilizing voice recognition software/Dragon

## 2025-02-21 NOTE — PROGRESS NOTES
[facility] MEDICINE SERVICE  DAILY PROGRESS NOTE    NAME: Nola Tariq  : 1950  MRN: [id number]      LOS: 3 days     PROVIDER OF SERVICE: Daphney Sargent MD    Chief Complaint: Sepsis    Subjective:     Interval History:  History taken from: patient    No new complaint    Review of Systems:   Review of Systems   All other systems reviewed and are negative.      Objective:     Vital Signs  Temp:  [97.8 °F (36.6 °C)-99.4 °F (37.4 °C)] (P) 99.4 °F (37.4 °C)  Heart Rate:  [68-92] (P) 71  Resp:  [13-19] (P) 15  BP: (126-153)/(48-92) (P) 136/66  Flow (L/min) (Oxygen Therapy):  [2] 2   Body mass index is 30.58 kg/m².    Physical Exam  Physical Exam  Constitutional:       Appearance: Normal appearance.   HENT:      Head: Normocephalic and atraumatic.      Nose: Nose normal.      Mouth/Throat:      Mouth: Mucous membranes are moist.   Eyes:      Extraocular Movements: Extraocular movements intact.      Pupils: Pupils are equal, round, and reactive to light.   Cardiovascular:      Rate and Rhythm: Normal rate and regular rhythm.   Pulmonary:      Effort: Pulmonary effort is normal.      Breath sounds: Normal breath sounds.   Abdominal:      General: Abdomen is flat. Bowel sounds are normal.      Palpations: Abdomen is soft.   Musculoskeletal:         General: Normal range of motion.      Cervical back: Normal range of motion and neck supple.   Skin:     General: Skin is warm and dry.   Neurological:      General: No focal deficit present.      Mental Status: She is alert and oriented to person, place, and time.   Psychiatric:         Mood and Affect: Mood normal.         Behavior: Behavior normal.         Thought Content: Thought content normal.         Judgment: Judgment normal.         Current Medications:  Scheduled Meds:amoxicillin-clavulanate, 1 tablet, Oral, Q12H  apixaban, 10 mg, Oral, BID   Followed by  [START ON 2025] apixaban, 5 mg, Oral, BID  gabapentin, 600 mg, Oral, Q8H  pantoprazole,  40 mg, Oral, Q AM  sodium chloride, 10 mL, Intravenous, Q12H  topiramate, 25 mg, Oral, Daily      Continuous Infusions:   PRN Meds:.  acetaminophen **OR** acetaminophen **OR** acetaminophen    albuterol    senna-docusate sodium **AND** polyethylene glycol **AND** bisacodyl **AND** bisacodyl    Calcium Replacement - Follow Nurse / BPA Driven Protocol    Magnesium Standard Dose Replacement - Follow Nurse / BPA Driven Protocol    Morphine    oxyCODONE    Phosphorus Replacement - Follow Nurse / BPA Driven Protocol    Potassium Replacement - Follow Nurse / BPA Driven Protocol    sodium chloride    sodium chloride       Diagnostic Data    Results from last 7 days   Lab Units 02/21/25  0133 02/19/25  0228 02/18/25  1657   WBC 10*3/mm3 7.55   < > 16.58*   HEMOGLOBIN g/dL 9.9*   < > 10.8*   HEMATOCRIT % 32.3*   < > 34.1   PLATELETS 10*3/mm3 252   < > 281   GLUCOSE mg/dL 101*   < > 113*   CREATININE mg/dL 1.08*   < > 1.09*   BUN mg/dL 13   < > 15   SODIUM mmol/L 136   < > 140   POTASSIUM mmol/L 4.2   < > 4.3   AST (SGOT) U/L  --   --  14   ALT (SGPT) U/L  --   --  6   ALK PHOS U/L  --   --  51   BILIRUBIN mg/dL  --   --  0.4   ANION GAP mmol/L 8.6   < > 9.3    < > = values in this interval not displayed.       No radiology results for the last day      I reviewed the patient's new clinical results.    Assessment/Plan:     Active and Resolved Problems  Active Hospital Problems    Diagnosis  POA    **Sepsis [A41.9]  Yes      Resolved Hospital Problems   No resolved problems to display.       Acute hypoxic respiratory failure  Pulmonary embolism  Pneumonia  Sepsis  Possible lumbar spinal surgical site infection        -Currently on 2 L of oxygen via nasal cannula  -Continue IV Zosyn and follow-up on blood and sputum cultures with regards to pneumonia and sepsis.  - Lumbar surgical site does not look infected.  Incision with apparent sutures still in place.  Orthopedic spine surgery is planning exploration and washout tomorrow.   Patient complaining of pain over lumbar incision site/lower back pain.  Ordered as needed pain medications.     - Continue Eliquis for pulmonary embolism.     - Continue current management.    VTE Prophylaxis:  Pharmacologic VTE prophylaxis orders are present.             Disposition Planning:     Barriers to Discharge: Pending clinical improvement  Anticipated Date of Discharge: 2/25/2025  Place of Discharge: Home      Code Status and Medical Interventions: CPR (Attempt to Resuscitate); Full Support   Ordered at: 02/18/25 2000     Code Status (Patient has no pulse and is not breathing):    CPR (Attempt to Resuscitate)     Medical Interventions (Patient has pulse or is breathing):    Full Support       Signature: Electronically signed by Daphney Sargent MD, 02/21/25, 14:55 EST.  [facility] Hospitalist Team

## 2025-02-21 NOTE — PLAN OF CARE
"Assessment: Nola Tariq presents with ADL impairments affecting function including endurance / activity tolerance, pain, range of motion (ROM), and strength. OT feels that pt would still benefit from SNF at dc for safety and quality of care, however pt not agreeable and wishes to dc home with assistance of brother, whom she reports can assist as needed. She does have all equipment needed in the home, and seemed to be receptive of OT education provided this date, including that of HEP following surgery. Demonstrated functioning below baseline abilities indicate the need for continued skilled intervention while inpatient. Tolerating session today without incident. Will continue to follow and progress as tolerated.      Plan/Recommendations:   Moderate Intensity Therapy recommended post-acute care. This is recommended as therapy feels the patient would require 3-4 days per week and wouldn't tolerate \"3 hour daily\" rehab intensity. SNF would be the preferred choice. If the patient does not agree to SNF, arrange HH or OP depending on home bound status. If patient is medically complex, consider LTACH.. Pt requires no DME at discharge.      Pt desires Home with Home Health and Home with family assist at discharge. Pt cooperative; agreeable to therapeutic recommendations and plan of care.     "

## 2025-02-21 NOTE — CASE MANAGEMENT/SOCIAL WORK
Continued Stay Note   Ivan     Patient Name: Nola Tariq  MRN: [id number]  Today's Date: 2/21/2025    Admit Date: 2/18/2025    Plan: Return home with brother and VNA C (current, will need MANAV order). Declines SNF.   Discharge Plan       Row Name 02/21/25 1419       Plan    Plan Return home with brother and VNA C (current, will need MANAV order). Declines SNF.    Patient/Family in Agreement with Plan yes    Plan Comments CM met with patient at bedside this AM. Reviewed therapy's recommendation for SNF placement at discharge. Pt insists that per her Ortho Surgeon, she needs to go to [facility]. Explained that d/t previous denial and therapy recommending SNF placement, insurance will still likely not approve. Offered SNF list for pt to review but she does not feel that would be of any benefit. Feels she can just do home health. CM added to [facility] basket and notified liaisons Janet and Elena of pt's request but they confirm that precert cannot even be started if therapy recommends SNF. Liaison Elena able to discuss with pt at bedside and also confirms pt would like to go home with [facility]. Updated hospitalist and rounds and anticipate dc tomorrow 2/22.             Megan Naegele, RN     Office Phone: [phone]  Office Cell: [phone]

## 2025-02-21 NOTE — PLAN OF CARE
"Goal Outcome Evaluation:      Assessment: Nola Tariq presents with functional mobility impairments which indicate the need for skilled intervention. Tolerating session today without incident. PT ensuring adherence to spinal precautions with skilled interventions performed. Will continue to follow and progress as tolerated.     Plan/Recommendations:   If medically appropriate, Moderate Intensity Therapy recommended post-acute care. This is recommended as therapy feels the patient would require 3-4 days per week and wouldn't tolerate \"3 hour daily\" rehab intensity. SNF would be the preferred choice. If the patient does not agree to SNF, arrange HH or OP depending on home bound status. If patient is medically complex, consider LTACH. Pt requires no DME at discharge.     Pt desires Home with Home Health and Home with family assist at discharge. Pt cooperative; agreeable to therapeutic recommendations and plan of care.                                       "

## 2025-02-21 NOTE — PROGRESS NOTES
[facility] MEDICINE SERVICE  DAILY PROGRESS NOTE    NAME: Nola Tariq  : 1950  MRN: [id number]      LOS: 2 days     PROVIDER OF SERVICE: Daphney Sargent MD    Chief Complaint: Sepsis    Subjective:     Interval History:  History taken from: patient    No new complaint    Review of Systems:   Review of Systems   All other systems reviewed and are negative.      Objective:     Vital Signs  Temp:  [97.3 °F (36.3 °C)-98.9 °F (37.2 °C)] (P) 98.8 °F (37.1 °C)  Heart Rate:  [64-90] (P) 90  Resp:  [11-16] (P) 16  BP: (113-145)/(59-88) (P) 126/70  Flow (L/min) (Oxygen Therapy):  [2] (P) 2   Body mass index is 30.58 kg/m².    Physical Exam  Physical Exam  Constitutional:       Appearance: Normal appearance.   HENT:      Head: Normocephalic and atraumatic.      Nose: Nose normal.      Mouth/Throat:      Mouth: Mucous membranes are moist.   Eyes:      Extraocular Movements: Extraocular movements intact.      Pupils: Pupils are equal, round, and reactive to light.   Cardiovascular:      Rate and Rhythm: Normal rate and regular rhythm.   Pulmonary:      Effort: Pulmonary effort is normal.      Breath sounds: Normal breath sounds.   Abdominal:      General: Abdomen is flat. Bowel sounds are normal.      Palpations: Abdomen is soft.   Musculoskeletal:         General: Normal range of motion.      Cervical back: Normal range of motion and neck supple.   Skin:     General: Skin is warm and dry.   Neurological:      General: No focal deficit present.      Mental Status: She is alert and oriented to person, place, and time.   Psychiatric:         Mood and Affect: Mood normal.         Behavior: Behavior normal.         Thought Content: Thought content normal.         Judgment: Judgment normal.         Current Medications:  Scheduled Meds:apixaban, 10 mg, Oral, BID   Followed by  [START ON 2025] apixaban, 5 mg, Oral, BID  gabapentin, 600 mg, Oral, Q8H  pantoprazole, 40 mg, Oral, Q AM  piperacillin-tazobactam,  3.375 g, Intravenous, Q8H  sodium chloride, 10 mL, Intravenous, Q12H  topiramate, 25 mg, Oral, Daily      Continuous Infusions:   PRN Meds:.  acetaminophen **OR** acetaminophen **OR** acetaminophen    albuterol    senna-docusate sodium **AND** polyethylene glycol **AND** bisacodyl **AND** bisacodyl    Calcium Replacement - Follow Nurse / BPA Driven Protocol    Magnesium Standard Dose Replacement - Follow Nurse / BPA Driven Protocol    Morphine    oxyCODONE    Phosphorus Replacement - Follow Nurse / BPA Driven Protocol    Potassium Replacement - Follow Nurse / BPA Driven Protocol    sodium chloride    sodium chloride       Diagnostic Data    Results from last 7 days   Lab Units 02/19/25  2351 02/19/25  0228 02/18/25  1657   WBC 10*3/mm3 12.70*   < > 16.58*   HEMOGLOBIN g/dL 10.9*   < > 10.8*   HEMATOCRIT % 35.9   < > 34.1   PLATELETS 10*3/mm3 264   < > 281   GLUCOSE mg/dL 117*   < > 113*   CREATININE mg/dL 0.94   < > 1.09*   BUN mg/dL 14   < > 15   SODIUM mmol/L 140   < > 140   POTASSIUM mmol/L 4.2   < > 4.3   AST (SGOT) U/L  --   --  14   ALT (SGPT) U/L  --   --  6   ALK PHOS U/L  --   --  51   BILIRUBIN mg/dL  --   --  0.4   ANION GAP mmol/L 6.6   < > 9.3    < > = values in this interval not displayed.       CT Angiogram Chest Pulmonary Embolism    Result Date: 2/19/2025  1.There is a small nonocclusive pulmonary embolus in a right middle lobe segmental branch. No other pulmonary embolism is identified. No evidence of right heart strain. 2.Bilateral pneumonia, left greater than right. 3.Emphysema. 4.Large hiatal hernia with gastroesophageal reflux. 5.Coronary artery disease. 6.Additional findings as given above. NOTIFICATION: Critical Value/emergent results were called by telephone at the time of interpretation on 2/19/2025 2:00 AM EST to Rashmi, the patient's nurse who verbally acknowledged these results. She reports she will notify the patient's physician immediately. Electronically Signed: Walter Lemus MD   2/19/2025 2:00 AM EST  Workstation ID: [id number]    MRI Lumbar Spine Without Contrast    Result Date: 2/18/2025  Impression: Interval postoperative changes of laminectomies from L3-L5 with fluid in the laminectomy beds which indents the dorsal thecal sac at these levels. This may represent postoperative changes/seromas, though superimposed infection is not excluded by imaging. Mildly increased severe spinal canal stenosis at L3-4 (with cauda equina compression) at L4-5. Multilevel degenerative changes are not significantly changed since 11/19/2024 otherwise. Electronically Signed: Manan Casey  2/18/2025 9:29 PM EST  Workstation ID: [id number]       I reviewed the patient's new clinical results.    Assessment/Plan:     Active and Resolved Problems  Active Hospital Problems    Diagnosis  POA    **Sepsis [A41.9]  Yes      Resolved Hospital Problems   No resolved problems to display.       Acute hypoxic respiratory failure  Pulmonary embolism  Pneumonia  Sepsis  Possible lumbar spinal surgical site infection        -Currently on 2 L of oxygen via nasal cannula  -Continue IV Zosyn and follow-up on blood and sputum cultures with regards to pneumonia and sepsis.  - Lumbar surgical site does not look infected.  Incision with apparent sutures still in place.  Orthopedic spine surgery is planning exploration and washout tomorrow.  Patient complaining of pain over lumbar incision site/lower back pain.  Ordered as needed pain medications.     - Continue Eliquis for pulmonary embolism.     - Continue current management.    VTE Prophylaxis:  Pharmacologic VTE prophylaxis orders are present.             Disposition Planning:     Barriers to Discharge: Pending clinical improvement  Anticipated Date of Discharge: 2/25/2025  Place of Discharge: Home      Code Status and Medical Interventions: CPR (Attempt to Resuscitate); Full Support   Ordered at: 02/18/25 2000     Code Status (Patient has no pulse and is not breathing):    CPR  (Attempt to Resuscitate)     Medical Interventions (Patient has pulse or is breathing):    Full Support       Signature: Electronically signed by Daphney Sargent MD, 02/20/25, 19:12 EST.  [facility] Hospitalist Team

## 2025-02-21 NOTE — THERAPY TREATMENT NOTE
Subjective: Pt agreeable to therapeutic plan of care.  Cognition: oriented to Person, Place, Time, and Situation    Objective:     Precautions - Spinal precautions, falls risk    Bed Mobility: N/A or Not attempted.   Functional Transfers: CGA and with rolling walker     Balance: supported, static, dynamic, and standing CGA and with rolling walker  Functional Ambulation: CGA and with rolling walker    OT discussed lower body dressing techniques, bathing techniques, and toileting techniques for compliance with spinal precautions. She has all needed equipment in the home, and lives with her brother who can provide assist at all times per pt report. Pt was able to report all spinal precautions including log rolling however required cues for recollection of logroll. She was able to demo figure four position attainment from seated position, allowing independent completion of lower body dressing and bathing without bending at the hip beyond allowance of spinal precautions.     Therapeutic Exercise - 10 Reps B UE AROM supported sitting / chair    Vitals: WNL    Pain: 5 VAS Location: Lower back, hips  Interventions for pain: Repositioned, Increased Activity, and Therapeutic Presence  Education: Provided education on the importance of mobility in the acute care setting, Verbal/Tactile Cues, ADL training, Transfer Training, Post-Op Precautions, and HEP    Assessment: Nola Tariq presents with ADL impairments affecting function including endurance / activity tolerance, pain, range of motion (ROM), and strength. OT feels that pt would still benefit from SNF at [facility] for safety and quality of care, however pt not agreeable and wishes to dc home with assistance of brother, whom she reports can assist as needed. She does have all equipment needed in the home, and seemed to be receptive of OT education provided this date, including that of HEP following surgery. Demonstrated functioning below baseline abilities indicate the need for  "continued skilled intervention while inpatient. Tolerating session today without incident. Will continue to follow and progress as tolerated.     Plan/Recommendations:   Moderate Intensity Therapy recommended post-acute care. This is recommended as therapy feels the patient would require 3-4 days per week and wouldn't tolerate \"3 hour daily\" rehab intensity. SNF would be the preferred choice. If the patient does not agree to SNF, arrange HH or OP depending on home bound status. If patient is medically complex, consider LTACH.. Pt requires no DME at discharge.     Pt desires Home with Home Health and Home with family assist at discharge. Pt cooperative; agreeable to therapeutic recommendations and plan of care.     Modified [place]: N/A = No pre-op stroke/TIA    Post-Tx Position: Up in Chair and Call light and personal items within reach  PPE: gloves    Therapy Charges for Today       Code Description Service Date Service Provider Modifiers Qty    42369695007 HC OT SELF CARE/MGMT/TRAIN EA 15 MIN 2/21/2025 Aiden Heard OT GO 1    91242353796 HC OT THERAPEUTIC ACT EA 15 MIN 2/21/2025 Aiden Heard OT GO 1    35515109495 HC OT SELF CARE/MGMT/TRAIN EA 15 MIN 2/21/2025 Aiden Heard OT GO 1           Time Calculation- OT       Row Name 02/21/25 1504             Time Calculation- OT    OT Start Time 1336  -      OT Stop Time 1405  -      OT Time Calculation (min) 29 min  -      Total Timed Code Minutes- OT 29 minute(s)  -      OT Received On 02/21/25  -      OT - Next Appointment 02/24/25  -         Timed Charges    86952 - OT Therapeutic Exercise Minutes 9  -      94583 - OT Therapeutic Activity Minutes 10  -      50305 - OT Self Care/Mgmt Minutes 10  -LS         Total Minutes    Timed Charges Total Minutes 29  -LS       Total Minutes 29  -LS                User Key  (r) = Recorded By, (t) = Taken By, (c) = Cosigned By      Initials Name Provider Type    Aiden Barillas OT Occupational Therapist      "

## 2025-02-21 NOTE — THERAPY TREATMENT NOTE
"Subjective: Pt/nursing agreeable to therapeutic plan of care. Able to recall spinal precautions per MD protocol when asked prior to skilled interventions performed.    Objective:     Precautions - Fall    Bed mobility - Up in recliner upon entry; reports sleeps in recliner at baseline  Transfers - CGA; PT cuing provided for proper technique/ spinal precautions   Ambulation - 30 feet with FWW and CGA; decreased gait speed with PT cuing provided for proper technique/spinal precautions    Discussed d/c plans and equipment needs. PT recommendation of continued skilled PT services at SNF d/t being below functional baseline at fall risk, however pt request to return home with brother. Pt reports 24 hr support available from brother, son, and daughter. Pt reports has FWW, grab bar next to commode with BSC is able to use if needed, motorized w/c, and built In seat/ hand held shower head in walk-in shower with no further equipment needs reported.       Vitals: WNL    Pain:  Location: Lumbar spine/ B hip region--did not give pain level  Intervention for pain: Repositioned    Education: Verbal/Tactile Cues    Assessment: Nola Tariq presents with functional mobility impairments which indicate the need for skilled intervention. Tolerating session today without incident. PT ensuring adherence to spinal precautions with skilled interventions performed. Will continue to follow and progress as tolerated.     Plan/Recommendations:   If medically appropriate, Moderate Intensity Therapy recommended post-acute care. This is recommended as therapy feels the patient would require 3-4 days per week and wouldn't tolerate \"3 hour daily\" rehab intensity. SNF would be the preferred choice. If the patient does not agree to SNF, arrange HH or OP depending on home bound status. If patient is medically complex, consider LTACH. Pt requires no DME at discharge.     Pt desires Home with Home Health and Home with family assist at discharge. Pt " cooperative; agreeable to therapeutic recommendations and plan of care.         Basic Mobility 6-click:  Rollin = Total, A lot = 2, A little = 3; 4 = None  Supine>Sit:   1 = Total, A lot = 2, A little = 3; 4 = None   Sit>Stand with arms:  1 = Total, A lot = 2, A little = 3; 4 = None  Bed>Chair:   1 = Total, A lot = 2, A little = 3; 4 = None  Ambulate in room:  1 = Total, A lot = 2, A little = 3; 4 = None  3-5 Steps with railin = Total, A lot = 2, A little = 3; 4 = None  Score: 16      Post-Tx Position: Up in Chair, Alarms activated, and Call light and personal items within reach  PPE: gloves

## 2025-02-21 NOTE — PLAN OF CARE
Problem: Adult Inpatient Plan of Care  Goal: Absence of Hospital-Acquired Illness or Injury  Intervention: Identify and Manage Fall Risk  Recent Flowsheet Documentation  Taken 2/21/2025 0400 by Uyen Mcgovern RN  Safety Promotion/Fall Prevention: safety round/check completed  Taken 2/21/2025 0200 by Uyen Mcgovern RN  Safety Promotion/Fall Prevention: safety round/check completed  Taken 2/21/2025 0000 by Uyen Mcgovern RN  Safety Promotion/Fall Prevention: safety round/check completed  Taken 2/20/2025 2200 by Uyen Mcgovern RN  Safety Promotion/Fall Prevention: safety round/check completed  Taken 2/20/2025 2002 by Uyen Mcgovern RN  Safety Promotion/Fall Prevention:   safety round/check completed   room organization consistent   nonskid shoes/slippers when out of bed   assistive device/personal items within reach   clutter free environment maintained   fall prevention program maintained  Intervention: Prevent Skin Injury  Recent Flowsheet Documentation  Taken 2/20/2025 2002 by Uyen Mcgovern RN  Body Position: position changed independently  Intervention: Prevent Infection  Recent Flowsheet Documentation  Taken 2/20/2025 2002 by Uyen Mcgovern RN  Infection Prevention:   rest/sleep promoted   single patient room provided  Goal: Optimal Comfort and Wellbeing  Intervention: Provide Person-Centered Care  Recent Flowsheet Documentation  Taken 2/20/2025 2002 by Uyen Mcgovern RN  Trust Relationship/Rapport:   care explained   choices provided     Problem: Fall Injury Risk  Goal: Absence of Fall and Fall-Related Injury  Intervention: Promote Injury-Free Environment  Recent Flowsheet Documentation  Taken 2/21/2025 0400 by Uyen Mcgovern RN  Safety Promotion/Fall Prevention: safety round/check completed  Taken 2/21/2025 0200 by Uyen Mcgovern RN  Safety Promotion/Fall Prevention: safety round/check completed  Taken 2/21/2025 0000 by Uyen Mcgovern RN  Safety Promotion/Fall Prevention:  safety round/check completed  Taken 2/20/2025 2200 by Uyen Mcgovern, RN  Safety Promotion/Fall Prevention: safety round/check completed  Taken 2/20/2025 2002 by Uyen Mcgovern, RN  Safety Promotion/Fall Prevention:   safety round/check completed   room organization consistent   nonskid shoes/slippers when out of bed   assistive device/personal items within reach   clutter free environment maintained   fall prevention program maintained     Problem: Sepsis/Septic Shock  Goal: Absence of Infection Signs and Symptoms  Intervention: Initiate Sepsis Management  Recent Flowsheet Documentation  Taken 2/20/2025 2002 by Uyen Mcgovern, RN  Infection Prevention:   rest/sleep promoted   single patient room provided   Goal Outcome Evaluation:

## 2025-02-22 PROCEDURE — 25010000002 MORPHINE PER 10 MG: Performed by: STUDENT IN AN ORGANIZED HEALTH CARE EDUCATION/TRAINING PROGRAM

## 2025-02-22 RX ADMIN — GABAPENTIN 600 MG: 300 CAPSULE ORAL at 05:02

## 2025-02-22 RX ADMIN — SENNOSIDES AND DOCUSATE SODIUM 2 TABLET: 50; 8.6 TABLET ORAL at 05:02

## 2025-02-22 RX ADMIN — Medication 10 ML: at 08:34

## 2025-02-22 RX ADMIN — PANTOPRAZOLE SODIUM 40 MG: 40 TABLET, DELAYED RELEASE ORAL at 05:02

## 2025-02-22 RX ADMIN — APIXABAN 10 MG: 5 TABLET, FILM COATED ORAL at 20:19

## 2025-02-22 RX ADMIN — Medication 10 ML: at 20:20

## 2025-02-22 RX ADMIN — AMOXICILLIN AND CLAVULANATE POTASSIUM 1 TABLET: 875; 125 TABLET, FILM COATED ORAL at 20:19

## 2025-02-22 RX ADMIN — GABAPENTIN 600 MG: 300 CAPSULE ORAL at 14:08

## 2025-02-22 RX ADMIN — AMOXICILLIN AND CLAVULANATE POTASSIUM 1 TABLET: 875; 125 TABLET, FILM COATED ORAL at 08:33

## 2025-02-22 RX ADMIN — GABAPENTIN 600 MG: 300 CAPSULE ORAL at 21:26

## 2025-02-22 RX ADMIN — OXYCODONE 5 MG: 5 TABLET ORAL at 08:33

## 2025-02-22 RX ADMIN — MORPHINE SULFATE 4 MG: 4 INJECTION, SOLUTION INTRAMUSCULAR; INTRAVENOUS at 11:41

## 2025-02-22 RX ADMIN — OXYCODONE 5 MG: 5 TABLET ORAL at 02:39

## 2025-02-22 RX ADMIN — OXYCODONE 5 MG: 5 TABLET ORAL at 20:23

## 2025-02-22 RX ADMIN — TOPIRAMATE 25 MG: 25 TABLET, FILM COATED ORAL at 08:33

## 2025-02-22 RX ADMIN — OXYCODONE 5 MG: 5 TABLET ORAL at 14:13

## 2025-02-22 RX ADMIN — APIXABAN 10 MG: 5 TABLET, FILM COATED ORAL at 08:33

## 2025-02-22 NOTE — PROGRESS NOTES
[facility] MEDICINE SERVICE  DAILY PROGRESS NOTE    NAME: Nola Tariq  : 1950  MRN: [id number]      LOS: 4 days     PROVIDER OF SERVICE: Daphney Sargent MD    Chief Complaint: Sepsis    Subjective:     Interval History:  History taken from: patient    No new complaint    Review of Systems:   Review of Systems   All other systems reviewed and are negative.      Objective:     Vital Signs  Temp:  [98.3 °F (36.8 °C)-100.4 °F (38 °C)] 98.3 °F (36.8 °C)  Heart Rate:  [73-98] 77  Resp:  [12-18] 16  BP: (119-156)/(62-92) 119/71   Body mass index is 30.58 kg/m².    Physical Exam  Physical Exam  Constitutional:       Appearance: Normal appearance.   HENT:      Head: Normocephalic and atraumatic.      Nose: Nose normal.      Mouth/Throat:      Mouth: Mucous membranes are moist.   Eyes:      Extraocular Movements: Extraocular movements intact.      Pupils: Pupils are equal, round, and reactive to light.   Cardiovascular:      Rate and Rhythm: Normal rate and regular rhythm.   Pulmonary:      Effort: Pulmonary effort is normal.      Breath sounds: Normal breath sounds.   Abdominal:      General: Abdomen is flat. Bowel sounds are normal.      Palpations: Abdomen is soft.   Musculoskeletal:         General: Normal range of motion.      Cervical back: Normal range of motion and neck supple.   Skin:     General: Skin is warm and dry.   Neurological:      General: No focal deficit present.      Mental Status: She is alert and oriented to person, place, and time.   Psychiatric:         Mood and Affect: Mood normal.         Behavior: Behavior normal.         Thought Content: Thought content normal.         Judgment: Judgment normal.         Current Medications:  Scheduled Meds:amoxicillin-clavulanate, 1 tablet, Oral, Q12H  apixaban, 10 mg, Oral, BID   Followed by  [START ON 2025] apixaban, 5 mg, Oral, BID  gabapentin, 600 mg, Oral, Q8H  pantoprazole, 40 mg, Oral, Q AM  sodium chloride, 10 mL, Intravenous,  Q12H  topiramate, 25 mg, Oral, Daily      Continuous Infusions:   PRN Meds:.  acetaminophen **OR** acetaminophen **OR** acetaminophen    albuterol    senna-docusate sodium **AND** polyethylene glycol **AND** bisacodyl **AND** bisacodyl    Calcium Replacement - Follow Nurse / BPA Driven Protocol    Magnesium Standard Dose Replacement - Follow Nurse / BPA Driven Protocol    Morphine    oxyCODONE    Phosphorus Replacement - Follow Nurse / BPA Driven Protocol    Potassium Replacement - Follow Nurse / BPA Driven Protocol    sodium chloride    sodium chloride       Diagnostic Data    Results from last 7 days   Lab Units 02/21/25  0133 02/19/25  0228 02/18/25  1657   WBC 10*3/mm3 7.55   < > 16.58*   HEMOGLOBIN g/dL 9.9*   < > 10.8*   HEMATOCRIT % 32.3*   < > 34.1   PLATELETS 10*3/mm3 252   < > 281   GLUCOSE mg/dL 101*   < > 113*   CREATININE mg/dL 1.08*   < > 1.09*   BUN mg/dL 13   < > 15   SODIUM mmol/L 136   < > 140   POTASSIUM mmol/L 4.2   < > 4.3   AST (SGOT) U/L  --   --  14   ALT (SGPT) U/L  --   --  6   ALK PHOS U/L  --   --  51   BILIRUBIN mg/dL  --   --  0.4   ANION GAP mmol/L 8.6   < > 9.3    < > = values in this interval not displayed.       No radiology results for the last day      I reviewed the patient's new clinical results.    Assessment/Plan:     Active and Resolved Problems  Active Hospital Problems    Diagnosis  POA    **Sepsis [A41.9]  Yes      Resolved Hospital Problems   No resolved problems to display.       Acute hypoxic respiratory failure  Pulmonary embolism  Pneumonia  Sepsis  Possible lumbar spinal surgical site infection        -Currently on 2 L of oxygen via nasal cannula  -Continue IV Zosyn and follow-up on blood and sputum cultures with regards to pneumonia and sepsis.  - Lumbar surgical site does not look infected.  Incision with apparent sutures still in place.  Orthopedic spine surgery is planning exploration and washout tomorrow.  Patient complaining of pain over lumbar incision  site/lower back pain.  Ordered as needed pain medications.     - Continue Eliquis for pulmonary embolism.     - Continue current management.    VTE Prophylaxis:  Pharmacologic VTE prophylaxis orders are present.             Disposition Planning:     Barriers to Discharge: Pending clinical improvement  Anticipated Date of Discharge: 2/25/2025  Place of Discharge: Home      Code Status and Medical Interventions: CPR (Attempt to Resuscitate); Full Support   Ordered at: 02/18/25 2000     Code Status (Patient has no pulse and is not breathing):    CPR (Attempt to Resuscitate)     Medical Interventions (Patient has pulse or is breathing):    Full Support       Signature: Electronically signed by Daphney Sargent MD, 02/22/25, 18:11 EST.  [facility] Hospitalist Team

## 2025-02-22 NOTE — PLAN OF CARE
Pt was afebrile throughout the shift, complained of back pain, given prn pain meds as ordered. Pts daughter at the bedside. No new concerns voiced at this time.     Problem: Adult Inpatient Plan of Care  Goal: Plan of Care Review  Outcome: Progressing  Goal: Patient-Specific Goal (Individualized)  Outcome: Progressing  Goal: Absence of Hospital-Acquired Illness or Injury  Outcome: Progressing  Goal: Optimal Comfort and Wellbeing  Outcome: Progressing  Goal: Readiness for Transition of Care  Outcome: Progressing     Problem: Fall Injury Risk  Goal: Absence of Fall and Fall-Related Injury  Outcome: Progressing     Problem: Pain Acute  Goal: Optimal Pain Control and Function  Outcome: Progressing     Problem: Pain Chronic (Persistent)  Goal: Optimal Pain Control and Function  Outcome: Progressing     Problem: Sepsis/Septic Shock  Goal: Optimal Coping  Outcome: Progressing  Goal: Absence of Bleeding  Outcome: Progressing  Goal: Blood Glucose Level Within Target Range  Outcome: Progressing  Goal: Absence of Infection Signs and Symptoms  Outcome: Progressing  Goal: Optimal Nutrition Delivery  Outcome: Progressing   Goal Outcome Evaluation:

## 2025-02-22 NOTE — PROGRESS NOTES
Infectious Diseases Progress Note      LOS: 4 days   Patient Care Team:  Miranda Hylton APRN as PCP - General (Nurse Practitioner)    Chief Complaint: Shortness of breath, cough    Subjective       The patient has been afebrile for the last 24 hours.  The patient is currently on room air.      Review of Systems:   Review of Systems   HENT: Negative.     Eyes: Negative.    Respiratory:  Positive for cough and shortness of breath.    Cardiovascular: Negative.    Gastrointestinal: Negative.    Endocrine: Negative.    Genitourinary: Negative.    Musculoskeletal:  Positive for back pain.   Skin: Negative.    Neurological:  Positive for weakness.   Psychiatric/Behavioral: Negative.     All other systems reviewed and are negative.       Objective     Vital Signs  Temp:  [98.5 °F (36.9 °C)-100.4 °F (38 °C)] 98.5 °F (36.9 °C)  Heart Rate:  [73-98] 89  Resp:  [12-18] 18  BP: (129-156)/(62-92) 129/62    Physical Exam:  Physical Exam  Vitals reviewed.   Constitutional:       Appearance: She is well-developed and normal weight. She is ill-appearing.   HENT:      Head: Normocephalic.   Eyes:      Pupils: Pupils are equal, round, and reactive to light.   Cardiovascular:      Rate and Rhythm: Normal rate and regular rhythm.      Heart sounds: Normal heart sounds.   Pulmonary:      Effort: Pulmonary effort is normal.      Breath sounds: Rales present.   Abdominal:      General: Bowel sounds are normal.      Palpations: Abdomen is soft.   Musculoskeletal:         General: Normal range of motion.      Cervical back: Neck supple.   Skin:     General: Skin is warm and dry.      Comments: Lumbar incision without significant fluctuance erythema or drainage   Neurological:      Mental Status: She is alert and oriented to person, place, and time.      Comments: No significant extremity weakness          Results Review:    I have reviewed all clinical data, test, lab, and imaging results.     Radiology  No Radiology Exams Resulted Within  Past 24 Hours    Cardiology    Laboratory    Results from last 7 days   Lab Units 02/21/25  0133 02/19/25  2351 02/19/25  0228 02/18/25  1657   WBC 10*3/mm3 7.55 12.70* 20.13* 16.58*   HEMOGLOBIN g/dL 9.9* 10.9* 10.2* 10.8*   HEMATOCRIT % 32.3* 35.9 33.0* 34.1   PLATELETS 10*3/mm3 252 264 270 281     Results from last 7 days   Lab Units 02/21/25  0133 02/19/25  2351 02/19/25  1408 02/18/25  1657   SODIUM mmol/L 136 140 139 140   POTASSIUM mmol/L 4.2 4.2 4.5 4.3   CHLORIDE mmol/L 104 105 107 105   CO2 mmol/L 23.4 28.4 24.2 25.7   BUN mg/dL 13 14 14 15   CREATININE mg/dL 1.08* 0.94 0.79 1.09*   GLUCOSE mg/dL 101* 117* 96 113*   ALBUMIN g/dL  --   --   --  3.4*   BILIRUBIN mg/dL  --   --   --  0.4   ALK PHOS U/L  --   --   --  51   AST (SGOT) U/L  --   --   --  14   ALT (SGPT) U/L  --   --   --  6   CALCIUM mg/dL 8.6 9.1 8.4* 8.5*                 Microbiology   Microbiology Results (last 10 days)       Procedure Component Value - Date/Time    MRSA Screen, PCR (Inpatient) - Swab, Nares [[id number]]  (Normal) Collected: 02/18/25 2150    Lab Status: Final result Specimen: Swab from Nares Updated: 02/18/25 2312     MRSA PCR No MRSA Detected    Narrative:      The negative predictive value of this diagnostic test is high and should only be used to consider de-escalating anti-MRSA therapy. A positive result may indicate colonization with MRSA and must be correlated clinically.    Blood Culture - Blood, Arm, Right [[id number]]  (Normal) Collected: 02/18/25 1720    Lab Status: Preliminary result Specimen: Blood from Arm, Right Updated: 02/21/25 1730     Blood Culture No growth at 3 days    Blood Culture - Blood, Arm, Left [[id number]]  (Normal) Collected: 02/18/25 1709    Lab Status: Preliminary result Specimen: Blood from Arm, Left Updated: 02/21/25 1715     Blood Culture No growth at 3 days    Respiratory Panel PCR w/COVID-19(SARS-CoV-2) PADMINI/LUIS FELIPE/SHANNON/PAD/COR/SILVINO In-House, NP Swab in UTM/VTM, 2 HR TAT - Swab, Nasopharynx  [[id number]]  (Normal) Collected: 02/18/25 1631    Lab Status: Final result Specimen: Swab from Nasopharynx Updated: 02/18/25 1733     ADENOVIRUS, PCR Not Detected     Coronavirus 229E Not Detected     Coronavirus HKU1 Not Detected     Coronavirus NL63 Not Detected     Coronavirus OC43 Not Detected     COVID19 Not Detected     Human Metapneumovirus Not Detected     Human Rhinovirus/Enterovirus Not Detected     Influenza A PCR Not Detected     Influenza B PCR Not Detected     Parainfluenza Virus 1 Not Detected     Parainfluenza Virus 2 Not Detected     Parainfluenza Virus 3 Not Detected     Parainfluenza Virus 4 Not Detected     RSV, PCR Not Detected     Bordetella pertussis pcr Not Detected     Bordetella parapertussis PCR Not Detected     Chlamydophila pneumoniae PCR Not Detected     Mycoplasma pneumo by PCR Not Detected    Narrative:      In the setting of a positive respiratory panel with a viral infection PLUS a negative procalcitonin without other underlying concern for bacterial infection, consider observing off antibiotics or discontinuation of antibiotics and continue supportive care. If the respiratory panel is positive for atypical bacterial infection (Bordetella pertussis, Chlamydophila pneumoniae, or Mycoplasma pneumoniae), consider antibiotic de-escalation to target atypical bacterial infection.    Legionella Antigen, Urine - Urine, Straight Cath [[id number]]  (Normal) Collected: 02/18/25 1630    Lab Status: Final result Specimen: Urine from Straight Cath Updated: 02/19/25 0945     LEGIONELLA ANTIGEN, URINE Negative    S. Pneumo Ag Urine or CSF - Urine, Straight Cath [[id number]]  (Normal) Collected: 02/18/25 1630    Lab Status: Final result Specimen: Urine from Straight Cath Updated: 02/19/25 0945     Strep Pneumo Ag Negative            Medication Review:       Schedule Meds  amoxicillin-clavulanate, 1 tablet, Oral, Q12H  apixaban, 10 mg, Oral, BID   Followed by  [START ON 2/27/2025] apixaban, 5 mg, Oral,  BID  gabapentin, 600 mg, Oral, Q8H  pantoprazole, 40 mg, Oral, Q AM  sodium chloride, 10 mL, Intravenous, Q12H  topiramate, 25 mg, Oral, Daily        Infusion Meds       PRN Meds    acetaminophen **OR** acetaminophen **OR** acetaminophen    albuterol    senna-docusate sodium **AND** polyethylene glycol **AND** bisacodyl **AND** bisacodyl    Calcium Replacement - Follow Nurse / BPA Driven Protocol    Magnesium Standard Dose Replacement - Follow Nurse / BPA Driven Protocol    Morphine    oxyCODONE    Phosphorus Replacement - Follow Nurse / BPA Driven Protocol    Potassium Replacement - Follow Nurse / BPA Driven Protocol    sodium chloride    sodium chloride        Assessment & Plan       Antimicrobial Therapy   1.  P.o. Augmentin        2.        3.        4.        5.              Assessment     Bilateral lower lobe infiltrates mostly on the left side.  Concerning for aspiration pneumonia.  MRSA screen was negative as well as urine for Legionella and pneumococcal antigen.  Viral PCR panel was negative.  now on room air     Febrile illness on admission secondary to above..  Resolving    Reactive leukocytosis-likely related to the above.  Trending down     Toxic metabolic encephalopathy most likely secondary to infection.  The patient currently back to her baseline     Recent lumbar spinal infusion as an outpatient.  No obvious infection during the examination of the incision site.  MRI did show postoperative changes of laminectomies from L3-L5 with some fluid in the laminectomy beds that could possibly be postoperative changes of seroma     Plan     Continue p.o. Augmentin 875/125 mg twice daily for 8 days for 10 days of treatment  Encourage incentive spirometer use  Continue supportive care  Okay to discharge from Infectious Disease standpoint      Dustin Ramos MD  02/22/25  16:57 EST    Note is dictated utilizing voice recognition software/Dragon

## 2025-02-22 NOTE — PLAN OF CARE
Goal Outcome Evaluation:            Patient complained of pain this shift, see MAR. Remains on room air. Patient stable at this time.                                 total assistance and encouragement with meals, magic cup BID, wound care protocol, continue with vitamin/mineral supplementation

## 2025-02-23 ENCOUNTER — READMISSION MANAGEMENT (OUTPATIENT)
Dept: CALL CENTER | Facility: HOSPITAL | Age: 75
End: 2025-02-23
Payer: MEDICARE

## 2025-02-23 VITALS
SYSTOLIC BLOOD PRESSURE: 133 MMHG | HEIGHT: 64 IN | HEART RATE: 96 BPM | DIASTOLIC BLOOD PRESSURE: 82 MMHG | WEIGHT: 178.13 LBS | OXYGEN SATURATION: 95 % | BODY MASS INDEX: 30.41 KG/M2 | TEMPERATURE: 97.9 F | RESPIRATION RATE: 18 BRPM

## 2025-02-23 LAB
ANION GAP SERPL CALCULATED.3IONS-SCNC: 7.9 MMOL/L (ref 5–15)
BACTERIA SPEC AEROBE CULT: NORMAL
BACTERIA SPEC AEROBE CULT: NORMAL
BUN SERPL-MCNC: 14 MG/DL (ref 8–23)
BUN/CREAT SERPL: 16.5 (ref 7–25)
CALCIUM SPEC-SCNC: 9.2 MG/DL (ref 8.6–10.5)
CHLORIDE SERPL-SCNC: 105 MMOL/L (ref 98–107)
CO2 SERPL-SCNC: 25.1 MMOL/L (ref 22–29)
CREAT SERPL-MCNC: 0.85 MG/DL (ref 0.57–1)
DEPRECATED RDW RBC AUTO: 44.7 FL (ref 37–54)
EGFRCR SERPLBLD CKD-EPI 2021: 71.5 ML/MIN/1.73
ERYTHROCYTE [DISTWIDTH] IN BLOOD BY AUTOMATED COUNT: 12.9 % (ref 12.3–15.4)
GLUCOSE SERPL-MCNC: 110 MG/DL (ref 65–99)
HCT VFR BLD AUTO: 31.4 % (ref 34–46.6)
HGB BLD-MCNC: 9.9 G/DL (ref 12–15.9)
MCH RBC QN AUTO: 30.2 PG (ref 26.6–33)
MCHC RBC AUTO-ENTMCNC: 31.5 G/DL (ref 31.5–35.7)
MCV RBC AUTO: 95.7 FL (ref 79–97)
PLATELET # BLD AUTO: 218 10*3/MM3 (ref 140–450)
PMV BLD AUTO: 8.5 FL (ref 6–12)
POTASSIUM SERPL-SCNC: 4 MMOL/L (ref 3.5–5.2)
RBC # BLD AUTO: 3.28 10*6/MM3 (ref 3.77–5.28)
SODIUM SERPL-SCNC: 138 MMOL/L (ref 136–145)
WBC NRBC COR # BLD AUTO: 7.52 10*3/MM3 (ref 3.4–10.8)

## 2025-02-23 PROCEDURE — 80048 BASIC METABOLIC PNL TOTAL CA: CPT | Performed by: INTERNAL MEDICINE

## 2025-02-23 PROCEDURE — 85027 COMPLETE CBC AUTOMATED: CPT | Performed by: INTERNAL MEDICINE

## 2025-02-23 RX ADMIN — GABAPENTIN 600 MG: 300 CAPSULE ORAL at 13:35

## 2025-02-23 RX ADMIN — PANTOPRAZOLE SODIUM 40 MG: 40 TABLET, DELAYED RELEASE ORAL at 05:11

## 2025-02-23 RX ADMIN — OXYCODONE 5 MG: 5 TABLET ORAL at 13:51

## 2025-02-23 RX ADMIN — Medication 10 ML: at 08:24

## 2025-02-23 RX ADMIN — AMOXICILLIN AND CLAVULANATE POTASSIUM 1 TABLET: 875; 125 TABLET, FILM COATED ORAL at 08:22

## 2025-02-23 RX ADMIN — APIXABAN 10 MG: 5 TABLET, FILM COATED ORAL at 08:22

## 2025-02-23 RX ADMIN — GABAPENTIN 600 MG: 300 CAPSULE ORAL at 05:11

## 2025-02-23 RX ADMIN — TOPIRAMATE 25 MG: 25 TABLET, FILM COATED ORAL at 08:22

## 2025-02-23 RX ADMIN — OXYCODONE 5 MG: 5 TABLET ORAL at 08:22

## 2025-02-23 NOTE — PLAN OF CARE
Goal Outcome Evaluation:         Patient complains of back pain. PRN oxycodone given as per MAR.

## 2025-02-23 NOTE — PLAN OF CARE
Problem: Adult Inpatient Plan of Care  Goal: Plan of Care Review  Outcome: Progressing  Goal: Patient-Specific Goal (Individualized)  Outcome: Progressing  Goal: Absence of Hospital-Acquired Illness or Injury  Outcome: Progressing  Goal: Optimal Comfort and Wellbeing  Outcome: Progressing  Goal: Readiness for Transition of Care  Outcome: Progressing     Problem: Fall Injury Risk  Goal: Absence of Fall and Fall-Related Injury  Outcome: Progressing     Problem: Pain Acute  Goal: Optimal Pain Control and Function  Outcome: Progressing     Problem: Pain Chronic (Persistent)  Goal: Optimal Pain Control and Function  Outcome: Progressing     Problem: Sepsis/Septic Shock  Goal: Optimal Coping  Outcome: Progressing  Goal: Absence of Bleeding  Outcome: Progressing  Goal: Blood Glucose Level Within Target Range  Outcome: Progressing  Goal: Absence of Infection Signs and Symptoms  Outcome: Progressing  Goal: Optimal Nutrition Delivery  Outcome: Progressing     Problem: Skin Injury Risk Increased  Goal: Skin Health and Integrity  Outcome: Progressing     Problem: Adult Inpatient Plan of Care  Goal: Plan of Care Review  Outcome: Progressing  Goal: Patient-Specific Goal (Individualized)  Outcome: Progressing  Goal: Absence of Hospital-Acquired Illness or Injury  Outcome: Progressing  Goal: Optimal Comfort and Wellbeing  Outcome: Progressing  Goal: Readiness for Transition of Care  Outcome: Progressing     Problem: Fall Injury Risk  Goal: Absence of Fall and Fall-Related Injury  Outcome: Progressing     Problem: Pain Acute  Goal: Optimal Pain Control and Function  Outcome: Progressing     Problem: Pain Chronic (Persistent)  Goal: Optimal Pain Control and Function  Outcome: Progressing     Problem: Sepsis/Septic Shock  Goal: Optimal Coping  Outcome: Progressing  Goal: Absence of Bleeding  Outcome: Progressing  Goal: Blood Glucose Level Within Target Range  Outcome: Progressing  Goal: Absence of Infection Signs and  Symptoms  Outcome: Progressing  Goal: Optimal Nutrition Delivery  Outcome: Progressing     Problem: Skin Injury Risk Increased  Goal: Skin Health and Integrity  Outcome: Progressing     Problem: Adult Inpatient Plan of Care  Goal: Plan of Care Review  Outcome: Progressing  Goal: Patient-Specific Goal (Individualized)  Outcome: Progressing  Goal: Absence of Hospital-Acquired Illness or Injury  Outcome: Progressing  Goal: Optimal Comfort and Wellbeing  Outcome: Progressing  Goal: Readiness for Transition of Care  Outcome: Progressing     Problem: Fall Injury Risk  Goal: Absence of Fall and Fall-Related Injury  Outcome: Progressing     Problem: Pain Acute  Goal: Optimal Pain Control and Function  Outcome: Progressing     Problem: Pain Chronic (Persistent)  Goal: Optimal Pain Control and Function  Outcome: Progressing     Problem: Sepsis/Septic Shock  Goal: Optimal Coping  Outcome: Progressing  Goal: Absence of Bleeding  Outcome: Progressing  Goal: Blood Glucose Level Within Target Range  Outcome: Progressing  Goal: Absence of Infection Signs and Symptoms  Outcome: Progressing  Goal: Optimal Nutrition Delivery  Outcome: Progressing     Problem: Skin Injury Risk Increased  Goal: Skin Health and Integrity  Outcome: Progressing     Problem: Adult Inpatient Plan of Care  Goal: Plan of Care Review  Outcome: Progressing  Goal: Patient-Specific Goal (Individualized)  Outcome: Progressing  Goal: Absence of Hospital-Acquired Illness or Injury  Outcome: Progressing  Goal: Optimal Comfort and Wellbeing  Outcome: Progressing  Goal: Readiness for Transition of Care  Outcome: Progressing     Problem: Fall Injury Risk  Goal: Absence of Fall and Fall-Related Injury  Outcome: Progressing     Problem: Pain Acute  Goal: Optimal Pain Control and Function  Outcome: Progressing     Problem: Pain Chronic (Persistent)  Goal: Optimal Pain Control and Function  Outcome: Progressing     Problem: Sepsis/Septic Shock  Goal: Optimal Coping  Outcome:  Progressing  Goal: Absence of Bleeding  Outcome: Progressing  Goal: Blood Glucose Level Within Target Range  Outcome: Progressing  Goal: Absence of Infection Signs and Symptoms  Outcome: Progressing  Goal: Optimal Nutrition Delivery  Outcome: Progressing     Problem: Skin Injury Risk Increased  Goal: Skin Health and Integrity  Outcome: Progressing   Goal Outcome Evaluation:

## 2025-02-23 NOTE — PROGRESS NOTES
Infectious Diseases Progress Note      LOS: 5 days   Patient Care Team:  Miranda Hylton APRN as PCP - General (Nurse Practitioner)    Chief Complaint: Shortness of breath, cough    Subjective       The patient has been afebrile for the last 24 hours.  The patient is currently on room air.  Currently in the chair with no new complaints      Review of Systems:   Review of Systems   HENT: Negative.     Eyes: Negative.    Respiratory:  Positive for cough and shortness of breath.         Improved   Cardiovascular: Negative.    Gastrointestinal: Negative.    Endocrine: Negative.    Genitourinary: Negative.    Musculoskeletal:  Positive for back pain.   Skin: Negative.    Neurological:  Positive for weakness.   Psychiatric/Behavioral: Negative.     All other systems reviewed and are negative.       Objective     Vital Signs  Temp:  [97.9 °F (36.6 °C)-98.3 °F (36.8 °C)] 97.9 °F (36.6 °C)  Heart Rate:  [64-96] 96  Resp:  [11-18] 18  BP: (102-144)/(50-82) 133/82    Physical Exam:  Physical Exam  Vitals reviewed.   Constitutional:       Appearance: She is well-developed and normal weight. She is ill-appearing.   HENT:      Head: Normocephalic.   Eyes:      Pupils: Pupils are equal, round, and reactive to light.   Cardiovascular:      Rate and Rhythm: Normal rate and regular rhythm.      Heart sounds: Normal heart sounds.   Pulmonary:      Effort: Pulmonary effort is normal.      Breath sounds: Rales present.   Abdominal:      General: Bowel sounds are normal.      Palpations: Abdomen is soft.   Musculoskeletal:         General: Normal range of motion.      Cervical back: Neck supple.   Skin:     General: Skin is warm and dry.      Comments: Lumbar incision without significant fluctuance erythema or drainage   Neurological:      Mental Status: She is alert and oriented to person, place, and time.      Comments: No significant extremity weakness          Results Review:    I have reviewed all clinical data, test, lab, and  imaging results.     Radiology  No Radiology Exams Resulted Within Past 24 Hours    Cardiology    Laboratory    Results from last 7 days   Lab Units 02/23/25  0403 02/21/25  0133 02/19/25  2351 02/19/25  0228 02/18/25  1657   WBC 10*3/mm3 7.52 7.55 12.70* 20.13* 16.58*   HEMOGLOBIN g/dL 9.9* 9.9* 10.9* 10.2* 10.8*   HEMATOCRIT % 31.4* 32.3* 35.9 33.0* 34.1   PLATELETS 10*3/mm3 218 252 264 270 281     Results from last 7 days   Lab Units 02/23/25  0403 02/21/25  0133 02/19/25  2351 02/19/25  1408 02/18/25  1657   SODIUM mmol/L 138 136 140 139 140   POTASSIUM mmol/L 4.0 4.2 4.2 4.5 4.3   CHLORIDE mmol/L 105 104 105 107 105   CO2 mmol/L 25.1 23.4 28.4 24.2 25.7   BUN mg/dL 14 13 14 14 15   CREATININE mg/dL 0.85 1.08* 0.94 0.79 1.09*   GLUCOSE mg/dL 110* 101* 117* 96 113*   ALBUMIN g/dL  --   --   --   --  3.4*   BILIRUBIN mg/dL  --   --   --   --  0.4   ALK PHOS U/L  --   --   --   --  51   AST (SGOT) U/L  --   --   --   --  14   ALT (SGPT) U/L  --   --   --   --  6   CALCIUM mg/dL 9.2 8.6 9.1 8.4* 8.5*                 Microbiology   Microbiology Results (last 10 days)       Procedure Component Value - Date/Time    MRSA Screen, PCR (Inpatient) - Swab, Nares [[id number]]  (Normal) Collected: 02/18/25 2150    Lab Status: Final result Specimen: Swab from Nares Updated: 02/18/25 2312     MRSA PCR No MRSA Detected    Narrative:      The negative predictive value of this diagnostic test is high and should only be used to consider de-escalating anti-MRSA therapy. A positive result may indicate colonization with MRSA and must be correlated clinically.    Blood Culture - Blood, Arm, Right [[id number]]  (Normal) Collected: 02/18/25 1720    Lab Status: Preliminary result Specimen: Blood from Arm, Right Updated: 02/22/25 1731     Blood Culture No growth at 4 days    Blood Culture - Blood, Arm, Left [[id number]]  (Normal) Collected: 02/18/25 1709    Lab Status: Preliminary result Specimen: Blood from Arm, Left Updated: 02/22/25 1712      Blood Culture No growth at 4 days    Respiratory Panel PCR w/COVID-19(SARS-CoV-2) [facility]/LUIS FELIPE/[facility]ANNON/[facility]IL[facility] In-House, NP Swab in UTM/VTM, 2 HR TAT - Swab, Nasopharynx [[id number]]  (Normal) Collected: 02/18/25 1631    Lab Status: Final result Specimen: Swab from Nasopharynx Updated: 02/18/25 1733     ADENOVIRUS, PCR Not Detected     Coronavirus 229E Not Detected     Coronavirus HKU1 Not Detected     Coronavirus NL63 Not Detected     Coronavirus OC43 Not Detected     COVID19 Not Detected     Human Metapneumovirus Not Detected     Human Rhinovirus/Enterovirus Not Detected     Influenza A PCR Not Detected     Influenza B PCR Not Detected     Parainfluenza Virus 1 Not Detected     Parainfluenza Virus 2 Not Detected     Parainfluenza Virus 3 Not Detected     Parainfluenza Virus 4 Not Detected     RSV, PCR Not Detected     Bordetella pertussis pcr Not Detected     Bordetella parapertussis PCR Not Detected     Chlamydophila pneumoniae PCR Not Detected     Mycoplasma pneumo by PCR Not Detected    Narrative:      In the setting of a positive respiratory panel with a viral infection PLUS a negative procalcitonin without other underlying concern for bacterial infection, consider observing off antibiotics or discontinuation of antibiotics and continue supportive care. If the respiratory panel is positive for atypical bacterial infection (Bordetella pertussis, Chlamydophila pneumoniae, or Mycoplasma pneumoniae), consider antibiotic de-escalation to target atypical bacterial infection.    Legionella Antigen, Urine - Urine, Straight Cath [[id number]]  (Normal) Collected: 02/18/25 1630    Lab Status: Final result Specimen: Urine from Straight Cath Updated: 02/19/25 0945     LEGIONELLA ANTIGEN, URINE Negative    S. Pneumo Ag Urine or CSF - Urine, Straight Cath [[id number]]  (Normal) Collected: 02/18/25 1630    Lab Status: Final result Specimen: Urine from Straight Cath Updated: 02/19/25 0945     Strep Pneumo Ag Negative             Medication Review:       Schedule Meds  amoxicillin-clavulanate, 1 tablet, Oral, Q12H  apixaban, 10 mg, Oral, BID   Followed by  [START ON 2/27/2025] apixaban, 5 mg, Oral, BID  gabapentin, 600 mg, Oral, Q8H  pantoprazole, 40 mg, Oral, Q AM  sodium chloride, 10 mL, Intravenous, Q12H  topiramate, 25 mg, Oral, Daily        Infusion Meds       PRN Meds    acetaminophen **OR** acetaminophen **OR** acetaminophen    albuterol    senna-docusate sodium **AND** polyethylene glycol **AND** bisacodyl **AND** bisacodyl    Calcium Replacement - Follow Nurse / BPA Driven Protocol    Magnesium Standard Dose Replacement - Follow Nurse / BPA Driven Protocol    Morphine    oxyCODONE    Phosphorus Replacement - Follow Nurse / BPA Driven Protocol    Potassium Replacement - Follow Nurse / BPA Driven Protocol    sodium chloride    sodium chloride        Assessment & Plan       Antimicrobial Therapy   1.  P.o. Augmentin        2.        3.        4.        5.              Assessment     Bilateral lower lobe infiltrates mostly on the left side.  Concerning for aspiration pneumonia.  MRSA screen was negative as well as urine for Legionella and pneumococcal antigen.  Viral PCR panel was negative.  now on room air     Febrile illness on admission secondary to above..  Resolving    Reactive leukocytosis-likely related to the above.  Trending down     Toxic metabolic encephalopathy most likely secondary to infection.  The patient currently back to her baseline     Recent lumbar spinal infusion as an outpatient.  No obvious infection during the examination of the incision site.  MRI did show postoperative changes of laminectomies from L3-L5 with some fluid in the laminectomy beds that could possibly be postoperative changes of seroma     Plan     Continue p.o. Augmentin 875/125 mg twice daily for 8 days for 10 days of treatment  Encourage incentive spirometer use  Continue supportive care  Okay to discharge from Infectious Disease  standpoint  Case discussed with patient and family member at bedside  Not much more to add from infectious disease standpoint-we will sign off at this time-please call with any questions.      Karen Diaz, APRN  02/23/25  13:58 EST    Note is dictated utilizing voice recognition software/Dragon

## 2025-02-24 ENCOUNTER — TELEPHONE (OUTPATIENT)
Dept: PAIN MEDICINE | Facility: CLINIC | Age: 75
End: 2025-02-24
Payer: MEDICARE

## 2025-02-24 NOTE — CASE MANAGEMENT/SOCIAL WORK
Case Management Discharge Note      Final Note: Home w/home health.    Selected Continued Care - Discharged on 2/23/2025 Admission date: 2/18/2025 - Discharge disposition: Home or Self Care      Home Medical Care Coordination complete.      Service Provider Services Address Phone Fax Patient Preferred    [facility] [address], [facility] [address] [id number] [phone]                Transportation Services  Private: Car    Final Discharge Disposition Code: 06 - home with home health care

## 2025-02-24 NOTE — TELEPHONE ENCOUNTER
Caller: Nola Tariq    Relationship to patient: Self    Best call back number:     Chief complaint: BACK PAIN     Type of visit: FUP     Requested date: ASAP     If rescheduling, when is the original appointment: [id number]     Additional notes:PATIENT IS ILL BUT WANTS TO RESCHEDULE.  TOMORROWS APPOINTMENT FOR THE FUTURE.  NEXT THAT SHOWED WAS APRIL.  SHE WOULD LIKE SOONER THAN THAT IF POSSIBLE

## 2025-02-24 NOTE — OUTREACH NOTE
Prep Survey      Flowsheet Row Responses   [facility] facility patient discharged from? [facility]   Is LACE score < 7 ? No   Eligibility Readm Mgmt   Discharge diagnosis Sepsis   Does the patient have one of the following disease processes/diagnoses(primary or secondary)? Sepsis   Does the patient have Home health ordered? Yes   What is the Home health agency?  [facility]   Is there a DME ordered? No   Prep survey completed? Yes            Vibha WEEKS - Registered Nurse

## 2025-02-26 NOTE — DISCHARGE SUMMARY
[facility] Medicine Services  Discharge Summary    Date of Service: 2025  Patient Name: Nola Tariq  : 1950  MRN: [id number]    Date of Admission: 2025  Discharge Diagnosis:     Acute hypoxic respiratory failure  Pulmonary embolism  Pneumonia    Date of Discharge:2025  Primary Care Physician: Miranda Hylton APRN      Hospital Course     Hospital Course:    This patient is a 74-year-old lady who was admitted and treated for acute hypoxic respiratory failure secondary to pneumonia as well as pulmonary embolism.  Upon presentation she was thought to possibly have a lumbar spine surgical site infection status post laminectomies from L3-L5 but upon examination the site was found to be noninfected.  She was started on IV Zosyn for treatment of pneumonia and on IV heparin for pulmonary embolism.  A CT scan chest PE protocol has been done which showed evidence of pulmonary embolism as well as pneumonia.  She gradually improved on this treatment regimen.  She was comanaged by infectious diseases.  She was also seen briefly in consult by orthopedic spine surgery to be examined for her recent lumbar spinal surgical site which was deemed to be okay without signs of infection.  Blood cultures showed no growth.  Respiratory panel was negative.  She had required oxygen but was weaned off of oxygen quickly during the hospital course.  She has been discharged home on Augmentin for complete treatment of pneumonia and on Eliquis for continued treatment of pulmonary embolism.        DISCHARGE Follow Up Recommendations:-Follow-up PCP in 1 week, follow-up with infectious diseases in 1 week.      Day of Discharge     Vital Signs:       Physical Exam:  Physical Exam  Constitutional:       Appearance: Normal appearance.   HENT:      Head: Normocephalic and atraumatic.      Nose: Nose normal.      Mouth/Throat:      Mouth: Mucous membranes are moist.   Eyes:      Extraocular Movements:  Extraocular movements intact.      Pupils: Pupils are equal, round, and reactive to light.   Cardiovascular:      Rate and Rhythm: Normal rate and regular rhythm.   Pulmonary:      Effort: Pulmonary effort is normal.      Breath sounds: Normal breath sounds.   Abdominal:      General: Abdomen is flat. Bowel sounds are normal.      Palpations: Abdomen is soft.   Musculoskeletal:      Cervical back: Normal range of motion and neck supple.   Skin:     General: Skin is warm and dry.   Neurological:      General: No focal deficit present.      Mental Status: She is alert and oriented to person, place, and time.   Psychiatric:         Mood and Affect: Mood normal.         Behavior: Behavior normal.         Thought Content: Thought content normal.         Judgment: Judgment normal.           Pertinent  and/or Most Recent Results     LAB RESULTS:      Lab 02/23/25  0403 02/21/25  0133 02/20/25  0604 02/19/25  2351 02/19/25  2223   WBC 7.52 7.55  --  12.70*  --    HEMOGLOBIN 9.9* 9.9*  --  10.9*  --    HEMATOCRIT 31.4* 32.3*  --  35.9  --    PLATELETS 218 252  --  264  --    NEUTROS ABS  --  4.91  --   --   --    IMMATURE GRANS (ABS)  --  0.03  --   --   --    LYMPHS ABS  --  1.55  --   --   --    MONOS ABS  --  0.86  --   --   --    EOS ABS  --  0.16  --   --   --    MCV 95.7 97.0  --  97.3*  --    APTT  --   --  195.9* 94.5* 134.2*         Lab 02/23/25 0403 02/21/25  0133 02/19/25  2351   SODIUM 138 136 140   POTASSIUM 4.0 4.2 4.2   CHLORIDE 105 104 105   CO2 25.1 23.4 28.4   ANION GAP 7.9 8.6 6.6   BUN 14 13 14   CREATININE 0.85 1.08* 0.94   EGFR 71.5 53.7* 63.4   GLUCOSE 110* 101* 117*   CALCIUM 9.2 8.6 9.1                         Brief Urine Lab Results  (Last result in the past 365 days)        Color   Clarity   Blood   Leuk Est   Nitrite   Protein   CREAT   Urine HCG        02/18/25 1630 Yellow   Clear   Negative   Negative   Negative   Negative                 Microbiology Results (last 10 days)       Procedure  Component Value - Date/Time    MRSA Screen, PCR (Inpatient) - Swab, Nares [[id number]]  (Normal) Collected: 02/18/25 2150    Lab Status: Final result Specimen: Swab from Nares Updated: 02/18/25 2312     MRSA PCR No MRSA Detected    Narrative:      The negative predictive value of this diagnostic test is high and should only be used to consider de-escalating anti-MRSA therapy. A positive result may indicate colonization with MRSA and must be correlated clinically.    Blood Culture - Blood, Arm, Right [[id number]]  (Normal) Collected: 02/18/25 1720    Lab Status: Final result Specimen: Blood from Arm, Right Updated: 02/23/25 1731     Blood Culture No growth at 5 days    Blood Culture - Blood, Arm, Left [[id number]]  (Normal) Collected: 02/18/25 1709    Lab Status: Final result Specimen: Blood from Arm, Left Updated: 02/23/25 1716     Blood Culture No growth at 5 days    Respiratory Panel PCR w/COVID-19(SARS-CoV-2) PADMINI/LUIS FELIPE/SHANNON/PAD/COR/SILVINO In-House, NP Swab in UTM/VTM, 2 HR TAT - Swab, Nasopharynx [[id number]]  (Normal) Collected: 02/18/25 1631    Lab Status: Final result Specimen: Swab from Nasopharynx Updated: 02/18/25 1733     ADENOVIRUS, PCR Not Detected     Coronavirus 229E Not Detected     Coronavirus HKU1 Not Detected     Coronavirus NL63 Not Detected     Coronavirus OC43 Not Detected     COVID19 Not Detected     Human Metapneumovirus Not Detected     Human Rhinovirus/Enterovirus Not Detected     Influenza A PCR Not Detected     Influenza B PCR Not Detected     Parainfluenza Virus 1 Not Detected     Parainfluenza Virus 2 Not Detected     Parainfluenza Virus 3 Not Detected     Parainfluenza Virus 4 Not Detected     RSV, PCR Not Detected     Bordetella pertussis pcr Not Detected     Bordetella parapertussis PCR Not Detected     Chlamydophila pneumoniae PCR Not Detected     Mycoplasma pneumo by PCR Not Detected    Narrative:      In the setting of a positive respiratory panel with a viral infection PLUS a negative  procalcitonin without other underlying concern for bacterial infection, consider observing off antibiotics or discontinuation of antibiotics and continue supportive care. If the respiratory panel is positive for atypical bacterial infection (Bordetella pertussis, Chlamydophila pneumoniae, or Mycoplasma pneumoniae), consider antibiotic de-escalation to target atypical bacterial infection.    Legionella Antigen, Urine - Urine, Straight Cath [[id number]]  (Normal) Collected: 02/18/25 1630    Lab Status: Final result Specimen: Urine from Straight Cath Updated: 02/19/25 0945     LEGIONELLA ANTIGEN, URINE Negative    S. Pneumo Ag Urine or CSF - Urine, Straight Cath [[id number]]  (Normal) Collected: 02/18/25 1630    Lab Status: Final result Specimen: Urine from Straight Cath Updated: 02/19/25 0945     Strep Pneumo Ag Negative            CT Angiogram Chest Pulmonary Embolism    Result Date: 2/19/2025  Impression: 1.There is a small nonocclusive pulmonary embolus in a right middle lobe segmental branch. No other pulmonary embolism is identified. No evidence of right heart strain. 2.Bilateral pneumonia, left greater than right. 3.Emphysema. 4.Large hiatal hernia with gastroesophageal reflux. 5.Coronary artery disease. 6.Additional findings as given above. NOTIFICATION: Critical Value/emergent results were called by telephone at the time of interpretation on 2/19/2025 2:00 AM EST to Rashmi, the patient's nurse who verbally acknowledged these results. She reports she will notify the patient's physician immediately. Electronically Signed: Walter Lemus MD  2/19/2025 2:00 AM EST  Workstation ID: [id number]    MRI Lumbar Spine Without Contrast    Result Date: 2/18/2025  Impression: Impression: Interval postoperative changes of laminectomies from L3-L5 with fluid in the laminectomy beds which indents the dorsal thecal sac at these levels. This may represent postoperative changes/seromas, though superimposed infection is not excluded  by imaging. Mildly increased severe spinal canal stenosis at L3-4 (with cauda equina compression) at L4-5. Multilevel degenerative changes are not significantly changed since 11/19/2024 otherwise. Electronically Signed: Manan Casey  2/18/2025 9:29 PM EST  Workstation ID: [id number]    CT Head Without Contrast    Result Date: 2/18/2025  Impression: Impression: No acute intracranial pathology. Electronically Signed: Yaya Allen MD  2/18/2025 7:02 PM EST  Workstation ID: [id number]    XR Chest 1 View    Result Date: 2/18/2025  Impression: Impression: No active disease. Electronically Signed: Colton Costello MD  2/18/2025 4:20 PM EST  Workstation ID: [id number]                 Labs Pending at Discharge:  Pending Results       None            Procedures Performed  Procedure(s):  LUMBAR INCISION AND DRAINAGE WOUND WASHOUT LAMINECTOMY L3 TO S1  LUMBAR LAMINECTOMY DISCECTOMY         Consults:   Consults       Date and Time Order Name Status Description    2/19/2025 12:12 AM Inpatient Infectious Diseases Consult Completed               Discharge Details        Discharge Medications        New Medications        Instructions Start Date   amoxicillin-clavulanate 875-125 MG per tablet  Commonly known as: AUGMENTIN   1 tablet, Oral, Every 12 Hours Scheduled      apixaban 5 MG tablet tablet  Commonly known as: ELIQUIS   Take 2 tablets by mouth 2 (Two) Times a Day for 5 days, THEN 1 tablet 2 (Two) Times a Day for 30 days. Indications: DVT/PE (active thrombosis)   Start Date: February 23, 2025     guaiFENesin 600 MG 12 hr tablet  Commonly known as: MUCINEX   600 mg, Oral, Every 12 Hours Scheduled             Continue These Medications        Instructions Start Date   albuterol sulfate  (90 Base) MCG/ACT inhaler  Commonly known as: PROVENTIL HFA;VENTOLIN HFA;PROAIR HFA   2 puffs, Inhalation, Every 4 Hours PRN      docusate sodium 100 MG capsule  Commonly known as: COLACE   1 capsule, 2 Times Daily      Excedrin Tension Headache  500-65 MG tablet  Generic drug: Acetaminophen-Caffeine   1 tablet, Daily PRN      famotidine 40 MG tablet  Commonly known as: PEPCID   40 mg, 2 Times Daily      gabapentin 600 MG tablet  Commonly known as: NEURONTIN   600 mg, Oral, 3 Times Daily      HYDROmorphone 2 MG tablet  Commonly known as: Dilaudid   2 mg, Oral, Every 6 Hours PRN      Melatonin 5 MG capsule   5 mg, Oral, Nightly PRN      multivitamin with minerals tablet tablet   1 tablet, Daily      omeprazole 40 MG capsule  Commonly known as: priLOSEC   40 mg, Daily      Prolia 60 MG/ML solution prefilled syringe syringe  Generic drug: denosumab   60 mg, Once      topiramate 25 MG tablet  Commonly known as: TOPAMAX   25 mg, Oral, Daily      vitamin D 1.25 MG (14128 UT) capsule capsule  Commonly known as: ERGOCALCIFEROL   1 capsule, Weekly      ZyrTEC Allergy 10 MG capsule  Generic drug: Cetirizine HCl   10 mg, Daily             Stop These Medications      ibuprofen 800 MG tablet  Commonly known as: ADVIL,MOTRIN              Allergies   Allergen Reactions    Celebrex [Celecoxib] Swelling         Discharge Disposition: Home  Home or Self Care    Diet: Regular diet          Discharge Activity:   Activity Instructions       Activity as Tolerated                CODE STATUS:  Code Status and Medical Interventions: CPR (Attempt to Resuscitate); Full Support   Ordered at: 02/18/25 2000     Code Status (Patient has no pulse and is not breathing):    CPR (Attempt to Resuscitate)     Medical Interventions (Patient has pulse or is breathing):    Full Support         Future Appointments   Date Time Provider Department Center   3/18/2025 11:10 AM Kirk Nascimento DO [facility]       Additional Instructions for the Follow-ups that You Need to Schedule       Ambulatory Referral to Home Health   As directed      Face to Face Visit Date: 2/22/2025   Follow-up provider for Plan of Care?: I treated the patient in an acute care facility and will not continue treatment  after discharge.   Follow-up provider: MIRANDA HARRINGTON [[id number]]   Reason/Clinical Findings: Impaired mobility   Describe mobility limitations that make leaving home difficult: Impaired mobility   Nursing/Therapeutic Services Requested: Physical Therapy   Frequency: 1 Week 1        Discharge Follow-up with PCP   As directed       Currently Documented PCP:    Miranda Harrington APRN    PCP Phone Number:    [phone]     Follow Up Details: 1 week        Discharge Follow-up with Specified Provider: Infectious diseases; 1 Week   As directed      To: Infectious diseases   Follow Up: 1 Week        Discharge Follow-up with Specified Provider: orthopedics; 1 Week   As directed      To: orthopedics   Follow Up: 1 Week                Time spent on Discharge including face to face service:  >30 minutes    Signature: Electronically signed by Dapheny Sargent MD, 02/26/25, 17:38 EST.  [facility]ist [facility]

## 2025-02-27 ENCOUNTER — READMISSION MANAGEMENT (OUTPATIENT)
Dept: CALL CENTER | Facility: HOSPITAL | Age: 75
End: 2025-02-27
Payer: MEDICARE

## 2025-02-27 NOTE — OUTREACH NOTE
Sepsis Week 1 Survey      Flowsheet Row Responses   [facility] patient discharged from? [facility]   Does the patient have one of the following disease processes/diagnoses(primary or secondary)? Sepsis   Week 1 attempt successful? Yes   Call start time 1110   Call end time 1117   List who call center can speak with pt   Meds reviewed with patient/caregiver? Yes   Is the patient having any side effects they believe may be caused by any medication additions or changes? No   Does the patient have all medications related to this admission filled (includes all antibiotics, inhalers, nebulizers,steroids,etc.) Yes   Is the patient taking all medications as directed (includes completed medication regime)? Yes   Comments regarding appointments Pt has had phone visit with pcp.   Does the patient have a primary care provider?  Yes   Has the patient kept scheduled appointments due by today? N/A   Has home health visited the patient within 72 hours of discharge? Yes   Psychosocial issues? No   Did the patient receive a copy of their discharge instructions? Yes   Nursing interventions Reviewed instructions with patient   What is the patient's perception of their health status since discharge? Improving   Is the patient/caregiver able to teach back TIME? T emperature - higher or lower than normal, I nfection - may have signs and symptoms of an infection, M ental Decline - confused, sleepy, difficult to arouse, E xtremely Ill - severe pain, discomfort, shortness of breath   Is patient/caregiver able to teach back steps to recovery at home? Set small, achievable goals for return to baseline health, Rest and regain strength   Is the patient/caregiver able to teach back signs and symptoms of worsening condition: Fever, Altered mental status(confusion/coma)   Is the patient/caregiver able to teach back the hierarchy of who to call/visit for symptoms/problems? PCP, Specialist, Home health nurse, Urgent Care, ED, 911 Yes   Week 1 call  completed? Yes   Is the patient interested in additional calls from an ambulatory ? No   Would this patient benefit from a Referral to [facility] Social Work? No   Wrap up additional comments Pt reports very slow improvment. Pt using a walker to ambulate. Pt reports some back pain at times. Pt has all medications. PT and OT are visiting.   Call end time 1117            Ruby MCKINLEY - Registered Nurse

## 2025-03-02 DIAGNOSIS — G43.009 MIGRAINE WITHOUT AURA AND WITHOUT STATUS MIGRAINOSUS, NOT INTRACTABLE: ICD-10-CM

## 2025-03-02 DIAGNOSIS — M79.671 RIGHT FOOT PAIN: ICD-10-CM

## 2025-03-02 DIAGNOSIS — M47.816 LUMBAR SPONDYLOSIS: ICD-10-CM

## 2025-03-02 DIAGNOSIS — M96.1 FAILED BACK SURGICAL SYNDROME: ICD-10-CM

## 2025-03-02 DIAGNOSIS — M96.1 POSTLAMINECTOMY SYNDROME: ICD-10-CM

## 2025-03-03 RX ORDER — TRAMADOL HYDROCHLORIDE 50 MG/1
100 TABLET ORAL EVERY 8 HOURS PRN
Qty: 180 TABLET | Refills: 2 | OUTPATIENT
Start: 2025-03-03 | End: 2025-06-01

## 2025-03-17 NOTE — PROGRESS NOTES
Subjective   Nola Tariq is a 75 y.o. female is here for follow-up for lower back pain.  Patient was last seen on 1/28/2025.  She is s/p laminectomy with Dr. Cantrell on 2/10/2025. Pain was significant improved with surgery until Sunday when she had increased R hip pain. She was recommended inpatient rehab but insurance denied. She has been working with PT at home. Unable to do PT due to increased  pain. She also had PE and pneumonia post operatively.     On last visit:     Left leg pain is 5/10 on VAS.    Lower back pain is 7/10 on VAS, at maximum 8 /10.  Pain is aching and dull in nature.  Referred to right groin, right leg  She has numbness and tingling in bilateral foot.  Pain is constant.  Improved by pain meds.  Worse with walking and standing.    R knee pain is 5/10 on VAS. Worse with weight bearing. She had R knee steroid injection with Rheumatology several months ago with excellent relief.     R shoulder pain is 6/10 on VAS. Normal ROM. Had shoulder bursa issues several years ago which improved with US treatments.     Migraines - She used to take Excedrin and Fioricet for daily migraines but it doesn't work anymore. She is scheduled to see Neurology.  She does wake up with migraines during night or early in morning. She would wake up with headache and goes away with Excedrin. She had 2-3 headaches over last month that didn't go away with Excedrin and lasted for rest of the day. She had seen someone for chiari malformation previous.  History of migraines since teenager and usually right-sided temporal area but states that new migraines are all over the head. Seen by neurology who recommended continuing excedrin.      Previous Injection:   12/5/2024-B/L TFESI L3-4- not sure if it helped.   12/3/2024-L4 kyphoplasty [employer]- some help  10/11/24 - L3 kyphoplasty - not sure if helped.   9/3/2024-LESI L3-4-no significant improvement.  4/18/2023- SCS trial [employer]- no significant pain relief. Coverage  was excellent with patient able to feel paresthesia in all of the painful areas but unfortunately no significant pain relief.  No functional improvement as well.  11/8/2022-right knee injection- good relief.  7/8/22 - LESI L5-S1 - no relief.   RFA at previous pain management - no relief.     Hx:  Referred by JEM Ashton for  lower back pain.  Pain started around 2008 without any significant injuries and has been getting worse since then.  Her main complaints today are mid back and lower back pain.  Patient had previously seen Dr. Voss and had epidurals, facet joint injections, ablations.  She states that nothing helped at the time. Last seen 1 year ago. Her lower back pain is worse than mid back pain. She is retired RN.   She has since seen [facility] (seen by JEM)- 2022 who recommended medication management.  Patient states that she is not interested in fusion surgery as there is 50% chance of lower back pain improvement. She had also injured her left knee and had steroid injection by Ortho in left knee.  Scheduled to see Ortho in 5 weeks. She has been seeing neurology for migraines and has been started on gabapentin.  Patient has taken gabapentin for about 1 month without significant improvement in her migraines.        PHQ-9- 4  SOAPP-2      PMH:   Tijerina's esophagus, hypertension, migraine, thoracic vertebra fx s/p kypho T7, T10, migraines, smoker, Back surgery- decompression L4-5?-2009 Dr. Rdz, Carpal tunnel surgery 1983 bilateral wrist; R knee steroid injection with orthopedics (Dr. Barton at [facility]).         Current Medications:     Lyrica 25 mg BID  Ibuprofen 800 mg  Xarelto       Past Medications:  Tramadol  mg daily   Meloxicam 15 mg  Celebrex-leg swelling  Gabapentin - didn't help   Norco 5-325 mg BID PRN   Tramadol  mg q6H PRN     Past Modalities:  TENS:                                                                          no                                                   Physical Therapy Within The Last 6 Months              Yes (1.5 years ago with some help).   Psychotherapy                                                            no  Massage Therapy                                                       no     Patient Complains Of:  Uro-Fecal Incontinence          no  Weight Gain/Loss                   Yes (weight gain 25 lbs - 1 year after smoking).   Fever/Chills                             no  Weakness                               no            Current Outpatient Medications:     Acetaminophen-Caffeine (Excedrin Tension Headache) 500-65 MG tablet, Take 1 tablet by mouth Daily As Needed (headache). Indications: Mild Pain, Disp: , Rfl:     albuterol sulfate  (90 Base) MCG/ACT inhaler, Inhale 2 puffs Every 4 (Four) Hours As Needed for Wheezing., Disp: 6.7 g, Rfl: 0    apixaban (ELIQUIS) 5 MG tablet tablet, Take 2 tablets by mouth 2 (Two) Times a Day for 5 days, THEN 1 tablet 2 (Two) Times a Day for 30 days. Indications: DVT/PE (active thrombosis), Disp: 60 tablet, Rfl: 0    Cetirizine HCl (ZyrTEC Allergy) 10 MG capsule, Take 10 mg by mouth Daily. Indications: Perennial Allergic Rhinitis, Disp: , Rfl:     denosumab (Prolia) 60 MG/ML solution prefilled syringe syringe, Inject 1 mL under the skin into the appropriate area as directed 1 (One) Time. Indications: Osteoporosis, Disp: , Rfl:     docusate sodium (COLACE) 100 MG capsule, Take 1 capsule by mouth 2 (Two) Times a Day. Indications: Constipation, Disp: , Rfl:     famotidine (PEPCID) 40 MG tablet, Take 1 tablet by mouth 2 (Two) Times a Day. Indications: Gastroesophageal Reflux Disease, Disp: , Rfl:     gabapentin (NEURONTIN) 600 MG tablet, Take 1 tablet by mouth 3 (Three) Times a Day. Indications: Neuropathic Pain, Disp: 90 tablet, Rfl: 1    HYDROmorphone (Dilaudid) 2 MG tablet, Take 1 tablet by mouth Every 6 (Six) Hours As Needed for Moderate Pain for up to 30 days., Disp: 120 tablet, Rfl: 0    Melatonin 5 MG capsule,  Take 5 mg by mouth At Night As Needed (sleep problem). (Patient taking differently: Take 5 mg by mouth At Night As Needed (sleep problem).), Disp: 30 each, Rfl: 0    Multiple Vitamins-Minerals (MULTIVITAMIN WITH MINERALS) tablet tablet, Take 1 tablet by mouth Daily. Indications: Nutritional Support, Disp: , Rfl:     omeprazole (priLOSEC) 40 MG capsule, Take 1 capsule by mouth Daily. Indications: Excess Stomach Secretions, Gastroesophageal Reflux Disease, Disp: , Rfl:     oxyCODONE-acetaminophen (PERCOCET)  MG per tablet, Take 1 tablet by mouth., Disp: , Rfl:     vitamin D (ERGOCALCIFEROL) 1.25 MG (33639 UT) capsule capsule, Take 1 capsule by mouth 1 (One) Time Per Week. Indications: Vitamin D Deficiency, Disp: , Rfl:     Xarelto 15 MG tablet, , Disp: , Rfl:     guaiFENesin (MUCINEX) 600 MG 12 hr tablet, Take 1 tablet by mouth Every 12 (Twelve) Hours., Disp: , Rfl:     topiramate (TOPAMAX) 25 MG tablet, Take 1 tablet by mouth Daily. Indications: Migraine Headache, Disp: , Rfl:     The following portions of the patient's history were reviewed and updated as appropriate: allergies, current medications, past family history, past medical history, past social history, past surgical history, and problem list.      REVIEW OF PERTINENT MEDICAL DATA    Past Medical History:   Diagnosis Date    Arthritis     Tijerina esophagus     Chronic pain disorder     COVID-19     DJD (degenerative joint disease)     Extremity pain     Fall at home     Fall at home     fracture rt. leg    Foot pain, right     Fractures     GERD (gastroesophageal reflux disease)     Headache, tension-type     Hypertension     Joint pain     Leg pain, right     Low back pain     Migraine     Plantar fasciitis     Shingles     Shoulder pain, right     Sleep apnea     Spinal stenosis 9/4/2024    Epidural on 9/4     Past Surgical History:   Procedure Laterality Date    BACK SURGERY  2/2009    Decompression    CARPAL TUNNEL RELEASE      col[place].    COLONOSCOPY       EPIDURAL BLOCK      EYE SURGERY      [place]    KNEE ARTHROSCOPY      SPINAL CORD STIMULATOR TRIAL W/ LAMINOTOMY      SPINE SURGERY      Kyphoplasty    TUBAL ABDOMINAL LIGATION      VERTEBROPLASTY Bilateral 10/11/2024    Procedure: VERTEBROPLASTY;  Surgeon: Kirk Nascimento DO;  Location: [facility] MAIN OR;  Service: Pain Management;  Laterality: Bilateral;    VERTEBROPLASTY Bilateral 12/3/2024    Procedure: KYPHOPLASTY at L4;  Surgeon: Kirk Nascimento DO;  Location: [facility] MAIN OR;  Service: Pain Management;  Laterality: Bilateral;    WRIST SURGERY      tendon release     Family History   Problem Relation Age of Onset    Cancer Mother     Migraines Father     COPD Father     Migraines Sister     Migraines Brother      Social History     Socioeconomic History    Marital status:    Tobacco Use    Smoking status: Former     Current packs/day: 0.00     Types: Cigarettes     Quit date: 1966     Years since quittin.2    Smokeless tobacco: Never   Vaping Use    Vaping status: Never Used   Substance and Sexual Activity    Alcohol use: Never    Drug use: Never    Sexual activity: Not Currently         Review of Systems   Musculoskeletal:  Positive for arthralgias and back pain.         Vitals:    25 1052   BP: 147/85   Pulse: 89   Resp: 16   SpO2: 96%   Weight: 79.8 kg (176 lb)   PainSc: 7                                      Objective   Physical Exam  Musculoskeletal:         General: Tenderness present.        Back:         Legs:    Neurological:      Comments: Motor strength 5/5 b/l LE  Sensory intact b/l LE             Imaging Reviewed:  MRI lumbar spine without contrast-2024  L2-3-left paracentral disc extrusion, bilateral facet arthropathy with mild to moderate canal stenosis and moderate bilateral neuroforaminal narrowing  L3-4-disc protrusion, bilateral facet hypertrophy, severe canal stenosis and moderate bilateral neuroforaminal narrowing  L4-5-grade 1 anterolisthesis, disc bulge, moderate  bilateral facet hypertrophy with ligamentum flavum thickening.  Severe central canal stenosis with moderate to severe bilateral neuroforaminal narrowing  A5-K4-wupxmvidy facet hypertrophy with disc osteophyte to the left, ligamentum flavum thickening.  Effacement of ventral thecal sac without significant central canal stenosis.  Severe left and moderate right neuroforaminal narrowing.  - Interval changes of vertebroplasty at L3 with new mild superior endplate compression fracture of L4 and an adjacent endplate edema.    Right hip x-ray-11/19/2024  - Unremarkable right hip    Right hip x-ray-9/5/2024  - Mild degenerative changes of each hip.    MRI lumbar spine without contrast-8/14/2024  - L2-3-disc bulge with left paracentral extrusion extending inferiorly.  Facet arthropathy.  Mild to moderate central canal stenosis and moderate bilateral foraminal stenosis  L3-4-disc bulge with small protrusion, bilateral facet arthropathy with ligamentum flavum hypertrophy causing severe central canal stenosis and severe right and moderate left foraminal stenosis  H4-0-glcjslodp facet hypertrophy resulting in 5 mm of L4 anterolisthesis.  Severe central canal stenosis, moderate bilateral foraminal stenosis and bilateral L5 lateral recess stenosis most severe on the right  L5-S1-left greater than right facet arthropathy.  Moderate central canal stenosis.  Possible compression of left S1 nerve root prior to entering the lateral recess.  Moderate right and severe left foraminal stenosis.  - Redundancy of the cauda equina nerve roots due to spinal stenosis.  - L3 inferior endplate subacute compression fracture with mild bone marrow edema which is new prior to previous imaging- subacute fracture.    MRI thoracic spine-6/13/2022  Kyphoplasty at T7 and T10.  - Residual mild to moderate compression fracture deformities  - Mild posterior disc bulges at multiple thoracic levels.  No large extrusions.    MRI brain wo contrast:  8/15/2018  Chiari Malformation type 1 with cerebellar tonsils projecting 7 mm below level of foramen magnum  Chronic b/l maxillary and ethmoid sinusitis     Assessment:    No diagnosis found.    Plan:   1.    UDS consistent with patient's interview on 2/27/2024.   Narcotic contract signed-5/10/2022.  Narcan sent-6/20/2022.  2. We discussed trying a course of formal physical therapy.  Physical therapy can help strengthen and stretch the muscles around the joints. Continue to be as active as possible. Start physical therapy as it will help generalized pain and follow up with HEP.  PT prescription sent to [facility] in [place] as requested by patient.  3.   STOP dilaudid. Continue Percocet  QID PRN (3/24; 4/23). Advised to decrease it to TID if possible.  Discussed with the patient regarding long-term side effects of opioids including but not limited to opioid induced hormonal suppression, hyperalgesia, fatigue, weight gain, possible opioid induced altered immune system, addiction, tolerance, dependence, risk of hearing loss and elevated risk of myocardial infarction. Proper use and potential life threatening side effects of over use discussed with patient. Patient states understanding of their use and risks.  Opioid Education for patient's receiving narcotics for pain: Patient was instructed regarding the risks, benefits, alternative forms of treatment, as well as potential development of tolerance and/or addiction. Patient was instructed to take the medication strictly as ordered, not to share the medication with others, not to drive while taking opioids, and to take no other sedatives/pain medications or alcohol while taking this prescription. The patient was instructed to wean off of the pain medication as healing occurs and pain resolves.   5. Continues Ibuprofen 800 mg TID PRN-sent for 6 months on 10/8/2024 patient informed of increased risk of heart attacks, stroke and kidney problems in addition to  gastric ulcers with use of non-steroidal anti-inflammatory medications.  6.  Increase gabapentin to 600 mg TID. Side effects discussed with the patient including but not limited to somnolence, dizziness, ataxia, headache, fatigue, blurred vision, cold or flu-like symptoms,delusions, hoarseness, lack or loss of strength, lower back or side pain, swelling of the hands, feet, or lower legs trembling or shaking. Patient understands and agrees with the plan.  7.  Continues to have severe right hip pain.  Right hip x-ray does not look severe but she continues to have severe right groin pain that is not responsive to epidurals and other pain medications.  Discussed possibility of right hip intra-articular injection.  Benefits and risks were discussed with patient and we will schedule her for the hip injection. Recommend seeing surgeon first.       RTC in 6 weeks.      Kirk Nascimento DO  Pain Management   [facility]           INSPECT REPORT    As part of the patient's treatment plan, I may be prescribing controlled substances. The patient has been made aware of appropriate use of such medications, including potential risk of somnolence, limited ability to drive and/or work safely, and the potential for dependence or overdose. It has also been made clear that these medications are for use by this patient only, without concomitant use of alcohol or other substances unless prescribed.     Patient has completed prescribing agreement detailing terms of continued prescribing of controlled substances, including monitoring INSPECT reports, urine drug screening, and pill counts if necessary. The patient is aware that inappropriate use will results in cessation of prescribing such medications.    INSPECT report has been reviewed and scanned into the patient's chart.

## 2025-03-18 ENCOUNTER — OFFICE VISIT (OUTPATIENT)
Dept: PAIN MEDICINE | Facility: CLINIC | Age: 75
End: 2025-03-18
Payer: MEDICARE

## 2025-03-18 VITALS
WEIGHT: 176 LBS | OXYGEN SATURATION: 96 % | BODY MASS INDEX: 30.21 KG/M2 | SYSTOLIC BLOOD PRESSURE: 147 MMHG | DIASTOLIC BLOOD PRESSURE: 85 MMHG | RESPIRATION RATE: 16 BRPM | HEART RATE: 89 BPM

## 2025-03-18 DIAGNOSIS — M48.062 SPINAL STENOSIS OF LUMBAR REGION WITH NEUROGENIC CLAUDICATION: ICD-10-CM

## 2025-03-18 DIAGNOSIS — Z79.899 HIGH RISK MEDICATION USE: ICD-10-CM

## 2025-03-18 DIAGNOSIS — M47.816 LUMBAR SPONDYLOSIS: ICD-10-CM

## 2025-03-18 DIAGNOSIS — M96.1 POSTLAMINECTOMY SYNDROME: Primary | ICD-10-CM

## 2025-03-18 RX ORDER — RIVAROXABAN 15 MG/1
TABLET, FILM COATED ORAL
COMMUNITY
Start: 2025-02-25

## 2025-03-18 RX ORDER — OXYCODONE AND ACETAMINOPHEN 10; 325 MG/1; MG/1
1 TABLET ORAL
COMMUNITY
End: 2025-03-18

## 2025-03-18 RX ORDER — OXYCODONE AND ACETAMINOPHEN 10; 325 MG/1; MG/1
1 TABLET ORAL EVERY 6 HOURS PRN
Qty: 120 TABLET | Refills: 0 | Status: SHIPPED | OUTPATIENT
Start: 2025-03-24 | End: 2025-04-23

## 2025-03-18 RX ORDER — OXYCODONE AND ACETAMINOPHEN 10; 325 MG/1; MG/1
1 TABLET ORAL EVERY 6 HOURS PRN
Qty: 120 TABLET | Refills: 0 | Status: SHIPPED | OUTPATIENT
Start: 2025-04-23 | End: 2025-05-23

## 2025-04-06 DIAGNOSIS — M96.1 POSTLAMINECTOMY SYNDROME: ICD-10-CM

## 2025-04-06 DIAGNOSIS — Z79.899 HIGH RISK MEDICATION USE: ICD-10-CM

## 2025-04-06 DIAGNOSIS — M25.551 RIGHT HIP PAIN: ICD-10-CM

## 2025-04-06 DIAGNOSIS — Z98.890 S/P KYPHOPLASTY: ICD-10-CM

## 2025-04-06 DIAGNOSIS — M48.062 SPINAL STENOSIS OF LUMBAR REGION WITH NEUROGENIC CLAUDICATION: ICD-10-CM

## 2025-04-07 RX ORDER — GABAPENTIN 600 MG/1
TABLET ORAL
Qty: 90 TABLET | Refills: 0 | OUTPATIENT
Start: 2025-04-07

## 2025-04-14 ENCOUNTER — TELEPHONE (OUTPATIENT)
Dept: PAIN MEDICINE | Facility: CLINIC | Age: 75
End: 2025-04-14
Payer: MEDICARE

## 2025-04-14 DIAGNOSIS — M48.062 SPINAL STENOSIS OF LUMBAR REGION WITH NEUROGENIC CLAUDICATION: ICD-10-CM

## 2025-04-14 DIAGNOSIS — M96.1 POSTLAMINECTOMY SYNDROME: ICD-10-CM

## 2025-04-14 DIAGNOSIS — Z98.890 S/P KYPHOPLASTY: ICD-10-CM

## 2025-04-14 DIAGNOSIS — M25.551 RIGHT HIP PAIN: ICD-10-CM

## 2025-04-14 DIAGNOSIS — Z79.899 HIGH RISK MEDICATION USE: ICD-10-CM

## 2025-04-14 RX ORDER — GABAPENTIN 600 MG/1
600 TABLET ORAL 3 TIMES DAILY
Qty: 90 TABLET | Refills: 1 | Status: SHIPPED | OUTPATIENT
Start: 2025-04-14

## 2025-04-14 NOTE — TELEPHONE ENCOUNTER
Caller: Tariq Nola L    Relationship: Self    Best call back number: [phone] (home)       Requested Prescriptions:   Requested Prescriptions     Pending Prescriptions Disp Refills    gabapentin (NEURONTIN) 600 MG tablet 90 tablet 1     Sig: Take 1 tablet by mouth 3 (Three) Times a Day. Indications: Neuropathic Pain        Pharmacy where request should be sent: [address] [phone] [facility] [phone] [facility]     Last office visit with prescribing clinician: 3/18/2025   Last telemedicine visit with prescribing clinician: Visit date not found   Next office visit with prescribing clinician: 4/29/2025     Additional details provided by patient:     Does the patient have less than a 3 day supply:  [] Yes  [x] No    Would you like a call back once the refill request has been completed: [x] Yes [] No    If the office needs to give you a call back, can they leave a voicemail: [x] Yes [] No    Dora Galdamez Rep   04/14/25 10:47 EDT

## 2025-04-22 ENCOUNTER — TELEPHONE (OUTPATIENT)
Dept: PAIN MEDICINE | Facility: CLINIC | Age: 75
End: 2025-04-22
Payer: MEDICARE

## 2025-04-22 NOTE — TELEPHONE ENCOUNTER
Caller: Nola Tariq    Relationship to patient: Self    Best call back number: [phone]    Type of visit: INJECTION ORDERED BY DR MUSA/ARSALAN KO - REFERRAL IN CHART    Requested date: ASAP     Additional notes:PT REQUESTING TO SCHEDULE INJECTION ORDERED BY ARSALAN ROY WITH DR CORDOVA. PLEASE CONTACT PT TO DISCUSS.

## 2025-04-28 NOTE — PROGRESS NOTES
Subjective   Nola Tariq is a 75 y.o. female is here for follow-up for lower back pain.  Patient was last seen on 3/18/2025. She had new MRI showing superior endplate L4 compression fracture.  Seen by orthospine who recommended right L3-4 TFESI.  Continues to have significant pain.    On last visit:     Left leg pain is 5/10 on VAS.    Lower back pain is 7/10 on VAS, at maximum 8 /10.  Pain is aching and dull in nature.  Referred to right groin, right leg  She has numbness and tingling in bilateral foot.  Pain is constant.  Improved by pain meds.  Worse with walking and standing.    R knee pain is 5/10 on VAS. Worse with weight bearing. She had R knee steroid injection with Rheumatology several months ago with excellent relief.     R shoulder pain is 6/10 on VAS. Normal ROM. Had shoulder bursa issues several years ago which improved with US treatments.     Migraines - She used to take Excedrin and Fioricet for daily migraines but it doesn't work anymore. She is scheduled to see Neurology.  She does wake up with migraines during night or early in morning. She would wake up with headache and goes away with Excedrin. She had 2-3 headaches over last month that didn't go away with Excedrin and lasted for rest of the day. She had seen someone for chiari malformation previous.  History of migraines since teenager and usually right-sided temporal area but states that new migraines are all over the head. Seen by neurology who recommended continuing excedrin.      Previous Injection:   12/5/2024-B/L TFESI L3-4- not sure if it helped.   12/3/2024-L4 kyphoplasty [employer]- some help  10/11/24 - L3 kyphoplasty - not sure if helped.   9/3/2024-LESI L3-4-no significant improvement.  4/18/2023- SCS trial [employer]- no significant pain relief. Coverage was excellent with patient able to feel paresthesia in all of the painful areas but unfortunately no significant pain relief.  No functional improvement as  well.  11/8/2022-right knee injection- good relief.  7/8/22 - LESI L5-S1 - no relief.   RFA at previous pain management - no relief.     Hx:  Referred by JEM Ashton for  lower back pain.  Pain started around 2008 without any significant injuries and has been getting worse since then.  Her main complaints today are mid back and lower back pain.  Patient had previously seen Dr. Voss and had epidurals, facet joint injections, ablations.  She states that nothing helped at the time. Last seen 1 year ago. Her lower back pain is worse than mid back pain. She is retired RN.   She has since seen [facility] spine (seen by JEM)- 2022 who recommended medication management.  Patient states that she is not interested in fusion surgery as there is 50% chance of lower back pain improvement. She had also injured her left knee and had steroid injection by Ortho in left knee.  Scheduled to see Ortho in 5 weeks. She has been seeing neurology for migraines and has been started on gabapentin.  Patient has taken gabapentin for about 1 month without significant improvement in her migraines.        PHQ-9- 4  SOAPP-2      PMH:   Tijerina's esophagus, hypertension, migraine, thoracic vertebra fx s/p kypho T7, T10, migraines, smoker, Back surgery- decompression L4-5?-2009 Dr. Rdz, Carpal tunnel surgery 1983 bilateral wrist; R knee steroid injection with orthopedics (Dr. Barton at [place]), s/p laminectomy with Dr. Cantrell on 2/10/2025.          Current Medications:   Percocet  mg   Gabapentin 600 mg TID  Ibuprofen 800 mg  Xarelto       Past Medications:  Tramadol  mg daily   Meloxicam 15 mg  Celebrex-leg swelling  Norco 5-325 mg BID PRN   Tramadol  mg q6H PRN  Lyrica      Past Modalities:  TENS:                                                                          no                                                  Physical Therapy Within The Last 6 Months              Yes (1.5 years ago with some help).    Psychotherapy                                                            no  Massage Therapy                                                       no     Patient Complains Of:  Uro-Fecal Incontinence          no  Weight Gain/Loss                   Yes (weight gain 25 lbs - 1 year after smoking).   Fever/Chills                             no  Weakness                               no            Current Outpatient Medications:     Acetaminophen-Caffeine (Excedrin Tension Headache) 500-65 MG tablet, Take 1 tablet by mouth Daily As Needed (headache). Indications: Mild Pain, Disp: , Rfl:     albuterol sulfate  (90 Base) MCG/ACT inhaler, Inhale 2 puffs Every 4 (Four) Hours As Needed for Wheezing., Disp: 6.7 g, Rfl: 0    Cetirizine HCl (ZyrTEC Allergy) 10 MG capsule, Take 10 mg by mouth Daily. Indications: Perennial Allergic Rhinitis, Disp: , Rfl:     denosumab (Prolia) 60 MG/ML solution prefilled syringe syringe, Inject 1 mL under the skin into the appropriate area as directed 1 (One) Time. Indications: Osteoporosis, Disp: , Rfl:     docusate sodium (COLACE) 100 MG capsule, Take 1 capsule by mouth 2 (Two) Times a Day. Indications: Constipation, Disp: , Rfl:     famotidine (PEPCID) 40 MG tablet, Take 1 tablet by mouth 2 (Two) Times a Day. Indications: Gastroesophageal Reflux Disease, Disp: , Rfl:     gabapentin (NEURONTIN) 600 MG tablet, Take 1 tablet by mouth 4 (Four) Times a Day for 90 days. Indications: Neuropathic Pain, Disp: 120 tablet, Rfl: 2    Melatonin 5 MG capsule, Take 5 mg by mouth At Night As Needed (sleep problem). (Patient taking differently: Take 5 mg by mouth At Night As Needed (sleep problem).), Disp: 30 each, Rfl: 0    Multiple Vitamins-Minerals (MULTIVITAMIN WITH MINERALS) tablet tablet, Take 1 tablet by mouth Daily. Indications: Nutritional Support, Disp: , Rfl:     omeprazole (priLOSEC) 40 MG capsule, Take 1 capsule by mouth Daily. Indications: Excess Stomach Secretions, Gastroesophageal  Reflux Disease, Disp: , Rfl:     [START ON 5/23/2025] oxyCODONE-acetaminophen (PERCOCET)  MG per tablet, Take 1 tablet by mouth Every 6 (Six) Hours As Needed for Moderate Pain for up to 30 days., Disp: 120 tablet, Rfl: 0    [START ON 6/22/2025] oxyCODONE-acetaminophen (PERCOCET)  MG per tablet, Take 1 tablet by mouth Every 6 (Six) Hours As Needed for Moderate Pain for up to 30 days., Disp: 120 tablet, Rfl: 0    vitamin D (ERGOCALCIFEROL) 1.25 MG (30026 UT) capsule capsule, Take 1 capsule by mouth 1 (One) Time Per Week. Indications: Vitamin D Deficiency, Disp: , Rfl:     Xarelto 15 MG tablet, , Disp: , Rfl:     guaiFENesin (MUCINEX) 600 MG 12 hr tablet, Take 1 tablet by mouth Every 12 (Twelve) Hours., Disp: , Rfl:     topiramate (TOPAMAX) 25 MG tablet, Take 1 tablet by mouth Daily. Indications: Migraine Headache, Disp: , Rfl:     The following portions of the patient's history were reviewed and updated as appropriate: allergies, current medications, past family history, past medical history, past social history, past surgical history, and problem list.      REVIEW OF PERTINENT MEDICAL DATA    Past Medical History:   Diagnosis Date    Arthritis     Itjerina esophagus     Chronic pain disorder     COVID-19     DJD (degenerative joint disease)     Extremity pain     Fall at home     Fall at home     fracture rt. leg    Foot pain, right     Fractures     GERD (gastroesophageal reflux disease)     Headache, tension-type     Hypertension     Joint pain     Leg pain, right     Low back pain     Migraine     Plantar fasciitis     Shingles     Shoulder pain, right     Sleep apnea     Spinal stenosis 9/4/2024    Epidural on 9/4     Past Surgical History:   Procedure Laterality Date    BACK SURGERY  2/2009    Decompression    CARPAL TUNNEL RELEASE      colin.    COLONOSCOPY      EPIDURAL BLOCK      EYE SURGERY      catarac    KNEE ARTHROSCOPY      SPINAL CORD STIMULATOR TRIAL W/ LAMINOTOMY      SPINE SURGERY       Kyphoplasty    TUBAL ABDOMINAL LIGATION      VERTEBROPLASTY Bilateral 10/11/2024    Procedure: VERTEBROPLASTY;  Surgeon: Kirk Nascimento DO;  Location: [facility] MAIN OR;  Service: Pain Management;  Laterality: Bilateral;    VERTEBROPLASTY Bilateral 12/3/2024    Procedure: KYPHOPLASTY at L4;  Surgeon: Kirk Nascimento DO;  Location: [facility] MAIN OR;  Service: Pain Management;  Laterality: Bilateral;    WRIST SURGERY      tendon release     Family History   Problem Relation Age of Onset    Cancer Mother     Migraines Father     COPD Father     Migraines Sister     Migraines Brother      Social History     Socioeconomic History    Marital status:    Tobacco Use    Smoking status: Former     Current packs/day: 0.00     Types: Cigarettes     Quit date: 1966     Years since quittin.3    Smokeless tobacco: Never   Vaping Use    Vaping status: Never Used   Substance and Sexual Activity    Alcohol use: Never    Drug use: Never    Sexual activity: Not Currently         Review of Systems   Musculoskeletal:  Positive for arthralgias and back pain.         Vitals:    25 1053   BP: 147/76   Pulse: 99   Resp: 16   SpO2: 95%   Weight: 79.8 kg (176 lb)   PainSc: 7                                        Objective   Physical Exam  Musculoskeletal:         General: Tenderness present.        Back:         Legs:    Neurological:      Comments: Motor strength 5/5 b/l LE  Sensory intact b/l LE             Imaging Reviewed:  MRI lumbar spine with and without contrast-4/3/2025  Previous kyphoplasty at L3 and L4  - Marrow edema along superior endplate of L4 suggesting subacute compression fracture  - Left-sided hemilaminectomies from L3-4 through L5-S1  L2-3-disc bulge, osteophyte, mild to moderate lateral recess and neuroforaminal stenosis on the left with more moderate neuroforaminal stenosis on the right  L3-4-disc bulge, posterior disc extrusion beyond vertebral body margin.  AP canal diameter of thecal sac 5 mm.  Left  hemilaminectomy.  Enhancement surgical site, postoperative finding.  Severe canal narrowing, lateral recess and right neuroforaminal stenosis with transiting and exiting right L3 nerve root contact.  Mild to moderate left neuroforaminal stenosis.  L4-5-grade 1 spondylolisthesis with moderate to advanced facet arthropathy.  Left hemilaminectomy.  Moderate lateral recess and neuroforaminal stenosis with potential transiting and exiting nerve root contact bilaterally  L5-S1-disc bulge, osteophyte formation on the left lateral component.  Moderate facet arthropathy.  Left hemilaminectomy.  Moderate lateral recess and right neuroforaminal stenosis.  Moderate to severe left neuroforaminal stenosis with likely contact of exiting L5 nerve root.  Potential additional bilateral transiting exiting right L5 nerve root contact.    Right hip x-ray-11/19/2024  - Unremarkable right hip    Right hip x-ray-9/5/2024  - Mild degenerative changes of each hip.    MRI lumbar spine without contrast-8/14/2024  - L2-3-disc bulge with left paracentral extrusion extending inferiorly.  Facet arthropathy.  Mild to moderate central canal stenosis and moderate bilateral foraminal stenosis  L3-4-disc bulge with small protrusion, bilateral facet arthropathy with ligamentum flavum hypertrophy causing severe central canal stenosis and severe right and moderate left foraminal stenosis  J1-2-blnnbzbha facet hypertrophy resulting in 5 mm of L4 anterolisthesis.  Severe central canal stenosis, moderate bilateral foraminal stenosis and bilateral L5 lateral recess stenosis most severe on the right  L5-S1-left greater than right facet arthropathy.  Moderate central canal stenosis.  Possible compression of left S1 nerve root prior to entering the lateral recess.  Moderate right and severe left foraminal stenosis.  - Redundancy of the cauda equina nerve roots due to spinal stenosis.  - L3 inferior endplate subacute compression fracture with mild bone marrow  edema which is new prior to previous imaging- subacute fracture.    MRI thoracic spine-6/13/2022  Kyphoplasty at T7 and T10.  - Residual mild to moderate compression fracture deformities  - Mild posterior disc bulges at multiple thoracic levels.  No large extrusions.    MRI brain wo contrast: 8/15/2018  Chiari Malformation type 1 with cerebellar tonsils projecting 7 mm below level of foramen magnum  Chronic b/l maxillary and ethmoid sinusitis     Assessment:    1. Postlaminectomy syndrome    2. Lumbar spondylosis    3. Spinal stenosis of lumbar region with neurogenic claudication    4. High risk medication use    5. S/P kyphoplasty    6. Right hip pain        Plan:   1.    UDS consistent with patient's interview on 2/27/2024.   Narcotic contract signed-5/10/2022.  Narcan sent-6/20/2022.  2. We discussed trying a course of formal physical therapy.  Physical therapy can help strengthen and stretch the muscles around the joints. Continue to be as active as possible. Start physical therapy as it will help generalized pain and follow up with HEP.  PT prescription sent to [facility] in [place] as requested by patient.  3.  Continue Percocet  QID PRN (5/23; 6/22).  Unable to decrease.  Discussed with the patient regarding long-term side effects of opioids including but not limited to opioid induced hormonal suppression, hyperalgesia, fatigue, weight gain, possible opioid induced altered immune system, addiction, tolerance, dependence, risk of hearing loss and elevated risk of myocardial infarction. Proper use and potential life threatening side effects of over use discussed with patient. Patient states understanding of their use and risks.  Opioid Education for patient's receiving narcotics for pain: Patient was instructed regarding the risks, benefits, alternative forms of treatment, as well as potential development of tolerance and/or addiction. Patient was instructed to take the medication strictly as ordered,  not to share the medication with others, not to drive while taking opioids, and to take no other sedatives/pain medications or alcohol while taking this prescription. The patient was instructed to wean off of the pain medication as healing occurs and pain resolves.   5. Continues Ibuprofen 800 mg TID PRN-sent for 6 months on 10/8/2024 patient informed of increased risk of heart attacks, stroke and kidney problems in addition to gastric ulcers with use of non-steroidal anti-inflammatory medications.  6.  Increase gabapentin to 600 mg QID. Side effects discussed with the patient including but not limited to somnolence, dizziness, ataxia, headache, fatigue, blurred vision, cold or flu-like symptoms,delusions, hoarseness, lack or loss of strength, lower back or side pain, swelling of the hands, feet, or lower legs trembling or shaking. Patient understands and agrees with the plan.  7. Continues to have severe right hip pain.  Right hip x-ray does not look severe but she continues to have severe right groin pain that is not responsive to epidurals and other pain medications.  Discussed possibility of right hip intra-articular injection.  Will discuss this in future.  8.  Discussed right TFESI L3-4 that was recommended by orthospine due to right sided radiculopathy.  Patient had this injection done in December and did not have significant pain relief.  Patient states that she is not interested in repeating this injection.      RTC in 2 months.    Kirk Nascimento DO  Pain Management   [facility]           INSPECT REPORT    As part of the patient's treatment plan, I may be prescribing controlled substances. The patient has been made aware of appropriate use of such medications, including potential risk of somnolence, limited ability to drive and/or work safely, and the potential for dependence or overdose. It has also been made clear that these medications are for use by this patient only, without concomitant use of  alcohol or other substances unless prescribed.     Patient has completed prescribing agreement detailing terms of continued prescribing of controlled substances, including monitoring INSPECT reports, urine drug screening, and pill counts if necessary. The patient is aware that inappropriate use will results in cessation of prescribing such medications.    INSPECT report has been reviewed and scanned into the patient's chart.

## 2025-04-29 ENCOUNTER — OFFICE VISIT (OUTPATIENT)
Dept: PAIN MEDICINE | Facility: CLINIC | Age: 75
End: 2025-04-29
Payer: MEDICARE

## 2025-04-29 VITALS
BODY MASS INDEX: 30.21 KG/M2 | HEART RATE: 99 BPM | DIASTOLIC BLOOD PRESSURE: 76 MMHG | OXYGEN SATURATION: 95 % | SYSTOLIC BLOOD PRESSURE: 147 MMHG | WEIGHT: 176 LBS | RESPIRATION RATE: 16 BRPM

## 2025-04-29 DIAGNOSIS — Z79.899 HIGH RISK MEDICATION USE: ICD-10-CM

## 2025-04-29 DIAGNOSIS — M96.1 POSTLAMINECTOMY SYNDROME: ICD-10-CM

## 2025-04-29 DIAGNOSIS — M47.816 LUMBAR SPONDYLOSIS: ICD-10-CM

## 2025-04-29 DIAGNOSIS — Z98.890 S/P KYPHOPLASTY: ICD-10-CM

## 2025-04-29 DIAGNOSIS — M25.551 RIGHT HIP PAIN: ICD-10-CM

## 2025-04-29 DIAGNOSIS — M48.062 SPINAL STENOSIS OF LUMBAR REGION WITH NEUROGENIC CLAUDICATION: ICD-10-CM

## 2025-04-29 RX ORDER — OXYCODONE AND ACETAMINOPHEN 10; 325 MG/1; MG/1
1 TABLET ORAL EVERY 6 HOURS PRN
Qty: 120 TABLET | Refills: 0 | Status: SHIPPED | OUTPATIENT
Start: 2025-06-22 | End: 2025-07-22

## 2025-04-29 RX ORDER — GABAPENTIN 600 MG/1
600 TABLET ORAL 4 TIMES DAILY
Qty: 120 TABLET | Refills: 2 | Status: SHIPPED | OUTPATIENT
Start: 2025-04-29 | End: 2025-07-28

## 2025-04-29 RX ORDER — OXYCODONE AND ACETAMINOPHEN 10; 325 MG/1; MG/1
1 TABLET ORAL EVERY 6 HOURS PRN
Qty: 120 TABLET | Refills: 0 | Status: SHIPPED | OUTPATIENT
Start: 2025-05-23 | End: 2025-06-22

## 2025-06-02 ENCOUNTER — ON CAMPUS - OUTPATIENT (AMBULATORY)
Dept: URBAN - METROPOLITAN AREA HOSPITAL 2 | Facility: HOSPITAL | Age: 75
End: 2025-06-02
Payer: COMMERCIAL

## 2025-06-02 ENCOUNTER — OFFICE (AMBULATORY)
Dept: URBAN - METROPOLITAN AREA PATHOLOGY 19 | Facility: PATHOLOGY | Age: 75
End: 2025-06-02
Payer: COMMERCIAL

## 2025-06-02 VITALS
HEIGHT: 67 IN | RESPIRATION RATE: 15 BRPM | HEART RATE: 70 BPM | WEIGHT: 182 LBS | OXYGEN SATURATION: 98 % | TEMPERATURE: 98.2 F | OXYGEN SATURATION: 100 % | SYSTOLIC BLOOD PRESSURE: 155 MMHG | HEART RATE: 89 BPM | OXYGEN SATURATION: 99 % | HEART RATE: 71 BPM | RESPIRATION RATE: 16 BRPM | HEART RATE: 85 BPM | SYSTOLIC BLOOD PRESSURE: 138 MMHG | RESPIRATION RATE: 18 BRPM | SYSTOLIC BLOOD PRESSURE: 129 MMHG | DIASTOLIC BLOOD PRESSURE: 78 MMHG | DIASTOLIC BLOOD PRESSURE: 69 MMHG | DIASTOLIC BLOOD PRESSURE: 74 MMHG | SYSTOLIC BLOOD PRESSURE: 128 MMHG | SYSTOLIC BLOOD PRESSURE: 134 MMHG | SYSTOLIC BLOOD PRESSURE: 142 MMHG | DIASTOLIC BLOOD PRESSURE: 86 MMHG | RESPIRATION RATE: 17 BRPM | HEART RATE: 76 BPM

## 2025-06-02 DIAGNOSIS — K20.80 OTHER ESOPHAGITIS WITHOUT BLEEDING: ICD-10-CM

## 2025-06-02 DIAGNOSIS — K21.9 GASTRO-ESOPHAGEAL REFLUX DISEASE WITHOUT ESOPHAGITIS: ICD-10-CM

## 2025-06-02 DIAGNOSIS — K44.9 DIAPHRAGMATIC HERNIA WITHOUT OBSTRUCTION OR GANGRENE: ICD-10-CM

## 2025-06-02 DIAGNOSIS — K22.70 BARRETT'S ESOPHAGUS WITHOUT DYSPLASIA: ICD-10-CM

## 2025-06-02 DIAGNOSIS — R10.13 EPIGASTRIC PAIN: ICD-10-CM

## 2025-06-02 PROCEDURE — 88305 TISSUE EXAM BY PATHOLOGIST: CPT | Mod: 26 | Performed by: PATHOLOGY

## 2025-06-02 PROCEDURE — 43239 EGD BIOPSY SINGLE/MULTIPLE: CPT | Performed by: INTERNAL MEDICINE

## 2025-07-14 NOTE — PROGRESS NOTES
Subjective   Nola Tariq is a 75 y.o. female is here for follow-up for lower back pain.  Patient was last seen on 4/29/2025. Getting worked up with hem/onc due to abnormal labs. She had bone survey at [facility].  Continues to have severe lower back pain with radiculopathy.  She states that Percocet is not lasting as long.    On last visit: Increased gabapentin- not sure if it helps.     Left leg pain is 5/10 on VAS.    Lower back pain is 7/10 on VAS, at maximum 8 /10.  Pain is aching and dull in nature.  Referred to right groin, right leg  She has numbness and tingling in bilateral foot.  Pain is constant.  Improved by pain meds.  Worse with walking and standing.    R knee pain is 5/10 on VAS. Worse with weight bearing. She had R knee steroid injection with Rheumatology several months ago with excellent relief.     R shoulder pain is 6/10 on VAS. Normal ROM. Had shoulder bursa issues several years ago which improved with US treatments.     Migraines - She used to take Excedrin and Fioricet for daily migraines but it doesn't work anymore. She is scheduled to see Neurology.  She does wake up with migraines during night or early in morning. She would wake up with headache and goes away with Excedrin. She had 2-3 headaches over last month that didn't go away with Excedrin and lasted for rest of the day. She had seen someone for chiari malformation previous.  History of migraines since teenager and usually right-sided temporal area but states that new migraines are all over the head. Seen by neurology who recommended continuing excedrin.      Previous Injection:   12/5/2024-B/L TFESI L3-4- not sure if it helped.   12/3/2024-L4 kyphoplasty [employer]- some help  10/11/24 - L3 kyphoplasty - not sure if helped.   9/3/2024-LESI L3-4-no significant improvement.  4/18/2023- SCS trial [email or web address]- no significant pain relief. Coverage was excellent with patient able to feel paresthesia in all of the painful areas  but unfortunately no significant pain relief.  No functional improvement as well.  11/8/2022-right knee injection- good relief.  7/8/22 - LESI L5-S1 - no relief.   RFA at previous pain management - no relief.     Hx:  Referred by JEM Ashton for  lower back pain.  Pain started around 2008 without any significant injuries and has been getting worse since then.  Her main complaints today are mid back and lower back pain.  Patient had previously seen Dr. Voss and had epidurals, facet joint injections, ablations.  She states that nothing helped at the time. Last seen 1 year ago. Her lower back pain is worse than mid back pain. She is retired RN.   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  Patient states that she is not interested in fusion surgery as there is 50% chance of lower back pain improvement. She had also injured her left knee and had steroid injection by Ortho in left knee.  Scheduled to see Ortho in 5 weeks. She has been seeing neurology for migraines and has been started on gabapentin.  Patient has taken gabapentin for about 1 month without significant improvement in her migraines.   4/29/2025-Seen by orthospine who recommended right L3-4 TFESI.        PHQ-9- 4  SOAPP-2      PMH:   Tijerina's esophagus, hypertension, migraine, thoracic vertebra fx s/p kypho T7, T10, migraines, smoker, Back surgery- decompression L4-5?-2009 Dr. Rdz, Carpal tunnel surgery 1983 bilateral wrist; R knee steroid injection with orthopedics (Dr. Barton at [place]), s/p laminectomy with Dr. Cantrell on 2/10/2025.          Current Medications:   Percocet  mg   Gabapentin 600 mg TID  Ibuprofen 800 mg  Xarelto       Past Medications:  Tramadol  mg daily   Meloxicam 15 mg  Celebrex-leg swelling  Norco 5-325 mg BID PRN   Tramadol  mg q6H PRN  Lyrica      Past Modalities:  TENS:                                                                          no                                                   Physical Therapy Within The Last 6 Months              Yes (1.5 years ago with some help).   Psychotherapy                                                            no  Massage Therapy                                                       no     Patient Complains Of:  Uro-Fecal Incontinence          no  Weight Gain/Loss                   Yes (weight gain 25 lbs - 1 year after smoking).   Fever/Chills                             no  Weakness                               no            Current Outpatient Medications:     Acetaminophen-Caffeine (Excedrin Tension Headache) 500-65 MG tablet, Take 1 tablet by mouth Daily As Needed (headache). Indications: Mild Pain, Disp: , Rfl:     albuterol sulfate  (90 Base) MCG/ACT inhaler, Inhale 2 puffs Every 4 (Four) Hours As Needed for Wheezing., Disp: 6.7 g, Rfl: 0    Cetirizine HCl (ZyrTEC Allergy) 10 MG capsule, Take 10 mg by mouth Daily. Indications: Perennial Allergic Rhinitis, Disp: , Rfl:     denosumab (Prolia) 60 MG/ML solution prefilled syringe syringe, Inject 1 mL under the skin into the appropriate area as directed 1 (One) Time. Indications: Osteoporosis, Disp: , Rfl:     docusate sodium (COLACE) 100 MG capsule, Take 1 capsule by mouth 2 (Two) Times a Day. Indications: Constipation, Disp: , Rfl:     famotidine (PEPCID) 40 MG tablet, Take 1 tablet by mouth 2 (Two) Times a Day. Indications: Gastroesophageal Reflux Disease, Disp: , Rfl:     gabapentin (NEURONTIN) 600 MG tablet, Take 1 tablet by mouth 4 (Four) Times a Day for 90 days. Indications: Neuropathic Pain, Disp: 120 tablet, Rfl: 2    Melatonin 5 MG capsule, Take 5 mg by mouth At Night As Needed (sleep problem). (Patient taking differently: Take 5 mg by mouth At Night As Needed (sleep problem).), Disp: 30 each, Rfl: 0    Multiple Vitamins-Minerals (MULTIVITAMIN WITH MINERALS) tablet tablet, Take 1 tablet by mouth Daily. Indications: Nutritional Support, Disp: , Rfl:      omeprazole (priLOSEC) 40 MG capsule, Take 1 capsule by mouth Daily. Indications: Excess Stomach Secretions, Gastroesophageal Reflux Disease, Disp: , Rfl:     oxyCODONE-acetaminophen (PERCOCET)  MG per tablet, Take 1 tablet by mouth Every 6 (Six) Hours As Needed for Moderate Pain for up to 30 days., Disp: 120 tablet, Rfl: 0    vitamin D (ERGOCALCIFEROL) 1.25 MG (26411 UT) capsule capsule, Take 1 capsule by mouth 1 (One) Time Per Week. Indications: Vitamin D Deficiency, Disp: , Rfl:     Xarelto 15 MG tablet, , Disp: , Rfl:     guaiFENesin (MUCINEX) 600 MG 12 hr tablet, Take 1 tablet by mouth Every 12 (Twelve) Hours., Disp: , Rfl:     topiramate (TOPAMAX) 25 MG tablet, Take 1 tablet by mouth Daily. Indications: Migraine Headache, Disp: , Rfl:     The following portions of the patient's history were reviewed and updated as appropriate: allergies, current medications, past family history, past medical history, past social history, past surgical history, and problem list.      REVIEW OF PERTINENT MEDICAL DATA    Past Medical History:   Diagnosis Date    Anemia     Arthritis     Tijerina esophagus     Chronic kidney disease     Chronic pain disorder     COVID-19     DJD (degenerative joint disease)     Extremity pain     Fall at home     Fall at home     fracture rt. leg    Foot pain, right     Fractures     GERD (gastroesophageal reflux disease)     Headache, tension-type     Hypertension     Joint pain     Leg pain, right     Low back pain     Migraine     Plantar fasciitis     Shingles     Shoulder pain, right     Sleep apnea     Spinal stenosis 9/4/2024    Epidural on 9/4     Past Surgical History:   Procedure Laterality Date    BACK SURGERY  2/2009    Decompression    CARPAL TUNNEL RELEASE      colin.    COLONOSCOPY      EPIDURAL BLOCK      EYE SURGERY      catarac    KNEE ARTHROSCOPY      SPINAL CORD STIMULATOR TRIAL W/ LAMINOTOMY      SPINE SURGERY      Kyphoplasty    TUBAL ABDOMINAL LIGATION      VERTEBROPLASTY  Bilateral 10/11/2024    Procedure: VERTEBROPLASTY;  Surgeon: Kirk Nascimento DO;  Location: [facility] MAIN OR;  Service: Pain Management;  Laterality: Bilateral;    VERTEBROPLASTY Bilateral 12/3/2024    Procedure: KYPHOPLASTY at L4;  Surgeon: Kirk Nascimento DO;  Location: [facility] MAIN OR;  Service: Pain Management;  Laterality: Bilateral;    WRIST SURGERY      tendon release     Family History   Problem Relation Age of Onset    Cancer Mother     Migraines Father     COPD Father     Migraines Sister     Migraines Brother      Social History     Socioeconomic History    Marital status:    Tobacco Use    Smoking status: Former     Current packs/day: 0.00     Types: Cigarettes     Quit date: 1966     Years since quittin.5    Smokeless tobacco: Never   Vaping Use    Vaping status: Never Used   Substance and Sexual Activity    Alcohol use: Never    Drug use: Never    Sexual activity: Not Currently         Review of Systems   Musculoskeletal:  Positive for arthralgias and back pain.         Vitals:    07/15/25 1100   BP: 115/73   Pulse: 85   Resp: 16   SpO2: 95%   Weight: 79.8 kg (176 lb)   PainSc: 6            Objective   Physical Exam  Musculoskeletal:         General: Tenderness present.        Back:         Legs:    Neurological:      Comments: Motor strength 5/5 b/l LE  Sensory intact b/l LE             Imaging Reviewed:  MRI lumbar spine with and without contrast-4/3/2025  Previous kyphoplasty at L3 and L4  - Marrow edema along superior endplate of L4 suggesting subacute compression fracture  - Left-sided hemilaminectomies from L3-4 through L5-S1  L2-3-disc bulge, osteophyte, mild to moderate lateral recess and neuroforaminal stenosis on the left with more moderate neuroforaminal stenosis on the right  L3-4-disc bulge, posterior disc extrusion beyond vertebral body margin.  AP canal diameter of thecal sac 5 mm.  Left hemilaminectomy.  Enhancement surgical site, postoperative finding.  Severe canal narrowing,  lateral recess and right neuroforaminal stenosis with transiting and exiting right L3 nerve root contact.  Mild to moderate left neuroforaminal stenosis.  L4-5-grade 1 spondylolisthesis with moderate to advanced facet arthropathy.  Left hemilaminectomy.  Moderate lateral recess and neuroforaminal stenosis with potential transiting and exiting nerve root contact bilaterally  L5-S1-disc bulge, osteophyte formation on the left lateral component.  Moderate facet arthropathy.  Left hemilaminectomy.  Moderate lateral recess and right neuroforaminal stenosis.  Moderate to severe left neuroforaminal stenosis with likely contact of exiting L5 nerve root.  Potential additional bilateral transiting exiting right L5 nerve root contact.    Right hip x-ray-11/19/2024  - Unremarkable right hip    Right hip x-ray-9/5/2024  - Mild degenerative changes of each hip.    MRI lumbar spine without contrast-8/14/2024  - L2-3-disc bulge with left paracentral extrusion extending inferiorly.  Facet arthropathy.  Mild to moderate central canal stenosis and moderate bilateral foraminal stenosis  L3-4-disc bulge with small protrusion, bilateral facet arthropathy with ligamentum flavum hypertrophy causing severe central canal stenosis and severe right and moderate left foraminal stenosis  [id number] facet hypertrophy resulting in 5 mm of L4 anterolisthesis.  Severe central canal stenosis, moderate bilateral foraminal stenosis and bilateral L5 lateral recess stenosis most severe on the right  L5-S1-left greater than right facet arthropathy.  Moderate central canal stenosis.  Possible compression of left S1 nerve root prior to entering the lateral recess.  Moderate right and severe left foraminal stenosis.  - Redundancy of the cauda equina nerve roots due to spinal stenosis.  - L3 inferior endplate subacute compression fracture with mild bone marrow edema which is new prior to previous imaging- subacute fracture.    MRI thoracic  spine-6/13/2022  Kyphoplasty at T7 and T10.  - Residual mild to moderate compression fracture deformities  - Mild posterior disc bulges at multiple thoracic levels.  No large extrusions.    MRI brain wo contrast: 8/15/2018  Chiari Malformation type 1 with cerebellar tonsils projecting 7 mm below level of foramen magnum  Chronic b/l maxillary and ethmoid sinusitis     Assessment:    1. Postlaminectomy syndrome    2. Spinal stenosis of lumbar region with neurogenic claudication    3. High risk medication use    4. S/P kyphoplasty    5. Right hip pain    6. Lumbar spondylosis          Plan:   1.    UDS consistent with patient's interview on 2/27/2024.   Narcotic contract signed-5/10/2022.  Narcan sent-6/20/2022.  2. We discussed trying a course of formal physical therapy.  Physical therapy can help strengthen and stretch the muscles around the joints. Continue to be as active as possible. Start physical therapy as it will help generalized pain and follow up with HEP.  PT prescription sent to [facility] in [place] as requested by patient.  3.  Start MS Contin 15 mg q12h. we will consider increasing it to 30 mg in future.  3.  Decrease Percocet  mg twice daily as needed for breakthrough pain and only if absolutely needed.  Discussed with the patient regarding long-term side effects of opioids including but not limited to opioid induced hormonal suppression, hyperalgesia, fatigue, weight gain, possible opioid induced altered immune system, addiction, tolerance, dependence, risk of hearing loss and elevated risk of myocardial infarction. Proper use and potential life threatening side effects of over use discussed with patient. Patient states understanding of their use and risks.  Opioid Education for patient's receiving narcotics for pain: Patient was instructed regarding the risks, benefits, alternative forms of treatment, as well as potential development of tolerance and/or addiction. Patient was instructed to  take the medication strictly as ordered, not to share the medication with others, not to drive while taking opioids, and to take no other sedatives/pain medications or alcohol while taking this prescription. The patient was instructed to wean off of the pain medication as healing occurs and pain resolves.   5. Continues Ibuprofen 800 mg TID PRN-sent for 6 months on 10/8/2024 patient informed of increased risk of heart attacks, stroke and kidney problems in addition to gastric ulcers with use of non-steroidal anti-inflammatory medications.  6.  Increase gabapentin to 800 mg QID. Side effects discussed with the patient including but not limited to somnolence, dizziness, ataxia, headache, fatigue, blurred vision, cold or flu-like symptoms,delusions, hoarseness, lack or loss of strength, lower back or side pain, swelling of the hands, feet, or lower legs trembling or shaking. Patient understands and agrees with the plan.  7. Continues to have severe right hip pain.  Right hip x-ray does not look severe but she continues to have severe right groin pain that is not responsive to epidurals and other pain medications.  Discussed possibility of right hip intra-articular injection.  Will discuss this in future.  8.  Discussed right TFESI L3-4 that was recommended by orthospine due to right sided radiculopathy.  Patient had this injection done in December and did not have significant pain relief.  Patient states that she is not interested in repeating this injection.  9.  Discussed with patient that another option if pain is not well-controlled with pain medication would be hospice.      RTC before 8/21/2025.    Kirk Nascimento DO  Pain Management   [facility]           INSPECT REPORT    As part of the patient's treatment plan, I may be prescribing controlled substances. The patient has been made aware of appropriate use of such medications, including potential risk of somnolence, limited ability to drive and/or work safely,  and the potential for dependence or overdose. It has also been made clear that these medications are for use by this patient only, without concomitant use of alcohol or other substances unless prescribed.     Patient has completed prescribing agreement detailing terms of continued prescribing of controlled substances, including monitoring INSPECT reports, urine drug screening, and pill counts if necessary. The patient is aware that inappropriate use will results in cessation of prescribing such medications.    INSPECT report has been reviewed and scanned into the patient's chart.

## 2025-07-15 ENCOUNTER — OFFICE VISIT (OUTPATIENT)
Dept: PAIN MEDICINE | Facility: CLINIC | Age: 75
End: 2025-07-15
Payer: MEDICARE

## 2025-07-15 VITALS
WEIGHT: 176 LBS | OXYGEN SATURATION: 95 % | DIASTOLIC BLOOD PRESSURE: 73 MMHG | SYSTOLIC BLOOD PRESSURE: 115 MMHG | HEART RATE: 85 BPM | RESPIRATION RATE: 16 BRPM | BODY MASS INDEX: 30.21 KG/M2

## 2025-07-15 DIAGNOSIS — M48.062 SPINAL STENOSIS OF LUMBAR REGION WITH NEUROGENIC CLAUDICATION: ICD-10-CM

## 2025-07-15 DIAGNOSIS — Z98.890 S/P KYPHOPLASTY: ICD-10-CM

## 2025-07-15 DIAGNOSIS — M25.551 RIGHT HIP PAIN: ICD-10-CM

## 2025-07-15 DIAGNOSIS — Z79.899 HIGH RISK MEDICATION USE: ICD-10-CM

## 2025-07-15 DIAGNOSIS — M47.816 LUMBAR SPONDYLOSIS: ICD-10-CM

## 2025-07-15 DIAGNOSIS — M96.1 POSTLAMINECTOMY SYNDROME: ICD-10-CM

## 2025-07-15 RX ORDER — GABAPENTIN 800 MG/1
800 TABLET ORAL 4 TIMES DAILY
Qty: 120 TABLET | Refills: 2 | Status: SHIPPED | OUTPATIENT
Start: 2025-07-15

## 2025-07-15 RX ORDER — MORPHINE SULFATE 15 MG/1
15 TABLET, FILM COATED, EXTENDED RELEASE ORAL 2 TIMES DAILY
Qty: 60 TABLET | Refills: 0 | Status: SHIPPED | OUTPATIENT
Start: 2025-07-22 | End: 2025-08-21

## 2025-07-15 RX ORDER — GABAPENTIN 600 MG/1
600 TABLET ORAL 4 TIMES DAILY
Qty: 120 TABLET | Refills: 2 | Status: CANCELLED | OUTPATIENT
Start: 2025-07-15 | End: 2025-10-13

## 2025-07-15 RX ORDER — MORPHINE SULFATE 15 MG/1
15 TABLET, FILM COATED, EXTENDED RELEASE ORAL 2 TIMES DAILY
Qty: 14 TABLET | Refills: 0 | Status: SHIPPED | OUTPATIENT
Start: 2025-07-15 | End: 2025-07-22

## 2025-07-15 RX ORDER — OXYCODONE AND ACETAMINOPHEN 10; 325 MG/1; MG/1
1 TABLET ORAL EVERY 6 HOURS PRN
Qty: 120 TABLET | Refills: 0 | Status: CANCELLED | OUTPATIENT
Start: 2025-07-15 | End: 2025-08-14

## 2025-07-25 DIAGNOSIS — M48.062 SPINAL STENOSIS OF LUMBAR REGION WITH NEUROGENIC CLAUDICATION: ICD-10-CM

## 2025-07-25 DIAGNOSIS — M47.816 LUMBAR SPONDYLOSIS: ICD-10-CM

## 2025-07-25 DIAGNOSIS — Z79.899 HIGH RISK MEDICATION USE: ICD-10-CM

## 2025-07-25 DIAGNOSIS — M25.551 RIGHT HIP PAIN: ICD-10-CM

## 2025-07-25 DIAGNOSIS — M96.1 POSTLAMINECTOMY SYNDROME: ICD-10-CM

## 2025-07-25 DIAGNOSIS — Z98.890 S/P KYPHOPLASTY: ICD-10-CM

## 2025-07-25 RX ORDER — MORPHINE SULFATE 15 MG/1
15 TABLET, FILM COATED, EXTENDED RELEASE ORAL 2 TIMES DAILY
Qty: 60 TABLET | Refills: 0 | Status: SHIPPED | OUTPATIENT
Start: 2025-07-25 | End: 2025-08-24

## 2025-08-14 ENCOUNTER — OFFICE (AMBULATORY)
Dept: URBAN - METROPOLITAN AREA CLINIC 64 | Facility: CLINIC | Age: 75
End: 2025-08-14
Payer: COMMERCIAL

## 2025-08-14 VITALS
SYSTOLIC BLOOD PRESSURE: 152 MMHG | HEIGHT: 67 IN | SYSTOLIC BLOOD PRESSURE: 150 MMHG | DIASTOLIC BLOOD PRESSURE: 90 MMHG | HEART RATE: 66 BPM | WEIGHT: 182 LBS

## 2025-08-14 DIAGNOSIS — K21.9 GASTRO-ESOPHAGEAL REFLUX DISEASE WITHOUT ESOPHAGITIS: ICD-10-CM

## 2025-08-14 DIAGNOSIS — K44.9 DIAPHRAGMATIC HERNIA WITHOUT OBSTRUCTION OR GANGRENE: ICD-10-CM

## 2025-08-14 DIAGNOSIS — K22.70 BARRETT'S ESOPHAGUS WITHOUT DYSPLASIA: ICD-10-CM

## 2025-08-14 PROCEDURE — 99213 OFFICE O/P EST LOW 20 MIN: CPT | Performed by: NURSE PRACTITIONER

## 2025-08-14 RX ORDER — ESOMEPRAZOLE MAGNESIUM 40 MG/1
80 CAPSULE, DELAYED RELEASE ORAL
Qty: 60 | Refills: 12 | Status: ACTIVE
Start: 2025-08-14

## 2025-08-20 ENCOUNTER — OFFICE VISIT (OUTPATIENT)
Dept: PAIN MEDICINE | Facility: CLINIC | Age: 75
End: 2025-08-20
Payer: MEDICARE

## 2025-08-20 VITALS
OXYGEN SATURATION: 95 % | DIASTOLIC BLOOD PRESSURE: 78 MMHG | SYSTOLIC BLOOD PRESSURE: 134 MMHG | WEIGHT: 184 LBS | HEART RATE: 66 BPM | RESPIRATION RATE: 16 BRPM | BODY MASS INDEX: 31.58 KG/M2

## 2025-08-20 DIAGNOSIS — Z79.899 HIGH RISK MEDICATION USE: ICD-10-CM

## 2025-08-20 DIAGNOSIS — M96.1 FAILED BACK SURGICAL SYNDROME: ICD-10-CM

## 2025-08-20 DIAGNOSIS — M48.062 SPINAL STENOSIS OF LUMBAR REGION WITH NEUROGENIC CLAUDICATION: ICD-10-CM

## 2025-08-20 DIAGNOSIS — M25.551 RIGHT HIP PAIN: ICD-10-CM

## 2025-08-20 DIAGNOSIS — M96.1 POSTLAMINECTOMY SYNDROME: Primary | ICD-10-CM

## 2025-08-20 DIAGNOSIS — Z98.890 S/P KYPHOPLASTY: ICD-10-CM

## 2025-08-20 RX ORDER — MORPHINE SULFATE 30 MG/1
30 TABLET, FILM COATED, EXTENDED RELEASE ORAL 2 TIMES DAILY
Qty: 60 TABLET | Refills: 0 | Status: SHIPPED | OUTPATIENT
Start: 2025-08-23 | End: 2025-09-22

## 2025-08-20 RX ORDER — MORPHINE SULFATE 30 MG/1
30 TABLET, FILM COATED, EXTENDED RELEASE ORAL 2 TIMES DAILY
Qty: 60 TABLET | Refills: 0 | Status: SHIPPED | OUTPATIENT
Start: 2025-09-23 | End: 2025-10-23

## (undated) DEVICE — WET SKIN PREP TRAY: Brand: MEDLINE INDUSTRIES, INC.

## (undated) DEVICE — C-ARM: Brand: UNBRANDED

## (undated) DEVICE — BONE TAMP KIT KEX152EB FF E2 15/2 OI: Brand: KYPHON EXPRESS II KYPHOPAK TRAY

## (undated) DEVICE — DRP C/ARMOR

## (undated) DEVICE — ORG INST STRIP/TS ADHS 2X10IN YEL STRL

## (undated) DEVICE — GLV SURG BIOGEL LTX PF 8

## (undated) DEVICE — SMOKE EVACUATION TUBING WITH 7/8 IN TO 1/4 IN REDUCER: Brand: BUFFALO FILTER

## (undated) DEVICE — 3M™ IOBAN™ 2 ANTIMICROBIAL INCISE DRAPE 6650EZ: Brand: IOBAN™ 2

## (undated) DEVICE — PK MINOR LAPAROTOMY 50

## (undated) DEVICE — GLV SURG SIGNATURE ESSENTIAL PF LTX SZ7.5

## (undated) DEVICE — NDL SPINE 22G 5IN BLK

## (undated) DEVICE — DRAPE SHEET ULTRAGARD: Brand: MEDLINE

## (undated) DEVICE — INTENDED FOR TISSUE SEPARATION, AND OTHER PROCEDURES THAT REQUIRE A SHARP SURGICAL BLADE TO PUNCTURE OR CUT.: Brand: BARD-PARKER ® CARBON RIB-BACK BLADES

## (undated) DEVICE — UNDERGLV SURG BIOGEL INDICAT PI SZ8 BLU

## (undated) DEVICE — KT SURG TURNOVER 050

## (undated) DEVICE — UNDERGLV SURG BIOGEL INDICATOR LF PF 7.5

## (undated) DEVICE — SYR LUERLOK 30CC

## (undated) DEVICE — DRSNG TELFA PAD NONADH STR 1S 3X4IN